# Patient Record
Sex: FEMALE | Race: WHITE | Employment: FULL TIME | URBAN - METROPOLITAN AREA
[De-identification: names, ages, dates, MRNs, and addresses within clinical notes are randomized per-mention and may not be internally consistent; named-entity substitution may affect disease eponyms.]

---

## 2017-01-14 ENCOUNTER — HOSPITAL ENCOUNTER (EMERGENCY)
Age: 52
Discharge: HOME | End: 2017-01-14
Attending: EMERGENCY MEDICINE | Admitting: EMERGENCY MEDICINE
Payer: COMMERCIAL

## 2017-01-14 ENCOUNTER — APPOINTMENT (OUTPATIENT)
Dept: GENERAL RADIOLOGY | Age: 52
End: 2017-01-14
Attending: EMERGENCY MEDICINE
Payer: COMMERCIAL

## 2017-01-14 ENCOUNTER — APPOINTMENT (OUTPATIENT)
Dept: CT IMAGING | Age: 52
End: 2017-01-14
Attending: EMERGENCY MEDICINE
Payer: COMMERCIAL

## 2017-01-14 VITALS
TEMPERATURE: 98.4 F | WEIGHT: 131 LBS | BODY MASS INDEX: 22.36 KG/M2 | RESPIRATION RATE: 16 BRPM | DIASTOLIC BLOOD PRESSURE: 82 MMHG | SYSTOLIC BLOOD PRESSURE: 117 MMHG | HEIGHT: 64 IN | HEART RATE: 102 BPM | OXYGEN SATURATION: 97 %

## 2017-01-14 DIAGNOSIS — E87.6 HYPOKALEMIA: ICD-10-CM

## 2017-01-14 DIAGNOSIS — E83.51 HYPOCALCEMIA: ICD-10-CM

## 2017-01-14 DIAGNOSIS — N30.00 ACUTE CYSTITIS WITHOUT HEMATURIA: ICD-10-CM

## 2017-01-14 DIAGNOSIS — D69.6 THROMBOCYTOPENIA (HCC): ICD-10-CM

## 2017-01-14 DIAGNOSIS — R56.9 SEIZURE (HCC): Primary | ICD-10-CM

## 2017-01-14 LAB
ALBUMIN SERPL BCP-MCNC: 3.4 G/DL (ref 3.5–5)
ALBUMIN/GLOB SERPL: 0.9 {RATIO} (ref 1.1–2.2)
ALP SERPL-CCNC: 118 U/L (ref 45–117)
ALT SERPL-CCNC: 32 U/L (ref 12–78)
ANION GAP BLD CALC-SCNC: 10 MMOL/L (ref 5–15)
ANION GAP BLD CALC-SCNC: 13 MMOL/L (ref 5–15)
APPEARANCE UR: CLEAR
AST SERPL W P-5'-P-CCNC: 164 U/L (ref 15–37)
BACTERIA URNS QL MICRO: ABNORMAL /HPF
BASOPHILS # BLD AUTO: 0 K/UL (ref 0–0.1)
BASOPHILS # BLD: 0 % (ref 0–1)
BILIRUB SERPL-MCNC: 0.7 MG/DL (ref 0.2–1)
BILIRUB UR QL: NEGATIVE
BUN SERPL-MCNC: 7 MG/DL (ref 6–20)
BUN SERPL-MCNC: 9 MG/DL (ref 6–20)
BUN/CREAT SERPL: 7 (ref 12–20)
BUN/CREAT SERPL: 7 (ref 12–20)
CALCIUM SERPL-MCNC: 7.4 MG/DL (ref 8.5–10.1)
CALCIUM SERPL-MCNC: 8.2 MG/DL (ref 8.5–10.1)
CHLORIDE SERPL-SCNC: 100 MMOL/L (ref 97–108)
CHLORIDE SERPL-SCNC: 91 MMOL/L (ref 97–108)
CO2 SERPL-SCNC: 25 MMOL/L (ref 21–32)
CO2 SERPL-SCNC: 26 MMOL/L (ref 21–32)
COLOR UR: ABNORMAL
CREAT SERPL-MCNC: 0.99 MG/DL (ref 0.55–1.02)
CREAT SERPL-MCNC: 1.28 MG/DL (ref 0.55–1.02)
EOSINOPHIL # BLD: 0.1 K/UL (ref 0–0.4)
EOSINOPHIL NFR BLD: 2 % (ref 0–7)
EPITH CASTS URNS QL MICRO: ABNORMAL /LPF
ERYTHROCYTE [DISTWIDTH] IN BLOOD BY AUTOMATED COUNT: 15.8 % (ref 11.5–14.5)
GLOBULIN SER CALC-MCNC: 3.9 G/DL (ref 2–4)
GLUCOSE BLD STRIP.AUTO-MCNC: 145 MG/DL (ref 65–100)
GLUCOSE SERPL-MCNC: 116 MG/DL (ref 65–100)
GLUCOSE SERPL-MCNC: 119 MG/DL (ref 65–100)
GLUCOSE UR STRIP.AUTO-MCNC: NEGATIVE MG/DL
HCT VFR BLD AUTO: 30.1 % (ref 35–47)
HGB BLD-MCNC: 10.6 G/DL (ref 11.5–16)
HGB UR QL STRIP: NEGATIVE
KETONES UR QL STRIP.AUTO: NEGATIVE MG/DL
LEUKOCYTE ESTERASE UR QL STRIP.AUTO: ABNORMAL
LYMPHOCYTES # BLD AUTO: 14 % (ref 12–49)
LYMPHOCYTES # BLD: 0.9 K/UL (ref 0.8–3.5)
MAGNESIUM SERPL-MCNC: 1.6 MG/DL (ref 1.6–2.4)
MCH RBC QN AUTO: 34.2 PG (ref 26–34)
MCHC RBC AUTO-ENTMCNC: 35.2 G/DL (ref 30–36.5)
MCV RBC AUTO: 97.1 FL (ref 80–99)
MONOCYTES # BLD: 0.7 K/UL (ref 0–1)
MONOCYTES NFR BLD AUTO: 10 % (ref 5–13)
NEUTS SEG # BLD: 4.8 K/UL (ref 1.8–8)
NEUTS SEG NFR BLD AUTO: 74 % (ref 32–75)
NITRITE UR QL STRIP.AUTO: NEGATIVE
PH UR STRIP: 6 [PH] (ref 5–8)
PLATELET # BLD AUTO: 80 K/UL (ref 150–400)
POTASSIUM SERPL-SCNC: 2.5 MMOL/L (ref 3.5–5.1)
POTASSIUM SERPL-SCNC: 3 MMOL/L (ref 3.5–5.1)
PROT SERPL-MCNC: 7.3 G/DL (ref 6.4–8.2)
PROT UR STRIP-MCNC: NEGATIVE MG/DL
RBC # BLD AUTO: 3.1 M/UL (ref 3.8–5.2)
RBC #/AREA URNS HPF: ABNORMAL /HPF (ref 0–5)
SERVICE CMNT-IMP: ABNORMAL
SODIUM SERPL-SCNC: 130 MMOL/L (ref 136–145)
SODIUM SERPL-SCNC: 135 MMOL/L (ref 136–145)
SP GR UR REFRACTOMETRY: 1 (ref 1–1.03)
UA: UC IF INDICATED,UAUC: ABNORMAL
UROBILINOGEN UR QL STRIP.AUTO: 0.2 EU/DL (ref 0.2–1)
WBC # BLD AUTO: 6.6 K/UL (ref 3.6–11)
WBC URNS QL MICRO: ABNORMAL /HPF (ref 0–4)

## 2017-01-14 PROCEDURE — 96366 THER/PROPH/DIAG IV INF ADDON: CPT

## 2017-01-14 PROCEDURE — 74011250637 HC RX REV CODE- 250/637: Performed by: EMERGENCY MEDICINE

## 2017-01-14 PROCEDURE — 87086 URINE CULTURE/COLONY COUNT: CPT | Performed by: EMERGENCY MEDICINE

## 2017-01-14 PROCEDURE — 87077 CULTURE AEROBIC IDENTIFY: CPT | Performed by: EMERGENCY MEDICINE

## 2017-01-14 PROCEDURE — 81001 URINALYSIS AUTO W/SCOPE: CPT | Performed by: EMERGENCY MEDICINE

## 2017-01-14 PROCEDURE — 71020 XR CHEST PA LAT: CPT

## 2017-01-14 PROCEDURE — 83735 ASSAY OF MAGNESIUM: CPT | Performed by: EMERGENCY MEDICINE

## 2017-01-14 PROCEDURE — 94640 AIRWAY INHALATION TREATMENT: CPT

## 2017-01-14 PROCEDURE — 70450 CT HEAD/BRAIN W/O DYE: CPT

## 2017-01-14 PROCEDURE — 96367 TX/PROPH/DG ADDL SEQ IV INF: CPT

## 2017-01-14 PROCEDURE — 74011250636 HC RX REV CODE- 250/636: Performed by: EMERGENCY MEDICINE

## 2017-01-14 PROCEDURE — 74011000258 HC RX REV CODE- 258: Performed by: EMERGENCY MEDICINE

## 2017-01-14 PROCEDURE — 82962 GLUCOSE BLOOD TEST: CPT

## 2017-01-14 PROCEDURE — 87186 SC STD MICRODIL/AGAR DIL: CPT | Performed by: EMERGENCY MEDICINE

## 2017-01-14 PROCEDURE — 80048 BASIC METABOLIC PNL TOTAL CA: CPT | Performed by: EMERGENCY MEDICINE

## 2017-01-14 PROCEDURE — 74011000250 HC RX REV CODE- 250: Performed by: EMERGENCY MEDICINE

## 2017-01-14 PROCEDURE — 96361 HYDRATE IV INFUSION ADD-ON: CPT

## 2017-01-14 PROCEDURE — 96365 THER/PROPH/DIAG IV INF INIT: CPT

## 2017-01-14 PROCEDURE — 77030029684 HC NEB SM VOL KT MONA -A

## 2017-01-14 PROCEDURE — 36415 COLL VENOUS BLD VENIPUNCTURE: CPT | Performed by: EMERGENCY MEDICINE

## 2017-01-14 PROCEDURE — 80053 COMPREHEN METABOLIC PANEL: CPT | Performed by: EMERGENCY MEDICINE

## 2017-01-14 PROCEDURE — 99285 EMERGENCY DEPT VISIT HI MDM: CPT

## 2017-01-14 PROCEDURE — 85025 COMPLETE CBC W/AUTO DIFF WBC: CPT | Performed by: EMERGENCY MEDICINE

## 2017-01-14 RX ORDER — CEPHALEXIN 500 MG/1
500 CAPSULE ORAL 3 TIMES DAILY
Qty: 21 CAP | Refills: 0 | Status: SHIPPED | OUTPATIENT
Start: 2017-01-14 | End: 2017-01-18 | Stop reason: SINTOL

## 2017-01-14 RX ORDER — CALCIUM GLUCONATE 94 MG/ML
1 INJECTION, SOLUTION INTRAVENOUS
Status: DISCONTINUED | OUTPATIENT
Start: 2017-01-14 | End: 2017-01-14

## 2017-01-14 RX ORDER — POTASSIUM CHLORIDE 750 MG/1
40 TABLET, FILM COATED, EXTENDED RELEASE ORAL
Status: COMPLETED | OUTPATIENT
Start: 2017-01-14 | End: 2017-01-14

## 2017-01-14 RX ORDER — IPRATROPIUM BROMIDE AND ALBUTEROL SULFATE 2.5; .5 MG/3ML; MG/3ML
3 SOLUTION RESPIRATORY (INHALATION)
Status: COMPLETED | OUTPATIENT
Start: 2017-01-14 | End: 2017-01-14

## 2017-01-14 RX ORDER — POTASSIUM CHLORIDE 20 MEQ/1
60 TABLET, EXTENDED RELEASE ORAL
Status: COMPLETED | OUTPATIENT
Start: 2017-01-14 | End: 2017-01-14

## 2017-01-14 RX ORDER — POTASSIUM CHLORIDE 7.45 MG/ML
10 INJECTION INTRAVENOUS
Status: COMPLETED | OUTPATIENT
Start: 2017-01-14 | End: 2017-01-14

## 2017-01-14 RX ADMIN — POTASSIUM CHLORIDE 60 MEQ: 20 TABLET, EXTENDED RELEASE ORAL at 09:07

## 2017-01-14 RX ADMIN — IPRATROPIUM BROMIDE AND ALBUTEROL SULFATE 3 ML: .5; 3 SOLUTION RESPIRATORY (INHALATION) at 07:54

## 2017-01-14 RX ADMIN — CALCIUM GLUCONATE 1 G: 94 INJECTION, SOLUTION INTRAVENOUS at 12:50

## 2017-01-14 RX ADMIN — POTASSIUM CHLORIDE 10 MEQ: 10 INJECTION, SOLUTION INTRAVENOUS at 09:08

## 2017-01-14 RX ADMIN — SODIUM CHLORIDE 1000 ML: 900 INJECTION, SOLUTION INTRAVENOUS at 07:53

## 2017-01-14 RX ADMIN — POTASSIUM CHLORIDE 40 MEQ: 750 TABLET, FILM COATED, EXTENDED RELEASE ORAL at 13:58

## 2017-01-14 RX ADMIN — POTASSIUM CHLORIDE 10 MEQ: 10 INJECTION, SOLUTION INTRAVENOUS at 10:10

## 2017-01-14 NOTE — ED PROVIDER NOTES
HPI Comments: Nilam Wilson is a 46 y.o. female with PMhx significant for seizures, breast CA, and HTN who presents via EMS to the ED with a sudden episode of AMS. Pt states that she got off work this morning at 0430 and went to a friend's house. She notes that she was consuming EtOH at the time of event, when her friend noticed that pt suddenly became \"out of it. \" Per EMS, friend noted that pt leaned back as if she was in pain and appeared \"jerky. \" Pt notes that she is unable to recall the event. Per EMS, pt appeared to be postictal on arrival to scene. Patient states that she has a Hx of seizures, last known seizure ~ 2 years and was admitted for further workup at that time. She denies currently being on antiepileptic medications and has not followed up with a neurologist since last seizure. Pt denies any N/V/D, fevers, chills, chest pain, head injury, dyspnea, abdominal pain, incontnence or falls. Social history significant for: + Tobacco, + EtOH, - Illicit drug use    PCP: Brianna Adams MD    There are no other complaints, changes or physical findings at this time. Written by Giancarlo Hinson ED Scribe, as dictated by Abram Anton MD      The history is provided by the patient and the EMS personnel. No  was used.         Past Medical History:   Diagnosis Date    Anemia NEC     Anxiety     Cancer ([facility] [address]      Breast Cancer-Ductal Carcinoma in situ grade 1    Depression     Headache(784.0)     Hypertension     Poor appetite     Psychiatric disorder      depression    PUD (peptic ulcer disease) 11/12    Stress     Tired     Trouble in sleeping     Weakness generalized        Past Surgical History:   Procedure Laterality Date    Hx gyn       fallopian tube    Hx other surgical       cyst removed rom right thigh    Hx orthopaedic       left ankle repair    Pr breast surgery procedure unlisted  5/30/13     RIGHT BREAST LUMPECTOMY WITH RIGHT NEDDLE LOCALIZATION     Hx breast lumpectomy  5/30/2013     BREAST LUMPECTOMY/PARTIAL performed by Aries Raphael MD at [facility] MAIN OR         Family History:   Problem Relation Age of Onset    Cancer Father      pancreatic cancer    Cancer Brother      thyroid cancer    Hypertension Mother        Social History     Social History    Marital status:      Spouse name: N/A    Number of children: N/A    Years of education: N/A     Occupational History    Not on file. Social History Main Topics    Smoking status: Current Every Day Smoker     Packs/day: 0.25     Years: 20.00     Types: Cigarettes    Smokeless tobacco: Never Used      Comment: 4 cigarettes a day    Alcohol use 3.5 oz/week     7 Standard drinks or equivalent per week      Comment: 1 beer daily/1 glass wine    Drug use: No    Sexual activity: Not on file     Other Topics Concern    Not on file     Social History Narrative         ALLERGIES: Aspirin    Review of Systems   Constitutional: Negative for chills, fatigue and fever. HENT: Negative for congestion, rhinorrhea and sore throat. Eyes: Negative for pain, discharge and visual disturbance. Respiratory: Negative for cough, chest tightness, shortness of breath and wheezing. Cardiovascular: Negative for chest pain, palpitations and leg swelling. Gastrointestinal: Negative for abdominal pain, constipation, diarrhea, nausea and vomiting. No incontinence    Genitourinary: Negative for dysuria, frequency and hematuria. No incontinence    Musculoskeletal: Negative for arthralgias, back pain and myalgias. Skin: Negative for rash. Neurological: Positive for seizures. Negative for dizziness, weakness, light-headedness and headaches. Psychiatric/Behavioral: Positive for confusion (Postictal ).        Patient Vitals for the past 12 hrs:   Temp Pulse Resp BP SpO2   01/14/17 1253 - 94 28 112/79 97 %   01/14/17 1130 - 96 18 119/84 96 %   01/14/17 1105 - 93 15 126/82 98 %   01/14/17 1030 - 95 17 110/79 94 %   01/14/17 1010 - 94 17 109/65 91 %   01/14/17 0830 - (!) 103 19 125/81 95 %   01/14/17 0800 - 85 18 121/88 100 %   01/14/17 0724 - - - - 97 %   01/14/17 0715 - - - 120/83 96 %   01/14/17 0701 98.4 °F (36.9 °C) 99 16 130/80 96 %            Physical Exam   Constitutional: She is oriented to person, place, and time. She appears well-developed and well-nourished. No distress. HENT:   Head: Normocephalic and atraumatic. Eyes: EOM are normal. Pupils are equal, round, and reactive to light. Right eye exhibits no discharge. Left eye exhibits no discharge. No scleral icterus. Neck: Normal range of motion. Neck supple. No tracheal deviation present. Cardiovascular: Normal rate, regular rhythm, normal heart sounds and intact distal pulses. Exam reveals no gallop and no friction rub. No murmur heard. Pulmonary/Chest: Effort normal. No respiratory distress. She has wheezes (Scattered). She has no rales. Abdominal: Soft. She exhibits no distension. There is no tenderness. Musculoskeletal: Normal range of motion. She exhibits no edema. Lymphadenopathy:     She has no cervical adenopathy. Neurological: She is alert and oriented to person, place, and time. Facial symmetry, normal speech, 5/5 strength BL upper and lower extremities, finger-nose-finger and heel to shin intact bilaterally. Negative pronator drift. Gait stable   Skin: Skin is warm and dry. No rash noted. Psychiatric: She has a normal mood and affect. Nursing note and vitals reviewed. MDM  Number of Diagnoses or Management Options  Acute cystitis without hematuria:   Hypocalcemia:   Hypokalemia:   Seizure ([facility] [address]): Thrombocytopenia [facility]):   Diagnosis management comments:     Patient presents to ED after seizure-like activity. DDx includes seizure disorder, electrolyte abnormality, dehydration, intracranial mass, withdrawal.  Will obtain CBC, CMP, Mg, UA, head CT. Will treat with IV fluids and reevaluate.   Advised patient she cannot drive until she has been seizure free for six months and cleared by a neurologist.         Amount and/or Complexity of Data Reviewed  Clinical lab tests: ordered and reviewed  Tests in the radiology section of CPT®: ordered and reviewed  Obtain history from someone other than the patient: yes (EMS)  Review and summarize past medical records: yes    Patient Progress  Patient progress: stable      ED Course       Procedures     PROGRESS NOTE:  7:57 AM  Pt has been re-evaluated. Patient's friend is at bedside. Friend states that pt leaned back, her eyes were squinted and she had diffuse shaking consistent with seizure (friend has witness seizures in the past). Friend notes that the episode lasted ~ 4 minutes. Friend states the pt was confused following event. PROGRESS NOTE:  1:00 PM  Patient is alert and oriented, no additional seizure like activity in ED. K and Ca repleted while in ED. Advised pt to f/u with PCP next week for repeat blood work (discussed electrolytes and low platelets) and neurology for further evaluation. Advised pt she can not drive for 6 months and until cleared by neurology. Discussed results, prescriptions and follow up plan with patient. Provided customary return to ED instructions. Patient expressed understanding.   Neymar Galdamez MD      LABORATORY TESTS:  Recent Results (from the past 12 hour(s))   GLUCOSE, POC    Collection Time: 01/14/17  7:02 AM   Result Value Ref Range    Glucose (POC) 145 (H) 65 - 100 mg/dL    Performed by Tami Washburn    CBC WITH AUTOMATED DIFF    Collection Time: 01/14/17  7:28 AM   Result Value Ref Range    WBC 6.6 3.6 - 11.0 K/uL    RBC 3.10 (L) 3.80 - 5.20 M/uL    HGB 10.6 (L) 11.5 - 16.0 g/dL    HCT 30.1 (L) 35.0 - 47.0 %    MCV 97.1 80.0 - 99.0 FL    MCH 34.2 (H) 26.0 - 34.0 PG    MCHC 35.2 30.0 - 36.5 g/dL    RDW 15.8 (H) 11.5 - 14.5 %    PLATELET 80 (L) 238 - 400 K/uL    NEUTROPHILS 74 32 - 75 %    LYMPHOCYTES 14 12 - 49 %    MONOCYTES 10 5 - 13 %    EOSINOPHILS 2 0 - 7 %    BASOPHILS 0 0 - 1 %    ABS. NEUTROPHILS 4.8 1.8 - 8.0 K/UL    ABS. LYMPHOCYTES 0.9 0.8 - 3.5 K/UL    ABS. MONOCYTES 0.7 0.0 - 1.0 K/UL    ABS. EOSINOPHILS 0.1 0.0 - 0.4 K/UL    ABS. BASOPHILS 0.0 0.0 - 0.1 K/UL   METABOLIC PANEL, COMPREHENSIVE    Collection Time: 01/14/17  7:28 AM   Result Value Ref Range    Sodium 130 (L) 136 - 145 mmol/L    Potassium 2.5 (LL) 3.5 - 5.1 mmol/L    Chloride 91 (L) 97 - 108 mmol/L    CO2 26 21 - 32 mmol/L    Anion gap 13 5 - 15 mmol/L    Glucose 119 (H) 65 - 100 mg/dL    BUN 9 6 - 20 MG/DL    Creatinine 1.28 (H) 0.55 - 1.02 MG/DL    BUN/Creatinine ratio 7 (L) 12 - 20      GFR est AA 53 (L) >60 ml/min/1.73m2    GFR est non-AA 44 (L) >60 ml/min/1.73m2    Calcium 8.2 (L) 8.5 - 10.1 MG/DL    Bilirubin, total 0.7 0.2 - 1.0 MG/DL    ALT 32 12 - 78 U/L     (H) 15 - 37 U/L    Alk.  phosphatase 118 (H) 45 - 117 U/L    Protein, total 7.3 6.4 - 8.2 g/dL    Albumin 3.4 (L) 3.5 - 5.0 g/dL    Globulin 3.9 2.0 - 4.0 g/dL    A-G Ratio 0.9 (L) 1.1 - 2.2     MAGNESIUM    Collection Time: 01/14/17  7:28 AM   Result Value Ref Range    Magnesium 1.6 1.6 - 2.4 mg/dL   URINALYSIS W/ REFLEX CULTURE    Collection Time: 01/14/17  9:17 AM   Result Value Ref Range    Color YELLOW/STRAW      Appearance CLEAR CLEAR      Specific gravity 1.005 1.003 - 1.030      pH (UA) 6.0 5.0 - 8.0      Protein NEGATIVE  NEG mg/dL    Glucose NEGATIVE  NEG mg/dL    Ketone NEGATIVE  NEG mg/dL    Bilirubin NEGATIVE  NEG      Blood NEGATIVE  NEG      Urobilinogen 0.2 0.2 - 1.0 EU/dL    Nitrites NEGATIVE  NEG      Leukocyte Esterase SMALL (A) NEG      WBC 5-10 0 - 4 /hpf    RBC 0-5 0 - 5 /hpf    Epithelial cells FEW FEW /lpf    Bacteria 2+ (A) NEG /hpf    UA:UC IF INDICATED URINE CULTURE ORDERED (A) CNI     METABOLIC PANEL, BASIC    Collection Time: 01/14/17 11:28 AM   Result Value Ref Range    Sodium 135 (L) 136 - 145 mmol/L    Potassium 3.0 (L) 3.5 - 5.1 mmol/L    Chloride 100 97 - 108 mmol/L    CO2 25 21 - 32 mmol/L    Anion gap 10 5 - 15 mmol/L    Glucose 116 (H) 65 - 100 mg/dL    BUN 7 6 - 20 MG/DL    Creatinine 0.99 0.55 - 1.02 MG/DL    BUN/Creatinine ratio 7 (L) 12 - 20      GFR est AA >60 >60 ml/min/1.73m2    GFR est non-AA 59 (L) >60 ml/min/1.73m2    Calcium 7.4 (L) 8.5 - 10.1 MG/DL       IMAGING RESULTS:  CT Results  (Last 48 hours)               01/14/17 0744  CT HEAD WO CONT Final result    Impression:  IMPRESSION:    1. No acute intracranial abnormality               Narrative:  EXAM:  CT HEAD WO CONT       INDICATION:   new onset seizure       COMPARISON: June 13, 2013. TECHNIQUE: Unenhanced CT of the head was performed using 5 mm images. Brain and   bone windows were generated. CT dose reduction was achieved through use of a   standardized protocol tailored for this examination and automatic exposure   control for dose modulation. FINDINGS:   The ventricles and sulci are normal in size, shape and configuration and   midline. There is no significant white matter disease. There is no intracranial   hemorrhage, extra-axial collection, mass, mass effect or midline shift. The   basilar cisterns are open. No acute infarct is identified. The bone windows   demonstrate no abnormalities. The visualized portions of the paranasal sinuses   and mastoid air cells are clear. CXR Results  (Last 48 hours)               01/14/17 0732  XR CHEST PA LAT Final result    Impression:  Impression:   1. No acute cardiopulmonary disease           Narrative:  INDICATION:  wheezing on exam with seizure-like activity       Exam: Chest 2 views. Comparison October 15, 2013. Findings: Cardiomediastinal silhouette is normal. Pulmonary vasculature is not   engorged. No focal parenchymal opacities, effusions, or pneumothorax. Bony   thorax is intact.                  MEDICATIONS GIVEN:  Medications   potassium chloride SR (KLOR-CON 10) tablet 40 mEq (not administered)   calcium gluconate 1 g in 0.9% sodium chloride 100 mL IVPB (1 g IntraVENous New Bag 1/14/17 1250)   sodium chloride 0.9 % bolus infusion 1,000 mL (0 mL IntraVENous IV Completed 1/14/17 0908)   albuterol-ipratropium (DUO-NEB) 2.5 MG-0.5 MG/3 ML (3 mL Nebulization Given 1/14/17 0754)   potassium chloride (K-DUR, KLOR-CON) SR tablet 60 mEq (60 mEq Oral Given 1/14/17 0907)   potassium chloride 10 mEq in 100 ml IVPB (0 mEq IntraVENous IV Completed 1/14/17 1127)       IMPRESSION:  1. Seizure ([facility] [address]    2. Hypokalemia    3. Hypocalcemia    4. Thrombocytopenia ([facility] [address]    5. Acute cystitis without hematuria        PLAN:  1. Discharge home  Current Discharge Medication List      START taking these medications    Details   cephALEXin (KEFLEX) 500 mg capsule Take 1 Cap by mouth three (3) times daily for 7 days. Qty: 21 Cap, Refills: 0         CONTINUE these medications which have NOT CHANGED    Details   atorvastatin (LIPITOR) 20 mg tablet Take 1 Tab by mouth daily. Qty: 30 Tab, Refills: 3    Associated Diagnoses: Elevated lipids      diphenoxylate-atropine (LOMOTIL) 2.5-0.025 mg per tablet TAKE 2 TABLETS BY MOUTH 4 TIMES A DAY AS NEEDED FOR DIARRHEA  Refills: 0      escitalopram oxalate (LEXAPRO) 10 mg tablet Take 1 Tab by mouth daily. Qty: 30 Tab, Refills: 6      SUMAtriptan (IMITREX) 50 mg tablet Take 1 Tab by mouth once as needed for Migraine for up to 1 dose. Qty: 6 Tab, Refills: 1      amitriptyline (ELAVIL) 25 mg tablet Take 1 Tab by mouth nightly. Qty: 30 Tab, Refills: 3      nystatin (MYCOSTATIN) 100,000 unit/mL suspension Take 5 mL by mouth four (4) times daily. swish and spit  Qty: 180 mL, Refills: 0      metoprolol succinate (TOPROL-XL) 25 mg XL tablet Take 1 Tab by mouth daily. Qty: 30 Tab, Refills: 3      predniSONE (STERAPRED) 5 mg dose pack See administration instruction per 5mg dose pack  Qty: 21 Tab, Refills: 0      multivitamin (ONE A DAY) tablet Take 1 Tab by mouth daily.              2.   Follow-up Information Follow up With Details Comments Contact Info    Lucille Isaacs MD In 3 days Please follow up with your primary care provider to have your blood work reevaluated [address] [facility] Wanda Kelley MD  Please follow up with neurology as soon as possible [address]  [address]  [phone]      [facility] EMERGENCY DEPT  As needed, If symptoms worsen [address]  [address] [facility]  [phone]      Return to ED if worse     DISCHARGE NOTE  1:01 PM  The patient has been re-evaluated and is ready for discharge. Reviewed available results with patient. Counseled patient on diagnosis and care plan. Patient has expressed understanding, and all questions have been answered. Patient agrees with plan and agrees to follow up as recommended, or return to the ED if their symptoms worsen. Discharge instructions have been provided and explained to the patient, along with reasons to return to the ED. This note is prepared by Tone Ryan, acting as Scribe for Ghulam Aragon MD.    Ghulam Aragon MD: The scribe's documentation has been prepared under my direction and personally reviewed by me in its entirety. I confirm that the note above accurately reflects all work, treatment, procedures, and medical decision making performed by me.

## 2017-01-14 NOTE — ED NOTES
Pt resting quietly with eyes closed. No distress noted. Friend remains at bedside. Call bell within reach. Side rails up x 2. Will continue to monitor.

## 2017-01-14 NOTE — ED NOTES
Pt ambulated to and from bathroom with steady gait with this nurse Valentin De Leon RN present at side.

## 2017-01-14 NOTE — ED NOTES
Discussed timeframe and plan of care with pt. Verbalized understanding. Side rails up x 2. Call bell within reach. Remains NSR on cardiac monitor. Will continue to monitor.

## 2017-01-14 NOTE — ED NOTES
Report received from off going RN Douglas Wong. Pt on cardiac monitor x 3. Side rails up x 2. Friend at bedside. Call bell within reach.

## 2017-01-14 NOTE — ED NOTES
Dr Pete Baltazar visited pt. Discharge orders and instructions noted. Discharge instructions, follow up care and prescriptions reviewed with pt and understanding verbalized. Opportunity for questions and clarifications provided. Pt left via ambulation with friend. In no acute distress.

## 2017-01-14 NOTE — DISCHARGE INSTRUCTIONS
- Please do not drive a vehicle or operate machinery until you have seen neurology and been seizure free for six months  - Please follow up with your primary care provider and neurology next week   - Please ensure you are eating a well balance diet and staying hydrated     Electrolyte Imbalance: Care Instructions  Your Care Instructions  Electrolytes are minerals in your blood. They include sodium, potassium, calcium, and magnesium. When they are not at the right levels, you can feel very ill. You may not know what is causing it, but you know something is wrong. You may feel weak or numb, have muscle spasms, or twitch. Your heart may beat fast. Symptoms are different with each mineral. Too much is as bad as too little. Minerals help keep your body working as it should. Vomiting, diarrhea, and fever can cause you to lose minerals. A problem with your kidneys can tip a mineral out of balance. So can taking certain medicines. Your doctor may do more tests. He or she may change your medicine and diet. If you are low in one or more minerals, they may be given through a tube into your vein (IV). Your doctor may have you take or drink special fluids or pills. If your kidneys are failing, your blood may be filtered. This is called dialysis. It can put the minerals back in balance. Follow-up care is a key part of your treatment and safety. Be sure to make and go to all appointments, and call your doctor if you are having problems. It's also a good idea to know your test results and keep a list of the medicines you take. How can you care for yourself at home? · Take your medicines exactly as prescribed. Call your doctor if you have any problems with your medicines. You will get more details on the specific medicines your doctor prescribes. · Do not take any medicine without talking to your doctor first. This includes prescription, over-the-counter, and herbal medicines.   · If you have kidney, heart, or liver disease and have to limit fluids, talk with your doctor before you increase the amount of fluids you drink. · Your doctor or dietitian may give you a diet plan to help balance your minerals. Follow the diet carefully. When should you call for help? Call 911 anytime you think you may need emergency care. For example, call if:  · You passed out (lost consciousness). · Your heart seems to be speeding up and then slowing down or skipping beats. Call your doctor now or seek immediate medical care if:  · You are very tired and have no energy. · You have trouble thinking or concentrating. Watch closely for changes in your health, and be sure to contact your doctor if:  · You want advice on what to do to keep your minerals in balance. · You do not get better as expected. Where can you learn more? Go to http://emilee-jaclyn.info/. Enter P108 in the search box to learn more about \"Electrolyte Imbalance: Care Instructions. \"  Current as of: July 26, 2016  Content Version: 11.1  © 6181-4165 [employer], Incorporated. Care instructions adapted under license by [employer] (which disclaims liability or warranty for this information). If you have questions about a medical condition or this instruction, always ask your healthcare professional. [place] [address] any warranty or liability for your use of this information. Seizure: Care Instructions  Your Care Instructions    Seizures are caused by abnormal patterns of electrical signals in the brain. They are different for each person. Seizures can affect movement, speech, vision, or awareness. Some people have only slight shaking of a hand and do not pass out. Other people may pass out and have violent shaking of the whole body. Some people appear to stare into space. They are awake, but they can't respond normally. Later, they may not remember what happened. You may need tests to identify the type and cause of the seizures.   A seizure may occur only once, or you may have them more than one time. Taking medicines as directed and following up with your doctor may help keep you from having more seizures. The doctor has checked you carefully, but problems can develop later. If you notice any problems or new symptoms, get medical treatment right away. Follow-up care is a key part of your treatment and safety. Be sure to make and go to all appointments, and call your doctor if you are having problems. It's also a good idea to know your test results and keep a list of the medicines you take. How can you care for yourself at home? · Be safe with medicines. Take your medicines exactly as prescribed. Call your doctor if you think you are having a problem with your medicine. · Do not do any activity that could be dangerous to you or others until your doctor says it is safe to do so. For example, do not drive a car, operate machinery, swim, or climb ladders. · Be sure that anyone treating you for any health problem knows that you have had a seizure and what medicines you are taking for it. · Identify and avoid things that may make you more likely to have a seizure. These may include lack of sleep, alcohol or drug use, stress, or not eating. · Make sure you go to your follow-up appointment. When should you call for help? Call 911 anytime you think you may need emergency care. For example, call if:  · You have another seizure. · You have more than one seizure in 24 hours. · You have new symptoms, such as trouble walking, speaking, or thinking clearly. Call your doctor now or seek immediate medical care if:  · You are not acting normally. Watch closely for changes in your health, and be sure to contact your doctor if you have any problems. Where can you learn more? Go to http://emilee-jaclyn.info/. Enter Q153 in the search box to learn more about \"Seizure: Care Instructions. \"  Current as of: February 19, 2016  Content Version: 11.1  © 2927-8012 [employer]. Care instructions adapted under license by [employer] (which disclaims liability or warranty for this information). If you have questions about a medical condition or this instruction, always ask your healthcare professional. [address] any warranty or liability for your use of this information.

## 2017-01-14 NOTE — LETTER
[address] 
[facility] EMERGENCY DEPT 
[address] [place] [address] [address] [id number] [phone] Work/School Note Date: 1/14/2017 To Whom It May concern: 
 
Oumou Ocasio was seen and treated today in the emergency room by the following provider(s): 
Attending Provider: Eriberto Arreola MD.   
 
Oumou Ocasio may return to work on 1/15/17.  
 
Sincerely, 
 
 
 
 
Eriberto Arreola MD

## 2017-01-15 NOTE — PROGRESS NOTES
Keflex, C&S pending. Patient would like a different abx called in as Keflex is causing diarrhea. Patient wishes to wait until tomorrow for the pending culture before starting another abx.    Alejo Allen

## 2017-01-16 LAB
BACTERIA SPEC CULT: NORMAL
CC UR VC: NORMAL
SERVICE CMNT-IMP: NORMAL

## 2017-01-18 RX ORDER — CIPROFLOXACIN 500 MG/1
500 TABLET ORAL 2 TIMES DAILY
Qty: 10 TAB | Refills: 0 | Status: SHIPPED | OUTPATIENT
Start: 2017-01-18 | End: 2017-01-23

## 2017-03-01 ENCOUNTER — APPOINTMENT (OUTPATIENT)
Dept: GENERAL RADIOLOGY | Age: 52
End: 2017-03-01
Attending: EMERGENCY MEDICINE
Payer: COMMERCIAL

## 2017-03-01 ENCOUNTER — HOSPITAL ENCOUNTER (EMERGENCY)
Age: 52
Discharge: HOME | End: 2017-03-01
Attending: EMERGENCY MEDICINE
Payer: COMMERCIAL

## 2017-03-01 VITALS
BODY MASS INDEX: 20.49 KG/M2 | SYSTOLIC BLOOD PRESSURE: 143 MMHG | WEIGHT: 120 LBS | HEART RATE: 120 BPM | OXYGEN SATURATION: 96 % | HEIGHT: 64 IN | TEMPERATURE: 98.5 F | RESPIRATION RATE: 18 BRPM | DIASTOLIC BLOOD PRESSURE: 95 MMHG

## 2017-03-01 DIAGNOSIS — R11.2 NAUSEA AND VOMITING, INTRACTABILITY OF VOMITING NOT SPECIFIED, UNSPECIFIED VOMITING TYPE: ICD-10-CM

## 2017-03-01 DIAGNOSIS — R07.89 ATYPICAL CHEST PAIN: Primary | ICD-10-CM

## 2017-03-01 DIAGNOSIS — N39.0 URINARY TRACT INFECTION, SITE UNSPECIFIED: ICD-10-CM

## 2017-03-01 DIAGNOSIS — R19.7 DIARRHEA, UNSPECIFIED TYPE: ICD-10-CM

## 2017-03-01 LAB
ALBUMIN SERPL BCP-MCNC: 3.3 G/DL (ref 3.5–5)
ALBUMIN/GLOB SERPL: 0.9 {RATIO} (ref 1.1–2.2)
ALP SERPL-CCNC: 141 U/L (ref 45–117)
ALT SERPL-CCNC: 48 U/L (ref 12–78)
ANION GAP BLD CALC-SCNC: 10 MMOL/L (ref 5–15)
APPEARANCE UR: ABNORMAL
AST SERPL W P-5'-P-CCNC: 179 U/L (ref 15–37)
BACTERIA URNS QL MICRO: ABNORMAL /HPF
BASOPHILS # BLD AUTO: 0 K/UL (ref 0–0.1)
BASOPHILS # BLD: 0 % (ref 0–1)
BILIRUB SERPL-MCNC: 1.4 MG/DL (ref 0.2–1)
BILIRUB UR QL: NEGATIVE
BUN SERPL-MCNC: 6 MG/DL (ref 6–20)
BUN/CREAT SERPL: 8 (ref 12–20)
CALCIUM SERPL-MCNC: 8.4 MG/DL (ref 8.5–10.1)
CHLORIDE SERPL-SCNC: 104 MMOL/L (ref 97–108)
CK SERPL-CCNC: 55 U/L (ref 26–192)
CO2 SERPL-SCNC: 27 MMOL/L (ref 21–32)
COLOR UR: ABNORMAL
CREAT SERPL-MCNC: 0.8 MG/DL (ref 0.55–1.02)
EOSINOPHIL # BLD: 0 K/UL (ref 0–0.4)
EOSINOPHIL NFR BLD: 0 % (ref 0–7)
EPITH CASTS URNS QL MICRO: ABNORMAL /LPF
ERYTHROCYTE [DISTWIDTH] IN BLOOD BY AUTOMATED COUNT: 17.3 % (ref 11.5–14.5)
GLOBULIN SER CALC-MCNC: 3.6 G/DL (ref 2–4)
GLUCOSE SERPL-MCNC: 119 MG/DL (ref 65–100)
GLUCOSE UR STRIP.AUTO-MCNC: NEGATIVE MG/DL
HCT VFR BLD AUTO: 31.1 % (ref 35–47)
HGB BLD-MCNC: 10.8 G/DL (ref 11.5–16)
HGB UR QL STRIP: ABNORMAL
KETONES UR QL STRIP.AUTO: ABNORMAL MG/DL
LEUKOCYTE ESTERASE UR QL STRIP.AUTO: ABNORMAL
LYMPHOCYTES # BLD AUTO: 11 % (ref 12–49)
LYMPHOCYTES # BLD: 1 K/UL (ref 0.8–3.5)
MCH RBC QN AUTO: 35.1 PG (ref 26–34)
MCHC RBC AUTO-ENTMCNC: 34.7 G/DL (ref 30–36.5)
MCV RBC AUTO: 101 FL (ref 80–99)
MONOCYTES # BLD: 0.5 K/UL (ref 0–1)
MONOCYTES NFR BLD AUTO: 6 % (ref 5–13)
NEUTS SEG # BLD: 7.5 K/UL (ref 1.8–8)
NEUTS SEG NFR BLD AUTO: 83 % (ref 32–75)
NITRITE UR QL STRIP.AUTO: POSITIVE
PH UR STRIP: 6.5 [PH] (ref 5–8)
PLATELET # BLD AUTO: 269 K/UL (ref 150–400)
POTASSIUM SERPL-SCNC: 3.3 MMOL/L (ref 3.5–5.1)
PROT SERPL-MCNC: 6.9 G/DL (ref 6.4–8.2)
PROT UR STRIP-MCNC: ABNORMAL MG/DL
RBC # BLD AUTO: 3.08 M/UL (ref 3.8–5.2)
RBC #/AREA URNS HPF: ABNORMAL /HPF (ref 0–5)
SODIUM SERPL-SCNC: 141 MMOL/L (ref 136–145)
SP GR UR REFRACTOMETRY: 1.01 (ref 1–1.03)
TROPONIN I SERPL-MCNC: <0.04 NG/ML
UA: UC IF INDICATED,UAUC: ABNORMAL
UROBILINOGEN UR QL STRIP.AUTO: 1 EU/DL (ref 0.2–1)
WBC # BLD AUTO: 9 K/UL (ref 3.6–11)
WBC URNS QL MICRO: ABNORMAL /HPF (ref 0–4)

## 2017-03-01 PROCEDURE — 36415 COLL VENOUS BLD VENIPUNCTURE: CPT | Performed by: EMERGENCY MEDICINE

## 2017-03-01 PROCEDURE — 93005 ELECTROCARDIOGRAM TRACING: CPT

## 2017-03-01 PROCEDURE — 81001 URINALYSIS AUTO W/SCOPE: CPT | Performed by: EMERGENCY MEDICINE

## 2017-03-01 PROCEDURE — 96361 HYDRATE IV INFUSION ADD-ON: CPT

## 2017-03-01 PROCEDURE — 82550 ASSAY OF CK (CPK): CPT | Performed by: EMERGENCY MEDICINE

## 2017-03-01 PROCEDURE — 80053 COMPREHEN METABOLIC PANEL: CPT | Performed by: EMERGENCY MEDICINE

## 2017-03-01 PROCEDURE — 71020 XR CHEST PA LAT: CPT

## 2017-03-01 PROCEDURE — 74011250636 HC RX REV CODE- 250/636: Performed by: EMERGENCY MEDICINE

## 2017-03-01 PROCEDURE — 85025 COMPLETE CBC W/AUTO DIFF WBC: CPT | Performed by: EMERGENCY MEDICINE

## 2017-03-01 PROCEDURE — 84484 ASSAY OF TROPONIN QUANT: CPT | Performed by: EMERGENCY MEDICINE

## 2017-03-01 PROCEDURE — 87077 CULTURE AEROBIC IDENTIFY: CPT | Performed by: EMERGENCY MEDICINE

## 2017-03-01 PROCEDURE — 96374 THER/PROPH/DIAG INJ IV PUSH: CPT

## 2017-03-01 PROCEDURE — 99285 EMERGENCY DEPT VISIT HI MDM: CPT

## 2017-03-01 PROCEDURE — 96375 TX/PRO/DX INJ NEW DRUG ADDON: CPT

## 2017-03-01 PROCEDURE — 87086 URINE CULTURE/COLONY COUNT: CPT | Performed by: EMERGENCY MEDICINE

## 2017-03-01 PROCEDURE — 87186 SC STD MICRODIL/AGAR DIL: CPT | Performed by: EMERGENCY MEDICINE

## 2017-03-01 RX ORDER — ONDANSETRON 2 MG/ML
4 INJECTION INTRAMUSCULAR; INTRAVENOUS
Status: COMPLETED | OUTPATIENT
Start: 2017-03-01 | End: 2017-03-01

## 2017-03-01 RX ORDER — MORPHINE SULFATE 2 MG/ML
4 INJECTION, SOLUTION INTRAMUSCULAR; INTRAVENOUS
Status: COMPLETED | OUTPATIENT
Start: 2017-03-01 | End: 2017-03-01

## 2017-03-01 RX ORDER — CEPHALEXIN 500 MG/1
500 CAPSULE ORAL 2 TIMES DAILY
Qty: 14 CAP | Refills: 0 | Status: SHIPPED | OUTPATIENT
Start: 2017-03-01 | End: 2017-03-08

## 2017-03-01 RX ORDER — ONDANSETRON 4 MG/1
4 TABLET, ORALLY DISINTEGRATING ORAL
Qty: 16 TAB | Refills: 0 | Status: SHIPPED | OUTPATIENT
Start: 2017-03-01 | End: 2017-03-22

## 2017-03-01 RX ADMIN — Medication 4 MG: at 20:33

## 2017-03-01 RX ADMIN — ONDANSETRON HYDROCHLORIDE 4 MG: 2 INJECTION, SOLUTION INTRAMUSCULAR; INTRAVENOUS at 20:33

## 2017-03-01 RX ADMIN — SODIUM CHLORIDE 1000 ML: 900 INJECTION, SOLUTION INTRAVENOUS at 20:33

## 2017-03-01 NOTE — LETTER
[address] 
[facility] EMERGENCY DEPT 
[address] [place] [address] [address] [id number] 
[phone] Work/School Note Date: 3/1/2017 To Whom It May concern: 
 
Anay Dillon was seen and treated today in the emergency room by the following provider(s): 
Attending Provider: Que Nj DO. Maite Florence may return to work on Thursday, March 2nd, 2017.  
 
Sincerely, 
 
 
 
 
Artem Barroso RN

## 2017-03-02 LAB
ATRIAL RATE: 97 BPM
CALCULATED P AXIS, ECG09: 61 DEGREES
CALCULATED R AXIS, ECG10: 23 DEGREES
CALCULATED T AXIS, ECG11: 53 DEGREES
DIAGNOSIS, 93000: NORMAL
P-R INTERVAL, ECG05: 150 MS
Q-T INTERVAL, ECG07: 374 MS
QRS DURATION, ECG06: 78 MS
QTC CALCULATION (BEZET), ECG08: 474 MS
VENTRICULAR RATE, ECG03: 97 BPM

## 2017-03-02 NOTE — ED NOTES
Physician has reviewed discharge instructions with the patient. The patient verbalized understanding. Pt denied wheelchair assistance out of ED. Pt in no acute distress.

## 2017-03-02 NOTE — DISCHARGE INSTRUCTIONS
Chest Pain: Care Instructions  Your Care Instructions  There are many things that can cause chest pain. Some are not serious and will get better on their own in a few days. But some kinds of chest pain need more testing and treatment. Your doctor may have recommended a follow-up visit in the next 8 to 12 hours. If you are not getting better, you may need more tests or treatment. Even though your doctor has released you, you still need to watch for any problems. The doctor carefully checked you, but sometimes problems can develop later. If you have new symptoms or if your symptoms do not get better, get medical care right away. If you have worse or different chest pain or pressure that lasts more than 5 minutes or you passed out (lost consciousness), call 911 or seek other emergency help right away. A medical visit is only one step in your treatment. Even if you feel better, you still need to do what your doctor recommends, such as going to all suggested follow-up appointments and taking medicines exactly as directed. This will help you recover and help prevent future problems. How can you care for yourself at home? · Rest until you feel better. · Take your medicine exactly as prescribed. Call your doctor if you think you are having a problem with your medicine. · Do not drive after taking a prescription pain medicine. When should you call for help? Call 911 if:  · You passed out (lost consciousness). · You have severe difficulty breathing. · You have symptoms of a heart attack. These may include:  ¨ Chest pain or pressure, or a strange feeling in your chest.  ¨ Sweating. ¨ Shortness of breath. ¨ Nausea or vomiting. ¨ Pain, pressure, or a strange feeling in your back, neck, jaw, or upper belly or in one or both shoulders or arms. ¨ Lightheadedness or sudden weakness. ¨ A fast or irregular heartbeat.   After you call 911, the  may tell you to chew 1 adult-strength or 2 to 4 low-dose aspirin. Wait for an ambulance. Do not try to drive yourself. Call your doctor today if:  · You have any trouble breathing. · Your chest pain gets worse. · You are dizzy or lightheaded, or you feel like you may faint. · You are not getting better as expected. · You are having new or different chest pain. Where can you learn more? Go to http://emilee-jaclyn.info/. Enter A120 in the search box to learn more about \"Chest Pain: Care Instructions. \"  Current as of: May 27, 2016  Content Version: 11.1  © 7807-3925 [email or web address]. Care instructions adapted under license by [employer] (which disclaims liability or warranty for this information). If you have questions about a medical condition or this instruction, always ask your healthcare professional. [address] any warranty or liability for your use of this information. Diarrhea: Care Instructions  Your Care Instructions    Diarrhea is loose, watery stools (bowel movements). The exact cause is often hard to find. Sometimes diarrhea is your body's way of getting rid of what caused an upset stomach. Viruses, food poisoning, and many medicines can cause diarrhea. Some people get diarrhea in response to emotional stress, anxiety, or certain foods. Almost everyone has diarrhea now and then. It usually isn't serious, and your stools will return to normal soon. The important thing to do is replace the fluids you have lost, so you can prevent dehydration. The doctor has checked you carefully, but problems can develop later. If you notice any problems or new symptoms, get medical treatment right away. Follow-up care is a key part of your treatment and safety. Be sure to make and go to all appointments, and call your doctor if you are having problems. It's also a good idea to know your test results and keep a list of the medicines you take. How can you care for yourself at home?   · Watch for signs of dehydration, which means your body has lost too much water. Dehydration is a serious condition and should be treated right away. Signs of dehydration are:  ¨ Increasing thirst and dry eyes and mouth. ¨ Feeling faint or lightheaded. ¨ Darker urine, and a smaller amount of urine than normal.  · To prevent dehydration, drink plenty of fluids, enough so that your urine is light yellow or clear like water. Choose water and other caffeine-free clear liquids until you feel better. If you have kidney, heart, or liver disease and have to limit fluids, talk with your doctor before you increase the amount of fluids you drink. · Begin eating small amounts of mild foods the next day, if you feel like it. ¨ Try yogurt that has live cultures of Lactobacillus. (Check the label.)  ¨ Avoid spicy foods, fruits, alcohol, and caffeine until 48 hours after all symptoms are gone. ¨ Avoid chewing gum that contains sorbitol. ¨ Avoid dairy products (except for yogurt with Lactobacillus) while you have diarrhea and for 3 days after symptoms are gone. · The doctor may recommend that you take over-the-counter medicine, such as loperamide (Imodium), if you still have diarrhea after 6 hours. Read and follow all instructions on the label. Do not use this medicine if you have bloody diarrhea, a high fever, or other signs of serious illness. Call your doctor if you think you are having a problem with your medicine. When should you call for help? Call 911 anytime you think you may need emergency care. For example, call if:  · You passed out (lost consciousness). · Your stools are maroon or very bloody. Call your doctor now or seek immediate medical care if:  · You are dizzy or lightheaded, or you feel like you may faint. · Your stools are black and look like tar, or they have streaks of blood. · You have new or worse belly pain. · You have symptoms of dehydration, such as:  ¨ Dry eyes and a dry mouth.   ¨ Passing only a little dark urine.  ¨ Feeling thirstier than usual.  · You have a new or higher fever. Watch closely for changes in your health, and be sure to contact your doctor if:  · Your diarrhea is getting worse. · You see pus in the diarrhea. · You are not getting better after 2 days (48 hours). Where can you learn more? Go to http://emilee-jaclyn.info/. Enter O454 in the search box to learn more about \"Diarrhea: Care Instructions. \"  Current as of: May 27, 2016  Content Version: 11.1  © 8100-6632 [employer]. Care instructions adapted under license by [employer] (which disclaims liability or warranty for this information). If you have questions about a medical condition or this instruction, always ask your healthcare professional. [place] [address] any warranty or liability for your use of this information. Nausea and Vomiting: Care Instructions  Your Care Instructions    When you are nauseated, you may feel weak and sweaty and notice a lot of saliva in your mouth. Nausea often leads to vomiting. Most of the time you do not need to worry about nausea and vomiting, but they can be signs of other illnesses. Two common causes of nausea and vomiting are stomach flu and food poisoning. Nausea and vomiting from viral stomach flu will usually start to improve within 24 hours. Nausea and vomiting from food poisoning may last from 12 to 48 hours. The doctor has checked you carefully, but problems can develop later. If you notice any problems or new symptoms, get medical treatment right away. Follow-up care is a key part of your treatment and safety. Be sure to make and go to all appointments, and call your doctor if you are having problems. It's also a good idea to know your test results and keep a list of the medicines you take. How can you care for yourself at home?   · To prevent dehydration, drink plenty of fluids, enough so that your urine is light yellow or clear like water. Choose water and other caffeine-free clear liquids until you feel better. If you have kidney, heart, or liver disease and have to limit fluids, talk with your doctor before you increase the amount of fluids you drink. · Rest in bed until you feel better. · When you are able to eat, try clear soups, mild foods, and liquids until all symptoms are gone for 12 to 48 hours. Other good choices include dry toast, crackers, cooked cereal, and gelatin dessert, such as Jell-O. When should you call for help? Call 911 anytime you think you may need emergency care. For example, call if:  · You passed out (lost consciousness). Call your doctor now or seek immediate medical care if:  · You have symptoms of dehydration, such as:  ¨ Dry eyes and a dry mouth. ¨ Passing only a little dark urine. ¨ Feeling thirstier than usual.  · You have new or worsening belly pain. · You have a new or higher fever. · You vomit blood or what looks like coffee grounds. Watch closely for changes in your health, and be sure to contact your doctor if:  · You have ongoing nausea and vomiting. · Your vomiting is getting worse. · Your vomiting lasts longer than 2 days. · You are not getting better as expected. Where can you learn more? Go to http://emilee-jaclyn.info/. Enter 25 [id number] in the search box to learn more about \"Nausea and Vomiting: Care Instructions. \"  Current as of: May 27, 2016  Content Version: 11.1  © 5631-2930 [employer]. Care instructions adapted under license by [employer] (which disclaims liability or warranty for this information). If you have questions about a medical condition or this instruction, always ask your healthcare professional. [place] [address] any warranty or liability for your use of this information.          Urinary Tract Infection in Women: Care Instructions  Your Care Instructions    A urinary tract infection, or UTI, is a general term for an infection anywhere between the kidneys and the urethra (where urine comes out). Most UTIs are bladder infections. They often cause pain or burning when you urinate. UTIs are caused by bacteria and can be cured with antibiotics. Be sure to complete your treatment so that the infection goes away. Follow-up care is a key part of your treatment and safety. Be sure to make and go to all appointments, and call your doctor if you are having problems. It's also a good idea to know your test results and keep a list of the medicines you take. How can you care for yourself at home? · Take your antibiotics as directed. Do not stop taking them just because you feel better. You need to take the full course of antibiotics. · Drink extra water and other fluids for the next day or two. This may help wash out the bacteria that are causing the infection. (If you have kidney, heart, or liver disease and have to limit fluids, talk with your doctor before you increase your fluid intake.)  · Avoid drinks that are carbonated or have caffeine. They can irritate the bladder. · Urinate often. Try to empty your bladder each time. · To relieve pain, take a hot bath or lay a heating pad set on low over your lower belly or genital area. Never go to sleep with a heating pad in place. To prevent UTIs  · Drink plenty of water each day. This helps you urinate often, which clears bacteria from your system. (If you have kidney, heart, or liver disease and have to limit fluids, talk with your doctor before you increase your fluid intake.)  · Consider adding cranberry juice to your diet. · Urinate when you need to. · Urinate right after you have sex. · Change sanitary pads often. · Avoid douches, bubble baths, feminine hygiene sprays, and other feminine hygiene products that have deodorants. · After going to the bathroom, wipe from front to back. When should you call for help?   Call your doctor now or seek immediate medical care if:  · Symptoms such as fever, chills, nausea, or vomiting get worse or appear for the first time. · You have new pain in your back just below your rib cage. This is called flank pain. · There is new blood or pus in your urine. · You have any problems with your antibiotic medicine. Watch closely for changes in your health, and be sure to contact your doctor if:  · You are not getting better after taking an antibiotic for 2 days. · Your symptoms go away but then come back. Where can you learn more? Go to http://emilee-jaclyn.info/. Enter Z856 in the search box to learn more about \"Urinary Tract Infection in Women: Care Instructions. \"  Current as of: August 12, 2016  Content Version: 11.1  © 5473-5093 [employer]. Care instructions adapted under license by [employer] (which disclaims liability or warranty for this information). If you have questions about a medical condition or this instruction, always ask your healthcare professional. [place] [address] any warranty or liability for your use of this information.

## 2017-03-02 NOTE — ED PROVIDER NOTES
HPI Comments: Nilam Wilson is a 46 y.o. Female tobacco user (.25ppd) who has a h/o hypertension, peptic ulcer disease and anxiety presents to ED [facility] ED via EMS with cc non radiating sharp left sided chest pain as well as shortness of breath x this morning. The patient also reports symptoms of nausea, vomiting, diarrhea, mild abd pain and dizziness, all of which began 2 days ago. The patient does not endorse any exacerbating or alleviating factors for her current symptom onset. The patient reports sick contacts for which she states that several of her coworkers have had cold symptoms. She has also not yet attempted any medications for symptom relief. She denies any known h/o heart disease, diabetes or hypertension. She denies all other symptoms at this time, including any leg swelling, fevers, coughing, headaches or any urinary symptoms. PCP: Brianna Adams MD    There are no other complaints changes or physical findings at this time  Written by Naila Cross ED Scribe as dictated by Rigoberto Perea DO. The history is provided by the patient.         Past Medical History:   Diagnosis Date    Anemia NEC     Anxiety     Cancer ([facility] [address]     Breast Cancer-Ductal Carcinoma in situ grade 1    Depression     Headache(784.0)     Hypertension     Poor appetite     Psychiatric disorder     depression    PUD (peptic ulcer disease) 11/12    Stress     Tired     Trouble in sleeping     Weakness generalized        Past Surgical History:   Procedure Laterality Date    BREAST SURGERY PROCEDURE UNLISTED  5/30/13    RIGHT BREAST LUMPECTOMY WITH RIGHT NEDDLE LOCALIZATION     HX BREAST LUMPECTOMY  5/30/2013    BREAST LUMPECTOMY/PARTIAL performed by Hero To MD at [facility] MAIN OR    HX GYN      fallopian tube    HX ORTHOPAEDIC      left ankle repair    HX OTHER SURGICAL      cyst removed rom right thigh         Family History:   Problem Relation Age of Onset    Cancer Father      pancreatic cancer    Cancer Brother      thyroid cancer    Hypertension Mother        Social History     Social History    Marital status:      Spouse name: N/A    Number of children: N/A    Years of education: N/A     Occupational History    Not on file. Social History Main Topics    Smoking status: Current Every Day Smoker     Packs/day: 0.25     Years: 20.00     Types: Cigarettes    Smokeless tobacco: Never Used      Comment: 4 cigarettes a day    Alcohol use 3.5 oz/week     7 Standard drinks or equivalent per week      Comment: 1 beer daily/1 glass wine    Drug use: No    Sexual activity: Not on file     Other Topics Concern    Not on file     Social History Narrative         ALLERGIES: Aspirin    Review of Systems   Constitutional: Negative for fatigue and fever. HENT: Negative. Eyes: Negative. Respiratory: Positive for shortness of breath. Negative for cough and wheezing. Cardiovascular: Positive for chest pain. Negative for leg swelling. Gastrointestinal: Positive for abdominal pain, diarrhea, nausea and vomiting. Negative for blood in stool and constipation. Endocrine: Negative. Genitourinary: Negative for decreased urine volume, difficulty urinating, dysuria, frequency, hematuria and urgency. Musculoskeletal: Negative. Skin: Negative for rash. Allergic/Immunologic: Negative. Neurological: Positive for dizziness. Negative for weakness, numbness and headaches. Hematological: Negative. Psychiatric/Behavioral: Negative. All other systems reviewed and are negative. Patient Vitals for the past 12 hrs:   Temp Pulse Resp BP SpO2   03/01/17 2245 - - - (!) 143/95 96 %   03/01/17 2230 - - - (!) 142/91 98 %   03/01/17 2130 - - - 122/72 98 %   03/01/17 2115 - - - 134/77 97 %   03/01/17 2037 - - - (!) 125/106 98 %   03/01/17 2023 98.5 °F (36.9 °C) (!) 120 18 (!) 118/91 96 %     Physical Exam   Constitutional: She is oriented to person, place, and time.  She appears well-developed and well-nourished. HENT:   Head: Normocephalic and atraumatic. Mouth/Throat: Mucous membranes are normal.   Eyes: EOM are normal. Pupils are equal, round, and reactive to light. Neck: Normal range of motion. No JVD present. No tracheal deviation present. Cardiovascular: Normal rate, regular rhythm, normal heart sounds and intact distal pulses. Exam reveals no gallop and no friction rub. No murmur heard. Pulmonary/Chest: Effort normal and breath sounds normal. No stridor. She has no wheezes. She has no rales. She exhibits no tenderness. Abdominal: Soft. Bowel sounds are normal. She exhibits no distension and no mass. There is no tenderness. There is no guarding. Musculoskeletal: Normal range of motion. She exhibits no edema or tenderness. Neurological: She is alert and oriented to person, place, and time. Skin: Skin is warm and dry. No rash noted. Psychiatric: She has a normal mood and affect. Her behavior is normal. Judgment and thought content normal.        MDM  Number of Diagnoses or Management Options  Atypical chest pain:   Diarrhea, unspecified type:   Nausea and vomiting, intractability of vomiting not specified, unspecified vomiting type:   Urinary tract infection, site unspecified:   Diagnosis management comments: Pt presenting with multiple complaints along with new onset L-sided chest pain that began this morning. Pt with no cardiac history. DDx includes atypical chest pain, acs, pneumonia, anxiety, gerd, uti. Will check labs, cardiac enzymes, CXR, ekg. Will treat with antiemetics, analgesia, then reassess.         Amount and/or Complexity of Data Reviewed  Clinical lab tests: ordered and reviewed  Tests in the radiology section of CPT®: ordered and reviewed  Tests in the medicine section of CPT®: ordered and reviewed  Review and summarize past medical records: yes  Independent visualization of images, tracings, or specimens: yes    Patient Progress  Patient progress: stable    ED Course       Procedures    EKG interpretation: (Preliminary) 1918  Rhythm: normal sinus rhythm; and regular . Rate (approx.): 97; Axis: normal; P wave: normal; QRS interval: normal ; ST/T wave: normal;     10:11 PM  The patient states she is feeling much better, tolerated PO, and is talking on the phone with a family member. LABORATORY TESTS:  Recent Results (from the past 12 hour(s))   EKG, 12 LEAD, INITIAL    Collection Time: 03/01/17  7:18 PM   Result Value Ref Range    Ventricular Rate 97 BPM    Atrial Rate 97 BPM    P-R Interval 150 ms    QRS Duration 78 ms    Q-T Interval 374 ms    QTC Calculation (Bezet) 474 ms    Calculated P Axis 61 degrees    Calculated R Axis 23 degrees    Calculated T Axis 53 degrees    Diagnosis       Normal sinus rhythm  Cannot rule out Anterior infarct , age undetermined  When compared with ECG of 23-MAY-2013 10:08,  QT has lengthened     CBC WITH AUTOMATED DIFF    Collection Time: 03/01/17  8:13 PM   Result Value Ref Range    WBC 9.0 3.6 - 11.0 K/uL    RBC 3.08 (L) 3.80 - 5.20 M/uL    HGB 10.8 (L) 11.5 - 16.0 g/dL    HCT 31.1 (L) 35.0 - 47.0 %    .0 (H) 80.0 - 99.0 FL    MCH 35.1 (H) 26.0 - 34.0 PG    MCHC 34.7 30.0 - 36.5 g/dL    RDW 17.3 (H) 11.5 - 14.5 %    PLATELET 898 671 - 241 K/uL    NEUTROPHILS 83 (H) 32 - 75 %    LYMPHOCYTES 11 (L) 12 - 49 %    MONOCYTES 6 5 - 13 %    EOSINOPHILS 0 0 - 7 %    BASOPHILS 0 0 - 1 %    ABS. NEUTROPHILS 7.5 1.8 - 8.0 K/UL    ABS. LYMPHOCYTES 1.0 0.8 - 3.5 K/UL    ABS. MONOCYTES 0.5 0.0 - 1.0 K/UL    ABS. EOSINOPHILS 0.0 0.0 - 0.4 K/UL    ABS.  BASOPHILS 0.0 0.0 - 0.1 K/UL   METABOLIC PANEL, COMPREHENSIVE    Collection Time: 03/01/17  8:13 PM   Result Value Ref Range    Sodium 141 136 - 145 mmol/L    Potassium 3.3 (L) 3.5 - 5.1 mmol/L    Chloride 104 97 - 108 mmol/L    CO2 27 21 - 32 mmol/L    Anion gap 10 5 - 15 mmol/L    Glucose 119 (H) 65 - 100 mg/dL    BUN 6 6 - 20 MG/DL    Creatinine 0.80 0.55 - 1.02 MG/DL    BUN/Creatinine ratio 8 (L) 12 - 20      GFR est AA >60 >60 ml/min/1.73m2    GFR est non-AA >60 >60 ml/min/1.73m2    Calcium 8.4 (L) 8.5 - 10.1 MG/DL    Bilirubin, total 1.4 (H) 0.2 - 1.0 MG/DL    ALT (SGPT) 48 12 - 78 U/L    AST (SGOT) 179 (H) 15 - 37 U/L    Alk. phosphatase 141 (H) 45 - 117 U/L    Protein, total 6.9 6.4 - 8.2 g/dL    Albumin 3.3 (L) 3.5 - 5.0 g/dL    Globulin 3.6 2.0 - 4.0 g/dL    A-G Ratio 0.9 (L) 1.1 - 2.2     CK W/ REFLX CKMB    Collection Time: 03/01/17  8:13 PM   Result Value Ref Range    CK 55 26 - 192 U/L   TROPONIN I    Collection Time: 03/01/17  8:13 PM   Result Value Ref Range    Troponin-I, Qt. <0.04 <0.05 ng/mL   URINALYSIS W/ REFLEX CULTURE    Collection Time: 03/01/17 10:30 PM   Result Value Ref Range    Color DARK YELLOW      Appearance CLOUDY (A) CLEAR      Specific gravity 1.010 1.003 - 1.030      pH (UA) 6.5 5.0 - 8.0      Protein TRACE (A) NEG mg/dL    Glucose NEGATIVE  NEG mg/dL    Ketone TRACE (A) NEG mg/dL    Bilirubin NEGATIVE  NEG      Blood TRACE (A) NEG      Urobilinogen 1.0 0.2 - 1.0 EU/dL    Nitrites POSITIVE (A) NEG      Leukocyte Esterase LARGE (A) NEG      WBC PENDING /hpf    RBC PENDING /hpf    Epithelial cells PENDING /lpf    Bacteria PENDING /hpf    UA:UC IF INDICATED PENDING        IMAGING RESULTS:  CXR Results  (Last 48 hours)               03/01/17 2052  XR CHEST PA LAT Final result    Impression:  IMPRESSION:       Normal chest views. No change. Narrative:  EXAM:  XR CHEST PA LAT       INDICATION:  Chest pain for one hour with associated left upper extremity   numbness and tingling. Malaise for the past few days. COMPARISON: Chest views on 1/14/2017       TECHNIQUE: PA and lateral chest views       FINDINGS: Metallic bra supports are visible. The cardiomediastinal and hilar   contours are within normal limits. The pulmonary vasculature is within normal   limits. The lungs and pleural spaces are clear.  The visualized bones and upper abdomen   are age-appropriate. MEDICATIONS GIVEN:  Medications   ondansetron [facility]) injection 4 mg (4 mg IntraVENous Given 3/1/17 2033)   sodium chloride 0.9 % bolus infusion 1,000 mL (0 mL IntraVENous IV Completed 3/1/17 2232)   morphine injection 4 mg (4 mg IntraVENous Given 3/1/17 2033)       IMPRESSION:  1. Atypical chest pain    2. Nausea and vomiting, intractability of vomiting not specified, unspecified vomiting type    3. Diarrhea, unspecified type    4. Urinary tract infection, site unspecified        PLAN:  1. Current Discharge Medication List      START taking these medications    Details   cephALEXin (KEFLEX) 500 mg capsule Take 1 Cap by mouth two (2) times a day for 7 days. Qty: 14 Cap, Refills: 0      ondansetron (ZOFRAN ODT) 4 mg disintegrating tablet Take 1 Tab by mouth every eight (8) hours as needed for Nausea. Qty: 16 Tab, Refills: 0         CONTINUE these medications which have NOT CHANGED    Details   atorvastatin (LIPITOR) 20 mg tablet Take 1 Tab by mouth daily. Qty: 30 Tab, Refills: 3    Associated Diagnoses: Elevated lipids      diphenoxylate-atropine (LOMOTIL) 2.5-0.025 mg per tablet TAKE 2 TABLETS BY MOUTH 4 TIMES A DAY AS NEEDED FOR DIARRHEA  Refills: 0      escitalopram oxalate (LEXAPRO) 10 mg tablet Take 1 Tab by mouth daily. Qty: 30 Tab, Refills: 6      SUMAtriptan (IMITREX) 50 mg tablet Take 1 Tab by mouth once as needed for Migraine for up to 1 dose. Qty: 6 Tab, Refills: 1      amitriptyline (ELAVIL) 25 mg tablet Take 1 Tab by mouth nightly. Qty: 30 Tab, Refills: 3      nystatin (MYCOSTATIN) 100,000 unit/mL suspension Take 5 mL by mouth four (4) times daily. swish and spit  Qty: 180 mL, Refills: 0      metoprolol succinate (TOPROL-XL) 25 mg XL tablet Take 1 Tab by mouth daily.   Qty: 30 Tab, Refills: 3      predniSONE (STERAPRED) 5 mg dose pack See administration instruction per 5mg dose pack  Qty: 21 Tab, Refills: 0      multivitamin (ONE A DAY) tablet Take 1 Tab by mouth daily.             2.   Follow-up Information     Follow up With Details Comments Contact Info    Margaretmary Saint, MD Schedule an appointment as soon as possible for a visit in 1 day  [address] [facility]  [address] [facility]  [phone]      [facility] EMERGENCY DEPT  As needed, If symptoms worsen [address]  [address] [facility]  [phone]        Return to ED if worse   Discharge Note:  11:03 PM  The patient is ready for discharge. The patient's signs, symptoms, diagnosis, and discharge instructions have been discussed and the patient has conveyed their understanding. The patient is to follow up as recommended or return to the ER should their symptoms worsen. Plan has been discussed and the patient is in agreement. This note is prepared by Tammie Delcid acting as Scribe for Marko Osuna DO. Marko Osuna DO: The Scribe's documentation has been prepared under my direction and personally reviewed by me in its entirety. I confirm that the note above accurately reflects all work, treatment, procedures, and medical decision making performed by me.

## 2017-03-04 LAB
BACTERIA SPEC CULT: ABNORMAL
CC UR VC: ABNORMAL
SERVICE CMNT-IMP: ABNORMAL

## 2017-03-18 ENCOUNTER — HOSPITAL ENCOUNTER (EMERGENCY)
Age: 52
Discharge: HOME | End: 2017-03-18
Attending: EMERGENCY MEDICINE
Payer: COMMERCIAL

## 2017-03-18 ENCOUNTER — APPOINTMENT (OUTPATIENT)
Dept: CT IMAGING | Age: 52
End: 2017-03-18
Attending: EMERGENCY MEDICINE
Payer: COMMERCIAL

## 2017-03-18 ENCOUNTER — APPOINTMENT (OUTPATIENT)
Dept: ULTRASOUND IMAGING | Age: 52
End: 2017-03-18
Attending: EMERGENCY MEDICINE
Payer: COMMERCIAL

## 2017-03-18 VITALS
SYSTOLIC BLOOD PRESSURE: 112 MMHG | DIASTOLIC BLOOD PRESSURE: 76 MMHG | HEART RATE: 82 BPM | WEIGHT: 143.3 LBS | TEMPERATURE: 97.9 F | OXYGEN SATURATION: 95 % | BODY MASS INDEX: 24.46 KG/M2 | RESPIRATION RATE: 16 BRPM | HEIGHT: 64 IN

## 2017-03-18 DIAGNOSIS — S22.41XA CLOSED FRACTURE OF MULTIPLE RIBS OF RIGHT SIDE, INITIAL ENCOUNTER: ICD-10-CM

## 2017-03-18 DIAGNOSIS — R07.81 RIB PAIN ON RIGHT SIDE: Primary | ICD-10-CM

## 2017-03-18 LAB
ALBUMIN SERPL BCP-MCNC: 3.4 G/DL (ref 3.5–5)
ALBUMIN/GLOB SERPL: 0.9 {RATIO} (ref 1.1–2.2)
ALP SERPL-CCNC: 146 U/L (ref 45–117)
ALT SERPL-CCNC: 48 U/L (ref 12–78)
ANION GAP BLD CALC-SCNC: 10 MMOL/L (ref 5–15)
AST SERPL W P-5'-P-CCNC: 213 U/L (ref 15–37)
BASOPHILS # BLD AUTO: 0.1 K/UL (ref 0–0.1)
BASOPHILS # BLD: 1 % (ref 0–1)
BILIRUB SERPL-MCNC: 0.4 MG/DL (ref 0.2–1)
BUN SERPL-MCNC: 2 MG/DL (ref 6–20)
BUN/CREAT SERPL: 3 (ref 12–20)
CALCIUM SERPL-MCNC: 8.2 MG/DL (ref 8.5–10.1)
CHLORIDE SERPL-SCNC: 106 MMOL/L (ref 97–108)
CO2 SERPL-SCNC: 27 MMOL/L (ref 21–32)
CREAT SERPL-MCNC: 0.62 MG/DL (ref 0.55–1.02)
EOSINOPHIL # BLD: 0.2 K/UL (ref 0–0.4)
EOSINOPHIL NFR BLD: 2 % (ref 0–7)
ERYTHROCYTE [DISTWIDTH] IN BLOOD BY AUTOMATED COUNT: 17.6 % (ref 11.5–14.5)
GLOBULIN SER CALC-MCNC: 3.8 G/DL (ref 2–4)
GLUCOSE SERPL-MCNC: 91 MG/DL (ref 65–100)
HCT VFR BLD AUTO: 33.2 % (ref 35–47)
HGB BLD-MCNC: 11 G/DL (ref 11.5–16)
LIPASE SERPL-CCNC: 144 U/L (ref 73–393)
LYMPHOCYTES # BLD AUTO: 22 % (ref 12–49)
LYMPHOCYTES # BLD: 2.8 K/UL (ref 0.8–3.5)
MCH RBC QN AUTO: 34.2 PG (ref 26–34)
MCHC RBC AUTO-ENTMCNC: 33.1 G/DL (ref 30–36.5)
MCV RBC AUTO: 103.1 FL (ref 80–99)
MONOCYTES # BLD: 1 K/UL (ref 0–1)
MONOCYTES NFR BLD AUTO: 8 % (ref 5–13)
NEUTS SEG # BLD: 8.8 K/UL (ref 1.8–8)
NEUTS SEG NFR BLD AUTO: 67 % (ref 32–75)
PLATELET # BLD AUTO: 218 K/UL (ref 150–400)
POTASSIUM SERPL-SCNC: 3.2 MMOL/L (ref 3.5–5.1)
PROT SERPL-MCNC: 7.2 G/DL (ref 6.4–8.2)
RBC # BLD AUTO: 3.22 M/UL (ref 3.8–5.2)
SODIUM SERPL-SCNC: 143 MMOL/L (ref 136–145)
WBC # BLD AUTO: 12.9 K/UL (ref 3.6–11)

## 2017-03-18 PROCEDURE — 96361 HYDRATE IV INFUSION ADD-ON: CPT

## 2017-03-18 PROCEDURE — 74011636320 HC RX REV CODE- 636/320: Performed by: EMERGENCY MEDICINE

## 2017-03-18 PROCEDURE — 36415 COLL VENOUS BLD VENIPUNCTURE: CPT | Performed by: EMERGENCY MEDICINE

## 2017-03-18 PROCEDURE — 99282 EMERGENCY DEPT VISIT SF MDM: CPT

## 2017-03-18 PROCEDURE — 96375 TX/PRO/DX INJ NEW DRUG ADDON: CPT

## 2017-03-18 PROCEDURE — 83690 ASSAY OF LIPASE: CPT | Performed by: EMERGENCY MEDICINE

## 2017-03-18 PROCEDURE — 96374 THER/PROPH/DIAG INJ IV PUSH: CPT

## 2017-03-18 PROCEDURE — 85025 COMPLETE CBC W/AUTO DIFF WBC: CPT | Performed by: EMERGENCY MEDICINE

## 2017-03-18 PROCEDURE — 80053 COMPREHEN METABOLIC PANEL: CPT | Performed by: EMERGENCY MEDICINE

## 2017-03-18 PROCEDURE — 71275 CT ANGIOGRAPHY CHEST: CPT

## 2017-03-18 PROCEDURE — 74011250636 HC RX REV CODE- 250/636: Performed by: EMERGENCY MEDICINE

## 2017-03-18 PROCEDURE — 76705 ECHO EXAM OF ABDOMEN: CPT

## 2017-03-18 RX ORDER — SODIUM CHLORIDE 9 MG/ML
50 INJECTION, SOLUTION INTRAVENOUS
Status: COMPLETED | OUTPATIENT
Start: 2017-03-18 | End: 2017-03-18

## 2017-03-18 RX ORDER — FENTANYL CITRATE 50 UG/ML
50 INJECTION, SOLUTION INTRAMUSCULAR; INTRAVENOUS
Status: DISCONTINUED | OUTPATIENT
Start: 2017-03-18 | End: 2017-03-18 | Stop reason: HOSPADM

## 2017-03-18 RX ORDER — ONDANSETRON 2 MG/ML
4 INJECTION INTRAMUSCULAR; INTRAVENOUS
Status: COMPLETED | OUTPATIENT
Start: 2017-03-18 | End: 2017-03-18

## 2017-03-18 RX ORDER — MORPHINE SULFATE 4 MG/ML
4 INJECTION, SOLUTION INTRAMUSCULAR; INTRAVENOUS ONCE
Status: COMPLETED | OUTPATIENT
Start: 2017-03-18 | End: 2017-03-18

## 2017-03-18 RX ORDER — SODIUM CHLORIDE 0.9 % (FLUSH) 0.9 %
10 SYRINGE (ML) INJECTION
Status: COMPLETED | OUTPATIENT
Start: 2017-03-18 | End: 2017-03-18

## 2017-03-18 RX ORDER — HYDROCODONE BITARTRATE AND ACETAMINOPHEN 5; 325 MG/1; MG/1
1 TABLET ORAL
Qty: 20 TAB | Refills: 0 | Status: SHIPPED | OUTPATIENT
Start: 2017-03-18 | End: 2017-03-22 | Stop reason: SDUPTHER

## 2017-03-18 RX ADMIN — SODIUM CHLORIDE 50 ML/HR: 900 INJECTION, SOLUTION INTRAVENOUS at 06:40

## 2017-03-18 RX ADMIN — MORPHINE SULFATE 4 MG: 4 INJECTION, SOLUTION INTRAMUSCULAR; INTRAVENOUS at 05:06

## 2017-03-18 RX ADMIN — Medication 10 ML: at 06:40

## 2017-03-18 RX ADMIN — ONDANSETRON HYDROCHLORIDE 4 MG: 2 INJECTION, SOLUTION INTRAMUSCULAR; INTRAVENOUS at 05:06

## 2017-03-18 RX ADMIN — SODIUM CHLORIDE 1000 ML: 900 INJECTION, SOLUTION INTRAVENOUS at 05:06

## 2017-03-18 RX ADMIN — IOPAMIDOL 100 ML: 755 INJECTION, SOLUTION INTRAVENOUS at 06:40

## 2017-03-18 NOTE — ED PROVIDER NOTES
HPI Comments: Nilam Wilson is a 46 y.o. female with PMHx of HTN, depression, and breast CA s/p lumpectomy (partial), presenting per EMS to ED c/o acute onset of intermittent sharp right sided abdominal pain for the past few hours. Pt denies modifying factors. She notes associated N/V. Pt reports she awoke from sleep with the pain. She endorses injury to right elbow (scratch) and states she thinks she hit it on the wall this morning while rolling out of bed. She reports she drinks 1-2 glasses of wine per day and \"more\" on her days off. Pt states she typically works at night, noting she worked last night. She denies prior similar symptoms. Pt also notes chronic cough secondary to smoking. She denies recent falls. Pt denies history of COPD, emphysema, asthma, and DM. She reports she is on medication for HTN, HLD, and migraines. Pt is without other complaints. PCP: Brianna Adams MD  Social Hx: current every day smoker (0.25 ppd); + EtOH (3.5 oz/week); - drug use. There are no other complaints, changes, or physical findings at this time. Written by Jeovany Holcomb ED Scribblossom, as dictated by Vania Abdullahi MD.          The history is provided by the patient and the EMS personnel.         Past Medical History:   Diagnosis Date    Anemia NEC     Anxiety     Cancer ([facility] [address]     Breast Cancer-Ductal Carcinoma in situ grade 1    Depression     Headache(784.0)     Hypertension     Poor appetite     Psychiatric disorder     depression    PUD (peptic ulcer disease) 11/12    Stress     Tired     Trouble in sleeping     Weakness generalized        Past Surgical History:   Procedure Laterality Date    BREAST SURGERY PROCEDURE UNLISTED  5/30/13    RIGHT BREAST LUMPECTOMY WITH RIGHT NEDDLE LOCALIZATION     HX BREAST LUMPECTOMY  5/30/2013    BREAST LUMPECTOMY/PARTIAL performed by [facility] at [facility] MAIN OR    HX GYN      fallopian tube    HX ORTHOPAEDIC      left ankle repair    HX OTHER SURGICAL      cyst removed rom right thigh         Family History:   Problem Relation Age of Onset    Cancer Father      pancreatic cancer    Cancer Brother      thyroid cancer    Hypertension Mother        Social History     Social History    Marital status:      Spouse name: N/A    Number of children: N/A    Years of education: N/A     Occupational History    Not on file. Social History Main Topics    Smoking status: Current Every Day Smoker     Packs/day: 0.25     Years: 20.00     Types: Cigarettes    Smokeless tobacco: Never Used      Comment: 4 cigarettes a day    Alcohol use 3.5 oz/week     7 Standard drinks or equivalent per week      Comment: 1 beer daily/1 glass wine    Drug use: No    Sexual activity: Not on file     Other Topics Concern    Not on file     Social History Narrative         ALLERGIES: Aspirin    Review of Systems   Constitutional: Negative for activity change, appetite change, chills, fever and unexpected weight change. HENT: Negative for congestion. Eyes: Negative for pain and visual disturbance. Respiratory: Positive for cough (chronic per pt). Negative for shortness of breath. Cardiovascular: Negative for chest pain. Gastrointestinal: Positive for abdominal pain (right), nausea and vomiting. Negative for diarrhea. Genitourinary: Negative for dysuria. Musculoskeletal: Negative for back pain. Skin: Negative for rash. Neurological: Negative for headaches. All other systems reviewed and are negative. Patient Vitals for the past 12 hrs:   Temp Pulse Resp BP SpO2   03/18/17 0346 97.9 °F (36.6 °C) 82 16 112/76 95 %       Physical Exam   Constitutional: She is oriented to person, place, and time. She appears well-developed and well-nourished. Middle-aged female who appears acutely intoxicated with intermittent pain that takes her breath away;   HENT:   Head: Normocephalic and atraumatic.    Mouth/Throat: Oropharynx is clear and moist.   Poor dentition;   Eyes: Conjunctivae and EOM are normal. Pupils are equal, round, and reactive to light. Right eye exhibits no discharge. Left eye exhibits no discharge. Neck: Normal range of motion. Neck supple. Cardiovascular: Normal rate and normal heart sounds. No murmur heard. Pulmonary/Chest: Effort normal and breath sounds normal. No respiratory distress. She has no wheezes. She has no rales. Abdominal: Soft. Bowel sounds are normal. She exhibits no distension. There is tenderness (RUQ). Musculoskeletal: Normal range of motion. Neurological: She is alert and oriented to person, place, and time. No cranial nerve deficit. She exhibits normal muscle tone. Skin: Skin is warm and dry. No rash noted. She is not diaphoretic. Nursing note and vitals reviewed. MDM  Number of Diagnoses or Management Options  Closed fracture of multiple ribs of right side, initial encounter:   Rib pain on right side:   Diagnosis management comments: RUQ pain with splinting. Possible PNA vs biliary colic. Pt acutely intoxicated but without evidence of trauma. Amount and/or Complexity of Data Reviewed  Clinical lab tests: ordered and reviewed  Tests in the radiology section of CPT®: ordered and reviewed  Obtain history from someone other than the patient: yes (EMS)  Review and summarize past medical records: yes  Independent visualization of images, tracings, or specimens: yes    Patient Progress  Patient progress: stable    Procedures    SIGN OUT:  6:12 AM  Patient's presentation, labs/imaging and plan of care was reviewed with Kaz Sage M.D. as part of sign out. They will wait for pending CTA as part of the plan discussed with the patient. Kaz Sage M.D.'s assistance in completion of this plan is greatly appreciated but it should be noted that I will be the provider of record for this patient.     Servando Gorman MD     This note is prepared by Roxana Jones, acting as Scribe for Ana Oreliseo MD Ginny.    Vilma Lassiter MD: The scribe's documentation has been prepared under my direction and personally reviewed by me in its entirety. I confirm that the note above accurately reflects all work, treatment, procedures, and medical decision making performed by me. LABORATORY TESTS:  Recent Results (from the past 12 hour(s))   METABOLIC PANEL, COMPREHENSIVE    Collection Time: 03/18/17  4:50 AM   Result Value Ref Range    Sodium 143 136 - 145 mmol/L    Potassium 3.2 (L) 3.5 - 5.1 mmol/L    Chloride 106 97 - 108 mmol/L    CO2 27 21 - 32 mmol/L    Anion gap 10 5 - 15 mmol/L    Glucose 91 65 - 100 mg/dL    BUN 2 (L) 6 - 20 MG/DL    Creatinine 0.62 0.55 - 1.02 MG/DL    BUN/Creatinine ratio 3 (L) 12 - 20      GFR est AA >60 >60 ml/min/1.73m2    GFR est non-AA >60 >60 ml/min/1.73m2    Calcium 8.2 (L) 8.5 - 10.1 MG/DL    Bilirubin, total 0.4 0.2 - 1.0 MG/DL    ALT (SGPT) 48 12 - 78 U/L    AST (SGOT) 213 (H) 15 - 37 U/L    Alk. phosphatase 146 (H) 45 - 117 U/L    Protein, total 7.2 6.4 - 8.2 g/dL    Albumin 3.4 (L) 3.5 - 5.0 g/dL    Globulin 3.8 2.0 - 4.0 g/dL    A-G Ratio 0.9 (L) 1.1 - 2.2     LIPASE    Collection Time: 03/18/17  4:50 AM   Result Value Ref Range    Lipase 144 73 - 393 U/L   CBC WITH AUTOMATED DIFF    Collection Time: 03/18/17  4:50 AM   Result Value Ref Range    WBC 12.9 (H) 3.6 - 11.0 K/uL    RBC 3.22 (L) 3.80 - 5.20 M/uL    HGB 11.0 (L) 11.5 - 16.0 g/dL    HCT 33.2 (L) 35.0 - 47.0 %    .1 (H) 80.0 - 99.0 FL    MCH 34.2 (H) 26.0 - 34.0 PG    MCHC 33.1 30.0 - 36.5 g/dL    RDW 17.6 (H) 11.5 - 14.5 %    PLATELET 096 251 - 690 K/uL    NEUTROPHILS 67 32 - 75 %    LYMPHOCYTES 22 12 - 49 %    MONOCYTES 8 5 - 13 %    EOSINOPHILS 2 0 - 7 %    BASOPHILS 1 0 - 1 %    ABS. NEUTROPHILS 8.8 (H) 1.8 - 8.0 K/UL    ABS. LYMPHOCYTES 2.8 0.8 - 3.5 K/UL    ABS. MONOCYTES 1.0 0.0 - 1.0 K/UL    ABS. EOSINOPHILS 0.2 0.0 - 0.4 K/UL    ABS.  BASOPHILS 0.1 0.0 - 0.1 K/UL       IMAGING RESULTS:  CTA CHEST W WO CONT   Final Result   CT ANGIOGRAPHY CHEST. 3/18/2017 6:40 AM      INDICATION: Chest pain, acute, pulmonary embolism suspected. Normal EKG. Right  breast carcinoma. Right-sided rib pain, increased with palpation and movement.     COMPARISON: 1/10/2014 CT abdomen.     TECHNIQUE: CT angiography of the chest was performed after the administration of  80 cc IV contrast (Isovue 370). Coronal and sagittal, and coronal MIP  reconstructions were performed. CT dose reduction was achieved through use of a  standardized protocol tailored for this examination and automatic exposure  control for dose modulation. [Billing note only, not part of diagnostic report:  [facility] - please bill for CT angiography of the chest with contrast only, as  described. ]     FINDINGS:  Acute fractures of the right seventh lateral rib and right ninth posterior rib  are mildly displaced. An old fracture of the right 10th posterior rib has healed  with minimal deformity. There is no pulmonary embolism. Aside from mild  dependent atelectasis, the lungs are clear. There is bronchial wall thickening  in the right lower lobe (505-34). No endobronchial mucus plugging or tree in bud  nodules to further suggest acute bronchitis. No pneumothorax or pleural  effusion. The heart size is normal. Left anterior descending coronary  calcifications are mild. No thoracic lymphadenopathy. The liver is fatty  infiltrated.     IMPRESSION  IMPRESSION:   1. Acute right seventh and ninth rib fractures. 2. Moderate hepatic steatosis. US ABD LTD   Final Result   ULTRASOUND OF THE RIGHT UPPER QUADRANT     INDICATION: Right upper quadrant abdominal pain and nausea. Evaluate for biliary  source.     COMPARISON: 12/24/2013, CT abdomen 1/10/2014.     TECHNIQUE:  Sonography of the right upper quadrant was performed.      FINDINGS:     GALLBLADDER: No gallstones, wall thickening, or pericholecystic fluid. COMMON DUCT: 0. 6 cm in diameter. The duct is normal caliber.    LIVER: Hepatic echogenicity is increased, consistent with moderate to severe  hepatic steatosis. All but the superficial portions of the liver are obscured as  a result. No intrahepatic biliary ductal dilation or focal mass is shown in the  superficial-most portions of the liver. PANCREAS: Obscured by overlying bowel gas. RIGHT KIDNEY: 9.5 cm in length. Largely obscured by overlying ribs, axial  position, and hepatic steatosis.       IMPRESSION  IMPRESSION:   Moderate to severe hepatic steatosis. MEDICATIONS GIVEN:  Medications   morphine injection 4 mg (4 mg IntraVENous Given 3/18/17 0506)   ondansetron (ZOFRAN) injection 4 mg (4 mg IntraVENous Given 3/18/17 0506)   sodium chloride 0.9 % bolus infusion 1,000 mL (1,000 mL IntraVENous New Bag 3/18/17 0506)   0.9% sodium chloride infusion (50 mL/hr IntraVENous New Bag 3/18/17 0640)   iopamidol (ISOVUE-370) 76 % injection 100 mL (100 mL IntraVENous Given 3/18/17 0640)   sodium chloride (NS) flush 10 mL (10 mL IntraVENous Given 3/18/17 0640)       IMPRESSION:  1. Rib pain on right side    2. Closed fracture of multiple ribs of right side, initial encounter        PLAN:  1. Current Discharge Medication List      START taking these medications    Details   HYDROcodone-acetaminophen (NORCO) 5-325 mg per tablet Take 1 Tab by mouth every four (4) hours as needed for Pain. Max Daily Amount: 6 Tabs. Qty: 20 Tab, Refills: 0           2.    Follow-up Information     Follow up With Details Comments Contact Info    Grady Huang MD Schedule an appointment as soon as possible for a visit in 2 days  [address]  [phone]      [facility] EMERGENCY DEPT  If symptoms worsen [address] [facility]  [address]   [address] [phone]    Grady Huang MD  As needed [address]  [phone]          Return to ED if worse     DISCHARGE NOTE  8:00 AM  The patient has been re-evaluated and is ready for discharge. Reviewed available results with patient. Counseled patient on diagnosis and care plan. Patient has expressed understanding, and all questions have been answered. Patient agrees with plan and agrees to follow up as recommended, or return to the ED if their symptoms worsen. Discharge instructions have been provided and explained to the patient, along with reasons to return to the ED. This note is prepared by Lin Box, acting as Scribe for Zaid Curtis M.D. Zaid Curtis M.D: The scribe's documentation has been prepared under my direction and personally reviewed by me in its entirety. I confirm that the note above accurately reflects all work, treatment, procedures, and medical decision making performed by me.

## 2017-03-18 NOTE — ED NOTES
Discharge instructions given to patient by Dr. Lucas Joiner . Patient verbalized understanding of discharge instructions. Pt discharged without difficulty. Pt. Discharged in stable condition via ambulation , accompanied by self.

## 2017-03-18 NOTE — LETTER
[address] 
[facility] EMERGENCY DEPT 
[address] [place] [address] [address] [id number] [phone] Work/School Note Date: 3/18/2017 To Whom It May concern: 
 
Kristine Fraire was seen and treated today in the emergency room by the following provider(s): 
Attending Provider: Birdie Evans. MD Ginny.   
 
Kristine Fraire may return to work on 3/22/17 night. Sincerely, Norman Clark MD

## 2017-03-18 NOTE — ED NOTES
47 yo female Pt with PMH of right side breast CA presents to ED for right side rib pain, pain increased with palpation, states pain with movement , oriented to room and call bell, assessment completed, provider at bedside for eval, plan of care reviewed, call bell within reach, side rails up x2, will continue to monitor. 6:05 AM  Transferred to radiology for ultrasound, safety maintained.

## 2017-03-20 ENCOUNTER — TELEPHONE (OUTPATIENT)
Dept: INTERNAL MEDICINE CLINIC | Age: 52
End: 2017-03-20

## 2017-03-20 NOTE — TELEPHONE ENCOUNTER
Patient states she needs a call back in refeernce to getting an appt for an ER 3/18/17 Follow up appt for Cracked Ribs. Please call.  Thank you

## 2017-03-20 NOTE — TELEPHONE ENCOUNTER
Called, spoke to pt. Two pt identifiers confirmed. Pt asking for ED f/u. Pt offered and accepted appt for 3/22/17 1215. Pt verbalized understanding of information discussed w/ no further questions at this time.

## 2017-03-22 ENCOUNTER — OFFICE VISIT (OUTPATIENT)
Dept: INTERNAL MEDICINE CLINIC | Age: 52
End: 2017-03-22

## 2017-03-22 VITALS
SYSTOLIC BLOOD PRESSURE: 137 MMHG | HEART RATE: 89 BPM | HEIGHT: 64 IN | BODY MASS INDEX: 22.4 KG/M2 | WEIGHT: 131.2 LBS | OXYGEN SATURATION: 94 % | DIASTOLIC BLOOD PRESSURE: 87 MMHG | TEMPERATURE: 99.2 F

## 2017-03-22 DIAGNOSIS — D72.829 LEUKOCYTOSIS, UNSPECIFIED TYPE: ICD-10-CM

## 2017-03-22 DIAGNOSIS — R79.89 ELEVATED LIVER FUNCTION TESTS: ICD-10-CM

## 2017-03-22 DIAGNOSIS — S22.41XD CLOSED FRACTURE OF MULTIPLE RIBS OF RIGHT SIDE WITH ROUTINE HEALING, SUBSEQUENT ENCOUNTER: ICD-10-CM

## 2017-03-22 DIAGNOSIS — E78.00 PURE HYPERCHOLESTEROLEMIA: ICD-10-CM

## 2017-03-22 DIAGNOSIS — I10 ESSENTIAL HYPERTENSION: Primary | ICD-10-CM

## 2017-03-22 DIAGNOSIS — F32.9 REACTIVE DEPRESSION: ICD-10-CM

## 2017-03-22 DIAGNOSIS — E03.9 ACQUIRED HYPOTHYROIDISM: ICD-10-CM

## 2017-03-22 RX ORDER — HYDROCODONE BITARTRATE AND ACETAMINOPHEN 5; 325 MG/1; MG/1
1 TABLET ORAL
Qty: 20 TAB | Refills: 0 | Status: SHIPPED | OUTPATIENT
Start: 2017-03-22 | End: 2017-05-01

## 2017-03-22 NOTE — LETTER
NOTIFICATION RETURN TO WORK / SCHOOL 
 
3/22/2017 12:42 PM 
 
Ms. Maite Rubio [address] [facility] [address] [facility] [address] To Whom It May Concern: 
 
Marisol Maurice is currently under the care of [facility]. She will return to work/school on: 4/1/17. Ms. Eb Rubio is to avoid lifting and carrying until 5/1/17. If there are questions or concerns please have the patient contact our office.  
 
 
 
Sincerely, 
 
 
Alfonos Davis MD

## 2017-03-22 NOTE — PROGRESS NOTES
HISTORY OF PRESENT ILLNESS  Maite Vasquez is a 46 y.o. female. HPI   Last here 9/19/16. Pt is here to f/u on ED visit     BP today is 137/87  Continues toprol 25mg daily     Pt was in ED 3/01/17 for cp and tingling in L arm   She had some sob with this as well   Reviewed EKG: normal  Reviewed chest XR: negative  Treated for UTI with keflex  Determined to be atypical chest pain   UTI sx resolved     Pt was in ED again 3/18/17 following a fall   Reviewed notes: pt had been drinking quite a few screwdrivers prior to falling at home, she fell down and had rib pain following this  She fell on Saturday in her bathroom in the afternoon   She slipped and fell over the rub with this  Denies hitting her head or LOC with this   Reviewed CT chest: fractures of 7 and 9th rib, fatty liver, old fracture, no PE   Reviewed US liver: moderate to severe fatty liver   Reviewed blood work: mild anemia, leukocytosis, LFTs quite elevated more than early in March, kidney nl, heart markers negative   She was given hydrocodone for pain   Pt has been using this at home for her pain since this and this has worked well for pain control   Pt was given note for ED to return to work this evening but she is in too much pain for this and would like note to stay out of work longer  She cannot lift her cat without significant pain   She cannot get in comfortable position and is having trouble sleeping with this  Pt has been wrapping with ACE bandage at night with improvement- addressed avoiding this   Advised avoiding heavy lifting for 5 weeks with this to improve healing and will provide work note  Pt has not had any drinks x 2 days- last had merlot a few days ago  She would like additional hydrocodone- discussed she cannot have alcohol with hydrocodone, will provide very limited amount.  She denies having any hydrocodone the same day as drinking   Discussed she cannot drive with taking hydrocodone  Can provide muscle relaxer for spasm as well Counseled on her alcohol abuse at length, discussed she can absolutely not use alcohol while taking hydrocodone for pain   She promises me she will not drink in the next week or so    Continues amitriptyline prn for sleep, works well   She is not using this each night     Reviewed last labs  LFTs very elevated, cholesterol very elevated  Repeat labs today    Since last visit, pt has started taking lipitor daily for cholesterol   She is currently taking 20mg daily   Pt is fasting today- repeat lipids    Her TSH was abnormal on last labs and had ordered repeat labs for this   She did not come back to complete these labs   Will have her repeat labs today to determine if treatment indicated     Continues lexapro 10mg daily for depression, works well, happy with dose  This works quite well overall and mood is stable. Pt continues to smoke  Counseled on cessation  Discussed her healing will be much impaired from her smoking     Advised f/u with Dr. Danii Rosales ([facility]) d/t severe liver abnormalities   Provided referral for her again today--discussed    PREVENTIVE:   Colonoscopy: 3/13, repeat 10 years  EGD: , possible Barrette's   Pap: overdue, reminded again, referred, will schedule 3/17   Mammogram: 5/04/15 negative, due, ordered, she will schedule, h/o R sided breast cancer--discussed 3/17  Tdap: 2013  Pneumovax: 2012  Flu shot: 2016  Eye exam:   EK16, 3/17 in ED  Lipids:  triglycerides too high to calculate LDL            Patient Active Problem List    Diagnosis Date Noted    Elevated WBC count 2014    Elevated liver function tests 2014    Breast cancer right DCIS 2013    Cancer ([facility] [address]     Depression 2013    Headache(784.0) 09/10/2012    HTN (hypertension) 09/10/2012     Current Outpatient Prescriptions   Medication Sig Dispense Refill    HYDROcodone-acetaminophen (NORCO) 5-325 mg per tablet Take 1 Tab by mouth every four (4) hours as needed for Pain. Max Daily Amount: 6 Tabs. 20 Tab 0    atorvastatin (LIPITOR) 20 mg tablet Take 1 Tab by mouth daily. 30 Tab 3    diphenoxylate-atropine (LOMOTIL) 2.5-0.025 mg per tablet TAKE 2 TABLETS BY MOUTH 4 TIMES A DAY AS NEEDED FOR DIARRHEA  0    escitalopram oxalate (LEXAPRO) 10 mg tablet Take 1 Tab by mouth daily. 30 Tab 6    SUMAtriptan (IMITREX) 50 mg tablet Take 1 Tab by mouth once as needed for Migraine for up to 1 dose. 6 Tab 1    amitriptyline (ELAVIL) 25 mg tablet Take 1 Tab by mouth nightly. 30 Tab 3    nystatin (MYCOSTATIN) 100,000 unit/mL suspension Take 5 mL by mouth four (4) times daily. swish and spit 180 mL 0    metoprolol succinate (TOPROL-XL) 25 mg XL tablet Take 1 Tab by mouth daily. 30 Tab 3    multivitamin (ONE A DAY) tablet Take 1 Tab by mouth daily.  ondansetron (ZOFRAN ODT) 4 mg disintegrating tablet Take 1 Tab by mouth every eight (8) hours as needed for Nausea.  16 Tab 0    predniSONE (STERAPRED) 5 mg dose pack See administration instruction per 5mg dose pack 21 Tab 0     Past Surgical History:   Procedure Laterality Date    BREAST SURGERY PROCEDURE UNLISTED  5/30/13    RIGHT BREAST LUMPECTOMY WITH RIGHT NEDDLE LOCALIZATION     HX BREAST LUMPECTOMY  5/30/2013    BREAST LUMPECTOMY/PARTIAL performed by Eduarda Chappell MD at [facility] MA[facility] OR    HX GYN      fallopian tube    HX ORTHOPAEDIC      left ankle repair    HX OTHER SURGICAL      cyst removed rom right thigh      Lab Results  Component Value Date/Time   WBC 12.9 03/18/2017 04:50 AM   Hemoglobin (POC) 15.3 10/31/2014 02:26 PM   HGB 11.0 03/18/2017 04:50 AM   Hematocrit (POC) 45 10/31/2014 02:26 PM   HCT 33.2 03/18/2017 04:50 AM   PLATELET 661 88/47/9772 04:50 AM   .1 03/18/2017 04:50 AM       Lab Results  Component Value Date/Time   Cholesterol, total 254 09/19/2016 02:08 PM   HDL Cholesterol 21 09/19/2016 02:08 PM   LDL, calculated Comment 09/19/2016 02:08 PM   Triglyceride 1900 09/19/2016 02:08 PM       Lab Results  Component Value Date/Time   GFR est AA >60 03/18/2017 04:50 AM   GFR est non-AA >60 03/18/2017 04:50 AM   Creatinine (POC) 0.9 10/31/2014 02:26 PM   Creatinine 0.62 03/18/2017 04:50 AM   BUN 2 03/18/2017 04:50 AM   BUN (POC) 8 10/31/2014 02:26 PM   Sodium (POC) 140 10/31/2014 02:26 PM   Sodium 143 03/18/2017 04:50 AM   Potassium 3.2 03/18/2017 04:50 AM   Potassium (POC) 3.7 10/31/2014 02:26 PM   Chloride (POC) 103 10/31/2014 02:26 PM   Chloride 106 03/18/2017 04:50 AM   CO2 27 03/18/2017 04:50 AM         Review of Systems   Respiratory: Negative for shortness of breath. Cardiovascular: Negative for chest pain. Musculoskeletal: Positive for falls. Physical Exam   Constitutional: She is oriented to person, place, and time. She appears well-developed and well-nourished. No distress. HENT:   Head: Normocephalic and atraumatic. Mouth/Throat: Oropharynx is clear and moist. No oropharyngeal exudate. Eyes: Conjunctivae and EOM are normal. Right eye exhibits no discharge. Left eye exhibits no discharge. Neck: Normal range of motion. Neck supple. Cardiovascular: Normal rate, regular rhythm and normal heart sounds. Exam reveals no gallop and no friction rub. No murmur heard. Pulmonary/Chest: Effort normal. No respiratory distress. She has wheezes (diffuse, mild). She has no rales. She exhibits no tenderness. Abdominal: Soft. She exhibits no distension. There is no tenderness. There is no rebound. Musculoskeletal: She exhibits edema (mild BLE). She exhibits no tenderness or deformity. Lymphadenopathy:     She has no cervical adenopathy. Neurological: She is alert and oriented to person, place, and time. Coordination abnormal.   Skin: Skin is warm and dry. No rash noted. She is not diaphoretic. No erythema. No pallor. Bruising R ribs   Psychiatric: She has a normal mood and affect. Her behavior is normal.       ASSESSMENT and PLAN    ICD-10-CM ICD-9-CM    1.  Essential hypertension    BP adequately controlled on current medications of toprol 25mg daily, continue no change to dose today   I10 401.9    2. Elevated liver function tests    This is chronic issue related to her alcohol use, recent US showed severe fatty liver, have discussed with her on multiple occasions to stop drinking alcohol but she continues to drink alcohol, I have referred her several times to f/u with liver specialist Dr. Ludy Merino, including last visit but she has not yet been to see him, discussed again today, discussed he can evaluate for fibrosis of the liver. R79.89 790.6    3. Closed fracture of multiple ribs of right side with routine healing, subsequent encounter    Related to falling after several alcoholic beverages, no evidence of pneumothorax in ED, will have her stay home from work until April first and will have her return to work and avoid heavy lifting for five weeks from that date, discussed she needs to not drink any alcoholic beverages, provided hydrocodone for pain. S22.41XD V54.19    4. Acquired hypothyroidism    TSH elevated on last labs, advised her to go back to lab for repeat testing to see if this needs to be treated, she never went back, will get blood work done today. E03.9 244.9    5. Pure hypercholesterolemia    Started her on lipitor since last visit, TG were exceedingly elevated, will repeat lipids today, pt is fasting. E78.00 272.0    6. Reactive depression    Improved overall on lexapro     F32.9 300.4    7. Leukocytosis  Was evaluated by Dr. Kaylee Sands for this and h/o breast cancer, last seen in 2015, addressed this. Elevated WBC is thought to be reactive to cigarette smoking. As for her h/o breast cancer, he had discussed she never completed her XRT and had advised hormonal therapy but she had not been interested, she is also several years overdue for her mammogram which I discussed with her, this has been ordered, advised her to schedule.       Written by Alberto Sewell Pratibha Joyner, as dictated by Lb Payton MD.    Current diagnosis and concerns discussed with pt at length. Understands risks and benefits or current treatment plan and medications and accepts the treatment and medication with any possible risks.   Pt asks appropriate questions which were answered.   Pt instructed to call with any concerns or problems. This note will not be viewable in [address]

## 2017-03-22 NOTE — MR AVS SNAPSHOT
Visit Information Date & Time Provider Department Dept. Phone Encounter #  
 3/22/2017 12:15 PM Meryle Pai, [address] [place] [address],[address] [phone] [id number] Follow-up Instructions Return in about 6 months (around 9/22/2017). Upcoming Health Maintenance Date Due  
 PAP AKA CERVICAL CYTOLOGY 6/6/1984 Pneumococcal 19-64 Highest Risk (2 of 3 - PCV13) 8/27/2013 FOBT Q 1 YEAR AGE 50-75 11/29/2013 BREAST CANCER SCRN MAMMOGRAM 5/4/2017 DTaP/Tdap/Td series (2 - Td) 6/13/2023 Allergies as of 3/22/2017  Review Complete On: 3/22/2017 By: Evens García Severity Noted Reaction Type Reactions Aspirin  06/21/2016    Itching Current Immunizations  Reviewed on 5/11/2015 Name Date Influenza Vaccine 9/16/2016 Influenza Vaccine PF 11/19/2013 Influenza Vaccine Split 9/18/2012 Pneumococcal Vaccine (Unspecified Type) 8/27/2012 TDAP Vaccine 9/10/2012 Tdap 6/13/2013 11:22 AM  
  
 Not reviewed this visit You Were Diagnosed With   
  
 Codes Comments Essential hypertension    -  Primary ICD-10-CM: I10 
ICD-9-CM: 401.9 Elevated liver function tests     ICD-10-CM: R79.89 ICD-9-CM: 790.6 Closed fracture of multiple ribs of right side with routine healing, subsequent encounter     ICD-10-CM: S22.41XD ICD-9-CM: V54.19 Acquired hypothyroidism     ICD-10-CM: E03.9 ICD-9-CM: 244.9 Pure hypercholesterolemia     ICD-10-CM: E78.00 ICD-9-CM: 272.0 Reactive depression     ICD-10-CM: F32.9 ICD-9-CM: 300.4 Leukocytosis, unspecified type     ICD-10-CM: D72.829 ICD-9-CM: 288.60 Vitals BP Pulse Temp Height(growth percentile) Weight(growth percentile) SpO2  
 137/87 (BP 1 Location: Right arm, BP Patient Position: Sitting) 89 99.2 °F (37.3 °C) (Oral) 5' 4\" (1.626 m) 131 lb 3.2 oz (59.5 kg) 94% BMI OB Status Smoking Status 22.52 kg/m2 Menopause Current Every Day Smoker BMI and BSA Data Body Mass Index Body Surface Area  
 22.52 kg/m 2 1.64 m 2 Preferred Pharmacy Pharmacy Name Phone [id number]- [place], [place] - [address] [phone] Your Updated Medication List  
  
   
This list is accurate as of: 3/22/17 12:38 PM.  Always use your most recent med list.  
  
  
  
  
 amitriptyline 25 mg tablet Commonly known as:  ELAVIL Take 1 Tab by mouth nightly. atorvastatin 20 mg tablet Commonly known as:  LIPITOR Take 1 Tab by mouth daily. diphenoxylate-atropine 2.5-0.025 mg per tablet Commonly known as:  LOMOTIL  
TAKE 2 TABLETS BY MOUTH 4 TIMES A DAY AS NEEDED FOR DIARRHEA  
  
 escitalopram oxalate 10 mg tablet Commonly known as:  Cloria Kohut Take 1 Tab by mouth daily. HYDROcodone-acetaminophen 5-325 mg per tablet Commonly known as:  Nahomi Parisian Take 1 Tab by mouth every four (4) hours as needed for Pain. Max Daily Amount: 6 Tabs. metoprolol succinate 25 mg XL tablet Commonly known as:  TOPROL-XL Take 1 Tab by mouth daily. multivitamin tablet Commonly known as:  ONE A DAY Take 1 Tab by mouth daily. nystatin 100,000 unit/mL suspension Commonly known as:  MYCOSTATIN Take 5 mL by mouth four (4) times daily. swish and spit SUMAtriptan 50 mg tablet Commonly known as:  IMITREX Take 1 Tab by mouth once as needed for Migraine for up to 1 dose. Prescriptions Printed Refills HYDROcodone-acetaminophen (NORCO) 5-325 mg per tablet 0 Sig: Take 1 Tab by mouth every four (4) hours as needed for Pain. Max Daily Amount: 6 Tabs. Class: Print Route: Oral  
  
We Performed the Following LIPID PANEL [85814 CPT(R)] T4, FREE [id number] CPT(R)] THYROID PEROXIDASE (TPO) AB [86135 CPT(R)] TSH 3RD GENERATION [42509 CPT(R)] Follow-up Instructions Return in about 6 months (around 9/22/2017). Introducing [facility]! Td Vilchis introduces FeedBurner patient portal. Now you can access parts of your medical record, email your doctor's office, and request medication refills online. 1. In your internet browser, go to [email or web address] 2. Click on the First Time User? Click Here link in the Sign In box. You will see the New Member Sign Up page. 3. Enter your FeedBurner Access Code exactly as it appears below. You will not need to use this code after youve completed the sign-up process. If you do not sign up before the expiration date, you must request a new code. · FeedBurner Access Code: [email or web address] Expires: 4/14/2017  8:19 AM 
 
4. Enter the last four digits of your Social Security Number (xxxx) and Date of Birth (mm/dd/yyyy) as indicated and click Submit. You will be taken to the next sign-up page. 5. Create a FeedBurner ID. This will be your FeedBurner login ID and cannot be changed, so think of one that is secure and easy to remember. 6. Create a FeedBurner password. You can change your password at any time. 7. Enter your Password Reset Question and Answer. This can be used at a later time if you forget your password. 8. Enter your e-mail address. You will receive e-mail notification when new information is available in [address] 9. Click Sign Up. You can now view and download portions of your medical record. 10. Click the Download Summary menu link to download a portable copy of your medical information. If you have questions, please visit the Frequently Asked Questions section of the FeedBurner website. Remember, FeedBurner is NOT to be used for urgent needs. For medical emergencies, dial 911. Now available from your iPhone and Android! Please provide this summary of care documentation to your next provider. Your primary care clinician is listed as Jody Ascencio. If you have any questions after today's visit, please call [phone].

## 2017-03-23 LAB
CHOLEST SERPL-MCNC: 153 MG/DL (ref 100–199)
HDLC SERPL-MCNC: 44 MG/DL
LDLC SERPL CALC-MCNC: 29 MG/DL (ref 0–99)
T4 FREE SERPL-MCNC: 0.75 NG/DL (ref 0.82–1.77)
THYROPEROXIDASE AB SERPL-ACNC: 14 IU/ML (ref 0–34)
TRIGL SERPL-MCNC: 400 MG/DL (ref 0–149)
TSH SERPL DL<=0.005 MIU/L-ACNC: 4.74 UIU/ML (ref 0.45–4.5)
VLDLC SERPL CALC-MCNC: 80 MG/DL (ref 5–40)

## 2017-03-23 NOTE — PROGRESS NOTES
the patient is hypothyroid, start synthoid 50mcg daily     Lipids improved continue lipitor    Repeat tsh in 6 weeks to adjust thyroid med as needed

## 2017-03-24 RX ORDER — LEVOTHYROXINE SODIUM 50 UG/1
50 TABLET ORAL
Qty: 30 TAB | Refills: 1 | Status: SHIPPED | OUTPATIENT
Start: 2017-03-24 | End: 2017-06-05 | Stop reason: SDUPTHER

## 2017-03-24 NOTE — PROGRESS NOTES
Called, spoke to pt. Two pt identifiers confirmed. Pt informed per Dr. Shae Feliz patient is hypothyroid, start synthoid 50mcg daily. Pt informed per Dr. Shae Feliz Lipids improved continue lipitor. Pt informed per Dr. Shae Feliz Repeat tsh in 6 weeks to adjust thyroid med as needed. Labs ordered and mailed to pt. Pt verbalized understanding of information discussed w/ no further questions at this time.

## 2017-04-13 ENCOUNTER — TELEPHONE (OUTPATIENT)
Dept: INTERNAL MEDICINE CLINIC | Age: 52
End: 2017-04-13

## 2017-04-13 NOTE — TELEPHONE ENCOUNTER
Patient states she needs a call back today as soon as possible to discuss Rib injury/Pain that patient has seen Dr. Zuleima Jung for & needs to discuss what she can take for pain as patient is allergic to Aspirin but patient reports she needs to work & is not sure what she can take. Patient states a detailed message can be left on voice mail of attached number. Please call to discuss & advise.  Thank you

## 2017-04-13 NOTE — TELEPHONE ENCOUNTER
Pt states she works at the [employer] and has to twist a lot and thinks this is what is bothering her ribs. Patient states she is in pain. Can you call in something for the pain as OTC is not working. Please call pt so she knows what to do.

## 2017-04-14 ENCOUNTER — DOCUMENTATION ONLY (OUTPATIENT)
Dept: INTERNAL MEDICINE CLINIC | Age: 52
End: 2017-04-14

## 2017-04-14 NOTE — TELEPHONE ENCOUNTER
MD Sav Esquivle, AMRIT        Caller: Unspecified (Yesterday,  8:38 AM)                     Tylenol over the counter is fine (as long as she is not drinking alcohol)

## 2017-04-14 NOTE — PROGRESS NOTES
Pt called at 5454am , c/o severe rib pain, hydrocodone not helping.  Advised to call in AM for appt or go to [facility]

## 2017-04-18 ENCOUNTER — TELEPHONE (OUTPATIENT)
Dept: INTERNAL MEDICINE CLINIC | Age: 52
End: 2017-04-18

## 2017-04-18 NOTE — TELEPHONE ENCOUNTER
Pt state the extra strength tylenol is not working. She is back to work and twist around and thinks this is adding to the pain. She is still having spasms as well. Is there something different that she can take? Please call pt.

## 2017-04-20 NOTE — TELEPHONE ENCOUNTER
Left vm for pt that it takes time for the healing and that if sx's worsen o/v needed. Will sign encounter for multiple attempts to reach pt unsuccessful.

## 2017-04-21 ENCOUNTER — TELEPHONE (OUTPATIENT)
Dept: INTERNAL MEDICINE CLINIC | Age: 52
End: 2017-04-21

## 2017-05-01 ENCOUNTER — OFFICE VISIT (OUTPATIENT)
Dept: INTERNAL MEDICINE CLINIC | Age: 52
End: 2017-05-01

## 2017-05-01 VITALS
RESPIRATION RATE: 16 BRPM | SYSTOLIC BLOOD PRESSURE: 114 MMHG | HEIGHT: 64 IN | OXYGEN SATURATION: 98 % | DIASTOLIC BLOOD PRESSURE: 83 MMHG | HEART RATE: 90 BPM | WEIGHT: 136.4 LBS | TEMPERATURE: 98 F | BODY MASS INDEX: 23.29 KG/M2

## 2017-05-01 DIAGNOSIS — I10 ESSENTIAL HYPERTENSION: ICD-10-CM

## 2017-05-01 DIAGNOSIS — G44.229 CHRONIC TENSION-TYPE HEADACHE, NOT INTRACTABLE: ICD-10-CM

## 2017-05-01 DIAGNOSIS — C50.911 MALIGNANT NEOPLASM OF RIGHT FEMALE BREAST, UNSPECIFIED SITE OF BREAST: ICD-10-CM

## 2017-05-01 DIAGNOSIS — F17.200 SMOKER: ICD-10-CM

## 2017-05-01 DIAGNOSIS — R07.81 PLEURITIC CHEST PAIN: Primary | ICD-10-CM

## 2017-05-01 DIAGNOSIS — R07.81 RIB PAIN ON RIGHT SIDE: ICD-10-CM

## 2017-05-01 DIAGNOSIS — E78.00 PURE HYPERCHOLESTEROLEMIA: ICD-10-CM

## 2017-05-01 DIAGNOSIS — D72.829 LEUKOCYTOSIS, UNSPECIFIED TYPE: ICD-10-CM

## 2017-05-01 DIAGNOSIS — E03.9 ACQUIRED HYPOTHYROIDISM: ICD-10-CM

## 2017-05-01 DIAGNOSIS — F32.9 REACTIVE DEPRESSION: Primary | ICD-10-CM

## 2017-05-01 RX ORDER — SUMATRIPTAN 50 MG/1
50 TABLET, FILM COATED ORAL
Qty: 6 TAB | Refills: 1 | Status: SHIPPED | OUTPATIENT
Start: 2017-05-01 | End: 2018-03-19 | Stop reason: SDUPTHER

## 2017-05-01 RX ORDER — GABAPENTIN 300 MG/1
300 CAPSULE ORAL
Qty: 30 CAP | Refills: 3 | Status: SHIPPED | OUTPATIENT
Start: 2017-05-01 | End: 2017-08-31 | Stop reason: SDUPTHER

## 2017-05-01 NOTE — MR AVS SNAPSHOT
Visit Information Date & Time Provider Department Dept. Phone Encounter #  
 5/1/2017 12:00 PM John Capellan, [address] [phone] [id number] Follow-up Instructions Return for as scheduled. Your Appointments 9/22/2017  8:30 AM  
ROUTINE CARE with John Capellan, [address] [facility] Appt Note: 6 month follow up  
 [address] [address]  
[address] [facility] [address] Upcoming Health Maintenance Date Due  
 PAP AKA CERVICAL CYTOLOGY 6/6/1984 Pneumococcal 19-64 Highest Risk (2 of 3 - PCV13) 8/27/2013 FOBT Q 1 YEAR AGE 50-75 11/29/2013 BREAST CANCER SCRN MAMMOGRAM 5/4/2017 INFLUENZA AGE 9 TO ADULT 8/1/2017 DTaP/Tdap/Td series (2 - Td) 6/13/2023 Allergies as of 5/1/2017  Review Complete On: 5/1/2017 By: John Capellan MD  
  
 Severity Noted Reaction Type Reactions Aspirin  06/21/2016    Itching Current Immunizations  Reviewed on 5/11/2015 Name Date Influenza Vaccine 9/16/2016 Influenza Vaccine PF 11/19/2013 Influenza Vaccine Split 9/18/2012 Pneumococcal Vaccine (Unspecified Type) 8/27/2012 TDAP Vaccine 9/10/2012 Tdap 6/13/2013 11:22 AM  
  
 Not reviewed this visit You Were Diagnosed With   
  
 Codes Comments Reactive depression    -  Primary ICD-10-CM: F32.9 ICD-9-CM: 300.4 Pure hypercholesterolemia     ICD-10-CM: E78.00 ICD-9-CM: 272.0 Chronic tension-type headache, not intractable     ICD-10-CM: C61.326 ICD-9-CM: 339.12 Rib pain on right side     ICD-10-CM: R07.81 ICD-9-CM: 786.50 Malignant neoplasm of right female breast, unspecified site of breast ([facility]     ICD-10-CM: C50.911 ICD-9-CM: 174.9 Acquired hypothyroidism     ICD-10-CM: E03.9 ICD-9-CM: 244.9 Leukocytosis, unspecified type     ICD-10-CM: D72.829 ICD-9-CM: 288.60   
 Essential hypertension     ICD-10-CM: I10 
ICD-9-CM: 401.9 Smoker     ICD-10-CM: D61.202 ICD-9-CM: 305.1 Vitals OB Status Smoking Status Menopause Current Every Day Smoker Preferred Pharmacy Pharmacy Name Phone [facility]AC[facility] #[id number] [place]  [address] [phone] Your Updated Medication List  
  
   
This list is accurate as of: 5/1/17 12:01 PM.  Always use your most recent med list.  
  
  
  
  
 amitriptyline 25 mg tablet Commonly known as:  ELAVIL Take 1 Tab by mouth nightly. atorvastatin 20 mg tablet Commonly known as:  LIPITOR Take 1 Tab by mouth daily. escitalopram oxalate 10 mg tablet Commonly known as:  Dior Lizett Take 1 Tab by mouth daily. gabapentin 300 mg capsule Commonly known as:  NEURONTIN Take 1 Cap by mouth nightly as needed. levothyroxine 50 mcg tablet Commonly known as:  SYNTHROID Take 1 Tab by mouth Daily (before breakfast). metoprolol succinate 25 mg XL tablet Commonly known as:  TOPROL-XL Take 1 Tab by mouth daily. multivitamin tablet Commonly known as:  ONE A DAY Take 1 Tab by mouth daily. SUMAtriptan 50 mg tablet Commonly known as:  IMITREX Take 1 Tab by mouth once as needed for Migraine for up to 1 dose. Prescriptions Sent to Pharmacy Refills SUMAtriptan (IMITREX) 50 mg tablet 1 Sig: Take 1 Tab by mouth once as needed for Migraine for up to 1 dose. Class: Normal  
 Pharmacy: [facility]/pharmacy [address] Ph #: [phone] Route: Oral  
 gabapentin (NEURONTIN) 300 mg capsule 3 Sig: Take 1 Cap by mouth nightly as needed. Class: Normal  
 Pharmacy: [facility]/pharmacy [address] [place] [address] [address] Ph #: [phone] Route: Oral  
  
We Performed the Following CBC W/O DIFF [40384 CPT(R)] METABOLIC PANEL, BASIC [99599 CPT(R)] TSH 3RD GENERATION [77880 CPT(R)] Follow-up Instructions Return for as scheduled. To-Do List   
 05/01/2017 Imaging:  [facility] MAMMO BI SCREENING INCL CAD   
  
 05/01/2017 Imaging:  XR RIBS RT W PA CXR MIN 3 V Referral Information Referral ID Referred By Referred To  
  
 [id number] Tamie Loredo Not Available Visits Status Start Date End Date 1 New Request 5/1/17 5/1/18 If your referral has a status of pending review or denied, additional information will be sent to support the outcome of this decision. Patient Instructions Take elavil EVERY night to prevent headaches 
 
Gabapentin for rib pain. Schedule mammogram and follow up with dr Ivana Coon today X ray to day Introducing [facility]! Tanis Epley introduces [employer] patient portal. Now you can access parts of your medical record, email your doctor's office, and request medication refills online. 1. In your internet browser, go to [email or web address] 2. Click on the First Time User? Click Here link in the Sign In box. You will see the New Member Sign Up page. 3. Enter your [employer] Access Code exactly as it appears below. You will not need to use this code after youve completed the sign-up process. If you do not sign up before the expiration date, you must request a new code. · [employer] Access Code: [email or web address] Expires: 7/30/2017 11:45 AM 
 
4. Enter the last four digits of your Social Security Number (xxxx) and Date of Birth (mm/dd/yyyy) as indicated and click Submit. You will be taken to the next sign-up page. 5. Create a [employer] ID. This will be your [employer] login ID and cannot be changed, so think of one that is secure and easy to remember. 6. Create a [employer] password. You can change your password at any time. 7. Enter your Password Reset Question and Answer. This can be used at a later time if you forget your password. 8. Enter your e-mail address. You will receive e-mail notification when new information is available in [address] 9. Click Sign Up. You can now view and download portions of your medical record. 10. Click the Download Summary menu link to download a portable copy of your medical information. If you have questions, please visit the Frequently Asked Questions section of the [email or web address] website. Remember, [email or web address] is NOT to be used for urgent needs. For medical emergencies, dial 911. Now available from your iPhone and Android! Please provide this summary of care documentation to your next provider. Your primary care clinician is listed as Milena Stone. If you have any questions after today's visit, please call [phone].

## 2017-05-01 NOTE — PATIENT INSTRUCTIONS
Take elavil EVERY night to prevent headaches    Gabapentin for rib pain.      Schedule mammogram and follow up with dr Boo An today     X ray to day

## 2017-05-01 NOTE — PROGRESS NOTES
HISTORY OF PRESENT ILLNESS  Maite Pino is a 46 y.o. female. HPI   Last here 3/22/17.  Pt is here for acute care    BP today is great- 114/83  Pt is not monitoring BP at home- no home cuff  Continues toprol XL 25mg daily  She takes her medications at night, reports compliance     Pt had fall 3/18/17 and went to ED for this  I saw her for f/u 3/22/17 and treated her with hydrocodone for pain   She had rib pain at that time and had rib fracture as well   She states she has continued spasm in her R rib cage   Pt has some shooting pains in her R fib  Discussed rib fractures take about 6 weeks to heal   Discussed while healing she cannot be doing lifting and other aggravating activities   However, she states moving mail at work exacerbated her pain   Since last visit her pain has improved but has not resolved completely   She has occasional shooting pains and primarily when trying to move around during sleep at night  Currently using extra strength tylenol for pain which occasionally improves her pain   Discussed cannot have any alcohol while taking tylenol    Stressed importance of this with her   Pt with burning pain and has this at bed time primarily  Will start gabapentin to take before bed for her rib pain   Cannot provide any further narcotics for pain, addressed this   Discussed can complete further imaging to ensure healing properly    Continues amitriptyline for HAs but only uses this prn   She also has imitrex prn for HAs which works well   Pt uses both of these medications about 4 times per week   Discussed amitriptyline needs to be taken on daily basis to be effective  Advised increasing to daily dosing and refilled imitrex for her to use prn only    Pt has been taking OTC medications for worsening allergies  She has been taking store brand zyrtec with improvement   This is mildly sedating for her but works well overall     Continues synthroid 50mcg daily   Her thyroid was too slow on labs   This medication was started since last visit over the phone  Tolerating well, not having any issues with this   Repeat labs today to ensure now at goal    Pt with h/o R breast DCIS  She completed partial XRT for this  She also was recommended to complete hormone therapy for this  Pt never completed this treatment course however  Pt saw Dr. Tereso Hendrix in the past for this-- last in 2015  Discussed she is also quite overdue for mammogram  Advised completing mammogram and scheduling f/u with him   She is going to walk over to his office today to schedule with him    Continues lipitor 20mg daily for cholesterol   Her LDL is now at goal on this medication    Reviewed last labs 3/17  Repeat labs today     Pt continues to smoke  She is having trouble tapering back  Counseled on cessation  Discussed this delays healing     Continues lexapro daily for anxiety and depression  She states this calms her and helps with stomach sx as well     Wt is up 5 lbs since last visit   Her weight is within normal ranges    Pt is cutting back on alcohol consumption  She has actually completely stopped drinking -- however,   Her mother was recently visiting from [place] and she drank wine with her   Pt reports drinking several glasses of wine with her mother   Pt denies drinking while taking tylenol as we discussed   Discussed cutting out the alcohol completely     Recall past evaluation with Dr. Tereso Hendrix in 2015, thrombocytosis thought d/t cigarette smoking       PREVENTIVE:   Colonoscopy: 3/13, repeat 10 years  EGD: 2013, possible Barrette's   Pap: overdue, reminded again, referred, will schedule, discussed again 5/17  Mammogram: 5/04/15 negative, due, ordered, she will schedule, h/o R sided breast DCIS, discussed  Tdap: 6/13/2013  Pneumovax: 8/27/2012  Klaikzt76: not yet needed  Zostavax: not yet needed  Flu shot: 9/16/2016  Eye exam: 8/16  EKG: 3/17 per ED   Lipids: 3/17 LDL 29      Patient Active Problem List    Diagnosis Date Noted    Elevated WBC count 04/16/2014    Elevated liver function tests 04/16/2014    Breast cancer right DCIS 06/11/2013    Cancer ([facility]     Depression 02/11/2013    Headache 09/10/2012    HTN (hypertension) 09/10/2012     Current Outpatient Prescriptions   Medication Sig Dispense Refill    levothyroxine (SYNTHROID) 50 mcg tablet Take 1 Tab by mouth Daily (before breakfast). 30 Tab 1    HYDROcodone-acetaminophen (NORCO) 5-325 mg per tablet Take 1 Tab by mouth every four (4) hours as needed for Pain. Max Daily Amount: 6 Tabs. 20 Tab 0    atorvastatin (LIPITOR) 20 mg tablet Take 1 Tab by mouth daily. 30 Tab 3    diphenoxylate-atropine (LOMOTIL) 2.5-0.025 mg per tablet TAKE 2 TABLETS BY MOUTH 4 TIMES A DAY AS NEEDED FOR DIARRHEA  0    escitalopram oxalate (LEXAPRO) 10 mg tablet Take 1 Tab by mouth daily. 30 Tab 6    SUMAtriptan (IMITREX) 50 mg tablet Take 1 Tab by mouth once as needed for Migraine for up to 1 dose. 6 Tab 1    amitriptyline (ELAVIL) 25 mg tablet Take 1 Tab by mouth nightly. 30 Tab 3    nystatin (MYCOSTATIN) 100,000 unit/mL suspension Take 5 mL by mouth four (4) times daily. swish and spit 180 mL 0    metoprolol succinate (TOPROL-XL) 25 mg XL tablet Take 1 Tab by mouth daily. 30 Tab 3    multivitamin (ONE A DAY) tablet Take 1 Tab by mouth daily.          Past Surgical History:   Procedure Laterality Date    BREAST SURGERY PROCEDURE UNLISTED  5/30/13    RIGHT BREAST LUMPECTOMY WITH RIGHT NEDDLE LOCALIZATION     HX BREAST LUMPECTOMY  5/30/2013    BREAST LUMPECTOMY/PARTIAL performed by Mona Zamudio [facility] at [facility] MAIN OR    HX GYN      fallopian tube    HX ORTHOPAEDIC      left ankle repair    HX OTHER SURGICAL      cyst removed rom right thigh      Lab Results  Component Value Date/Time   WBC 12.9 03/18/2017 04:50 AM   Hemoglobin (POC) 15.3 10/31/2014 02:26 PM   HGB 11.0 03/18/2017 04:50 AM   Hematocrit (POC) 45 10/31/2014 02:26 PM   HCT 33.2 03/18/2017 04:50 AM   PLATELET 780 46/21/7239 04:50 AM   .1 03/18/2017 04:50 AM       Lab Results  Component Value Date/Time   Cholesterol, total 153 03/22/2017 12:31 PM   HDL Cholesterol 44 03/22/2017 12:31 PM   LDL, calculated 29 03/22/2017 12:31 PM   Triglyceride 400 03/22/2017 12:31 PM       Lab Results  Component Value Date/Time   GFR est AA >60 03/18/2017 04:50 AM   GFR est non-AA >60 03/18/2017 04:50 AM   Creatinine (POC) 0.9 10/31/2014 02:26 PM   Creatinine 0.62 03/18/2017 04:50 AM   BUN 2 03/18/2017 04:50 AM   BUN (POC) 8 10/31/2014 02:26 PM   Sodium (POC) 140 10/31/2014 02:26 PM   Sodium 143 03/18/2017 04:50 AM   Potassium 3.2 03/18/2017 04:50 AM   Potassium (POC) 3.7 10/31/2014 02:26 PM   Chloride (POC) 103 10/31/2014 02:26 PM   Chloride 106 03/18/2017 04:50 AM   CO2 27 03/18/2017 04:50 AM         Review of Systems   Respiratory: Negative for shortness of breath. Cardiovascular: Negative for chest pain. Physical Exam   Constitutional: She is oriented to person, place, and time. She appears well-developed and well-nourished. No distress. HENT:   Head: Normocephalic and atraumatic. Mouth/Throat: Oropharynx is clear and moist. No oropharyngeal exudate. Eyes: Conjunctivae and EOM are normal. Right eye exhibits no discharge. Left eye exhibits no discharge. Neck: Normal range of motion. Neck supple. Cardiovascular: Normal rate, regular rhythm and normal heart sounds. Exam reveals no gallop and no friction rub. No murmur heard. Pulmonary/Chest: Effort normal and breath sounds normal. No respiratory distress. She has no wheezes. She has no rales. She exhibits no tenderness. Mild ttp R ribcage   Musculoskeletal: She exhibits tenderness. She exhibits no edema or deformity. Lymphadenopathy:     She has no cervical adenopathy. Neurological: She is alert and oriented to person, place, and time. Coordination normal.   Skin: Skin is warm and dry. No rash noted. She is not diaphoretic. No erythema. No pallor.    Psychiatric: She has a normal mood and affect. Her behavior is normal.       ASSESSMENT and PLAN    ICD-10-CM ICD-9-CM    1. Reactive depression    Controlled on lexapro, continue. F32.9 300.4 XR RIBS RT W PA CXR MIN 3 V      CBC W/O DIFF      METABOLIC PANEL, BASIC      TSH 3RD GENERATION      [facility] MAMMO BI SCREENING INCL CAD   2. Pure hypercholesterolemia    Now on lipitor, at goal last check, continue no change to dose. E78.00 272.0 XR RIBS RT W PA CXR MIN 3 V      CBC W/O DIFF      METABOLIC PANEL, BASIC      TSH 3RD GENERATION      [facility] MAMMO BI SCREENING INCL CAD   3. Chronic tension-type headache, not intractable    Chronic issue, needs refill of imitrex, also is only taking elavil prn, discussed taking on daily basis to prevent recurrent HAs G44.229 339.12 XR RIBS RT W PA CXR MIN 3 V      CBC W/O DIFF      METABOLIC PANEL, BASIC      TSH 3RD GENERATION      [facility] MAMMO BI SCREENING INCL CAD   4. Rib pain on right side    Repeat chest XR to ensure fractures have healed appropriately, gave gabapentin in the evening to take to improve sx R07.81 786.50 XR RIBS RT W PA CXR MIN 3 V      CBC W/O DIFF      METABOLIC PANEL, BASIC      TSH 3RD GENERATION      [facility] MAMMO BI SCREENING INCL CAD   5. Malignant neoplasm of right female breast, unspecified site of breast ([facility] [address] [facility]    Overdue for mammogram and f/u with Rony, ordered. C50.911 174.9 XR RIBS RT W PA CXR MIN 3 V      CBC W/O DIFF      METABOLIC PANEL, BASIC      TSH 3RD GENERATION      [facility] MAMMO BI SCREENING INCL CAD   6. Acquired hypothyroidism    Pt is now on synthroid 50mcg daily. Check TFTs and adjust dose as needed. E03.9 244.9 XR RIBS RT W PA CXR MIN 3 V      CBC W/O DIFF      METABOLIC PANEL, BASIC      TSH 3RD GENERATION      [facility] MAMMO BI SCREENING INCL CAD   7.  Leukocytosis, unspecified type    Evaluated by Dr. Isela Gibson over a year ago, thought related to smoking, repeat CBC, advised smoking cessation D72.829 288.60 XR RIBS RT W PA CXR MIN 3 V      CBC W/O DIFF      METABOLIC PANEL, BASIC      TSH 3RD GENERATION      [facility] MAMMO BI SCREENING INCL CAD   8. Essential hypertension    Controlled on toprol XL   I10 401.9    9. Smoker    Counseled on cessation F17.200 305.1             Written by Marino Olivares, as dictated by Georgina August MD.    Current diagnosis and concerns discussed with pt at length. Understands risks and benefits or current treatment plan and medications and accepts the treatment and medication with any possible risks.   Pt asks appropriate questions which were answered.   Pt instructed to call with any concerns or problems. This note will not be viewable in [address]

## 2017-05-02 LAB
BUN SERPL-MCNC: 9 MG/DL (ref 6–24)
BUN/CREAT SERPL: 15 (ref 9–23)
CALCIUM SERPL-MCNC: 10.1 MG/DL (ref 8.7–10.2)
CHLORIDE SERPL-SCNC: 92 MMOL/L (ref 96–106)
CO2 SERPL-SCNC: 23 MMOL/L (ref 18–29)
CREAT SERPL-MCNC: 0.61 MG/DL (ref 0.57–1)
ERYTHROCYTE [DISTWIDTH] IN BLOOD BY AUTOMATED COUNT: 15 % (ref 12.3–15.4)
GLUCOSE SERPL-MCNC: 83 MG/DL (ref 65–99)
HCT VFR BLD AUTO: 35.9 % (ref 34–46.6)
HGB BLD-MCNC: 11.8 G/DL (ref 11.1–15.9)
MCH RBC QN AUTO: 34.3 PG (ref 26.6–33)
MCHC RBC AUTO-ENTMCNC: 32.9 G/DL (ref 31.5–35.7)
MCV RBC AUTO: 104 FL (ref 79–97)
PLATELET # BLD AUTO: 416 X10E3/UL (ref 150–379)
POTASSIUM SERPL-SCNC: 5 MMOL/L (ref 3.5–5.2)
RBC # BLD AUTO: 3.44 X10E6/UL (ref 3.77–5.28)
SODIUM SERPL-SCNC: 135 MMOL/L (ref 134–144)
TSH SERPL DL<=0.005 MIU/L-ACNC: 3.78 UIU/ML (ref 0.45–4.5)
WBC # BLD AUTO: 24.8 X10E3/UL (ref 3.4–10.8)

## 2017-05-02 NOTE — PROGRESS NOTES
Johnathan: Have pt f/u with dr Ivana Moctezuma, WBC quite elevated again, will route copy of labs to him to review as well and see if he would like any additional labs ordered    tsh at goal on synthroid continue no change to dose

## 2017-05-02 NOTE — PROGRESS NOTES
Called, spoke to pt. Two pt identifiers confirmed. Pt informed per Dr. Tam Christian to f/u with dr Luis Hopper, WBC quite elevated again, will route copy of labs to him. Pt informed per Dr. Tam Christian tsh at goal on synthroid continue no change to dose. Pt verbalized understanding of information discussed w/ no further questions at this time.

## 2017-05-03 ENCOUNTER — TELEPHONE (OUTPATIENT)
Dept: INTERNAL MEDICINE CLINIC | Age: 52
End: 2017-05-03

## 2017-05-03 NOTE — TELEPHONE ENCOUNTER
Pt would like to know why there are certain restrictions for her job. Please call.   She is asking for Dr. Joann Slaughter to write this in a letter as well to give to her employer

## 2017-05-04 NOTE — TELEPHONE ENCOUNTER
Pt would like a return phone call from the nurse regarding getting a note for work explaining why she needs to be on light duty for another 2 weeks. Pt can be reached at [phone].        Message received & copied from [facility] after closing on 5/3/17

## 2017-05-04 NOTE — TELEPHONE ENCOUNTER
Left vm for pt that it is important for pt to not lift/bend/twist too much at work in order for the ribs to heal.  Callback prn. Encounter to be signed.

## 2017-05-23 ENCOUNTER — HOSPITAL ENCOUNTER (OUTPATIENT)
Dept: MAMMOGRAPHY | Age: 52
Discharge: HOME | End: 2017-05-23
Attending: INTERNAL MEDICINE
Payer: COMMERCIAL

## 2017-05-23 DIAGNOSIS — E03.9 ACQUIRED HYPOTHYROIDISM: ICD-10-CM

## 2017-05-23 DIAGNOSIS — R07.81 RIB PAIN ON RIGHT SIDE: ICD-10-CM

## 2017-05-23 DIAGNOSIS — G44.229 CHRONIC TENSION-TYPE HEADACHE, NOT INTRACTABLE: ICD-10-CM

## 2017-05-23 DIAGNOSIS — C50.911 MALIGNANT NEOPLASM OF RIGHT FEMALE BREAST, UNSPECIFIED SITE OF BREAST: ICD-10-CM

## 2017-05-23 DIAGNOSIS — E78.00 PURE HYPERCHOLESTEROLEMIA: ICD-10-CM

## 2017-05-23 DIAGNOSIS — D72.829 LEUKOCYTOSIS, UNSPECIFIED TYPE: ICD-10-CM

## 2017-05-23 DIAGNOSIS — F32.9 REACTIVE DEPRESSION: ICD-10-CM

## 2017-05-23 PROCEDURE — 77066 DX MAMMO INCL CAD BI: CPT

## 2017-05-31 ENCOUNTER — OFFICE VISIT (OUTPATIENT)
Dept: ONCOLOGY | Age: 52
End: 2017-05-31

## 2017-05-31 VITALS
DIASTOLIC BLOOD PRESSURE: 85 MMHG | RESPIRATION RATE: 18 BRPM | TEMPERATURE: 100.8 F | SYSTOLIC BLOOD PRESSURE: 125 MMHG | HEART RATE: 93 BPM | HEIGHT: 64 IN | OXYGEN SATURATION: 96 % | WEIGHT: 138 LBS | BODY MASS INDEX: 23.56 KG/M2

## 2017-05-31 DIAGNOSIS — C50.911 MALIGNANT NEOPLASM OF RIGHT FEMALE BREAST, UNSPECIFIED SITE OF BREAST: ICD-10-CM

## 2017-05-31 DIAGNOSIS — D75.839 THROMBOCYTOSIS: ICD-10-CM

## 2017-05-31 DIAGNOSIS — E78.5 ELEVATED LIPIDS: ICD-10-CM

## 2017-05-31 DIAGNOSIS — D72.9 NEUTROPHILIA: Primary | ICD-10-CM

## 2017-05-31 RX ORDER — ATORVASTATIN CALCIUM 20 MG/1
20 TABLET, FILM COATED ORAL DAILY
Qty: 30 TAB | Refills: 3 | Status: SHIPPED | OUTPATIENT
Start: 2017-05-31 | End: 2018-02-04 | Stop reason: SDUPTHER

## 2017-05-31 NOTE — PROGRESS NOTES
Tevin Canas is a 46 y.o. female   had concerns including Follow-up. Breast right DCIS, continues to smoke,tori anxious , states no pain and no sob. Ms. Jose Alfredo Lind has a reminder for a \"due or due soon\" health maintenance. I have asked that she contact her primary care provider for follow-up on this health maintenance.

## 2017-05-31 NOTE — MR AVS SNAPSHOT
Visit Information Date & Time Provider Department Dept. Phone Encounter #  
 5/31/2017  3:30 PM Jam Deleon [address] Oncology at [address] [id number] Your Appointments 9/22/2017  8:30 AM  
ROUTINE CARE with Charismaarianna Connellks, [address] [facility]) Appt Note: 6 month follow up  
 [facility] [address] [address]  
[address] [facility] [address] [place] [address] Upcoming Health Maintenance Date Due  
 PAP AKA CERVICAL CYTOLOGY 6/6/1984 Pneumococcal 19-64 Highest Risk (2 of 3 - PCV13) 8/27/2013 FOBT Q 1 YEAR AGE 50-75 11/29/2013 INFLUENZA AGE 9 TO ADULT 8/1/2017 BREAST CANCER SCRN MAMMOGRAM 5/23/2019 DTaP/Tdap/Td series (2 - Td) 6/13/2023 Allergies as of 5/31/2017  Review Complete On: 5/31/2017 By: Becky Zarate LPN Severity Noted Reaction Type Reactions Aspirin  06/21/2016    Itching Current Immunizations  Reviewed on 5/31/2017 Name Date Influenza Vaccine 9/16/2016 Influenza Vaccine PF 11/19/2013 Influenza Vaccine Split 9/18/2012 Pneumococcal Vaccine (Unspecified Type) 8/27/2012 TDAP Vaccine 9/10/2012 Tdap 6/13/2013 11:22 AM  
  
 Reviewed by Becky Zarate LPN on 8/39/8235 at  3:23 PM  
Vitals BP Pulse Temp Resp Height(growth percentile) Weight(growth percentile) 125/85 93 (!) 100.8 °F (38.2 °C) 18 5' 4\" (1.626 m) 138 lb (62.6 kg) SpO2 BMI OB Status Smoking Status 96% 23.69 kg/m2 Menopause Current Every Day Smoker Vitals History BMI and BSA Data Body Mass Index Body Surface Area  
 23.69 kg/m 2 1.68 m 2 Preferred Pharmacy Pharmacy Name Phone [id number], [place] - [address] [phone] Your Updated Medication List  
  
   
 This list is accurate as of: 5/31/17  3:49 PM.  Always use your most recent med list.  
  
  
  
  
 amitriptyline 25 mg tablet Commonly known as:  ELAVIL Take 1 Tab by mouth nightly. atorvastatin 20 mg tablet Commonly known as:  LIPITOR Take 1 Tab by mouth daily. escitalopram oxalate 10 mg tablet Commonly known as:  Aliene Apo Take 1 Tab by mouth daily. gabapentin 300 mg capsule Commonly known as:  NEURONTIN Take 1 Cap by mouth nightly as needed. levothyroxine 50 mcg tablet Commonly known as:  SYNTHROID Take 1 Tab by mouth Daily (before breakfast). metoprolol succinate 25 mg XL tablet Commonly known as:  TOPROL-XL Take 1 Tab by mouth daily. multivitamin tablet Commonly known as:  ONE A DAY Take 1 Tab by mouth daily. SUMAtriptan 50 mg tablet Commonly known as:  IMITREX Take 1 Tab by mouth once as needed for Migraine for up to 1 dose. Introducing [facility] & [facility]! [facility] introduces [employer] patient portal. Now you can access parts of your medical record, email your doctor's office, and request medication refills online. 1. In your internet browser, go to [email or web address] 2. Click on the First Time User? Click Here link in the Sign In box. You will see the New Member Sign Up page. 3. Enter your [employer] Access Code exactly as it appears below. You will not need to use this code after youve completed the sign-up process. If you do not sign up before the expiration date, you must request a new code. · [employer] Access Code: [email or web address] Expires: 7/30/2017 11:45 AM 
 
4. Enter the last four digits of your Social Security Number (xxxx) and Date of Birth (mm/dd/yyyy) as indicated and click Submit. You will be taken to the next sign-up page. 5. Create a [employer] ID. This will be your [employer] login ID and cannot be changed, so think of one that is secure and easy to remember. 6. Create a [employer] password. You can change your password at any time. 7. Enter your Password Reset Question and Answer. This can be used at a later time if you forget your password. 8. Enter your e-mail address. You will receive e-mail notification when new information is available in [address] 9. Click Sign Up. You can now view and download portions of your medical record. 10. Click the Download Summary menu link to download a portable copy of your medical information. If you have questions, please visit the Frequently Asked Questions section of the [employer] website. Remember, [employer] is NOT to be used for urgent needs. For medical emergencies, dial 911. Now available from your iPhone and Android! Please provide this summary of care documentation to your next provider. Your primary care clinician is listed as Milena Stone. If you have any questions after today's visit, please call [phone].

## 2017-05-31 NOTE — PROGRESS NOTES
Follow-up Note        Patient: Alison Salas MRN: [id number]  SSN: [id number]    YOB: 1965  Age: 46 y.o. Sex: female          Diagnosis:      1. Right breast DCIS, multifocal    Grade 1, ER +ve, NC +ve       Treatment:      1. S/P right lumpectomy on 05/30/2013   Patient interrupted radiation    Patient refused hormonal therapy      Subjective:      Alison Salas is a 46 y.o. pre-menopausal  female who has right sided multifocal DCIS. She underwent a lumpectomy on 05/30/2013. She discontinued radiation therapy before the end and she did not want to take hormonal therapy. Ms. Christoph Clay returns today at the request of Dr. Henry Castro for evaluation of thrombocytosis and leukocytosis. The patient has no complaints, but does continue to smoke and drinks alcohol daily.        Review of Systems:     Constitutional: anxiety   Eyes: negative   Ears, Nose, Mouth, Throat, and Face: negative   Respiratory: negative   Cardiovascular: negative   Gastrointestinal: negative   Genitourinary:negative   Integument/Breast: negative   Hematologic/Lymphatic: negative   Musculoskeletal:negative   Neurological: negative             Past Medical History:   Diagnosis Date    Anemia NEC     Anxiety     Breast cancer ([facility] 2013    right lumpectomy with radiation    Cancer ([facility]     Breast Cancer-Ductal Carcinoma in situ grade 1    Depression     Headache     Hypertension     Poor appetite     Psychiatric disorder     depression    PUD (peptic ulcer disease) 11/12    Stress     Tired     Trouble in sleeping     Weakness generalized      Past Surgical History:   Procedure Laterality Date    BREAST SURGERY PROCEDURE UNLISTED  5/30/13    RIGHT BREAST LUMPECTOMY WITH RIGHT NEDDLE LOCALIZATION     HX BREAST LUMPECTOMY  5/30/2013    BREAST LUMPECTOMY/PARTIAL performed by [facility] MAIN OR    HX GYN      fallopian tube    HX ORTHOPAEDIC      left ankle repair    HX OTHER SURGICAL cyst removed rom right thigh      Family History   Problem Relation Age of Onset    Cancer Father      pancreatic cancer    Cancer Brother      thyroid cancer    Hypertension Mother      Social History   Substance Use Topics    Smoking status: Current Every Day Smoker     Packs/day: 0.25     Years: 20.00     Types: Cigarettes    Smokeless tobacco: Never Used      Comment: 4 cigarettes a day    Alcohol use 3.5 oz/week     7 Standard drinks or equivalent per week      Comment: 1 beer daily/1 glass wine      Prior to Admission medications    Medication Sig Start Date End Date Taking? Authorizing Provider   SUMAtriptan (IMITREX) 50 mg tablet Take 1 Tab by mouth once as needed for Migraine for up to 1 dose. 5/1/17  Yes Merced Blue MD   gabapentin (NEURONTIN) 300 mg capsule Take 1 Cap by mouth nightly as needed. 5/1/17  Yes Merced Blue MD   levothyroxine (SYNTHROID) 50 mcg tablet Take 1 Tab by mouth Daily (before breakfast). 3/24/17  Yes Merced Blue MD   atorvastatin (LIPITOR) 20 mg tablet Take 1 Tab by mouth daily. 9/23/16  Yes Merced Blue MD   escitalopram oxalate (LEXAPRO) 10 mg tablet Take 1 Tab by mouth daily. 9/19/16  Yes Merced Blue MD   amitriptyline (ELAVIL) 25 mg tablet Take 1 Tab by mouth nightly. 9/19/16  Yes Merced Blue MD   metoprolol succinate (TOPROL-XL) 25 mg XL tablet Take 1 Tab by mouth daily. 9/19/16  Yes Merced Blue MD   multivitamin (ONE A DAY) tablet Take 1 Tab by mouth daily.      Yes Historical Provider          Allergies   Allergen Reactions    Aspirin Itching         Objective:     Vitals:    05/31/17 1521   BP: 125/85   Pulse: 93   Resp: 18   Temp: (!) 100.8 °F (38.2 °C)   SpO2: 96%   Weight: 138 lb (62.6 kg)   Height: 5' 4\" (1.626 m)            Physical Exam:    GENERAL: alert, cooperative, anxious   EYE: conjunctivae/corneas clear   LYMPHATIC: Cervical, supraclavicular, and axillary nodes normal.   THROAT & NECK: normal and no erythema or exudates noted. LUNG: clear to auscultation bilaterally   HEART: regular rate and rhythm   ABDOMEN: soft, non-tender   EXTREMITIES: extremities normal   SKIN: Normal.   NEUROLOGIC: negative           Lab Results   Component Value Date/Time    WBC 24.8 05/01/2017 12:19 PM    Hemoglobin (POC) 15.3 10/31/2014 02:26 PM    HGB 11.8 05/01/2017 12:19 PM    Hematocrit (POC) 45 10/31/2014 02:26 PM    HCT 35.9 05/01/2017 12:19 PM    PLATELET 463 39/85/9403 12:19 PM     05/01/2017 12:19 PM       Lab Results   Component Value Date/Time    Sodium 135 05/01/2017 12:19 PM    Potassium 5.0 05/01/2017 12:19 PM    Chloride 92 05/01/2017 12:19 PM    CO2 23 05/01/2017 12:19 PM    Anion gap 10 03/18/2017 04:50 AM    Glucose 83 05/01/2017 12:19 PM    BUN 9 05/01/2017 12:19 PM    Creatinine 0.61 05/01/2017 12:19 PM    BUN/Creatinine ratio 15 05/01/2017 12:19 PM    GFR est  05/01/2017 12:19 PM    GFR est non- 05/01/2017 12:19 PM    Calcium 10.1 05/01/2017 12:19 PM    Bilirubin, total 0.4 03/18/2017 04:50 AM    AST (SGOT) 213 03/18/2017 04:50 AM    Alk. phosphatase 146 03/18/2017 04:50 AM    Protein, total 7.2 03/18/2017 04:50 AM    Albumin 3.4 03/18/2017 04:50 AM    Globulin 3.8 03/18/2017 04:50 AM    A-G Ratio 0.9 03/18/2017 04:50 AM    ALT (SGPT) 48 03/18/2017 04:50 AM             Assessment:      1. Right breast DCIS:    Grade 1, ER +ve, MA +ve       > S/P right breast lumpectomy   > Did not finish breast radiation  > Not keen on hormonal therapy    Recent mammogram (05/2015): normal      2. Neutrophilia and thrombocytosis    Secondary to cigarette smoking and alcohol intake    Advised strict cessation of smoking and alcohol intake. She will contact PCP if she desires Chantix. Plan:        > Mammogram in 1 yr  > Follow up with PCP        Signed by: Cameron Franco MD                     May 31, 2017          CC.  Shane Rojas MD

## 2017-05-31 NOTE — TELEPHONE ENCOUNTER
Requested Prescriptions     Pending Prescriptions Disp Refills    atorvastatin (LIPITOR) 20 mg tablet 30 Tab 3     Sig: Take 1 Tab by mouth daily. Last OV-5. 1.17  Next OV-9.22.17

## 2017-06-05 DIAGNOSIS — E03.9 ACQUIRED HYPOTHYROIDISM: ICD-10-CM

## 2017-06-05 RX ORDER — LEVOTHYROXINE SODIUM 50 UG/1
50 TABLET ORAL
Qty: 30 TAB | Refills: 1 | Status: SHIPPED | OUTPATIENT
Start: 2017-06-05 | End: 2017-07-14 | Stop reason: SDUPTHER

## 2017-07-01 RX ORDER — METOPROLOL SUCCINATE 25 MG/1
TABLET, EXTENDED RELEASE ORAL
Qty: 30 TAB | Refills: 3 | Status: SHIPPED | OUTPATIENT
Start: 2017-07-01 | End: 2018-02-04 | Stop reason: SDUPTHER

## 2017-07-07 ENCOUNTER — TELEPHONE (OUTPATIENT)
Dept: INTERNAL MEDICINE CLINIC | Age: 52
End: 2017-07-07

## 2017-07-07 NOTE — TELEPHONE ENCOUNTER
Patient states she needs a call back to discuss her medications for her Thyroid & Migraines. Patient states she needs to know if she's to continue the Thyroid medication & also needs to see about getting more than 6 pills for the migraines as patient reports they are coming more frequent. Patient states a detailed message can be left on voice mail of attached phone number. Please call.  Thank you

## 2017-07-10 NOTE — TELEPHONE ENCOUNTER
MD Felecia Brownlee LPN        Caller: Unspecified (3 days ago, 10:59 AM)                     the patient can continue on synthroid 50mcg daiy --at goal     the patient is to take elavil to prevent HA EVERY day --will give 30d at a time plus RF     imitrex is just prn , not supposed to use more than 4-6 per month -- if having HA may need to increase elavil dose

## 2017-07-10 NOTE — TELEPHONE ENCOUNTER
Left detailed vm per pt request:    Pt informed per Dr. Diamond Brandt: take synthroid 50mcg daily, take elavil daily, imitrex only prn for about 4-6 per month, and may need to increase elavil dose if HA persist/worsen. Pt advised to call back for any refills and if having any questions/concerns. Encounter to be closed.

## 2017-08-03 ENCOUNTER — TELEPHONE (OUTPATIENT)
Dept: INTERNAL MEDICINE CLINIC | Age: 52
End: 2017-08-03

## 2017-08-03 NOTE — TELEPHONE ENCOUNTER
----- Message from Bernita Habermann sent at 8/3/2017 10:11 AM EDT -----  Regarding: Rubio/telephone  Pt is requesting to know if she is updated on her Tetanus shot. Her neighbors puppy nipped her in a few places. Pts number is [phone]. You can leave a message she does work at night and she maybe a sleep.       Copied/pasted from [facility]

## 2017-08-03 NOTE — TELEPHONE ENCOUNTER
MD Kris Oneil LPN        Caller: Unspecified (Today, 10:37 AM)                     tdap is utd she is fine, has been less than 5 years

## 2017-08-31 ENCOUNTER — OFFICE VISIT (OUTPATIENT)
Dept: INTERNAL MEDICINE CLINIC | Age: 52
End: 2017-08-31

## 2017-08-31 VITALS
RESPIRATION RATE: 16 BRPM | OXYGEN SATURATION: 98 % | BODY MASS INDEX: 24.84 KG/M2 | HEART RATE: 83 BPM | HEIGHT: 64 IN | TEMPERATURE: 98.4 F | WEIGHT: 145.5 LBS | DIASTOLIC BLOOD PRESSURE: 92 MMHG | SYSTOLIC BLOOD PRESSURE: 148 MMHG

## 2017-08-31 DIAGNOSIS — E78.00 PURE HYPERCHOLESTEROLEMIA: ICD-10-CM

## 2017-08-31 DIAGNOSIS — I10 ESSENTIAL HYPERTENSION: ICD-10-CM

## 2017-08-31 DIAGNOSIS — Z23 ENCOUNTER FOR IMMUNIZATION: ICD-10-CM

## 2017-08-31 DIAGNOSIS — E03.9 ACQUIRED HYPOTHYROIDISM: ICD-10-CM

## 2017-08-31 DIAGNOSIS — D72.829 LEUKOCYTOSIS, UNSPECIFIED TYPE: ICD-10-CM

## 2017-08-31 DIAGNOSIS — C50.911 MALIGNANT NEOPLASM OF RIGHT FEMALE BREAST, UNSPECIFIED SITE OF BREAST: ICD-10-CM

## 2017-08-31 DIAGNOSIS — R79.89 ELEVATED LIVER FUNCTION TESTS: ICD-10-CM

## 2017-08-31 DIAGNOSIS — R60.0 LOCALIZED EDEMA: Primary | ICD-10-CM

## 2017-08-31 RX ORDER — FUROSEMIDE 20 MG/1
20 TABLET ORAL DAILY
Qty: 30 TAB | Refills: 0 | Status: SHIPPED | OUTPATIENT
Start: 2017-08-31 | End: 2018-03-19 | Stop reason: SDUPTHER

## 2017-08-31 RX ORDER — GABAPENTIN 300 MG/1
CAPSULE ORAL
Qty: 30 CAP | Refills: 3 | Status: SHIPPED | OUTPATIENT
Start: 2017-08-31 | End: 2018-02-20

## 2017-08-31 RX ORDER — ESCITALOPRAM OXALATE 10 MG/1
TABLET ORAL
Qty: 30 TAB | Refills: 6 | Status: SHIPPED | OUTPATIENT
Start: 2017-08-31 | End: 2018-02-20 | Stop reason: DRUGHIGH

## 2017-08-31 NOTE — MR AVS SNAPSHOT
Visit Information Date & Time Provider Department Dept. Phone Encounter #  
 8/31/2017 11:30 AM Camila Addison, [address] [phone] [id number] Follow-up Instructions Return for as scheduled. Your Appointments 9/22/2017  8:30 AM  
ROUTINE CARE with Camila Addison, [address] [facility]) Appt Note: 6 month follow up  
 [address] [facility] [phone]  
[address] [place] [address] [address] [place] [address] Upcoming Health Maintenance Date Due  
 PAP AKA CERVICAL CYTOLOGY 6/6/1984 Pneumococcal 19-64 Highest Risk (2 of 3 - PCV13) 8/27/2013 INFLUENZA AGE 9 TO ADULT 8/1/2017 BREAST CANCER SCRN MAMMOGRAM 5/23/2019 COLONOSCOPY 3/11/2023 DTaP/Tdap/Td series (2 - Td) 6/13/2023 Allergies as of 8/31/2017  Review Complete On: 8/31/2017 By: Camila Addison MD  
  
 Severity Noted Reaction Type Reactions Aspirin  06/21/2016    Itching Current Immunizations  Reviewed on 8/31/2017 Name Date Influenza Vaccine 9/16/2016 Influenza Vaccine (Quad) PF 8/31/2017 11:50 AM  
 Influenza Vaccine PF 11/19/2013 Influenza Vaccine Split 9/18/2012 TDAP Vaccine 9/10/2012 Tdap 6/13/2013 11:22 AM  
 ZZZ-RETIRED (DO NOT USE) Pneumococcal Vaccine (Unspecified Type) 8/27/2012 Reviewed by Portia Shi LPN on 7/81/8298 at 09:63 AM  
You Were Diagnosed With   
  
 Codes Comments Localized edema    -  Primary ICD-10-CM: R60.0 ICD-9-CM: 782.3 Leukocytosis, unspecified type     ICD-10-CM: D72.829 ICD-9-CM: 288.60 Elevated liver function tests     ICD-10-CM: R94.5 ICD-9-CM: 790.6 Malignant neoplasm of right female breast, unspecified site of breast [facility]     ICD-10-CM: C50.911 ICD-9-CM: 174.9 Essential hypertension     ICD-10-CM: I10 
ICD-9-CM: 401.9 Pure hypercholesterolemia     ICD-10-CM: E78.00 ICD-9-CM: 272.0 Acquired hypothyroidism     ICD-10-CM: E03.9 ICD-9-CM: 244.9 Encounter for immunization     ICD-10-CM: R48 ICD-9-CM: V03.89 Vitals BP Pulse Temp Resp Height(growth percentile) Weight(growth percentile) 140/88 (BP 1 Location: Left arm, BP Patient Position: Sitting) 83 98.4 °F (36.9 °C) (Oral) 16 5' 4\" (1.626 m) 145 lb 8 oz (66 kg) SpO2 BMI OB Status Smoking Status 98% 24.98 kg/m2 Menopause Current Every Day Smoker Vitals History BMI and BSA Data Body Mass Index Body Surface Area 24.98 kg/m 2 1.73 m 2 Preferred Pharmacy Pharmacy Name Phone [id number]- [place], [place] - [address] [phone] Your Updated Medication List  
  
   
This list is accurate as of: 8/31/17 11:51 AM.  Always use your most recent med list.  
  
  
  
  
 amitriptyline 25 mg tablet Commonly known as:  ELAVIL Take 1 Tab by mouth nightly. atorvastatin 20 mg tablet Commonly known as:  LIPITOR Take 1 Tab by mouth daily. escitalopram oxalate 10 mg tablet Commonly known as:  Jasper Snow TAKE 1 TABLET BY MOUTH EVERY DAY  
  
 furosemide 20 mg tablet Commonly known as:  LASIX Take 1 Tab by mouth daily. gabapentin 300 mg capsule Commonly known as:  NEURONTIN  
TAKE ONE CAPSULE BY MOUTH EVERY NIGHT AS NEEDED  
  
 levothyroxine 50 mcg tablet Commonly known as:  SYNTHROID Take 1 Tab by mouth Daily (before breakfast). metoprolol succinate 25 mg XL tablet Commonly known as:  TOPROL-XL  
TAKE 1 TAB BY MOUTH DAILY. multivitamin tablet Commonly known as:  ONE A DAY Take 1 Tab by mouth daily. SUMAtriptan 50 mg tablet Commonly known as:  IMITREX Take 1 Tab by mouth once as needed for Migraine for up to 1 dose. Prescriptions Sent to Pharmacy Refills  
 furosemide (LASIX) 20 mg tablet 0 Sig: Take 1 Tab by mouth daily.   
 Class: Normal  
 Pharmacy: [facility] [address] Ph #: [phone] Route: Oral  
  
We Performed the Following CBC W/O DIFF [82852 CPT(R)] INFLUENZA VIRUS VAC QUAD,SPLIT,PRESV FREE SYRINGE 3/> YRS IM [id number] CPT(R)] METABOLIC PANEL, COMPREHENSIVE [13354 CPT(R)] TSH 3RD GENERATION [69767 CPT(R)] Follow-up Instructions Return for as scheduled. Patient Instructions Take lasix once daily for the next 1-2 weeks to improve swelling Labs today Vaccine Information Statement Influenza (Flu) Vaccine (Inactivated or Recombinant): What you need to know Many Vaccine Information Statements are available in Upper sorbian and other languages. See www.immunize.org/vis Hojas de Información Sobre Vacunas están disponibles en Español y en muchos otros idiomas. Visite www.immunize.org/vis 1. Why get vaccinated? Influenza (flu) is a contagious disease that spreads around the [facility] every year, usually between October and May. Flu is caused by influenza viruses, and is spread mainly by coughing, sneezing, and close contact. Anyone can get flu. Flu strikes suddenly and can last several days. Symptoms vary by age, but can include: 
 fever/chills  sore throat  muscle aches  fatigue  cough  headache  runny or stuffy nose Flu can also lead to pneumonia and blood infections, and cause diarrhea and seizures in children. If you have a medical condition, such as heart or lung disease, flu can make it worse. Flu is more dangerous for some people. Infants and young children, people 72years of age and older, pregnant women, and people with certain health conditions or a weakened immune system are at greatest risk. Each year thousands of people in the [facility] die from flu, and many more are hospitalized.   
 
Flu vaccine can: 
 keep you from getting flu, 
 make flu less severe if you do get it, and 
  keep you from spreading flu to your family and other people. 2. Inactivated and recombinant flu vaccines A dose of flu vaccine is recommended every flu season. Children 6 months through 6years of age may need two doses during the same flu season. Everyone else needs only one dose each flu season. Some inactivated flu vaccines contain a very small amount of a mercury-based preservative called thimerosal. Studies have not shown thimerosal in vaccines to be harmful, but flu vaccines that do not contain thimerosal are available. There is no live flu virus in flu shots. They cannot cause the flu. There are many flu viruses, and they are always changing. Each year a new flu vaccine is made to protect against three or four viruses that are likely to cause disease in the upcoming flu season. But even when the vaccine doesnt exactly match these viruses, it may still provide some protection Flu vaccine cannot prevent: 
 flu that is caused by a virus not covered by the vaccine, or 
 illnesses that look like flu but are not. It takes about 2 weeks for protection to develop after vaccination, and protection lasts through the flu season. 3. Some people should not get this vaccine Tell the person who is giving you the vaccine:  If you have any severe, life-threatening allergies. If you ever had a life-threatening allergic reaction after a dose of flu vaccine, or have a severe allergy to any part of this vaccine, you may be advised not to get vaccinated. Most, but not all, types of flu vaccine contain a small amount of egg protein.  If you ever had Guillain-Barré Syndrome (also called GBS). Some people with a history of GBS should not get this vaccine. This should be discussed with your doctor.  If you are not feeling well. It is usually okay to get flu vaccine when you have a mild illness, but you might be asked to come back when you feel better. 4. Risks of a vaccine reaction With any medicine, including vaccines, there is a chance of reactions. These are usually mild and go away on their own, but serious reactions are also possible. Most people who get a flu shot do not have any problems with it. Minor problems following a flu shot include:  
 soreness, redness, or swelling where the shot was given  hoarseness  sore, red or itchy eyes  cough  fever  aches  headache  itching  fatigue If these problems occur, they usually begin soon after the shot and last 1 or 2 days. More serious problems following a flu shot can include the following:  There may be a small increased risk of Guillain-Barré Syndrome (GBS) after inactivated flu vaccine. This risk has been estimated at 1 or 2 additional cases per million people vaccinated. This is much lower than the risk of severe complications from flu, which can be prevented by flu vaccine.  Young children who get the flu shot along with pneumococcal vaccine (PCV13) and/or DTaP vaccine at the same time might be slightly more likely to have a seizure caused by fever. Ask your doctor for more information. Tell your doctor if a child who is getting flu vaccine has ever had a seizure. Problems that could happen after any injected vaccine:  People sometimes faint after a medical procedure, including vaccination. Sitting or lying down for about 15 minutes can help prevent fainting, and injuries caused by a fall. Tell your doctor if you feel dizzy, or have vision changes or ringing in the ears.  Some people get severe pain in the shoulder and have difficulty moving the arm where a shot was given. This happens very rarely.  Any medication can cause a severe allergic reaction. Such reactions from a vaccine are very rare, estimated at about 1 in a million doses, and would happen within a few minutes to a few hours after the vaccination. As with any medicine, there is a very remote chance of a vaccine causing a serious injury or death. The safety of vaccines is always being monitored. For more information, visit: www.cdc.gov/vaccinesafety/ 
 
5. What if there is a serious reaction? What should I look for?  Look for anything that concerns you, such as signs of a severe allergic reaction, very high fever, or unusual behavior. Signs of a severe allergic reaction can include hives, swelling of the face and throat, difficulty breathing, a fast heartbeat, dizziness, and weakness  usually within a few minutes to a few hours after the vaccination. What should I do?  If you think it is a severe allergic reaction or other emergency that cant wait, call 9-1-1 and get the person to the nearest hospital. Otherwise, call your doctor.  Reactions should be reported to the Vaccine Adverse Event Reporting System (VAERS). Your doctor should file this report, or you can do it yourself through  the VAERS web site at www.vaers. [facility].gov, or by calling 8-541.810.9414. VAERS does not give medical advice. 6. The National Vaccine Injury Compensation Program 
 
The [facility] Vaccine Injury Compensation Program (VICP) is a federal program that was created to compensate people who may have been injured by certain vaccines. Persons who believe they may have been injured by a vaccine can learn about the program and about filing a claim by calling 1-627.119.3695 or visiting the [employer] website at www.[facility].gov/vaccinecompensation. There is a time limit to file a claim for compensation. 7. How can I learn more?  Ask your healthcare provider. He or she can give you the vaccine package insert or suggest other sources of information.  Call your local or state health department.  Contact the Centers for Disease Control and Prevention (CDC): 
- Call 6-869.249.7798 (1-800-CDC-INFO) or 
- Visit CDCs website at www.cdc.gov/flu Vaccine Information Statement Inactivated Influenza Vaccine 8/7/2015 
[address] [id number]-39 Department of Health and [facility] Centers for Disease Control and Prevention Office Use Only Introducing [facility]! [address] introduces [employer] patient portal. Now you can access parts of your medical record, email your doctor's office, and request medication refills online. 1. In your internet browser, go to [email or web address] 2. Click on the First Time User? Click Here link in the Sign In box. You will see the New Member Sign Up page. 3. Enter your [employer] Access Code exactly as it appears below. You will not need to use this code after youve completed the sign-up process. If you do not sign up before the expiration date, you must request a new code. · [employer] Access Code: [facility] Expires: 11/29/2017 11:46 AM 
 
4. Enter the last four digits of your Social Security Number (xxxx) and Date of Birth (mm/dd/yyyy) as indicated and click Submit. You will be taken to the next sign-up page. 5. Create a [employer] ID. This will be your [employer] login ID and cannot be changed, so think of one that is secure and easy to remember. 6. Create a [employer] password. You can change your password at any time. 7. Enter your Password Reset Question and Answer. This can be used at a later time if you forget your password. 8. Enter your e-mail address. You will receive e-mail notification when new information is available in [address] 9. Click Sign Up. You can now view and download portions of your medical record. 10. Click the Download Summary menu link to download a portable copy of your medical information. If you have questions, please visit the Frequently Asked Questions section of the [employer] website. Remember, [employer] is NOT to be used for urgent needs. For medical emergencies, dial 911. Now available from your iPhone and Android! Please provide this summary of care documentation to your next provider. Your primary care clinician is listed as Lary Marina. If you have any questions after today's visit, please call [phone].

## 2017-08-31 NOTE — PATIENT INSTRUCTIONS
Take lasix once daily for the next 1-2 weeks to improve swelling     Labs today   Vaccine Information Statement    Influenza (Flu) Vaccine (Inactivated or Recombinant): What you need to know    Many Vaccine Information Statements are available in Bengali and other languages. See www.immunize.org/vis  Hojas de Información Sobre Vacunas están disponibles en Español y en muchos otros idiomas. Visite www.immunize.org/vis    1. Why get vaccinated? Influenza (flu) is a contagious disease that spreads around the [place] every year, usually between October and May. Flu is caused by influenza viruses, and is spread mainly by coughing, sneezing, and close contact. Anyone can get flu. Flu strikes suddenly and can last several days. Symptoms vary by age, but can include:   fever/chills   sore throat   muscle aches   fatigue   cough   headache    runny or stuffy nose    Flu can also lead to pneumonia and blood infections, and cause diarrhea and seizures in children. If you have a medical condition, such as heart or lung disease, flu can make it worse. Flu is more dangerous for some people. Infants and young children, people 72years of age and older, pregnant women, and people with certain health conditions or a weakened immune system are at greatest risk. Each year thousands of people in the [facility] die from flu, and many more are hospitalized. Flu vaccine can:   keep you from getting flu,   make flu less severe if you do get it, and   keep you from spreading flu to your family and other people. 2. Inactivated and recombinant flu vaccines    A dose of flu vaccine is recommended every flu season. Children 6 months through 6years of age may need two doses during the same flu season. Everyone else needs only one dose each flu season.        Some inactivated flu vaccines contain a very small amount of a mercury-based preservative called thimerosal. Studies have not shown thimerosal in vaccines to be harmful, but flu vaccines that do not contain thimerosal are available. There is no live flu virus in flu shots. They cannot cause the flu. There are many flu viruses, and they are always changing. Each year a new flu vaccine is made to protect against three or four viruses that are likely to cause disease in the upcoming flu season. But even when the vaccine doesnt exactly match these viruses, it may still provide some protection    Flu vaccine cannot prevent:   flu that is caused by a virus not covered by the vaccine, or   illnesses that look like flu but are not. It takes about 2 weeks for protection to develop after vaccination, and protection lasts through the flu season. 3. Some people should not get this vaccine    Tell the person who is giving you the vaccine:     If you have any severe, life-threatening allergies. If you ever had a life-threatening allergic reaction after a dose of flu vaccine, or have a severe allergy to any part of this vaccine, you may be advised not to get vaccinated. Most, but not all, types of flu vaccine contain a small amount of egg protein.  If you ever had Guillain-Barré Syndrome (also called GBS). Some people with a history of GBS should not get this vaccine. This should be discussed with your doctor.  If you are not feeling well. It is usually okay to get flu vaccine when you have a mild illness, but you might be asked to come back when you feel better. 4. Risks of a vaccine reaction    With any medicine, including vaccines, there is a chance of reactions. These are usually mild and go away on their own, but serious reactions are also possible. Most people who get a flu shot do not have any problems with it.      Minor problems following a flu shot include:    soreness, redness, or swelling where the shot was given     hoarseness   sore, red or itchy eyes   cough   fever   aches   headache   itching   fatigue  If these problems occur, they usually begin soon after the shot and last 1 or 2 days. More serious problems following a flu shot can include the following:     There may be a small increased risk of Guillain-Barré Syndrome (GBS) after inactivated flu vaccine. This risk has been estimated at 1 or 2 additional cases per million people vaccinated. This is much lower than the risk of severe complications from flu, which can be prevented by flu vaccine.  Young children who get the flu shot along with pneumococcal vaccine (PCV13) and/or DTaP vaccine at the same time might be slightly more likely to have a seizure caused by fever. Ask your doctor for more information. Tell your doctor if a child who is getting flu vaccine has ever had a seizure. Problems that could happen after any injected vaccine:      People sometimes faint after a medical procedure, including vaccination. Sitting or lying down for about 15 minutes can help prevent fainting, and injuries caused by a fall. Tell your doctor if you feel dizzy, or have vision changes or ringing in the ears.  Some people get severe pain in the shoulder and have difficulty moving the arm where a shot was given. This happens very rarely.  Any medication can cause a severe allergic reaction. Such reactions from a vaccine are very rare, estimated at about 1 in a million doses, and would happen within a few minutes to a few hours after the vaccination. As with any medicine, there is a very remote chance of a vaccine causing a serious injury or death. The safety of vaccines is always being monitored. For more information, visit: www.cdc.gov/vaccinesafety/    5. What if there is a serious reaction? What should I look for?  Look for anything that concerns you, such as signs of a severe allergic reaction, very high fever, or unusual behavior.     Signs of a severe allergic reaction can include hives, swelling of the face and throat, difficulty breathing, a fast heartbeat, dizziness, and weakness  usually within a few minutes to a few hours after the vaccination. What should I do?  If you think it is a severe allergic reaction or other emergency that cant wait, call 9-1-1 and get the person to the nearest hospital. Otherwise, call your doctor.  Reactions should be reported to the Vaccine Adverse Event Reporting System (VAERS). Your doctor should file this report, or you can do it yourself through  the VAERS web site at www.vaers. [facility].gov, or by calling 9-492.494.1992. VAERS does not give medical advice. 6. The National Vaccine Injury Compensation Program    The [facility] Vaccine Injury Compensation Program (VICP) is a federal program that was created to compensate people who may have been injured by certain vaccines. Persons who believe they may have been injured by a vaccine can learn about the program and about filing a claim by calling 5-675.151.4507 or visiting the [facility] website at www.[facility].gov/vaccinecompensation. There is a time limit to file a claim for compensation. 7. How can I learn more?  Ask your healthcare provider. He or she can give you the vaccine package insert or suggest other sources of information.  Call your local or state health department.  Contact the Centers for Disease Control and Prevention (CDC):  - Call 2-949.541.6961 (1-800-CDC-INFO) or  - Visit CDCs website at www.cdc.gov/flu    Vaccine Information Statement   Inactivated Influenza Vaccine   8/7/2015  [address] ELIAS Beltran [id number]    Department of Health and Human Services  Centers for Disease Control and Prevention    Office Use Only

## 2017-08-31 NOTE — PROGRESS NOTES
HISTORY OF PRESENT ILLNESS  Maite Underwood is a 45 y.o. female. HPI   Last here 5/01/17. Pt is here to f/u on acute care. Pt brought in medications - reviewed.     BP today is 140/88, will repeat this  Pt is not monitoring BP at home as she has no BP cuff  Continues toprol XL 25mg daily  She takes her medications at night, reports compliance     Pt c/o edema to ankles BL x 1-2 days ago, recurrent issue for her  She states that this has been worsening since she worked last night  Pt stands while working for ~8 hours  She denies any triggers to precipitate this  She denies SOB  She reports numbness to the tops of her feet  Will start her on lasix today  Advised leg elevation    Pt saw NP from Dr. Mosley Mon office in 5/17  Reviewed note from Dr. Polo Juan (onc) 5/31/17:   1. Right breast DCIS: Grade 1, [place] +ve, [place] +ve  > S/P right breast lumpectomy   > Did not finish breast radiation  > Not keen on hormonal therapy  Recent mammogram (05/2015): normal  2. Neutrophilia and thrombocytosis:  Secondary to cigarette smoking and alcohol intake  Advised strict cessation of smoking and alcohol intake. She will contact PCP if she desires Chantix.    Recall past evaluation with Dr. Polo Juan in 2015, thrombocytosis thought d/t cigarette smoking     Continues amitriptyline for HAs but only uses this prn   She also has imitrex prn for HAs which works well      Continues synthroid 50mcg daily   Discussed thyroid dysfunction possibly contributing to edema     Continues lipitor 20mg daily for cholesterol      Reviewed last labs 5/17  30182 WBC    Reviewed mammogram 5/23/17: negative     Pt continues to smoke  She is having trouble tapering back  Counseled on cessation  Discussed this delays healing      Continues lexapro daily for anxiety and depression  She states this calms her and helps with stomach sx as well      Wt is up 7 lbs since last visit  Her weight is within normal ranges     Pt is cutting back on alcohol consumption  Pt states that she had 4-6 glasses of wine for 5 days while on vacation  Discussed alcohol consumption possibly causing electrolyte imbalance  Discussed cutting out the alcohol completely         PREVENTIVE:   Colonoscopy: 3/13, repeat 10 years  EGD: 2013, possible Barrette's   Pap: overdue, reminded again, referred, will schedule, discussed again 8/17  Mammogram: 5/23/17 negative, h/o R sided breast DCIS, discussed  Tdap: 6/13/2013  Pneumovax: 8/27/2012  [id number]: not yet needed  Zostavax: not yet needed  Flu shot: 8/31/17  Eye exam: 8/16  EKG: 3/17 per ED   Lipids: 3/17 LDL 29    Patient Active Problem List    Diagnosis Date Noted    Pure hypercholesterolemia 05/01/2017    Acquired hypothyroidism 05/01/2017    Elevated WBC count 04/16/2014    Elevated liver function tests 04/16/2014    Breast cancer right DCIS 06/11/2013    Cancer ([facility] [address]     Depression 02/11/2013    Headache 09/10/2012    HTN (hypertension) 09/10/2012     Current Outpatient Prescriptions   Medication Sig Dispense Refill    gabapentin (NEURONTIN) 300 mg capsule TAKE ONE CAPSULE BY MOUTH EVERY NIGHT AS NEEDED 30 Cap 3    escitalopram oxalate (LEXAPRO) 10 mg tablet TAKE 1 TABLET BY MOUTH EVERY DAY 30 Tab 6    levothyroxine (SYNTHROID) 50 mcg tablet Take 1 Tab by mouth Daily (before breakfast). 30 Tab 2    metoprolol succinate (TOPROL-XL) 25 mg XL tablet TAKE 1 TAB BY MOUTH DAILY. 30 Tab 3    atorvastatin (LIPITOR) 20 mg tablet Take 1 Tab by mouth daily. 30 Tab 3    SUMAtriptan (IMITREX) 50 mg tablet Take 1 Tab by mouth once as needed for Migraine for up to 1 dose. 6 Tab 1    amitriptyline (ELAVIL) 25 mg tablet Take 1 Tab by mouth nightly. 30 Tab 3    multivitamin (ONE A DAY) tablet Take 1 Tab by mouth daily.          Past Surgical History:   Procedure Laterality Date    BREAST SURGERY PROCEDURE UNLISTED  5/30/13    RIGHT BREAST LUMPECTOMY WITH RIGHT NEDDLE LOCALIZATION     HX BREAST LUMPECTOMY  5/30/2013    BREAST LUMPECTOMY[facility]PARTIAL [facility] Tre[facility]    HX GYN      fallopian tube    HX ORTHOPAEDIC      left ankle repair    HX OTHER SURGICAL      cyst removed rom right thigh      Lab Results  Component Value Date/Time   WBC 24.8 05/01/2017 12:19 PM   HGB 11.8 05/01/2017 12:19 PM   Hemoglobin (POC) 15.3 10/31/2014 02:26 PM   HCT 35.9 05/01/2017 12:19 PM   Hematocrit (POC) 45 10/31/2014 02:26 PM   PLATELET 738 36/47/8667 12:19 PM    05/01/2017 12:19 PM     Lab Results  Component Value Date/Time   Cholesterol, total 153 03/22/2017 12:31 PM   HDL Cholesterol 44 03/22/2017 12:31 PM   LDL, calculated 29 03/22/2017 12:31 PM   Triglyceride 400 03/22/2017 12:31 PM     Lab Results  Component Value Date/Time   GFR est non- 05/01/2017 12:19 PM   GFRNA, POC >60 10/31/2014 02:26 PM   GFR est  05/01/2017 12:19 PM   GFRAA, POC >60 10/31/2014 02:26 PM   Creatinine 0.61 05/01/2017 12:19 PM   Creatinine (POC) 0.9 10/31/2014 02:26 PM   BUN 9 05/01/2017 12:19 PM   BUN (POC) 8 10/31/2014 02:26 PM   Sodium 135 05/01/2017 12:19 PM   Sodium (POC) 140 10/31/2014 02:26 PM   Potassium 5.0 05/01/2017 12:19 PM   Potassium (POC) 3.7 10/31/2014 02:26 PM   Chloride 92 05/01/2017 12:19 PM   Chloride (POC) 103 10/31/2014 02:26 PM   CO2 23 05/01/2017 12:19 PM   Magnesium 1.6 01/14/2017 07:28 AM   Phosphorus 1.0 11/28/2012 04:45 AM          Review of Systems   Respiratory: Negative for shortness of breath. Cardiovascular: Positive for leg swelling. Negative for chest pain. Physical Exam   Constitutional: She is oriented to person, place, and time. She appears well-developed and well-nourished. No distress. HENT:   Head: Normocephalic and atraumatic. Mouth/Throat: Oropharynx is clear and moist. No oropharyngeal exudate. Eyes: Conjunctivae and EOM are normal.   Neck: Normal range of motion. Neck supple. Cardiovascular: Normal rate, regular rhythm, normal heart sounds and intact distal pulses. Exam reveals no gallop and no friction rub. No murmur heard. Pulmonary/Chest: Effort normal and breath sounds normal. No respiratory distress. She has no wheezes. She has no rales. She exhibits no tenderness. Musculoskeletal: Normal range of motion. She exhibits edema (1+ pitting BLE). She exhibits no tenderness or deformity. Lymphadenopathy:     She has no cervical adenopathy. Neurological: She is alert and oriented to person, place, and time. Coordination normal.   Skin: Skin is warm and dry. No rash noted. She is not diaphoretic. No erythema. No pallor. Psychiatric: She has a normal mood and affect. Her behavior is normal.       ASSESSMENT and PLAN    ICD-10-CM ICD-9-CM    1. Localized edema    Will check basic labs for further eval, pt was recently on a trip and drank profuse amounts of alochol and likely has electrolyte abnormalities that are contributing, she has been counselled on multiple occasions about alcohol consumption, will give lasix daily to improve sx, work on leg elevation, await lab results K18.7 796.0 METABOLIC PANEL, COMPREHENSIVE      CBC W/O DIFF      TSH 3RD GENERATION   2. Leukocytosis, unspecified type    Was evaluated by Dr. Laura Shaw office, elevated WBC is thought to be d/t reactive smoking and alcohol, repeat CBC today, counselled on cessation Z11.585 387.01 METABOLIC PANEL, COMPREHENSIVE      CBC W/O DIFF      TSH 3RD GENERATION   3. Elevated liver function tests    Related to alcohol use, repeat LFTs today C83.0 223.8 METABOLIC PANEL, COMPREHENSIVE      CBC W/O DIFF      TSH 3RD GENERATION   4. Malignant neoplasm of right female breast, unspecified site of breast ([facility] [address]    Mammogram UTD N57.303 183.3 METABOLIC PANEL, COMPREHENSIVE      CBC W/O DIFF      TSH 3RD GENERATION   5.  Essential hypertension    BP mildly up today, was well controlled last visit, she is not feeling well and retaining fluid and wt is up about 7lbs from last visit, which is likely contributing, for now just start lasix, continue toprol XL 25mg, if not improved at f/u next month, will adjust meds further R09 444.1 METABOLIC PANEL, COMPREHENSIVE      CBC W/O DIFF      TSH 3RD GENERATION   6. Pure hypercholesterolemia    On lipitor, at goal M30.73 368.7 METABOLIC PANEL, COMPREHENSIVE      CBC W/O DIFF      TSH 3RD GENERATION   7. Acquired hypothyroidism    On synthroid 50 mcg, wt up, check TFTs and adjust dose as needed F32.2 326.9 METABOLIC PANEL, COMPREHENSIVE      CBC W/O DIFF      TSH 3RD GENERATION   8. Encounter for immunization    Flu shot Z23 V03.89 INFLUENZA VIRUS VAC QUAD,SPLIT,PRESV FREE SYRINGE 3/> YRS IM        Scribed by [address], as dictated by Dr. Kandy Naranjo. Current diagnosis and concerns discussed with pt at length. Understands risks and benefits or current treatment plan and medications and accepts the treatment and medication with any possible risks. Pt asks appropriate questions which were answered. Pt instructed to call with any concerns or problems. This note will not be viewable in [address]

## 2017-09-01 LAB
ALBUMIN SERPL-MCNC: 4.1 G/DL (ref 3.5–5.5)
ALBUMIN/GLOB SERPL: 1.5 {RATIO} (ref 1.2–2.2)
ALP SERPL-CCNC: 92 IU/L (ref 39–117)
ALT SERPL-CCNC: 18 IU/L (ref 0–32)
AST SERPL-CCNC: 31 IU/L (ref 0–40)
BILIRUB SERPL-MCNC: 0.4 MG/DL (ref 0–1.2)
BUN SERPL-MCNC: 9 MG/DL (ref 6–24)
BUN/CREAT SERPL: 13 (ref 9–23)
CALCIUM SERPL-MCNC: 8.6 MG/DL (ref 8.7–10.2)
CHLORIDE SERPL-SCNC: 98 MMOL/L (ref 96–106)
CO2 SERPL-SCNC: 25 MMOL/L (ref 18–29)
CREAT SERPL-MCNC: 0.72 MG/DL (ref 0.57–1)
ERYTHROCYTE [DISTWIDTH] IN BLOOD BY AUTOMATED COUNT: 15.3 % (ref 12.3–15.4)
GLOBULIN SER CALC-MCNC: 2.7 G/DL (ref 1.5–4.5)
GLUCOSE SERPL-MCNC: 91 MG/DL (ref 65–99)
HCT VFR BLD AUTO: 33.2 % (ref 34–46.6)
HGB BLD-MCNC: 10.9 G/DL (ref 11.1–15.9)
MCH RBC QN AUTO: 32.5 PG (ref 26.6–33)
MCHC RBC AUTO-ENTMCNC: 32.8 G/DL (ref 31.5–35.7)
MCV RBC AUTO: 99 FL (ref 79–97)
PLATELET # BLD AUTO: 198 X10E3/UL (ref 150–379)
POTASSIUM SERPL-SCNC: 4.2 MMOL/L (ref 3.5–5.2)
PROT SERPL-MCNC: 6.8 G/DL (ref 6–8.5)
RBC # BLD AUTO: 3.35 X10E6/UL (ref 3.77–5.28)
SODIUM SERPL-SCNC: 141 MMOL/L (ref 134–144)
TSH SERPL DL<=0.005 MIU/L-ACNC: 1.34 UIU/ML (ref 0.45–4.5)
WBC # BLD AUTO: 7.1 X10E3/UL (ref 3.4–10.8)

## 2017-09-22 ENCOUNTER — OFFICE VISIT (OUTPATIENT)
Dept: INTERNAL MEDICINE CLINIC | Age: 52
End: 2017-09-22

## 2017-09-22 ENCOUNTER — TELEPHONE (OUTPATIENT)
Dept: INTERNAL MEDICINE CLINIC | Age: 52
End: 2017-09-22

## 2017-09-22 VITALS
TEMPERATURE: 99.3 F | RESPIRATION RATE: 16 BRPM | HEART RATE: 90 BPM | WEIGHT: 142 LBS | SYSTOLIC BLOOD PRESSURE: 135 MMHG | BODY MASS INDEX: 24.24 KG/M2 | DIASTOLIC BLOOD PRESSURE: 88 MMHG | OXYGEN SATURATION: 95 % | HEIGHT: 64 IN

## 2017-09-22 DIAGNOSIS — R60.0 LOCALIZED EDEMA: ICD-10-CM

## 2017-09-22 DIAGNOSIS — D72.829 LEUKOCYTOSIS, UNSPECIFIED TYPE: ICD-10-CM

## 2017-09-22 DIAGNOSIS — D50.0 IRON DEFICIENCY ANEMIA DUE TO CHRONIC BLOOD LOSS: ICD-10-CM

## 2017-09-22 DIAGNOSIS — I10 ESSENTIAL HYPERTENSION: ICD-10-CM

## 2017-09-22 DIAGNOSIS — C50.911 MALIGNANT NEOPLASM OF RIGHT FEMALE BREAST, UNSPECIFIED SITE OF BREAST: ICD-10-CM

## 2017-09-22 DIAGNOSIS — E03.9 ACQUIRED HYPOTHYROIDISM: ICD-10-CM

## 2017-09-22 DIAGNOSIS — Z80.8 FHX: THYROID CANCER: ICD-10-CM

## 2017-09-22 DIAGNOSIS — Z00.00 PHYSICAL EXAM, ANNUAL: Primary | ICD-10-CM

## 2017-09-22 DIAGNOSIS — E78.00 PURE HYPERCHOLESTEROLEMIA: ICD-10-CM

## 2017-09-22 NOTE — PROGRESS NOTES
HISTORY OF PRESENT ILLNESS  Maite Hodgson is a 45 y.o. female. HPI   Last here 8/31/17. Pt is here to f/u on chronic conditions.   Pt brought in medications - reviewed.      BP today is 135/88  Pt is not monitoring BP at home as she has no BP cuff  Continues toprol XL 25mg daily   This med was not in her pill bag, but she reports compliance with this med  She takes her medications at night, reports compliance     Last visit, pt c/o edema to BL ankles   Pt was rx'd with lasix 20mg with significant improvement to sx  Pt uses this med prn     Pt follows with Dr. Martha Mack (hem/onc) for anemia  Last visit was 5/17 with his NP  Recall COLO and EGD 2013 - bleeding ulcer, issues with anemia  Recall past evaluation with Dr. Martha Mack in 2015, thrombocytosis thought d/t cigarette smoking      Pt states that she is getting more and more migraines  Pt has missed days of work as a result  Pt states that the light exacerbates her sx  Pt denies a HA in the clinic today  Pt has not been taking amitriptyline 25mg qhs for HAs - pt thought that this med only helped with sleeping  Advised her to take amitriptyline 25mg qhs to prevent HAs   Discussed increasing amitriptyline to 50mg qhs if needed  Discussed this med preventing HAs  Pt also has imitrex prn for HAs, works well  Advised taking imitrex when she feels a HA coming on      Reviewed head CT 1/14/17: No acute intracranial abnormality    Pt wonders about FMLA   Pt brought in FMLA paperwork today   Discussed her having to be compliant with therapy  Discussed short-term FMLA and giving her 1 day off from work each month      Continues synthroid 50mcg daily, tolerating well  Pt wonders if she has to take this med for the rest of her life - addressed this today  Pt has [address] thyroid cancer (brother)  Pt is unsure how his cancer was discovered  On exam, there were no nodules  Will try to order US thyroids today      Continues lipitor 20mg daily for cholesterol, tolerating well    Continues gabapentin 300mg, tolerating well helping pain      Reviewed last labs 8/17  Thyroid nl, WBC much improved, kidney nl, low calcium, liver nl  Will have her start calcium OTC today      Pt continues to smoke <0.5 PPD  Counseled on cessation    Pt has 1 alcoholic beverage each night  Pt has 2-3 alcoholic beverages when she dines with her mother  Discussed cessation      Continues lexapro 10mg daily for anxiety and depression  She states this calms her and helps with stomach sx as well       Wt is down 3 lbs since last visit  Her weight is within normal ranges    On exam, pt had black hairy tongue  Discussed this being d/t bacteria and orange juice consumption  Discussed using a soft toothbrush to scrape off bacteria     PREVENTIVE:   Colonoscopy: 3/13, repeat 10 years  EGD: 2013, possible Barrette's   Pap: overdue, reminded again, referred, will schedule, discussed again 9/17  Mammogram: 5/23/17 negative, h/o R sided breast DCIS,   Tdap: 6/13/2013  Pneumovax: 8/27/2012  [id number]: not yet needed  Zostavax: not yet needed  Flu shot: 8/31/17  Eye exam: 8/16, will schedule 10/2017 or 11/2017  EKG: 3/17 per ED   Lipids: 3/17 LDL 29    Patient Active Problem List    Diagnosis Date Noted    Pure hypercholesterolemia 05/01/2017    Acquired hypothyroidism 05/01/2017    Elevated WBC count 04/16/2014    Elevated liver function tests 04/16/2014    Breast cancer right DCIS 06/11/2013    Cancer ([facility] [address]     Depression 02/11/2013    Headache 09/10/2012    HTN (hypertension) 09/10/2012     Current Outpatient Prescriptions   Medication Sig Dispense Refill    gabapentin (NEURONTIN) 300 mg capsule TAKE ONE CAPSULE BY MOUTH EVERY NIGHT AS NEEDED 30 Cap 3    escitalopram oxalate (LEXAPRO) 10 mg tablet TAKE 1 TABLET BY MOUTH EVERY DAY 30 Tab 6    furosemide (LASIX) 20 mg tablet Take 1 Tab by mouth daily. 30 Tab 0    levothyroxine (SYNTHROID) 50 mcg tablet Take 1 Tab by mouth Daily (before breakfast).  30 Tab 2  metoprolol succinate (TOPROL-XL) 25 mg XL tablet TAKE 1 TAB BY MOUTH DAILY. 30 Tab 3    atorvastatin (LIPITOR) 20 mg tablet Take 1 Tab by mouth daily. 30 Tab 3    SUMAtriptan (IMITREX) 50 mg tablet Take 1 Tab by mouth once as needed for Migraine for up to 1 dose. 6 Tab 1    amitriptyline (ELAVIL) 25 mg tablet Take 1 Tab by mouth nightly. 30 Tab 3    multivitamin (ONE A DAY) tablet Take 1 Tab by mouth daily.          Past Surgical History:   Procedure Laterality Date    BREAST SURGERY PROCEDURE UNLISTED  5/30/13    RIGHT BREAST LUMPECTOMY WITH RIGHT NEDDLE LOCALIZATION     HX BREAST LUMPECTOMY  5/30/2013    BREAST LUMPECTOMY/PARTIAL performed by Vikash Gonzales MD at [facility] MAIN OR    HX GYN      fallopian tube    HX ORTHOPAEDIC      left ankle repair    HX OTHER SURGICAL      cyst removed rom right thigh      Lab Results  Component Value Date/Time   WBC 7.1 08/31/2017 12:04 PM   HGB 10.9 08/31/2017 12:04 PM   Hemoglobin (POC) 15.3 10/31/2014 02:26 PM   HCT 33.2 08/31/2017 12:04 PM   Hematocrit (POC) 45 10/31/2014 02:26 PM   PLATELET 261 52/29/8427 12:04 PM   MCV 99 08/31/2017 12:04 PM     Lab Results  Component Value Date/Time   Cholesterol, total 153 03/22/2017 12:31 PM   HDL Cholesterol 44 03/22/2017 12:31 PM   LDL, calculated 29 03/22/2017 12:31 PM   Triglyceride 400 03/22/2017 12:31 PM     Lab Results  Component Value Date/Time   GFR est non-AA 95 08/31/2017 12:04 PM   GFRNA, POC >60 10/31/2014 02:26 PM   GFR est  08/31/2017 12:04 PM   GFRAA, POC >60 10/31/2014 02:26 PM   Creatinine 0.72 08/31/2017 12:04 PM   Creatinine (POC) 0.9 10/31/2014 02:26 PM   BUN 9 08/31/2017 12:04 PM   BUN (POC) 8 10/31/2014 02:26 PM   Sodium 141 08/31/2017 12:04 PM   Sodium (POC) 140 10/31/2014 02:26 PM   Potassium 4.2 08/31/2017 12:04 PM   Potassium (POC) 3.7 10/31/2014 02:26 PM   Chloride 98 08/31/2017 12:04 PM   Chloride (POC) 103 10/31/2014 02:26 PM   CO2 25 08/31/2017 12:04 PM   Magnesium 1.6 01/14/2017 07:28 AM Phosphorus 1.0 11/28/2012 04:45 AM          Review of Systems   Respiratory: Negative for shortness of breath. Cardiovascular: Negative for chest pain and leg swelling. Neurological: Positive for headaches. Physical Exam   Constitutional: She is oriented to person, place, and time. She appears well-developed and well-nourished. No distress. HENT:   Head: Normocephalic and atraumatic. Mouth/Throat: Oropharynx is clear and moist. No oropharyngeal exudate. Black, hairy tongue   Eyes: Conjunctivae and EOM are normal. Right eye exhibits no discharge. Left eye exhibits no discharge. Neck: Normal range of motion. Neck supple. No thyromegaly present. No carotid bruits     Cardiovascular: Normal rate, regular rhythm, normal heart sounds and intact distal pulses. Exam reveals no gallop and no friction rub. No murmur heard. Pulmonary/Chest: Effort normal and breath sounds normal. No respiratory distress. She has no wheezes. She has no rales. She exhibits no tenderness. Abdominal: Soft. She exhibits no distension and no mass. There is no tenderness. There is no rebound and no guarding. Musculoskeletal: Normal range of motion. She exhibits no edema, tenderness or deformity. Lymphadenopathy:     She has no cervical adenopathy. Neurological: She is alert and oriented to person, place, and time. She has normal reflexes. She exhibits normal muscle tone. Coordination normal.   Skin: Skin is warm and dry. No rash noted. She is not diaphoretic. No erythema. No pallor. Psychiatric: She has a normal mood and affect. Her behavior is normal.       ASSESSMENT and PLAN    ICD-10-CM ICD-9-CM    1. Physical exam, annual    Wt nl, labs just completed, reviewed with her, she is still due for pelvic exam, addressed this, COLO and mammogram are UTD, flu shot completed, eye exam is due, advised her to schedule   Z00.00 V70.0    2.  Essential hypertension    Controlled on toprol XL 25mg daily, continue   I10 401.9    3. Iron deficiency anemia due to chronic blood loss    Chronic issue, had EGD and COLO in 2013, follows with hem, will monitor for now, did have h/o bleeding ulcers in 2013, if anemia progresses will need to complete EGD, addressed this with her   D50.0 280.0    4. Acquired hypothyroidism    Controled on synthroid 50mcg daily, discussed this is a chronic med   E03.9 244.9    5. Leukocytosis, unspecified type    Much improved on recent labs, follows with Dr. Dolores Oliveira, was thought reactive in nature   D72.829 288.60    6. Malignant neoplasm of right female breast, unspecified site of breast ([facility] [address]    UTD on mammogram   C50.911 174.9    7. Pure hypercholesterolemia    Controlled on lipitor in March   E78.00 272.0    8. Localized edema    Much improved since last visit, she can use lasix prn, does not need to use on a daily basis   R60.0 782.3    9. FHx: thyroid cancer    Will check US for thyroid cancer Z80.8 V16.8 US THYROID/PARATHYROID/SOFT TISS        Scribed by Tavia Gillespie of [address] [place] [address] [address], as dictated by Dr. Anne Smith. Current diagnosis and concerns discussed with pt at length. Pt understands risks and benefits or current treatment plan and medications, and accepts the treatment and medication with any possible risks. Pt asks appropriate questions, which were answered. Pt was instructed to call with any concerns or problems. This note will not be viewable in [address]

## 2017-09-22 NOTE — LETTER
NOTIFICATION RETURN TO WORK / SCHOOL 
 
9/22/2017 9:03 AM 
 
Ms. Maite Perez [address] [facility] [address] To Whom It May Concern: 
 
Luis E Mendoza is currently under the care of [facility]. She will return to work/school on: 9/22/17 If there are questions or concerns please have the patient contact our office.  
 
 
 
Sincerely, 
 
 
Dedra Laguna MD

## 2017-09-22 NOTE — PATIENT INSTRUCTIONS
Schedule pelvic exam     You need to take amitriptyline EVERY SINGLE NIGHT  To prevent headaches, can increase to 2 per night if needed

## 2017-09-22 NOTE — TELEPHONE ENCOUNTER
----- Message from Masood Renee sent at 9/22/2017  3:39 PM EDT -----  Regarding: Dr. Jaquelin Stahl  The patient received a message from [facility] and is requesting a call back.  P)[phone]    Copy/Pasted from [facility]

## 2017-09-22 NOTE — MR AVS SNAPSHOT
Visit Information Date & Time Provider Department Dept. Phone Encounter #  
 9/22/2017  8:30 AM Nicole Burden, [address] [place] [address],[address] [phone] [id number] Follow-up Instructions Return in about 5 months (around 2/22/2018). Upcoming Health Maintenance Date Due  
 PAP AKA CERVICAL CYTOLOGY 6/6/1984 Pneumococcal 19-64 Highest Risk (2 of 3 - PCV13) 8/27/2013 BREAST CANCER SCRN MAMMOGRAM 5/23/2019 COLONOSCOPY 3/11/2023 DTaP/Tdap/Td series (2 - Td) 6/13/2023 Allergies as of 9/22/2017  Review Complete On: 9/22/2017 By: Nicole Burden MD  
  
 Severity Noted Reaction Type Reactions Aspirin  06/21/2016    Itching Current Immunizations  Reviewed on 8/31/2017 Name Date Influenza Vaccine 9/16/2016 Influenza Vaccine (Quad) PF 8/31/2017 11:50 AM  
 Influenza Vaccine PF 11/19/2013 Influenza Vaccine Split 9/18/2012 TDAP Vaccine 9/10/2012 Tdap 6/13/2013 11:22 AM  
 ZZZ-RETIRED (DO NOT USE) Pneumococcal Vaccine (Unspecified Type) 8/27/2012 Not reviewed this visit You Were Diagnosed With   
  
 Codes Comments Physical exam, annual    -  Primary ICD-10-CM: Z00.00 ICD-9-CM: V70.0 Essential hypertension     ICD-10-CM: I10 
ICD-9-CM: 401.9 Iron deficiency anemia due to chronic blood loss     ICD-10-CM: D50.0 ICD-9-CM: 280.0 Acquired hypothyroidism     ICD-10-CM: E03.9 ICD-9-CM: 244.9 Leukocytosis, unspecified type     ICD-10-CM: D72.829 ICD-9-CM: 288.60 Malignant neoplasm of right female breast, unspecified site of breast ([facility] [address]     ICD-10-CM: C50.911 ICD-9-CM: 174.9 Pure hypercholesterolemia     ICD-10-CM: E78.00 ICD-9-CM: 272.0 Localized edema     ICD-10-CM: R60.0 ICD-9-CM: 782.3 FHx: thyroid cancer     ICD-10-CM: Z80.8 ICD-9-CM: V16.8 Vitals BP Pulse Temp Resp Height(growth percentile) Weight(growth percentile)  135/88 (BP 1 Location: Left arm, BP Patient Position: Sitting) 90 99.3 °F (37.4 °C) (Oral) 16 5' 4\" (1.626 m) 142 lb (64.4 kg) SpO2 BMI OB Status Smoking Status 95% 24.37 kg/m2 Menopause Current Every Day Smoker Vitals History BMI and BSA Data Body Mass Index Body Surface Area  
 24.37 kg/m 2 1.71 m 2 Preferred Pharmacy Pharmacy Name Phone [id number] [address] [phone] Your Updated Medication List  
  
   
This list is accurate as of: 9/22/17  8:57 AM.  Always use your most recent med list.  
  
  
  
  
 amitriptyline 25 mg tablet Commonly known as:  ELAVIL Take 1 Tab by mouth nightly. atorvastatin 20 mg tablet Commonly known as:  LIPITOR Take 1 Tab by mouth daily. escitalopram oxalate 10 mg tablet Commonly known as:  Napolean Mantle TAKE 1 TABLET BY MOUTH EVERY DAY  
  
 furosemide 20 mg tablet Commonly known as:  LASIX Take 1 Tab by mouth daily. gabapentin 300 mg capsule Commonly known as:  NEURONTIN  
TAKE ONE CAPSULE BY MOUTH EVERY NIGHT AS NEEDED  
  
 levothyroxine 50 mcg tablet Commonly known as:  SYNTHROID Take 1 Tab by mouth Daily (before breakfast). metoprolol succinate 25 mg XL tablet Commonly known as:  TOPROL-XL  
TAKE 1 TAB BY MOUTH DAILY. multivitamin tablet Commonly known as:  ONE A DAY Take 1 Tab by mouth daily. SUMAtriptan 50 mg tablet Commonly known as:  IMITREX Take 1 Tab by mouth once as needed for Migraine for up to 1 dose. Follow-up Instructions Return in about 5 months (around 2/22/2018). To-Do List   
 09/22/2017 Imaging:  US THYROID/PARATHYROID/SOFT TISS Patient Instructions Schedule pelvic exam  
 
You need to take amitriptyline EVERY SINGLE NIGHT  To prevent headaches, can increase to 2 per night if needed Introducing [facility]!    
 Joseph Mata introduces [employer] patient portal. Now you can access parts of your medical record, email your doctor's office, and request medication refills online. 1. In your internet browser, go to [email or web address] 2. Click on the First Time User? Click Here link in the Sign In box. You will see the New Member Sign Up page. 3. Enter your TeamPatentt Access Code exactly as it appears below. You will not need to use this code after youve completed the sign-up process. If you do not sign up before the expiration date, you must request a new code. · [employer] Access Code: [facility] Expires: 11/29/2017 11:46 AM 
 
4. Enter the last four digits of your Social Security Number (xxxx) and Date of Birth (mm/dd/yyyy) as indicated and click Submit. You will be taken to the next sign-up page. 5. Create a RetiDiag ID. This will be your RetiDiag login ID and cannot be changed, so think of one that is secure and easy to remember. 6. Create a TeamPatentt password. You can change your password at any time. 7. Enter your Password Reset Question and Answer. This can be used at a later time if you forget your password. 8. Enter your e-mail address. You will receive e-mail notification when new information is available in [address] 9. Click Sign Up. You can now view and download portions of your medical record. 10. Click the Download Summary menu link to download a portable copy of your medical information. If you have questions, please visit the Frequently Asked Questions section of the RetiDiag website. Remember, RetiDiag is NOT to be used for urgent needs. For medical emergencies, dial 911. Now available from your iPhone and Android! Please provide this summary of care documentation to your next provider. Your primary care clinician is listed as Darcy Job. If you have any questions after today's visit, please call [phone].

## 2017-09-25 NOTE — TELEPHONE ENCOUNTER
Left vm for pt that FMLA forms ready. Pt informed they must be given to pt to turn in. Pt informed to call back office or come in to get forms at earliest convenience.

## 2017-09-26 RX ORDER — AMITRIPTYLINE HYDROCHLORIDE 25 MG/1
TABLET, FILM COATED ORAL
Qty: 30 TAB | Refills: 1 | Status: SHIPPED | OUTPATIENT
Start: 2017-09-26 | End: 2017-12-11 | Stop reason: DRUGHIGH

## 2017-10-30 ENCOUNTER — HOSPITAL ENCOUNTER (EMERGENCY)
Age: 52
Discharge: HOME | End: 2017-10-30
Attending: FAMILY MEDICINE

## 2017-10-30 VITALS
OXYGEN SATURATION: 97 % | BODY MASS INDEX: 25.61 KG/M2 | SYSTOLIC BLOOD PRESSURE: 117 MMHG | HEART RATE: 85 BPM | RESPIRATION RATE: 18 BRPM | DIASTOLIC BLOOD PRESSURE: 67 MMHG | WEIGHT: 150 LBS | TEMPERATURE: 97.1 F | HEIGHT: 64 IN

## 2017-10-30 DIAGNOSIS — J30.2 CHRONIC SEASONAL ALLERGIC RHINITIS, UNSPECIFIED TRIGGER: Primary | ICD-10-CM

## 2017-10-30 RX ORDER — MOMETASONE FUROATE 50 UG/1
2 SPRAY, METERED NASAL DAILY
Qty: 1 CONTAINER | Refills: 0 | Status: SHIPPED | OUTPATIENT
Start: 2017-10-30 | End: 2018-02-20

## 2017-10-30 NOTE — LETTER
[facility] 
[address] [address] 
[phone] Work/School Note Date: 10/30/2017 To Whom It May concern: 
 
Margaux Cole was seen and treated today in the emergency room by the following provider(s): 
Attending Provider: Tamera Ham MD 
Physician Assistant: Alejo Hernandes. Maite Nova may return to work on 10/31/17. Sincerely, Alejo Hernandes

## 2017-10-30 NOTE — DISCHARGE INSTRUCTIONS
Allergies: Care Instructions  Your Care Instructions    Allergies occur when your body's defense system (immune system) overreacts to certain substances. The immune system treats a harmless substance as if it were a harmful germ or virus. Many things can cause this overreaction, including pollens, medicine, food, dust, animal dander, and mold. Allergies can be mild or severe. Mild allergies can be managed with home treatment. But medicine may be needed to prevent problems. Managing your allergies is an important part of staying healthy. Your doctor may suggest that you have allergy testing to help find out what is causing your allergies. When you know what things trigger your symptoms, you can avoid them. This can prevent allergy symptoms and other health problems. For severe allergies that cause reactions that affect your whole body (anaphylactic reactions), your doctor may prescribe a shot of epinephrine to carry with you in case you have a severe reaction. Learn how to give yourself the shot and keep it with you at all times. Make sure it is not . Follow-up care is a key part of your treatment and safety. Be sure to make and go to all appointments, and call your doctor if you are having problems. It's also a good idea to know your test results and keep a list of the medicines you take. How can you care for yourself at home? · If you have been told by your doctor that dust or dust mites are causing your allergy, decrease the dust around your bed:  [facility] sheets, pillowcases, and other bedding in hot water every week. ¨ Use dust-proof covers for pillows, duvets, and mattresses. Avoid plastic covers because they tear easily and do not \"breathe. \" Wash as instructed on the label. ¨ Do not use any blankets and pillows that you do not need. ¨ Use blankets that you can wash in your washing machine. ¨ Consider removing drapes and carpets, which attract and hold dust, from your bedroom.   · If you are allergic to house dust and mites, do not use home humidifiers. Your doctor can suggest ways you can control dust and mites. · Look for signs of cockroaches. Cockroaches cause allergic reactions. Use cockroach baits to get rid of them. Then, clean your home well. Cockroaches like areas where grocery bags, newspapers, empty bottles, or cardboard boxes are stored. Do not keep these inside your home, and keep trash and food containers sealed. Seal off any spots where cockroaches might enter your home. · If you are allergic to mold, get rid of furniture, rugs, and drapes that smell musty. Check for mold in the bathroom. · If you are allergic to outdoor pollen or mold spores, use air-conditioning. Change or clean all filters every month. Keep windows closed. · If you are allergic to pollen, stay inside when pollen counts are high. Use a vacuum  with a HEPA filter or a double-thickness filter at least two times each week. · Stay inside when air pollution is bad. Avoid paint fumes, perfumes, and other strong odors. · Avoid conditions that make your allergies worse. Stay away from smoke. Do not smoke or let anyone else smoke in your house. Do not use fireplaces or wood-burning stoves. · If you are allergic to your pets, change the air filter in your furnace every month. Use high-efficiency filters. · If you are allergic to pet dander, keep pets outside or out of your bedroom. Old carpet and cloth furniture can hold a lot of animal dander. You may need to replace them. When should you call for help? Give an epinephrine shot if:  ? · You think you are having a severe allergic reaction. ? · You have symptoms in more than one body area, such as mild nausea and an itchy mouth. ? After giving an epinephrine shot call 911, even if you feel better. ?Call 911 if:  ? · You have symptoms of a severe allergic reaction. These may include:  ¨ Sudden raised, red areas (hives) all over your body.   ¨ Swelling of the throat, mouth, lips, or tongue. ¨ Trouble breathing. ¨ Passing out (losing consciousness). Or you may feel very lightheaded or suddenly feel weak, confused, or restless. ? · You have been given an epinephrine shot, even if you feel better. ?Call your doctor now or seek immediate medical care if:  ? · You have symptoms of an allergic reaction, such as:  ¨ A rash or hives (raised, red areas on the skin). ¨ Itching. ¨ Swelling. ¨ Belly pain, nausea, or vomiting. ? Watch closely for changes in your health, and be sure to contact your doctor if:  ? · You do not get better as expected. Where can you learn more? Go to http://emilee-jaclyn.info/. Enter W700 in the search box to learn more about \"Allergies: Care Instructions. \"  Current as of: September 29, 2016  Content Version: 11.4  © 1746-4525 [employer]. Care instructions adapted under license by [employer] (which disclaims liability or warranty for this information). If you have questions about a medical condition or this instruction, always ask your healthcare professional. [place] [address] any warranty or liability for your use of this information.

## 2017-10-30 NOTE — UC PROVIDER NOTE
Patient is a 45 y.o. female presenting with cold symptoms. The history is provided by the patient. Cold Symptoms    This is a new problem. The current episode started more than 2 days ago. The problem has not changed since onset. There has been no fever. Associated symptoms include congestion and rhinorrhea. Pertinent negatives include no chest pain, no ear pain and no headaches. She has tried other medications for the symptoms. The treatment provided no relief. Past Medical History:   Diagnosis Date    Anemia NEC     Anxiety     Breast cancer ([address] [address] 2013    right lumpectomy with radiation    Cancer ([address]     Breast Cancer-Ductal Carcinoma in situ grade 1    Depression     Headache(784.0)     Hypertension     Poor appetite     Psychiatric disorder     depression    PUD (peptic ulcer disease) 11/12    Stress     Tired     Trouble in sleeping     Weakness generalized         Past Surgical History:   Procedure Laterality Date    BREAST SURGERY PROCEDURE UNLISTED  5/30/13    RIGHT BREAST LUMPECTOMY WITH RIGHT NEDDLE LOCALIZATION     HX BREAST LUMPECTOMY  5/30/2013    BREAST LUMPECTOMY/PARTIAL performed by Tonya Scruggs MD at [facility] MAIN OR    HX GYN      fallopian tube    HX ORTHOPAEDIC      left ankle repair    HX OTHER SURGICAL      cyst removed rom right thigh         Family History   Problem Relation Age of Onset    Cancer Father      pancreatic cancer    Cancer Brother      thyroid cancer    Hypertension Mother         Social History     Social History    Marital status:      Spouse name: N/A    Number of children: N/A    Years of education: N/A     Occupational History    Not on file.      Social History Main Topics    Smoking status: Current Every Day Smoker     Packs/day: 0.25     Years: 20.00     Types: Cigarettes    Smokeless tobacco: Never Used      Comment: 4 cigarettes a day    Alcohol use 3.5 oz/week     7 Standard drinks or equivalent per week      Comment: 1 beer daily/1 glass wine    Drug use: No    Sexual activity: Not on file     Other Topics Concern    Not on file     Social History Narrative                ALLERGIES: Aspirin    Review of Systems   Constitutional: Negative for chills. HENT: Positive for congestion and rhinorrhea. Negative for ear pain. Cardiovascular: Negative for chest pain. Neurological: Negative for headaches. Vitals:    10/30/17 1427   BP: 117/67   Pulse: 85   Resp: 18   Temp: 97.1 °F (36.2 °C)   SpO2: 97%   Weight: 68 kg (150 lb)   Height: 5' 4\" (1.626 m)       Physical Exam   Constitutional: She is oriented to person, place, and time. She appears well-developed and well-nourished. HENT:   Right Ear: External ear normal.   Left Ear: External ear normal.   Nasal passages pale, violet in color   Eyes: Conjunctivae and EOM are normal.   Cardiovascular: Normal rate and regular rhythm. Pulmonary/Chest: Effort normal and breath sounds normal.   Neurological: She is alert and oriented to person, place, and time. Skin: Skin is warm and dry. Psychiatric: She has a normal mood and affect. Her behavior is normal. Judgment and thought content normal.   Nursing note and vitals reviewed. MDM     Differential Diagnosis; Clinical Impression; Plan:     CLINICAL IMPRESSION:  Chronic seasonal allergic rhinitis, unspecified trigger  (primary encounter diagnosis)    Plan:  1. nasonex   2. OTC normal saline spray  3. Risk of Significant Complications, Morbidity, and/or Mortality:   Presenting problems: Moderate  Diagnostic procedures: Moderate  Management options:   Moderate  Progress:   Patient progress:  Stable      Procedures

## 2017-11-28 ENCOUNTER — TELEPHONE (OUTPATIENT)
Dept: SLEEP MEDICINE | Age: 52
End: 2017-11-28

## 2017-12-10 ENCOUNTER — HOSPITAL ENCOUNTER (EMERGENCY)
Age: 52
Discharge: HOME | End: 2017-12-10
Attending: EMERGENCY MEDICINE
Payer: COMMERCIAL

## 2017-12-10 VITALS
WEIGHT: 153 LBS | OXYGEN SATURATION: 98 % | RESPIRATION RATE: 16 BRPM | TEMPERATURE: 99 F | HEART RATE: 101 BPM | DIASTOLIC BLOOD PRESSURE: 75 MMHG | HEIGHT: 64 IN | SYSTOLIC BLOOD PRESSURE: 101 MMHG | BODY MASS INDEX: 26.12 KG/M2

## 2017-12-10 DIAGNOSIS — S61.012A LACERATION OF LEFT THUMB WITHOUT FOREIGN BODY, NAIL DAMAGE STATUS UNSPECIFIED, INITIAL ENCOUNTER: Primary | ICD-10-CM

## 2017-12-10 PROCEDURE — 90715 TDAP VACCINE 7 YRS/> IM: CPT | Performed by: EMERGENCY MEDICINE

## 2017-12-10 PROCEDURE — 75810000293 HC SIMP/SUPERF WND  RPR

## 2017-12-10 PROCEDURE — 77030010507 HC ADH SKN DERMBND J&J -B

## 2017-12-10 PROCEDURE — 99282 EMERGENCY DEPT VISIT SF MDM: CPT

## 2017-12-10 PROCEDURE — 74011250636 HC RX REV CODE- 250/636: Performed by: EMERGENCY MEDICINE

## 2017-12-10 PROCEDURE — 77030018836 HC SOL IRR NACL ICUM -A

## 2017-12-10 PROCEDURE — 90471 IMMUNIZATION ADMIN: CPT

## 2017-12-10 RX ADMIN — TETANUS TOXOID, REDUCED DIPHTHERIA TOXOID AND ACELLULAR PERTUSSIS VACCINE, ADSORBED 0.5 ML: 5; 2.5; 8; 8; 2.5 SUSPENSION INTRAMUSCULAR at 03:44

## 2017-12-10 NOTE — ED PROVIDER NOTES
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 female presents ambulatory to the ED c/o sudden onset laceration to the dorsal aspect of her L hand after getting her hand stuck in a machine at work PTA this morning. Pt states she currently works for [employer] and was advised to follow up in the ED. She denies any recent medications or treatment for her current sxs. Pt notes cleaning and bandaging her laceration at work PTA. She denies the use of any daily anticoagulants. Pt is unsure when her Tetanus was last updated. She otherwise specifically denies any recent fevers, chills, nausea, vomiting, diarrhea, abd pain, CP, SOB, urinary sxs, changes in BM, or headache. Given pt is otherwise without complaint there is no applicable quality, duration, severity, associated sxs, additional context, or modifying factors. PCP: Brianna Adams MD    There are no other complaints, changes, or physical findings at this time. Current Outpatient Prescriptions   Medication Sig Dispense Refill    mometasone (NASONEX) 50 mcg/actuation nasal spray 2 Sprays by Both Nostrils route daily. 1 Container 0    amitriptyline (ELAVIL) 25 mg tablet TAKE 1 TABLET BY MOUTH IN THE EVENING 30 Tab 1    gabapentin (NEURONTIN) 300 mg capsule TAKE ONE CAPSULE BY MOUTH EVERY NIGHT AS NEEDED 30 Cap 3    escitalopram oxalate (LEXAPRO) 10 mg tablet TAKE 1 TABLET BY MOUTH EVERY DAY 30 Tab 6    furosemide (LASIX) 20 mg tablet Take 1 Tab by mouth daily. 30 Tab 0    levothyroxine (SYNTHROID) 50 mcg tablet Take 1 Tab by mouth Daily (before breakfast).  30 Tab 2    metoprolol succinate (TOPROL-XL) 25 mg XL tablet TAKE 1 TAB BY MOUTH DAILY. 30 Tab 3    atorvastatin (LIPITOR) 20 mg tablet Take 1 Tab by mouth daily. 30 Tab 3    SUMAtriptan (IMITREX) 50 mg tablet Take 1 Tab by mouth once as needed for Migraine for up to 1 dose. 6 Tab 1    multivitamin (ONE A DAY) tablet Take 1 Tab by mouth daily. Past History     Past Medical History:  Past Medical History:   Diagnosis Date    Anemia NEC     Anxiety     Breast cancer ([facility] 2013    right lumpectomy with radiation    Cancer ([facility]     Breast Cancer-Ductal Carcinoma in situ grade 1    Depression     Headache(784.0)     Hypertension     Poor appetite     Psychiatric disorder     depression    PUD (peptic ulcer disease) 11/12    Stress     Tired     Trouble in sleeping     Weakness generalized        Past Surgical History:  Past Surgical History:   Procedure Laterality Date    BREAST SURGERY PROCEDURE UNLISTED  5/30/13    RIGHT BREAST LUMPECTOMY WITH RIGHT NEDDLE LOCALIZATION     HX BREAST LUMPECTOMY  5/30/2013    BREAST LUMPECTOMY/PARTIAL performed by Hero To MD at [facility] MA[facility] OR    HX GYN      fallopian tube    HX ORTHOPAEDIC      left ankle repair    HX OTHER SURGICAL      cyst removed rom right thigh       Family History:  Family History   Problem Relation Age of Onset    Cancer Father      pancreatic cancer    Cancer Brother      thyroid cancer    Hypertension Mother        Social History:  Social History   Substance Use Topics    Smoking status: Current Every Day Smoker     Packs/day: 0.25     Years: 20.00     Types: Cigarettes    Smokeless tobacco: Never Used      Comment: 4 cigarettes a day    Alcohol use 3.5 oz/week     7 Standard drinks or equivalent per week      Comment: 1 beer daily/1 glass wine       Allergies: Allergies   Allergen Reactions    Aspirin Itching         Review of Systems   Review of Systems   Constitutional: Negative for chills and fever. HENT: Negative for congestion and sore throat.     Eyes: Negative for visual disturbance. Respiratory: Negative for cough and shortness of breath. Cardiovascular: Negative for chest pain and leg swelling. Gastrointestinal: Negative for abdominal pain, blood in stool, diarrhea and nausea. Endocrine: Negative for polyuria. Genitourinary: Negative for dysuria, flank pain, vaginal bleeding and vaginal discharge. Musculoskeletal: Negative for myalgias. Skin: Positive for wound (laceration). Negative for rash. Allergic/Immunologic: Negative for immunocompromised state. Neurological: Negative for weakness and headaches. Psychiatric/Behavioral: Negative for confusion. Physical Exam   Physical Exam   Constitutional: She is oriented to person, place, and time. She appears well-developed and well-nourished. HENT:   Head: Normocephalic and atraumatic. Moist mucous membranes   Eyes: Conjunctivae are normal. Pupils are equal, round, and reactive to light. Right eye exhibits no discharge. Left eye exhibits no discharge. Neck: Normal range of motion. Neck supple. No tracheal deviation present. Cardiovascular: Normal rate, regular rhythm and normal heart sounds. No murmur heard. Pulmonary/Chest: Effort normal and breath sounds normal. No respiratory distress. She has no wheezes. She has no rales. Abdominal: Soft. Bowel sounds are normal. There is no tenderness. There is no rebound and no guarding. Musculoskeletal: Normal range of motion. She exhibits no edema, tenderness or deformity. Neurological: She is alert and oriented to person, place, and time. Normal strength  Normal sensations distally   Skin: Skin is warm and dry. No rash noted. No erythema. 1.5 cm laceration on the interweb space between the 1st and 2nd digit on the L hand; dorsal aspect, hemostatic  Laceration is more of a skin avulsion and flap  Skin flap is devitalized   Psychiatric: Her behavior is normal.   Nursing note and vitals reviewed.         Medical Decision Making   I am the first provider for this patient. I reviewed the vital signs, available nursing notes, past medical history, past surgical history, family history and social history. Vital Signs-Reviewed the patient's vital signs. Patient Vitals for the past 12 hrs:   Temp Pulse Resp BP SpO2   12/10/17 0330 - - - 101/75 98 %   12/10/17 0309 99 °F (37.2 °C) (!) 101 16 (!) 149/96 100 %       Pulse Oximetry Analysis - 99% on RA    Cardiac Monitor:   Rate: 98 bpm  Rhythm: Normal Sinus Rhythm       Records Reviewed: Nursing Notes    Provider Notes (Medical Decision Making):     DDx: laceration, contusion, no foreign body, need for Tetanus update    MDM: no need for primary closure, devitalized skin flap is surface covering. Will cover with glue for protection. ED Course:   Initial assessment performed. The patients presenting problems have been discussed, and they are in agreement with the care plan formulated and outlined with them. I have encouraged them to ask questions as they arise throughout their visit. 3:32 AM  Discussed the risks of smoking and the benefits of smoking cessation as well as the long term sequelae of smoking with the pt who verbalized her understanding. Reviewed strategies for success, including gradually decreasing the number of cigarettes smoked a day. Written by MARY ANN Matthewibblossom, as dictated by Jackeline Franks DO    Procedure Note - Laceration Repair:  3:40 AM  Procedure by Jackeline Franks DO. Complexity: simple  1.5cm skin avulsed k laceration to L hand  was irrigated copiously with NS under jet lavage, prepped with Shur Clens and draped in a sterile fashion. The wound was explored with the following results: No foreign bodies found. The wound was repaired with Dermabond. The wound was closed with good hemostasis and approximation. Sterile dressing applied. Estimated blood loss: <1mL   The procedure took 1-15 minutes, and pt tolerated well.       Progress note:  3:50 AM  Pt noted to be feeling better, ready for discharge. Will follow up as instructed. All questions have been answered, pt voiced understanding and agreement with plan. Specific return precautions provided as well as instructions to return to the ED should sx worsen at any time. Vital signs stable for discharge. Written by MARY ANN Matthew, as dictated by Jackeline Franks DO    Disposition:    DISCHARGE NOTE:  3:50 AM  The patient's results have been reviewed with family and/or caregiver. They verbally convey their understanding and agreement of the patient's signs, symptoms, diagnosis, treatment, and prognosis. They additionally agree to follow up as recommended in the discharge instructions or to return to the Emergency Room should the patient's condition change prior to their follow-up appointment. The family and/or caregiver verbally agrees with the care-plan and all of their questions have been answered. The discharge instructions have also been provided to the them along with educational information regarding the patient's diagnosis and a list of reasons why the patient would want to return to the ER prior to their follow-up appointment should their condition change. Written by MARY ANN Matthew, as dictated by Stewart Corral MD.     PLAN:  1. Discharge Medication List as of 12/10/2017  3:50 AM        2. Follow-up Information     Follow up With Details Comments Contact Info    Deepti Rangel MD  As needed [address] [place] [address] [address]  [facility]  [phone]          Return to ED if worse     Diagnosis     Clinical Impression:   1. Laceration of left thumb without foreign body, nail damage status unspecified, initial encounter        Attestations: This note is prepared by Dayton Mims, acting as Scribe for DO Jackeline Machado DO : The scribe's documentation has been prepared under my direction and personally reviewed by me in its entirety.  I confirm that the note above accurately reflects all work, treatment, procedures, and medical decision making performed by me.

## 2017-12-10 NOTE — LETTER
[address] 
[facility] EMERGENCY DEPT 
[address] [place] [address] [address] [id number] [phone] Work/School Note Date: 12/10/2017 To Whom It May concern: 
 
Stephania Sierra was seen and treated today in the emergency room by the following provider(s): 
Attending Provider: Lisa Kilpatrick DO. Maite Tejeda January may return to work on 12/15/17. Sincerely, Lisa Kilpatrick DO

## 2017-12-10 NOTE — DISCHARGE INSTRUCTIONS
Cuts: Care Instructions  Your Care Instructions  A cut can happen anywhere on your body. Stitches, staples, skin adhesives, or pieces of tape called Steri-Strips are sometimes used to keep the edges of a cut together and help it heal. Steri-Strips can be used by themselves or with stitches or staples. Sometimes cuts are left open. If the cut went deep and through the skin, the doctor may have closed the cut in two layers. A deeper layer of stitches brings the deep part of the cut together. These stitches will dissolve and don't need to be removed. The upper layer closure, which could be stitches, staples, Steri-Strips, or adhesive, is what you see on the cut. A cut is often covered by a bandage. The doctor has checked you carefully, but problems can develop later. If you notice any problems or new symptoms, get medical treatment right away. Follow-up care is a key part of your treatment and safety. Be sure to make and go to all appointments, and call your doctor if you are having problems. It's also a good idea to know your test results and keep a list of the medicines you take. How can you care for yourself at home? If a cut is open or closed  · Prop up the sore area on a pillow anytime you sit or lie down during the next 3 days. Try to keep it above the level of your heart. This will help reduce swelling. · Keep the cut dry for the first 24 to 48 hours. After this, you can shower if your doctor okays it. Pat the cut dry. · Don't soak the cut, such as in a bathtub. Your doctor will tell you when it's safe to get the cut wet. · After the first 24 to 48 hours, clean the cut with soap and water 2 times a day unless your doctor gives you different instructions. ¨ Don't use hydrogen peroxide or alcohol, which can slow healing. ¨ You may cover the cut with a thin layer of petroleum jelly and a nonstick bandage.   ¨ If the doctor put a bandage over the cut, put on a new bandage after cleaning the cut or if the bandage gets wet or dirty. · Avoid any activity that could cause your cut to reopen. · Be safe with medicines. Read and follow all instructions on the label. ¨ If the doctor gave you a prescription medicine for pain, take it as prescribed. ¨ If you are not taking a prescription pain medicine, ask your doctor if you can take an over-the-counter medicine. If the cut is closed with stitches, staples, or Steri-Strips  · Follow the above instructions for open or closed cuts. · Do not remove the stitches or staples on your own. Your doctor will tell you when to come back to have the stitches or staples removed. · Leave Steri-Strips on until they fall off. If the cut is closed with a skin adhesive  · Follow the above instructions for open or closed cuts. · Leave the skin adhesive on your skin until it falls off on its own. This may take 5 to 10 days. · Do not scratch, rub, or pick at the adhesive. · Do not put the sticky part of a bandage directly on the adhesive. · Do not put any kind of ointment, cream, or lotion over the area. This can make the adhesive fall off too soon. Do not use hydrogen peroxide or alcohol, which can slow healing. When should you call for help? Call your doctor now or seek immediate medical care if:  ? · You have new pain, or your pain gets worse. ? · The skin near the cut is cold or pale or changes color. ? · You have tingling, weakness, or numbness near the cut.   ? · The cut starts to bleed, and blood soaks through the bandage. Oozing small amounts of blood is normal.   ? · You have trouble moving the area near the cut.   ? · You have symptoms of infection, such as:  ¨ Increased pain, swelling, warmth, or redness around the cut. ¨ Red streaks leading from the cut. ¨ Pus draining from the cut. ¨ A fever. ? Watch closely for changes in your health, and be sure to contact your doctor if:  ? · The cut reopens. ? · You do not get better as expected.    Where can you learn more? Go to http://emilee-jaclyn.info/. Enter M735 in the search box to learn more about \"Cuts: Care Instructions. \"  Current as of: March 20, 2017  Content Version: 11.4  © 3889-8854 [employer]. Care instructions adapted under license by [employer] (which disclaims liability or warranty for this information). If you have questions about a medical condition or this instruction, always ask your healthcare professional. [address] any warranty or liability for your use of this information. Cuts Closed With Adhesives: Care Instructions  Your Care Instructions  A cut can happen anywhere on your body. The doctor used an adhesive to close the cut. When the adhesive dries, it forms a film that holds the edges of the cut together. Skin adhesives are sometimes called liquid stitches. If the cut went deep and through the skin, the doctor may have put in a layer of stitches below the adhesive. The deeper layer of stitches brings the deep part of the cut together. These stitches will dissolve and don't need to be removed. You don't see the stitches, only the adhesive. You may have a bandage. The doctor has checked you carefully, but problems can develop later. If you notice any problems or new symptoms, get medical treatment right away. Follow-up care is a key part of your treatment and safety. Be sure to make and go to all appointments, and call your doctor if you are having problems. It's also a good idea to know your test results and keep a list of the medicines you take. How can you care for yourself at home? · Keep the cut dry for the first 24 to 48 hours. After this, you can shower if your doctor okays it. Pat the cut dry. · Don't soak the cut, such as in a bathtub. Your doctor will tell you when it's safe to get the cut wet. · If your doctor told you how to care for your cut, follow your doctor's instructions.  If you did not get instructions, follow this general advice:  ¨ Do not put any kind of ointment, cream, or lotion over the area. This can make the adhesive fall off too soon. ¨ After the first 24 to 48 hours, wash around the cut with clean water 2 times a day. Do not use hydrogen peroxide or alcohol, which can slow healing. ¨ If the doctor told you to use a bandage, put on a new bandage after cleaning the cut or if the bandage gets wet or dirty. · Prop up the sore area on a pillow anytime you sit or lie down during the next 3 days. Try to keep it above the level of your heart. This will help reduce swelling. · Leave the skin adhesive on your skin until it falls off on its own. This may take 5 to 10 days. · Do not scratch, rub, or pick at the adhesive. · Do not put the sticky part of a bandage directly on the adhesive. · Avoid any activity that could cause your cut to reopen. · Be safe with medicines. Read and follow all instructions on the label. ¨ If the doctor gave you a prescription medicine for pain, take it as prescribed. ¨ If you are not taking a prescription pain medicine, ask your doctor if you can take an over-the-counter medicine. When should you call for help? Call your doctor now or seek immediate medical care if:  ? · You have new pain, or your pain gets worse. ? · The skin near the cut is cold or pale or changes color. ? · You have tingling, weakness, or numbness near the cut.   ? · The cut starts to bleed. ? · You have trouble moving the area near the cut.   ? · You have symptoms of infection, such as:  ¨ Increased pain, swelling, warmth, or redness around the cut. ¨ Red streaks leading from the cut. ¨ Pus draining from the cut. ¨ A fever. ? Watch closely for changes in your health, and be sure to contact your doctor if:  ? · The cut reopens. ? · You do not get better as expected. Where can you learn more? Go to http://bruce.info/.   Enter P174 in the search box to learn more about \"Cuts Closed With Adhesives: Care Instructions. \"  Current as of: March 20, 2017  Content Version: 11.4  © 1222-0727 [employer]. Care instructions adapted under license by [employer] (which disclaims liability or warranty for this information). If you have questions about a medical condition or this instruction, always ask your healthcare professional. [place] [address] any warranty or liability for your use of this information.

## 2017-12-10 NOTE — ED NOTES
Dr. Heath Maciel went over dc instructions with pt at this time. No further questions or concerns. Pt to dc home, doesn't want a wheelchair.

## 2017-12-11 ENCOUNTER — OFFICE VISIT (OUTPATIENT)
Dept: INTERNAL MEDICINE CLINIC | Age: 52
End: 2017-12-11

## 2017-12-11 VITALS
SYSTOLIC BLOOD PRESSURE: 126 MMHG | RESPIRATION RATE: 16 BRPM | DIASTOLIC BLOOD PRESSURE: 77 MMHG | TEMPERATURE: 98 F | HEART RATE: 96 BPM | WEIGHT: 151 LBS | BODY MASS INDEX: 25.78 KG/M2 | OXYGEN SATURATION: 94 % | HEIGHT: 64 IN

## 2017-12-11 DIAGNOSIS — E78.00 PURE HYPERCHOLESTEROLEMIA: ICD-10-CM

## 2017-12-11 DIAGNOSIS — G44.229 CHRONIC TENSION-TYPE HEADACHE, NOT INTRACTABLE: ICD-10-CM

## 2017-12-11 DIAGNOSIS — C50.911 MALIGNANT NEOPLASM OF RIGHT FEMALE BREAST, UNSPECIFIED ESTROGEN RECEPTOR STATUS, UNSPECIFIED SITE OF BREAST (HCC): Primary | ICD-10-CM

## 2017-12-11 DIAGNOSIS — R07.81 RIB PAIN ON RIGHT SIDE: ICD-10-CM

## 2017-12-11 DIAGNOSIS — E03.9 ACQUIRED HYPOTHYROIDISM: ICD-10-CM

## 2017-12-11 DIAGNOSIS — E66.3 OVERWEIGHT: ICD-10-CM

## 2017-12-11 DIAGNOSIS — F32.9 REACTIVE DEPRESSION: ICD-10-CM

## 2017-12-11 DIAGNOSIS — I10 ESSENTIAL HYPERTENSION: ICD-10-CM

## 2017-12-11 DIAGNOSIS — B00.1 COLD SORE: ICD-10-CM

## 2017-12-11 RX ORDER — AMITRIPTYLINE HYDROCHLORIDE 10 MG/1
10 TABLET, FILM COATED ORAL
Qty: 30 TAB | Refills: 3 | Status: SHIPPED | OUTPATIENT
Start: 2017-12-11 | End: 2018-05-22 | Stop reason: SDUPTHER

## 2017-12-11 RX ORDER — HYDROCODONE BITARTRATE AND ACETAMINOPHEN 5; 325 MG/1; MG/1
1 TABLET ORAL
Qty: 15 TAB | Refills: 0 | Status: SHIPPED | OUTPATIENT
Start: 2017-12-11 | End: 2018-02-20

## 2017-12-11 RX ORDER — INDOMETHACIN 50 MG/1
CAPSULE ORAL
COMMUNITY
Start: 2017-12-02 | End: 2017-12-11 | Stop reason: ALTCHOICE

## 2017-12-11 RX ORDER — MELOXICAM 7.5 MG/1
7.5 TABLET ORAL DAILY
Qty: 30 TAB | Refills: 0 | Status: SHIPPED | OUTPATIENT
Start: 2017-12-11 | End: 2018-02-20

## 2017-12-11 RX ORDER — VALACYCLOVIR HYDROCHLORIDE 1 G/1
1000 TABLET, FILM COATED ORAL 2 TIMES DAILY
Qty: 2 TAB | Refills: 0 | Status: SHIPPED | OUTPATIENT
Start: 2017-12-11 | End: 2018-02-20

## 2017-12-11 NOTE — LETTER
NOTIFICATION RETURN TO WORK / SCHOOL 
 
12/11/2017 1:58 PM 
 
Ms. Maite Box [email or web address] [facility] [phone] To Whom It May Concern: 
 
Christen Parrish is currently under the care of [facility]. She was for an office visit today, 12/11/17. If there are questions or concerns please have the patient contact our office.  
 
 
 
Sincerely, 
 
 
Quinton Do MD

## 2017-12-11 NOTE — PROGRESS NOTES
HISTORY OF PRESENT ILLNESS  Maite Banegas is a 45 y.o. female. HPI   Last here 9/22/17. Pt is here for acute/routine care.     Pt wonders if she can stop some of her medications   Addressed this not being the case (for the most part)    BP today is 126/77  Pt is not monitoring BP at home, as she does not have a BP cuff  Continues on toprol XL 25mg daily   She also has lasix 20mg to use prn for edema    Wt is up 9 lbs since last visit  Pt drinks flavored water  Discussed decreasing caloric beverage and increasing water intake    Discussed not eating fast-food    Reviewed last labs 8/17  Will get labs today       Pt follows with Dr. Erum Landis (hem/onc) for anemia  Last visit was 5/17 with his NP  Recall COLO and EGD 2013 - bleeding ulcer, issues with anemia  Recall past evaluation with Dr. Erum Landis in 2015, thrombocytosis thought d/t cigarette smoking    Lov, pt c/o frequent migraines - unchanged   Pt is now taking amitriptyline 25mg qhs (for the most part) to prevent HAs   Amitriptyline also helps her with sleep  Pt is now taking imitrex for onset HAs  Discussed decreasing amitriptyline would contribute to worsening sx  Will have her continue with amitriptyline 25mg qhs    Pt c/o fever blisters on face  Ordered valtrex 1g x2    Pt c/o constant R side pain x 6 days  Pt states that her sx mildly improve with walking   Pt states that it feels as though something is stabbing her in this area  Pt has difficulty sleeping as a result  Pt had rib fractures 5/17, but stated that her sx have improved  Pt states that her current pain is new and different from her past pain  Pt denies injuring this area or having any falls  Pt applied some Bengay to this area with mild improvement to sx  Pt has not taken any pain medications for her sx  Pt states that gabapentin has not helped her with this pain  Ordered acute rib series   Ordered meloxicam  Ordered a limited amount of hydrocodone   Will check labs     Continues on synthroid 50mcg daily  Lov, I ordered an US thyroids, but this imaging was never completed  Reordered US thyroids   Recall pt has a FMHX thyroid cancer (brother)    Pt went to the ER on 12/10/17  Pt cut her L hand on a machine  Pt received a Tdap at that time      Continues on lipitor 20mg daily for cholesterol     Continues on gabapentin 300mg    Continues on lexapro 10mg daily for anxiety and depression - works well, happy with dose  She states this medication calms her and helps with her stomach sx      Pt continues to smoke <0.5 PPD  Counseled on smoking cessation     Pt has 1 alcoholic beverage each night  Pt has 2-3 alcoholic beverages when she dines with her mother  Discussed alcohol cessation      PREVENTIVE:   Colonoscopy: 3/13, repeat 10 years  EGD: 2013, possible Barrette's   Pap: overdue, reminded again, referred, will schedule, discussed again 9/17  Mammogram: 5/23/17 negative, h/o R sided breast DCIS,   Tdap: 12/10/17  Pneumovax: 8/27/2012  Alxzysj64: not yet needed  Zostavax: not yet needed  Flu shot: 8/31/17  Eye exam: 8/16, will schedule 10/2017 or 11/2017  EKG: 3/17 per ED   Lipids: 3/17 LDL 29    Patient Active Problem List    Diagnosis Date Noted    Pure hypercholesterolemia 05/01/2017    Acquired hypothyroidism 05/01/2017    Elevated WBC count 04/16/2014    Elevated liver function tests 04/16/2014    Breast cancer right DCIS 06/11/2013    Cancer ([address]     Depression 02/11/2013    Headache(784.0) 09/10/2012    HTN (hypertension) 09/10/2012     Current Outpatient Prescriptions   Medication Sig Dispense Refill    indomethacin (INDOCIN) 50 mg capsule       mometasone (NASONEX) 50 mcg/actuation nasal spray 2 Sprays by Both Nostrils route daily.  1 Container 0    amitriptyline (ELAVIL) 25 mg tablet TAKE 1 TABLET BY MOUTH IN THE EVENING 30 Tab 1    gabapentin (NEURONTIN) 300 mg capsule TAKE ONE CAPSULE BY MOUTH EVERY NIGHT AS NEEDED 30 Cap 3    escitalopram oxalate (LEXAPRO) 10 mg tablet TAKE 1 TABLET BY MOUTH EVERY DAY 30 Tab 6    levothyroxine (SYNTHROID) 50 mcg tablet Take 1 Tab by mouth Daily (before breakfast). 30 Tab 2    metoprolol succinate (TOPROL-XL) 25 mg XL tablet TAKE 1 TAB BY MOUTH DAILY. 30 Tab 3    atorvastatin (LIPITOR) 20 mg tablet Take 1 Tab by mouth daily. 30 Tab 3    SUMAtriptan (IMITREX) 50 mg tablet Take 1 Tab by mouth once as needed for Migraine for up to 1 dose. 6 Tab 1    multivitamin (ONE A DAY) tablet Take 1 Tab by mouth daily.  furosemide (LASIX) 20 mg tablet Take 1 Tab by mouth daily.  30 Tab 0     Past Surgical History:   Procedure Laterality Date    BREAST SURGERY PROCEDURE UNLISTED  5/30/13    RIGHT BREAST LUMPECTOMY WITH RIGHT NEDDLE LOCALIZATION     HX BREAST LUMPECTOMY  5/30/2013    BREAST LUMPECTOMY/PARTIAL performed by Kirsten Duffy MD at [facility]    HX GYN      fallopian tube    HX ORTHOPAEDIC      left ankle repair    HX OTHER SURGICAL      cyst removed rom right thigh      Lab Results  Component Value Date/Time   WBC 7.1 08/31/2017 12:04 PM   HGB 10.9 08/31/2017 12:04 PM   Hemoglobin (POC) 15.3 10/31/2014 02:26 PM   HCT 33.2 08/31/2017 12:04 PM   Hematocrit (POC) 45 10/31/2014 02:26 PM   PLATELET 821 54/81/0358 12:04 PM   MCV 99 08/31/2017 12:04 PM     Lab Results  Component Value Date/Time   Cholesterol, total 153 03/22/2017 12:31 PM   HDL Cholesterol 44 03/22/2017 12:31 PM   LDL, calculated 29 03/22/2017 12:31 PM   Triglyceride 400 03/22/2017 12:31 PM     Lab Results  Component Value Date/Time   GFR est non-AA 95 08/31/2017 12:04 PM   GFRNA, POC >60 10/31/2014 02:26 PM   GFR est  08/31/2017 12:04 PM   GFRAA, POC >60 10/31/2014 02:26 PM   Creatinine 0.72 08/31/2017 12:04 PM   Creatinine (POC) 0.9 10/31/2014 02:26 PM   BUN 9 08/31/2017 12:04 PM   BUN (POC) 8 10/31/2014 02:26 PM   Sodium 141 08/31/2017 12:04 PM   Sodium (POC) 140 10/31/2014 02:26 PM   Potassium 4.2 08/31/2017 12:04 PM   Potassium (POC) 3.7 10/31/2014 02:26 PM   Chloride 98 08/31/2017 12:04 PM   Chloride (POC) 103 10/31/2014 02:26 PM   CO2 25 08/31/2017 12:04 PM   Magnesium 1.6 01/14/2017 07:28 AM   Phosphorus 1.0 11/28/2012 04:45 AM          Review of Systems   Respiratory: Negative for shortness of breath. Cardiovascular: Negative for chest pain. Musculoskeletal: Positive for myalgias. Negative for falls. Neurological: Positive for headaches. Physical Exam   Constitutional: She is oriented to person, place, and time. She appears well-developed and well-nourished. No distress. HENT:   Head: Normocephalic and atraumatic. Mouth/Throat: No oropharyngeal exudate. Eyes: Conjunctivae and EOM are normal. Right eye exhibits no discharge. Left eye exhibits no discharge. Neck: Normal range of motion. Neck supple. Cardiovascular: Normal rate, regular rhythm and normal heart sounds. Exam reveals no gallop and no friction rub. No murmur heard. Pulmonary/Chest: Effort normal and breath sounds normal. No respiratory distress. She has no wheezes. She has no rales. She exhibits no tenderness. Musculoskeletal: Normal range of motion. She exhibits tenderness (R rib cage). She exhibits no edema or deformity. Lymphadenopathy:     She has no cervical adenopathy. Neurological: She is alert and oriented to person, place, and time. Coordination normal.   Skin: Skin is warm and dry. No rash noted. She is not diaphoretic. No erythema. No pallor. Laceration on L hand  Erythematous papules near mouth and under nostrils    Psychiatric: She has a normal mood and affect. Her behavior is normal.       ASSESSMENT and PLAN    ICD-10-CM ICD-9-CM    1. Malignant neoplasm of right female breast, unspecified estrogen receptor status, unspecified site of breast ([facility] [address]    Follows with Dr. Lucina London, she last saw him in 5/17, mammogram was done in 5/17, she never completed the tx that he initially recommended, she is due to f/u with him 5/18   C50.911 174.9 XR RIBS RT W PA CXR MIN 3 V   2. Acquired hypothyroidism    On synthroid 50mcg daily, discussed that this is a permanent med, continue   E03.9 244.9 TSH 3RD GENERATION   3. Pure hypercholesterolemia    Controled on lipitor in March, continue    E78.00 272.0    4. Chronic tension-type headache, not intractable    Pt is on elavil 25mg daily to prevent HAs, she still gets HAs but not all the time, she asks about decreasing dose but this is not a good idea since HAs still occur, uses imitrex prn   T55.128 210.08 METABOLIC PANEL, COMPREHENSIVE   5. Rib pain on right side    Will get plain film, evaluate for any recurrent fractures, she has h/o falls though she does not admit to a recent fall, ultimately she says the pain has been present for 6 days, will give meloxicam for pain and a limited one time supply of hydrocodone   R07.81 786.50    6. Reactive depression    Controled on lexapro    F86.1 918.1 METABOLIC PANEL, COMPREHENSIVE   7. Essential hypertension    Controled on metoprolol    I10 401.9    8. Overweight    Has gained a little bit of wt ove the last years, discussed this with her, she will work on better diet choices nad cut out caloric beverages   E66.3 278.02    9. Cold sore    Valtrex 1g BID x1, 2 tabs given   B00.1 054. 9        Scribed by Brianna Hayes of [address] [place] [address] [address], as dictated by Dr. Milan Simpson. Current diagnosis and concerns discussed with pt at length. Pt understands risks and benefits or current treatment plan and medications, and accepts the treatment and medication with any possible risks. Pt asks appropriate questions, which were answered. Pt was instructed to call with any concerns or problems. This note will not be viewable in [address]

## 2017-12-11 NOTE — MR AVS SNAPSHOT
Visit Information Date & Time Provider Department Dept. Phone Encounter #  
 12/11/2017  1:30 PM Christa Fraire, [address] [phone] [id number] Your Appointments 2/20/2018  8:30 AM  
ROUTINE CARE with Christa Fraire, [address] [facility]) Appt Note: 5 month follow up  
 [facility] [address] [address]  
[address] [place] [address] [address] [place] [address] Upcoming Health Maintenance Date Due  
 PAP AKA CERVICAL CYTOLOGY 6/6/1984 Pneumococcal 19-64 Highest Risk (2 of 3 - PCV13) 8/27/2013 BREAST CANCER SCRN MAMMOGRAM 5/23/2019 COLONOSCOPY 3/11/2023 DTaP/Tdap/Td series (3 - Td) 12/10/2027 Allergies as of 12/11/2017  Review Complete On: 12/11/2017 By: Christa Fraire MD  
  
 Severity Noted Reaction Type Reactions Aspirin  06/21/2016    Itching Current Immunizations  Reviewed on 8/31/2017 Name Date Influenza Vaccine 9/16/2016 Influenza Vaccine (Quad) PF 8/31/2017 11:50 AM  
 Influenza Vaccine PF 11/19/2013 Influenza Vaccine Split 9/18/2012 TDAP Vaccine 9/10/2012 Tdap 12/10/2017  3:44 AM, 6/13/2013 11:22 AM  
 ZZZ-RETIRED (DO NOT USE) Pneumococcal Vaccine (Unspecified Type) 8/27/2012 Not reviewed this visit You Were Diagnosed With   
  
 Codes Comments Malignant neoplasm of right female breast, unspecified estrogen receptor status, unspecified site of breast ([address]    -  Primary ICD-10-CM: C50.911 ICD-9-CM: 174.9 Acquired hypothyroidism     ICD-10-CM: E03.9 ICD-9-CM: 244.9 Pure hypercholesterolemia     ICD-10-CM: E78.00 ICD-9-CM: 272.0 Chronic tension-type headache, not intractable     ICD-10-CM: Z23.672 ICD-9-CM: 339.12 Rib pain on right side     ICD-10-CM: R07.81 ICD-9-CM: 786.50 Reactive depression     ICD-10-CM: F32.9 ICD-9-CM: 300.4 Essential hypertension     ICD-10-CM: I10 
ICD-9-CM: 401.9 Overweight     ICD-10-CM: S10.9 ICD-9-CM: 278.02 Cold sore     ICD-10-CM: B00.1 ICD-9-CM: 054.9 Vitals BP Pulse Temp Resp Height(growth percentile) Weight(growth percentile) 126/77 (BP 1 Location: Left arm, BP Patient Position: Sitting) 96 98 °F (36.7 °C) (Oral) 16 5' 4\" (1.626 m) 151 lb (68.5 kg) SpO2 BMI OB Status Smoking Status 94% 25.92 kg/m2 Menopause Current Every Day Smoker Vitals History BMI and BSA Data Body Mass Index Body Surface Area  
 25.92 kg/m 2 1.76 m 2 Preferred Pharmacy Pharmacy Name Phone [id number]- [place], [place] - [address] [phone] Your Updated Medication List  
  
   
This list is accurate as of: 12/11/17  1:48 PM.  Always use your most recent med list.  
  
  
  
  
 amitriptyline 10 mg tablet Commonly known as:  ELAVIL Take 1 Tab by mouth nightly. atorvastatin 20 mg tablet Commonly known as:  LIPITOR Take 1 Tab by mouth daily. escitalopram oxalate 10 mg tablet Commonly known as:  Janeann Ramp TAKE 1 TABLET BY MOUTH EVERY DAY  
  
 furosemide 20 mg tablet Commonly known as:  LASIX Take 1 Tab by mouth daily. gabapentin 300 mg capsule Commonly known as:  NEURONTIN  
TAKE ONE CAPSULE BY MOUTH EVERY NIGHT AS NEEDED HYDROcodone-acetaminophen 5-325 mg per tablet Commonly known as:  Barnet Cuff Take 1 Tab by mouth every eight (8) hours as needed for Pain. Max Daily Amount: 3 Tabs. levothyroxine 50 mcg tablet Commonly known as:  SYNTHROID Take 1 Tab by mouth Daily (before breakfast). meloxicam 7.5 mg tablet Commonly known as:  MOBIC Take 1 Tab by mouth daily. metoprolol succinate 25 mg XL tablet Commonly known as:  TOPROL-XL  
TAKE 1 TAB BY MOUTH DAILY. mometasone 50 mcg/actuation nasal spray Commonly known as:  NASONEX  
2 Sprays by Both Nostrils route daily. multivitamin tablet Commonly known as:  ONE A DAY Take 1 Tab by mouth daily. SUMAtriptan 50 mg tablet Commonly known as:  IMITREX Take 1 Tab by mouth once as needed for Migraine for up to 1 dose. valACYclovir 1 gram tablet Commonly known as:  VALTREX Take 1 Tab by mouth two (2) times a day. Prescriptions Printed Refills HYDROcodone-acetaminophen (NORCO) 5-325 mg per tablet 0 Sig: Take 1 Tab by mouth every eight (8) hours as needed for Pain. Max Daily Amount: 3 Tabs. Class: Print Route: Oral  
  
Prescriptions Sent to Pharmacy Refills  
 amitriptyline (ELAVIL) 10 mg tablet 3 Sig: Take 1 Tab by mouth nightly. Class: Normal  
 Pharm[facility] [address] [facility] [address] Ph #: [phone] Route: Oral  
 meloxicam (MOBIC) 7.5 mg tablet 0 Sig: Take 1 Tab by mouth daily. Class: Normal  
 Pharm[facility] [id number]  [facility] [address] Ph #: [phone] Route: Oral  
 valACYclovir (VALTREX) 1 gram tablet 0 Sig: Take 1 Tab by mouth two (2) times a day. Class: Normal  
 Pharmacy: [facility]pharmacy [address] [facility] [address] Ph #: [phone] Route: Oral  
  
We Performed the Following METABOLIC PANEL, COMPREHENSIVE [93107 CPT(R)] TSH 3RD GENERATION [88072 CPT(R)] To-Do List   
 12/11/2017 Imaging:  XR RIBS RT W PA CXR MIN 3 V Patient Instructions Schedule pelvic and ultrasound thyroid Introducing [facility]! [facility] introduces [employer] patient portal. Now you can access parts of your medical record, email your doctor's office, and request medication refills online. 1. In your internet browser, go to [email or web address] 2. Click on the First Time User? Click Here link in the Sign In box. You will see the New Member Sign Up page. 3. Enter your Dunamu Access Code exactly as it appears below. You will not need to use this code after youve completed the sign-up process. If you do not sign up before the expiration date, you must request a new code. · Dunamu Access Code: [email or web address] Expires: 3/10/2018  3:43 AM 
 
4. Enter the last four digits of your Social Security Number (xxxx) and Date of Birth (mm/dd/yyyy) as indicated and click Submit. You will be taken to the next sign-up page. 5. Create a Dunamu ID. This will be your Dunamu login ID and cannot be changed, so think of one that is secure and easy to remember. 6. Create a Dunamu password. You can change your password at any time. 7. Enter your Password Reset Question and Answer. This can be used at a later time if you forget your password. 8. Enter your e-mail address. You will receive e-mail notification when new information is available in [address] 9. Click Sign Up. You can now view and download portions of your medical record. 10. Click the Download Summary menu link to download a portable copy of your medical information. If you have questions, please visit the Frequently Asked Questions section of the Dunamu website. Remember, Dunamu is NOT to be used for urgent needs. For medical emergencies, dial 911. Now available from your iPhone and Android! Please provide this summary of care documentation to your next provider. Your primary care clinician is listed as Sheree Serrano. If you have any questions after today's visit, please call [phone].

## 2017-12-12 LAB
ALBUMIN SERPL-MCNC: 4.1 G/DL (ref 3.5–5.5)
ALBUMIN/GLOB SERPL: 1.2 {RATIO} (ref 1.2–2.2)
ALP SERPL-CCNC: 120 IU/L (ref 39–117)
ALT SERPL-CCNC: 22 IU/L (ref 0–32)
AST SERPL-CCNC: 27 IU/L (ref 0–40)
BILIRUB SERPL-MCNC: <0.2 MG/DL (ref 0–1.2)
BUN SERPL-MCNC: 10 MG/DL (ref 6–24)
BUN/CREAT SERPL: 13 (ref 9–23)
CALCIUM SERPL-MCNC: 9.8 MG/DL (ref 8.7–10.2)
CHLORIDE SERPL-SCNC: 95 MMOL/L (ref 96–106)
CO2 SERPL-SCNC: 25 MMOL/L (ref 18–29)
CREAT SERPL-MCNC: 0.79 MG/DL (ref 0.57–1)
GFR SERPLBLD CREATININE-BSD FMLA CKD-EPI: 85 ML/MIN/1.73
GFR SERPLBLD CREATININE-BSD FMLA CKD-EPI: 98 ML/MIN/1.73
GLOBULIN SER CALC-MCNC: 3.5 G/DL (ref 1.5–4.5)
GLUCOSE SERPL-MCNC: 87 MG/DL (ref 65–99)
POTASSIUM SERPL-SCNC: 4.5 MMOL/L (ref 3.5–5.2)
PROT SERPL-MCNC: 7.6 G/DL (ref 6–8.5)
SODIUM SERPL-SCNC: 137 MMOL/L (ref 134–144)
TSH SERPL DL<=0.005 MIU/L-ACNC: 0.99 UIU/ML (ref 0.45–4.5)

## 2018-01-02 DIAGNOSIS — E03.9 ACQUIRED HYPOTHYROIDISM: ICD-10-CM

## 2018-01-02 RX ORDER — LEVOTHYROXINE SODIUM 50 UG/1
TABLET ORAL
Qty: 30 TAB | Refills: 2 | Status: SHIPPED | OUTPATIENT
Start: 2018-01-02 | End: 2018-04-03 | Stop reason: SDUPTHER

## 2018-01-24 ENCOUNTER — TELEPHONE (OUTPATIENT)
Dept: INTERNAL MEDICINE CLINIC | Age: 53
End: 2018-01-24

## 2018-01-24 NOTE — TELEPHONE ENCOUNTER
----- Message from Emerita Cottrell sent at 1/23/2018  8:15 PM EST -----  Regarding: Dr Rubio/Telephone   URGENT  Pt requesting call back regarding sharp pain in right side, radiating to back cannot find comfortable position to be in for work; stands for 8 hour shifts. Cannot take pain meds. at work. Is there anything differently that can be taken? Cannot take Aspirin; causes itching.     Best contact [phone]      Message copied/pasted from Vicki Weeks

## 2018-01-25 NOTE — TELEPHONE ENCOUNTER
Left voice message for patient to use gabapentin or tylenol for pain and to contact  in Ortho for further evaluation

## 2018-01-26 ENCOUNTER — TELEPHONE (OUTPATIENT)
Dept: INTERNAL MEDICINE CLINIC | Age: 53
End: 2018-01-26

## 2018-01-29 NOTE — TELEPHONE ENCOUNTER
Spoke with patient. Two pt identifiers confirmed. Patient states that she did not get VM on Friday pertaining to her side pain. Advised patient per Dr. Chaparro Hardin, that she can try her gabapentin and tylenol for the pain in her side, and also gave patient the phone number for Ortho (Dr. Yadi Quiñones) [phone]. Pt verbalized understanding of information discussed w/ no further questions at this time.

## 2018-02-04 DIAGNOSIS — E78.5 ELEVATED LIPIDS: ICD-10-CM

## 2018-02-04 RX ORDER — METOPROLOL SUCCINATE 25 MG/1
TABLET, EXTENDED RELEASE ORAL
Qty: 30 TAB | Refills: 3 | Status: SHIPPED | OUTPATIENT
Start: 2018-02-04 | End: 2018-06-22 | Stop reason: SDUPTHER

## 2018-02-04 RX ORDER — ATORVASTATIN CALCIUM 20 MG/1
TABLET, FILM COATED ORAL
Qty: 30 TAB | Refills: 3 | Status: SHIPPED | OUTPATIENT
Start: 2018-02-04 | End: 2018-06-22 | Stop reason: SDUPTHER

## 2018-02-19 ENCOUNTER — TELEPHONE (OUTPATIENT)
Dept: INTERNAL MEDICINE CLINIC | Age: 53
End: 2018-02-19

## 2018-02-19 DIAGNOSIS — I10 ESSENTIAL HYPERTENSION: Primary | ICD-10-CM

## 2018-02-19 DIAGNOSIS — E78.00 PURE HYPERCHOLESTEROLEMIA: ICD-10-CM

## 2018-02-19 NOTE — TELEPHONE ENCOUNTER
MD Rafal Orellana, LPN        Caller: Unspecified (Today,  7:47 AM)                     Lipids, cbc, cmp

## 2018-02-19 NOTE — TELEPHONE ENCOUNTER
----- Message from Sosa Natarajan sent at 2/17/2018  1:46 PM EST -----  Regarding: Dr. Katja Avila if faasting labs will be done on upcoming appt scheduled Tuesday 02/20/18 at 8:30 AM. Best contact number [phone] if no answer please leave a voicemail message.        Message copied/pasted from [facility]

## 2018-02-19 NOTE — TELEPHONE ENCOUNTER
Called, spoke to pt. Two pt identifiers confirmed. Pt informed per Dr. Thad Jain labs are fasting. Labs ordered. Pt verbalized understanding of information discussed w/ no further questions at this time.

## 2018-02-20 ENCOUNTER — OFFICE VISIT (OUTPATIENT)
Dept: INTERNAL MEDICINE CLINIC | Age: 53
End: 2018-02-20

## 2018-02-20 VITALS
DIASTOLIC BLOOD PRESSURE: 80 MMHG | BODY MASS INDEX: 26.29 KG/M2 | WEIGHT: 154 LBS | RESPIRATION RATE: 16 BRPM | SYSTOLIC BLOOD PRESSURE: 125 MMHG | HEART RATE: 79 BPM | OXYGEN SATURATION: 100 % | TEMPERATURE: 98.1 F | HEIGHT: 64 IN

## 2018-02-20 DIAGNOSIS — G89.29 CHRONIC RIGHT-SIDED THORACIC BACK PAIN: ICD-10-CM

## 2018-02-20 DIAGNOSIS — F17.200 SMOKING: ICD-10-CM

## 2018-02-20 DIAGNOSIS — E66.3 OVERWEIGHT: ICD-10-CM

## 2018-02-20 DIAGNOSIS — F32.9 REACTIVE DEPRESSION: ICD-10-CM

## 2018-02-20 DIAGNOSIS — M54.6 PAIN IN THORACIC SPINE: Primary | ICD-10-CM

## 2018-02-20 DIAGNOSIS — E03.9 ACQUIRED HYPOTHYROIDISM: ICD-10-CM

## 2018-02-20 DIAGNOSIS — M54.6 CHRONIC RIGHT-SIDED THORACIC BACK PAIN: ICD-10-CM

## 2018-02-20 DIAGNOSIS — K21.9 GASTROESOPHAGEAL REFLUX DISEASE WITHOUT ESOPHAGITIS: Primary | ICD-10-CM

## 2018-02-20 DIAGNOSIS — E78.00 PURE HYPERCHOLESTEROLEMIA: ICD-10-CM

## 2018-02-20 DIAGNOSIS — G43.709 CHRONIC MIGRAINE WITHOUT AURA WITHOUT STATUS MIGRAINOSUS, NOT INTRACTABLE: ICD-10-CM

## 2018-02-20 RX ORDER — PANTOPRAZOLE SODIUM 40 MG/1
40 TABLET, DELAYED RELEASE ORAL DAILY
Qty: 30 TAB | Refills: 1 | Status: SHIPPED | OUTPATIENT
Start: 2018-02-20 | End: 2018-12-17

## 2018-02-20 RX ORDER — METRONIDAZOLE 500 MG/1
TABLET ORAL
COMMUNITY
Start: 2018-02-16 | End: 2018-12-17

## 2018-02-20 RX ORDER — DULOXETIN HYDROCHLORIDE 30 MG/1
30 CAPSULE, DELAYED RELEASE ORAL DAILY
Qty: 30 CAP | Refills: 1 | Status: SHIPPED | OUTPATIENT
Start: 2018-02-20 | End: 2018-05-06 | Stop reason: SDUPTHER

## 2018-02-20 NOTE — PATIENT INSTRUCTIONS
Take lexapro every other day for 2 weeks then stop     Start cymbalta tomorrow    protonix daily for 4-6 weeks for bloating    Labs today     Xray ribs    Complete thyroid ultrasound    Schedule eye exam and pelvic exam    Mammogram is due in may

## 2018-02-20 NOTE — PROGRESS NOTES
HISTORY OF PRESENT ILLNESS  Maite Duong is a 45 y.o. female. HPI   Last here 12/11/17. Pt is here for routine care.     BP today is 125/80  Pt is not monitoring BP at home, as she does not have a BP cuff  Continues on toprol XL 25mg daily   She also has lasix 20mg to use prn (not recently) for edema     Wt is up 3 lbs since last visit    Pt states that she snacks at work  Discussed reducing caloric intake by 500 calories/day  Discussed decreasing carbohydrate and increasing protein intake  Discussed decreasing caloric beverage and increasing water intake       Reviewed last labs 12/17  Will get labs today     Lov, pt c/o constant R sided pain  Lov, I provided her with meloxicam and a limited amount of hydrocodone   Pt states that she has run out of both medications  Pt stopped taking gabapentin, as it was not effective   Reviewed XR acute rib series 12/17: 1. Chronic right eighth and ninth rib fractures. Today, pt c/o similar sx  Pt states that her sx exacerbate throughout the day, suze.  after standing for some time at work  Discussed mobic, tylenol and NSAIDs (cannot drink alcohol with these meds) to improve her sx  Ordering cymbalta to improve pain, as well  Ordered XR T spine    Pt went to [facility] for diarrhea on 2/16/18  Pt was provided with flagyl TID for her sx  Diarrhea has resolved  Today, pt states that it feels as though she has \"a bubble\" in her stomach x 7 days  Pt states that her sx exacerbate after eating food  Pt takes Pepto Bismol prn (not daily)  Advised her to take protonix daily for 6 weeks  If her sx do progress, will have her schedule an EGD  Recall EGD and COLO in 2013: bleeding ulcer    Continues on synthroid 50mcg daily  Since lov, I ordered an US thyroids, but this imaging was never completed  Reordered US thyroids today   Recall pt has a FMHX thyroid cancer (brother)      Continues on lipitor 20mg daily for cholesterol     Continues on lexapro 10mg daily for anxiety and depression, which has mildly improved her sx  Pt states that she has been sleeping more often over the last few weeks  Discussed trying cymbalta to improve anxiety/depression and R sided pain  Will decrease lexapro to every other day for 2 weeks  Will then start cymbalta 30mg qhs    Pt has been taking amitriptyline 10mg qhs prn (not always nightly) to prevent HAs and help with sleep, which works well  Pt also takes imitrex 50mg prn (not daily)       Pt follows with Dr. Lorenza Falk (hem/onc) for anemia  Last visit was 5/17   Recall COLO and EGD 2013 - bleeding ulcer, issues with anemia  Recall past evaluation with Dr. Lorenza Falk in 2015, thrombocytosis thought d/t cigarette smoking     Pt continues to smoke <0.5 PPD  Counseled on smoking cessation    Pt is not drinking alcohol at the moment, as she is taking flagyl  Pt states that she drinks 3 alcoholic beverages after speaking to her mother  Pt drinks up to 4 alcoholic beverages qhs  Discussed her having a moderate-severe fatty liver  Counseled on alcohol cessation    ACP not on file. SDM is her mother (Leticia Hernandez).   Provided information today.       PREVENTIVE:   Colonoscopy: 3/13, repeat 10 years  EGD: 2013, possible Barrette's   Pap: overdue, reminded again, referred, will schedule, discussed 9/17, discussed 2/18  Mammogram: 5/23/17, negative, h/o R sided breast DCIS, due 5/18   Tdap: 12/10/17  Pneumovax: 8/27/2012  [id number]: not yet needed  Zostavax: not yet needed  Flu shot: 8/31/17  Eye exam: 8/16, due, will schedule  EKG: 3/17 per ED   Lipids: 3/17 LDL 29    Patient Active Problem List    Diagnosis Date Noted    Pure hypercholesterolemia 05/01/2017    Acquired hypothyroidism 05/01/2017    Elevated WBC count 04/16/2014    Elevated liver function tests 04/16/2014    Breast cancer right DCIS 06/11/2013    Cancer ([facility] [address]     Depression 02/11/2013    Headache(784.0) 09/10/2012    HTN (hypertension) 09/10/2012     Current Outpatient Prescriptions Medication Sig Dispense Refill    metroNIDAZOLE (FLAGYL) 500 mg tablet       metoprolol succinate (TOPROL-XL) 25 mg XL tablet TAKE 1 TAB BY MOUTH DAILY. 30 Tab 3    atorvastatin (LIPITOR) 20 mg tablet TAKE 1 TAB BY MOUTH DAILY. 30 Tab 3    levothyroxine (SYNTHROID) 50 mcg tablet TAKE 1 TAB BY MOUTH DAILY (BEFORE BREAKFAST). 30 Tab 2    amitriptyline (ELAVIL) 10 mg tablet Take 1 Tab by mouth nightly. 30 Tab 3    meloxicam (MOBIC) 7.5 mg tablet Take 1 Tab by mouth daily. 30 Tab 0    HYDROcodone-acetaminophen (NORCO) 5-325 mg per tablet Take 1 Tab by mouth every eight (8) hours as needed for Pain. Max Daily Amount: 3 Tabs. 15 Tab 0    valACYclovir (VALTREX) 1 gram tablet Take 1 Tab by mouth two (2) times a day. 2 Tab 0    mometasone (NASONEX) 50 mcg/actuation nasal spray 2 Sprays by Both Nostrils route daily. 1 Container 0    gabapentin (NEURONTIN) 300 mg capsule TAKE ONE CAPSULE BY MOUTH EVERY NIGHT AS NEEDED 30 Cap 3    escitalopram oxalate (LEXAPRO) 10 mg tablet TAKE 1 TABLET BY MOUTH EVERY DAY 30 Tab 6    furosemide (LASIX) 20 mg tablet Take 1 Tab by mouth daily. 30 Tab 0    SUMAtriptan (IMITREX) 50 mg tablet Take 1 Tab by mouth once as needed for Migraine for up to 1 dose. 6 Tab 1    multivitamin (ONE A DAY) tablet Take 1 Tab by mouth daily.          Past Surgical History:   Procedure Laterality Date    BREAST SURGERY PROCEDURE UNLISTED  5/30/13    RIGHT BREAST LUMPECTOMY WITH RIGHT NEDDLE LOCALIZATION     HX BREAST LUMPECTOMY  5/30/2013    BREAST LUMPECTOMY/PARTIAL performed [facility]ait[facility] MA[facility] OR    HX GYN      fallopian tube    HX ORTHOPAEDIC      left ankle repair    HX OTHER SURGICAL      cyst removed rom right thigh      Lab Results  Component Value Date/Time   WBC 7.1 08/31/2017 12:04 PM   HGB 10.9 (L) 08/31/2017 12:04 PM   Hemoglobin (POC) 15.3 10/31/2014 02:26 PM   HCT 33.2 (L) 08/31/2017 12:04 PM   Hematocrit (POC) 45 10/31/2014 02:26 PM   PLATELET 495 91/39/3911 12:04 PM   MCV 99 (H) 08/31/2017 12:04 PM     Lab Results  Component Value Date/Time   Cholesterol, total 153 03/22/2017 12:31 PM   HDL Cholesterol 44 03/22/2017 12:31 PM   LDL, calculated 29 03/22/2017 12:31 PM   Triglyceride 400 (H) 03/22/2017 12:31 PM     Lab Results  Component Value Date/Time   GFR est non-AA 85 12/11/2017 03:08 PM   GFRNA, POC >60 10/31/2014 02:26 PM   GFR est AA 98 12/11/2017 03:08 PM   GFRAA, POC >60 10/31/2014 02:26 PM   Creatinine 0.79 12/11/2017 03:08 PM   Creatinine (POC) 0.9 10/31/2014 02:26 PM   BUN 10 12/11/2017 03:08 PM   BUN (POC) 8 (L) 10/31/2014 02:26 PM   Sodium 137 12/11/2017 03:08 PM   Sodium (POC) 140 10/31/2014 02:26 PM   Potassium 4.5 12/11/2017 03:08 PM   Potassium (POC) 3.7 10/31/2014 02:26 PM   Chloride 95 (L) 12/11/2017 03:08 PM   Chloride (POC) 103 10/31/2014 02:26 PM   CO2 25 12/11/2017 03:08 PM   Magnesium 1.6 01/14/2017 07:28 AM   Phosphorus 1.0 (L) 11/28/2012 04:45 AM        Review of Systems   Respiratory: Negative for shortness of breath. Cardiovascular: Negative for chest pain. Gastrointestinal: Positive for diarrhea. Musculoskeletal: Positive for myalgias. Physical Exam   Constitutional: She is oriented to person, place, and time. She appears well-developed and well-nourished. No distress. HENT:   Head: Normocephalic and atraumatic. Mouth/Throat: Oropharynx is clear and moist. No oropharyngeal exudate. Eyes: Conjunctivae and EOM are normal. Right eye exhibits no discharge. Left eye exhibits no discharge. Neck: Normal range of motion. Neck supple. Cardiovascular: Normal rate, regular rhythm and normal heart sounds. Exam reveals no gallop and no friction rub. No murmur heard. Pulmonary/Chest: Effort normal and breath sounds normal. No respiratory distress. She has no wheezes. She has no rales. She exhibits no tenderness. Abdominal: Soft. She exhibits no distension and no mass. There is tenderness (Mild epigastric).  There is no rebound and no guarding. Musculoskeletal: She exhibits tenderness (Reproducible pain around R lower rib cage). She exhibits no edema or deformity. Lymphadenopathy:     She has no cervical adenopathy. Neurological: She is alert and oriented to person, place, and time. Coordination normal.   Skin: Skin is warm and dry. No rash noted. She is not diaphoretic. No erythema. No pallor. Psychiatric: She has a normal mood and affect. Her behavior is normal.        ASSESSMENT and PLAN    ICD-10-CM ICD-9-CM    1. Gastroesophageal reflux disease without esophagitis    Start protonix daily for 4-6 weeks to improve bloating and epigastric discomfort, if not improving may need a f/u EGD, recall has h/o bleeding ulcer   K21.9 530.81    2. Overweight    Discussed diet and weight loss, up about 10 lbs in the last year, needs to focus on portion control, currently not monitoring diet    E66.3 278.02    3. Reactive depression    Borderline control on lexapro, given she has some chronic pain sx as well will change to celexa 30mg and see if this improves her sx, will taper off lexapro to every other day for 2 weeks and then stop, will start cymbalta now   F32.9 300.4    4. Acquired hypothyroidism    Stable on synthroid, reprinted order for thyroid US, discussed this with her   E03.9 244.9    5. Pure hypercholesterolemia    Controled on lipitor, due for repeat lipids, will check today   E78.00 272.0    6.  Chronic right-sided thoracic back pain    Pt has pain that is completely reproducible, musculoskeletal in etiology, has a h/o rib fractures on that side, potentially could have a compression fracture on that side so will check T spine plain film, will give cymbalta to see if this improves her sx, discussed mobbic but given her h/o gastritis she will try to avoid this, discussed tylenol for her sx, discussed NSAIDs but she needs to avoid alcohol which I have discussed with her, of note gabapentin has not helped   M54.6 724.1 XR SPINE THORAC 3 V G89.29 338.29     7. Migraines - improved on elavil 10mg, has imitrex to use prn    8. Smoking - counseled on cessation    Scribed by Kameron Rivera of [address] [place] [address] [address], as dictated by Dr. Berna Whitaker. Current diagnosis and concerns discussed with pt at length. Pt understands risks and benefits or current treatment plan and medications, and accepts the treatment and medication with any possible risks. Pt asks appropriate questions, which were answered. Pt was instructed to call with any concerns or problems. This note will not be viewable in [address]

## 2018-02-20 NOTE — MR AVS SNAPSHOT
[address] [facility] 
[phone] Patient: Shahnaz Jacob MRN: [id number] :1965 Visit Information Date & Time Provider Department Dept. Phone Encounter #  
 2018  8:30 AM Makayla Reno, [address] [place] [address],[address] [phone] [id number] Follow-up Instructions Return in about 3 months (around 2018). Upcoming Health Maintenance Date Due  
 PAP AKA CERVICAL CYTOLOGY 1984 Pneumococcal 19-64 Highest Risk (2 of 3 - PCV13) 2013 BREAST CANCER SCRN MAMMOGRAM 2019 COLONOSCOPY 3/11/2023 DTaP/Tdap/Td series (3 - Td) 12/10/2027 Allergies as of 2018  Review Complete On: 2018 By: Makayla Reno MD  
  
 Severity Noted Reaction Type Reactions Aspirin  2016    Itching Current Immunizations  Reviewed on 2017 Name Date Influenza Vaccine 2016 Influenza Vaccine (Quad) PF 2017 11:50 AM  
 Influenza Vaccine PF 2013 Influenza Vaccine Split 2012 TDAP Vaccine 9/10/2012 Tdap 12/10/2017  3:44 AM, 2013 11:22 AM  
 ZZZ-RETIRED (DO NOT USE) Pneumococcal Vaccine (Unspecified Type) 2012 Not reviewed this visit You Were Diagnosed With   
  
 Codes Comments Gastroesophageal reflux disease without esophagitis    -  Primary ICD-10-CM: K21.9 ICD-9-CM: 530.81 Overweight     ICD-10-CM: E64.5 ICD-9-CM: 278.02 Reactive depression     ICD-10-CM: F32.9 ICD-9-CM: 300.4 Acquired hypothyroidism     ICD-10-CM: E03.9 ICD-9-CM: 244.9 Pure hypercholesterolemia     ICD-10-CM: E78.00 ICD-9-CM: 272.0 Chronic right-sided thoracic back pain     ICD-10-CM: M54.6, G89.29 ICD-9-CM: 724.1, 338.29 Vitals BP Pulse Temp Resp Height(growth percentile) Weight(growth percentile)  125/80 (BP 1 Location: Left arm, BP Patient Position: Sitting) 79 98.1 °F (36.7 °C) (Oral) 16 5' 4\" (1.626 m) 154 lb (69.9 kg) SpO2 BMI OB Status Smoking Status 100% 26.43 kg/m2 Menopause Current Every Day Smoker Vitals History BMI and BSA Data Body Mass Index Body Surface Area  
 26.43 kg/m 2 1.78 m 2 Preferred Pharmacy Pharmacy Name Phone [id number]- [place] - [address] [phone] Your Updated Medication List  
  
   
This list is accurate as of: 2/20/18  8:43 AM.  Always use your most recent med list.  
  
  
  
  
 amitriptyline 10 mg tablet Commonly known as:  ELAVIL Take 1 Tab by mouth nightly. atorvastatin 20 mg tablet Commonly known as:  LIPITOR  
TAKE 1 TAB BY MOUTH DAILY. DULoxetine 30 mg capsule Commonly known as:  CYMBALTA Take 1 Cap by mouth daily. furosemide 20 mg tablet Commonly known as:  LASIX Take 1 Tab by mouth daily. levothyroxine 50 mcg tablet Commonly known as:  SYNTHROID  
TAKE 1 TAB BY MOUTH DAILY (BEFORE BREAKFAST). metoprolol succinate 25 mg XL tablet Commonly known as:  TOPROL-XL  
TAKE 1 TAB BY MOUTH DAILY. metroNIDAZOLE 500 mg tablet Commonly known as:  FLAGYL  
  
 multivitamin tablet Commonly known as:  ONE A DAY Take 1 Tab by mouth daily. pantoprazole 40 mg tablet Commonly known as:  PROTONIX Take 1 Tab by mouth daily. SUMAtriptan 50 mg tablet Commonly known as:  IMITREX Take 1 Tab by mouth once as needed for Migraine for up to 1 dose. Prescriptions Sent to Pharmacy Refills  
 pantoprazole (PROTONIX) 40 mg tablet 1 Sig: Take 1 Tab by mouth daily. Class: Normal  
 Pharmacy: [facility]/pharmacy [address] Ph #: [phone] Route: Oral  
 DULoxetine (CYMBALTA) 30 mg capsule 1 Sig: Take 1 Cap by mouth daily.   
 Class: Normal  
 Pharmacy: [facility] [address] Ph #: [phone] Route: Oral  
  
Follow-up Instructions Return in about 3 months (around 5/20/2018). To-Do List   
 02/20/2018 Imaging:  XR SPINE THORAC 3 V Patient Instructions Take lexapro every other day for 2 weeks then stop Start cymbalta tomorrow 
 
protonix daily for 4-6 weeks for bloating Labs today Xray ribs Complete thyroid ultrasound Schedule eye exam and pelvic exam 
 
Mammogram is due in may Introducing [facility]! Wally Kathleen introduces HelpAround patient portal. Now you can access parts of your medical record, email your doctor's office, and request medication refills online. 1. In your internet browser, go to [email or web address] 2. Click on the First Time User? Click Here link in the Sign In box. You will see the New Member Sign Up page. 3. Enter your HelpAround Access Code exactly as it appears below. You will not need to use this code after youve completed the sign-up process. If you do not sign up before the expiration date, you must request a new code. · HelpAround Access Code: [email or web address] Expires: 3/10/2018  3:43 AM 
 
4. Enter the last four digits of your Social Security Number (xxxx) and Date of Birth (mm/dd/yyyy) as indicated and click Submit. You will be taken to the next sign-up page. 5. Create a HelpAround ID. This will be your HelpAround login ID and cannot be changed, so think of one that is secure and easy to remember. 6. Create a HelpAround password. You can change your password at any time. 7. Enter your Password Reset Question and Answer. This can be used at a later time if you forget your password. 8. Enter your e-mail address. You will receive e-mail notification when new information is available in [address] 9. Click Sign Up. You can now view and download portions of your medical record. 10. Click the Download Summary menu link to download a portable copy of your medical information. If you have questions, please visit the Frequently Asked Questions section of the [employer] website. Remember, [employer] is NOT to be used for urgent needs. For medical emergencies, dial 911. Now available from your iPhone and Android! Please provide this summary of care documentation to your next provider. Your primary care clinician is listed as Elisabeth Justin. If you have any questions after today's visit, please call [phone].

## 2018-02-21 LAB
ALBUMIN SERPL-MCNC: 4 G/DL (ref 3.5–5.5)
ALBUMIN/GLOB SERPL: 1.3 {RATIO} (ref 1.2–2.2)
ALP SERPL-CCNC: 103 IU/L (ref 39–117)
ALT SERPL-CCNC: 26 IU/L (ref 0–32)
AST SERPL-CCNC: 54 IU/L (ref 0–40)
BILIRUB SERPL-MCNC: 0.2 MG/DL (ref 0–1.2)
BUN SERPL-MCNC: 8 MG/DL (ref 6–24)
BUN/CREAT SERPL: 10 (ref 9–23)
CALCIUM SERPL-MCNC: 8.8 MG/DL (ref 8.7–10.2)
CHLORIDE SERPL-SCNC: 94 MMOL/L (ref 96–106)
CHOLEST SERPL-MCNC: 117 MG/DL (ref 100–199)
CO2 SERPL-SCNC: 23 MMOL/L (ref 18–29)
CREAT SERPL-MCNC: 0.78 MG/DL (ref 0.57–1)
ERYTHROCYTE [DISTWIDTH] IN BLOOD BY AUTOMATED COUNT: 15.4 % (ref 12.3–15.4)
GFR SERPLBLD CREATININE-BSD FMLA CKD-EPI: 100 ML/MIN/{1.73_M2}
GFR SERPLBLD CREATININE-BSD FMLA CKD-EPI: 86 ML/MIN/{1.73_M2}
GLOBULIN SER CALC-MCNC: 3 G/L (ref 1.5–4.5)
GLUCOSE SERPL-MCNC: 80 MG/DL (ref 65–99)
HCT VFR BLD AUTO: 35.7 % (ref 34–46.6)
HDLC SERPL-MCNC: 42 MG/DL
HGB BLD-MCNC: 12 G/DL (ref 11.1–15.9)
LDLC SERPL CALC-MCNC: 40 MG/DL (ref 0–99)
MCH RBC QN AUTO: 32.7 PG (ref 26.6–33)
MCHC RBC AUTO-ENTMCNC: 33.6 G/DL (ref 31.5–35.7)
MCV RBC AUTO: 97 FL (ref 79–97)
PLATELET # BLD AUTO: 189 X10E3/UL (ref 150–379)
POTASSIUM SERPL-SCNC: 3.6 MMOL/L (ref 3.5–5.2)
PROT SERPL-MCNC: 7 G/DL (ref 6–8.5)
RBC # BLD AUTO: 3.67 X10E6/UL (ref 3.77–5.28)
SODIUM SERPL-SCNC: 132 MMOL/L (ref 134–144)
TRIGL SERPL-MCNC: 175 MG/DL (ref 0–149)
VLDLC SERPL CALC-MCNC: 35 MG/DL (ref 5–40)
WBC # BLD AUTO: 7.8 X10E3/UL (ref 3.4–10.8)

## 2018-02-21 NOTE — TELEPHONE ENCOUNTER
Overall labs good    Na a bit low and one lft up , pt was recently ill with diarrhea which may be cause    Repeat cmp in 3-4 weeks to ensure back to normal

## 2018-02-22 NOTE — TELEPHONE ENCOUNTER
Called, spoke to pt. Two pt identifiers confirmed. Pt informed per Dr. Shabnam Myers overall labs are good. Pt informed per Dr. Shabnam Myers Na a bit low and one lft up , pt was recently ill with diarrhea which may be cause. Pt informed per Dr. Shabnam Myers repeat labs in 3-4 wks. Labs ordered and mailed to pt. Pt verbalized understanding of information discussed w/ no further questions at this time.

## 2018-03-12 RX ORDER — HYDROCODONE BITARTRATE AND ACETAMINOPHEN 5; 325 MG/1; MG/1
1 TABLET ORAL
Qty: 15 TAB | Refills: 0 | OUTPATIENT
Start: 2018-03-12

## 2018-03-12 NOTE — TELEPHONE ENCOUNTER
Patient states she needs refill. Please call if any questions or when Hard Copy is ready.  Thank you

## 2018-03-12 NOTE — TELEPHONE ENCOUNTER
PCP: Arjun Kolb MD    Last appt: 2/20/2018  Future Appointments  Date Time Provider Pb Victor   5/22/2018 1:15 PM Arjun Kolb MD [facility]       Requested Prescriptions     Pending Prescriptions Disp Refills    HYDROcodone-acetaminophen (NORCO) 5-325 mg per tablet 15 Tab 0     Sig: Take 1 Tab by mouth every eight (8) hours as needed for Pain. Max Daily Amount: 3 Tabs.

## 2018-03-13 NOTE — TELEPHONE ENCOUNTER
Refused Medications  HYDROcodone-acetaminophen (NORCO) 5-325 mg per tablet  Take 1 Tab by mouth every eight (8) hours as needed for Pain. Max Daily Amount: 3 Tabs.   Disp: 15 Tab Refills: 0    Class: Normal Start: 3/12/2018   Refused by: Apolinar Vasquez MD  Refusal reason: other  Left message for patient that Venida Prude will not be refilled per Dr. Tiana Bolanos

## 2018-03-20 RX ORDER — SUMATRIPTAN 50 MG/1
TABLET, FILM COATED ORAL
Qty: 6 TAB | Refills: 1 | Status: SHIPPED | OUTPATIENT
Start: 2018-03-20

## 2018-03-20 RX ORDER — FUROSEMIDE 20 MG/1
TABLET ORAL
Qty: 30 TAB | Refills: 0 | Status: SHIPPED | OUTPATIENT
Start: 2018-03-20 | End: 2018-05-19 | Stop reason: SDUPTHER

## 2018-04-03 DIAGNOSIS — E03.9 ACQUIRED HYPOTHYROIDISM: ICD-10-CM

## 2018-04-03 RX ORDER — LEVOTHYROXINE SODIUM 50 UG/1
TABLET ORAL
Qty: 30 TAB | Refills: 2 | Status: SHIPPED | OUTPATIENT
Start: 2018-04-03 | End: 2018-07-08 | Stop reason: SDUPTHER

## 2018-04-16 ENCOUNTER — APPOINTMENT (OUTPATIENT)
Dept: INTERNAL MEDICINE CLINIC | Age: 53
End: 2018-04-16

## 2018-04-16 DIAGNOSIS — I10 ESSENTIAL HYPERTENSION: ICD-10-CM

## 2018-04-17 LAB
ALBUMIN SERPL-MCNC: 3.7 G/DL (ref 3.5–5.5)
ALBUMIN/GLOB SERPL: 1.1 {RATIO} (ref 1.2–2.2)
ALP SERPL-CCNC: 119 IU/L (ref 39–117)
ALT SERPL-CCNC: 19 IU/L (ref 0–32)
AST SERPL-CCNC: 25 IU/L (ref 0–40)
BILIRUB SERPL-MCNC: 0.3 MG/DL (ref 0–1.2)
BUN SERPL-MCNC: 10 MG/DL (ref 6–24)
BUN/CREAT SERPL: 14 (ref 9–23)
CALCIUM SERPL-MCNC: 9.6 MG/DL (ref 8.7–10.2)
CHLORIDE SERPL-SCNC: 99 MMOL/L (ref 96–106)
CO2 SERPL-SCNC: 23 MMOL/L (ref 18–29)
CREAT SERPL-MCNC: 0.71 MG/DL (ref 0.57–1)
GFR SERPLBLD CREATININE-BSD FMLA CKD-EPI: 112 ML/MIN/1.73
GFR SERPLBLD CREATININE-BSD FMLA CKD-EPI: 97 ML/MIN/1.73
GLOBULIN SER CALC-MCNC: 3.3 G/DL (ref 1.5–4.5)
GLUCOSE SERPL-MCNC: 100 MG/DL (ref 65–99)
POTASSIUM SERPL-SCNC: 4.2 MMOL/L (ref 3.5–5.2)
PROT SERPL-MCNC: 7 G/DL (ref 6–8.5)
SODIUM SERPL-SCNC: 138 MMOL/L (ref 134–144)

## 2018-05-06 RX ORDER — DULOXETIN HYDROCHLORIDE 30 MG/1
CAPSULE, DELAYED RELEASE ORAL
Qty: 30 CAP | Refills: 1 | Status: SHIPPED | OUTPATIENT
Start: 2018-05-06 | End: 2018-06-16 | Stop reason: SDUPTHER

## 2018-05-20 RX ORDER — FUROSEMIDE 20 MG/1
TABLET ORAL
Qty: 30 TAB | Refills: 0 | Status: SHIPPED | OUTPATIENT
Start: 2018-05-20 | End: 2018-07-21 | Stop reason: SDUPTHER

## 2018-05-22 ENCOUNTER — OFFICE VISIT (OUTPATIENT)
Dept: INTERNAL MEDICINE CLINIC | Age: 53
End: 2018-05-22

## 2018-05-22 VITALS
SYSTOLIC BLOOD PRESSURE: 109 MMHG | OXYGEN SATURATION: 100 % | DIASTOLIC BLOOD PRESSURE: 70 MMHG | WEIGHT: 152 LBS | RESPIRATION RATE: 16 BRPM | HEART RATE: 84 BPM | BODY MASS INDEX: 25.95 KG/M2 | TEMPERATURE: 98.6 F | HEIGHT: 64 IN

## 2018-05-22 DIAGNOSIS — D72.829 LEUKOCYTOSIS, UNSPECIFIED TYPE: ICD-10-CM

## 2018-05-22 DIAGNOSIS — F32.9 REACTIVE DEPRESSION: ICD-10-CM

## 2018-05-22 DIAGNOSIS — M54.6 CHRONIC BILATERAL THORACIC BACK PAIN: Primary | ICD-10-CM

## 2018-05-22 DIAGNOSIS — E03.9 ACQUIRED HYPOTHYROIDISM: ICD-10-CM

## 2018-05-22 DIAGNOSIS — Z12.39 SCREENING BREAST EXAMINATION: ICD-10-CM

## 2018-05-22 DIAGNOSIS — Z87.891 PERSONAL HISTORY OF NICOTINE DEPENDENCE: ICD-10-CM

## 2018-05-22 DIAGNOSIS — G43.709 CHRONIC MIGRAINE WITHOUT AURA WITHOUT STATUS MIGRAINOSUS, NOT INTRACTABLE: ICD-10-CM

## 2018-05-22 DIAGNOSIS — E66.3 OVERWEIGHT: ICD-10-CM

## 2018-05-22 DIAGNOSIS — G89.29 CHRONIC BILATERAL THORACIC BACK PAIN: Primary | ICD-10-CM

## 2018-05-22 DIAGNOSIS — E78.00 PURE HYPERCHOLESTEROLEMIA: ICD-10-CM

## 2018-05-22 RX ORDER — AMITRIPTYLINE HYDROCHLORIDE 10 MG/1
20 TABLET, FILM COATED ORAL
Qty: 60 TAB | Refills: 3 | Status: SHIPPED | OUTPATIENT
Start: 2018-05-22 | End: 2018-12-17

## 2018-05-22 RX ORDER — GABAPENTIN 300 MG/1
300 CAPSULE ORAL
Qty: 60 CAP | Refills: 4 | Status: SHIPPED | OUTPATIENT
Start: 2018-05-22 | End: 2018-11-12 | Stop reason: SDUPTHER

## 2018-05-22 NOTE — PATIENT INSTRUCTIONS
Increase elavil to 2 tabs at night to prevent migraines    Add gabapentin 1-2 times per day for side pain

## 2018-05-22 NOTE — MR AVS SNAPSHOT
[facility] [address] 
[phone] Patient: Antonieta Ceron MRN: [id number] :1965 Visit Information Date & Time Provider Department Dept. Phone Encounter #  
 2018  1:15 PM Yocasta Chaudhry, [address] [place] [address],[address] [phone] [id number] Follow-up Instructions Return in about 6 months (around 2018). Upcoming Health Maintenance Date Due  
 PAP AKA CERVICAL CYTOLOGY 1984 Pneumococcal 19-64 Highest Risk (2 of 3 - PCV13) 2013 Influenza Age 5 to Adult 2018 BREAST CANCER SCRN MAMMOGRAM 2019 COLONOSCOPY 3/11/2023 DTaP/Tdap/Td series (3 - Td) 12/10/2027 Allergies as of 2018  Review Complete On: 2018 By: Petey Guan LPN Severity Noted Reaction Type Reactions Aspirin  2016    Itching Current Immunizations  Reviewed on 2017 Name Date Influenza Vaccine 2016 Influenza Vaccine (Quad) PF 2017 11:50 AM  
 Influenza Vaccine PF 2013 Influenza Vaccine Split 2012 TDAP Vaccine 9/10/2012 Tdap 12/10/2017  3:44 AM, 2013 11:22 AM  
 ZZZ-RETIRED (DO NOT USE) Pneumococcal Vaccine (Unspecified Type) 2012 Not reviewed this visit You Were Diagnosed With   
  
 Codes Comments Chronic bilateral thoracic back pain    -  Primary ICD-10-CM: M54.6, G89.29 ICD-9-CM: 724.1, 338.29 Reactive depression     ICD-10-CM: F32.9 ICD-9-CM: 300.4 Pure hypercholesterolemia     ICD-10-CM: E78.00 ICD-9-CM: 272.0 Acquired hypothyroidism     ICD-10-CM: E03.9 ICD-9-CM: 244.9 Leukocytosis, unspecified type     ICD-10-CM: D72.829 ICD-9-CM: 288.60 Chronic migraine without aura without status migrainosus, not intractable     ICD-10-CM: V83.675 ICD-9-CM: 346.70 Personal history of nicotine dependence     ICD-10-CM: F42.189 ICD-9-CM: V15.82   
 Screening breast examination     ICD-10-CM: Z12.31 
ICD-9-CM: V76.10 Overweight     ICD-10-CM: Z17.6 ICD-9-CM: 278.02 Vitals BP Pulse Temp Resp Height(growth percentile) Weight(growth percentile) 109/70 (BP 1 Location: Left arm, BP Patient Position: Sitting) 84 98.6 °F (37 °C) (Oral) 16 5' 4\" (1.626 m) 152 lb (68.9 kg) SpO2 BMI OB Status Smoking Status 100% 26.09 kg/m2 Menopause Current Every Day Smoker Vitals History BMI and BSA Data Body Mass Index Body Surface Area 26.09 kg/m 2 1.76 m 2 Preferred Pharmacy Pharmacy Name Phone [facility]ACY #[id number]- [place], [place] - [address] [phone] Your Updated Medication List  
  
   
This list is accurate as of 5/22/18  2:15 PM.  Always use your most recent med list.  
  
  
  
  
 amitriptyline 10 mg tablet Commonly known as:  ELAVIL Take 2 Tabs by mouth nightly. atorvastatin 20 mg tablet Commonly known as:  LIPITOR  
TAKE 1 TAB BY MOUTH DAILY. DULoxetine 30 mg capsule Commonly known as:  CYMBALTA TAKE 1 CAP BY MOUTH DAILY. furosemide 20 mg tablet Commonly known as:  LASIX TAKE 1 TAB BY MOUTH DAILY. gabapentin 300 mg capsule Commonly known as:  NEURONTIN Take 1 Cap by mouth two (2) times daily as needed. levothyroxine 50 mcg tablet Commonly known as:  SYNTHROID  
TAKE 1 TAB BY MOUTH DAILY (BEFORE BREAKFAST). metoprolol succinate 25 mg XL tablet Commonly known as:  TOPROL-XL  
TAKE 1 TAB BY MOUTH DAILY. metroNIDAZOLE 500 mg tablet Commonly known as:  FLAGYL  
  
 multivitamin tablet Commonly known as:  ONE A DAY Take 1 Tab by mouth daily. pantoprazole 40 mg tablet Commonly known as:  PROTONIX Take 1 Tab by mouth daily. SUMAtriptan 50 mg tablet Commonly known as:  IMITREX  
TAKE 1 TABLET BY MOUTH ONCE AS NEEDED FOR MIGRAINE FOR UP TO 1 DOSE  
  
  
  
  
 Prescriptions Sent to Pharmacy Refills  
 amitriptyline (ELAVIL) 10 mg tablet 3 Sig: Take 2 Tabs by mouth nightly. Class: Normal  
 [facility] [address] [facility] [address] Ph #: [phone] Route: Oral  
 gabapentin (NEURONTIN) 300 mg capsule 4 Sig: Take 1 Cap by mouth two (2) times daily as needed. Class: Normal  
 [facility] [address] [facility] [address] Ph #: [phone] Route: Oral  
  
We Performed the Following REFERRAL TO ORTHOPEDICS [MPX135 Custom] Follow-up Instructions Return in about 6 months (around 11/22/2018). To-Do List   
 05/22/2018 Imaging:  CT LOW DOSE LUNG CANCER SCREENING   
  
 05/22/2018 Imaging:  CONNIE MAMMO BI SCREENING INCL CAD Referral Information Referral ID Referred By Referred To  
  
 [id number] ANTHONY, [facility] [address] [place] [address] Phone: [phone] Visits Status Start Date End Date 1 New Request 5/22/18 5/22/19 If your referral has a status of pending review or denied, additional information will be sent to support the outcome of this decision. Patient Instructions Increase elavil to 2 tabs at night to prevent migraines Add gabapentin 1-2 times per day for side pain Introducing [facility]! Tahira Ballesteros introduces BagThat patient portal. Now you can access parts of your medical record, email your doctor's office, and request medication refills online. 1. In your internet browser, go to https://"[email or web address] 2. Click on the First Time User? Click Here link in the Sign In box. You will see the New Member Sign Up page. 3. Enter your BagThat Access Code exactly as it appears below. You will not need to use this code after youve completed the sign-up process.  If you do not sign up before the expiration date, you must request a new code. · ComActivity Access Code: [email or web address] Expires: 8/20/2018  2:15 PM 
 
4. Enter the last four digits of your Social Security Number (xxxx) and Date of Birth (mm/dd/yyyy) as indicated and click Submit. You will be taken to the next sign-up page. 5. Create a ComActivity ID. This will be your ComActivity login ID and cannot be changed, so think of one that is secure and easy to remember. 6. Create a ComActivity password. You can change your password at any time. 7. Enter your Password Reset Question and Answer. This can be used at a later time if you forget your password. 8. Enter your e-mail address. You will receive e-mail notification when new information is available in [address] 9. Click Sign Up. You can now view and download portions of your medical record. 10. Click the Download Summary menu link to download a portable copy of your medical information. If you have questions, please visit the Frequently Asked Questions section of the ComActivity website. Remember, ComActivity is NOT to be used for urgent needs. For medical emergencies, dial 911. Now available from your iPhone and Android! Please provide this summary of care documentation to your next provider. Your primary care clinician is listed as Sherrie Gregg. If you have any questions after today's visit, please call [phone].

## 2018-05-22 NOTE — PROGRESS NOTES
HISTORY OF PRESENT ILLNESS  Maite Myers is a 45 y.o. female. HPI   Last here 2/20/18. Pt is here for routine care.      BP today is 109/70  Pt is not monitoring BP at home, as she does not have a BP cuff  Continues on toprol XL 25mg daily       She also has lasix 20mg to use prn (not recently) for edema      Wt is down 2 lbs x lov  Congratulated pt on this feat    Discussed continued diet and w/l       Reviewed last labs 2/18 and 4/18    Lov, pt c/o constant R-sided pain  Lov, I provided her with gabapentin   However, she started and stopped taking this med  Lov, I started her on cymbalta with improvement to sx  Reviewed XR T spine 2/18: No evidence of thoracic spine fracture. Levoconvex curvature of the lower thoracic spine, with associated multilevel spondylosis.   Today, pt c/o similar sx  Advised her to restart gabapentin qhs-BID   Provided her with exercises to complete at home  Provided referral for an ortho  She would like [place] to be [place] for this issue - reviewed   She would like the start date to be 05/22/18   She would like to have 3 days off/month for pain as needed    Pt c/o hot flashes  Discussed gabapentin and cymbalta improving her sx  Discussed hormonal therapy being contraindicated, as she is a smoker     Lov, I stopped her lexapro and started her on cymbalta 30mg qhs for anxiety and depression - reports compliance, works well, happy with dose     Continues on synthroid 50mcg daily  Lov, I ordered an US thyroids, but this imaging was never completed  Recall pt has a FMHX thyroid cancer (brother)      Continues on lipitor 20mg daily for cholesterol       Pt has been taking amitriptyline 10mg qhs prn to prevent HAs and help with sleep  Pt also takes imitrex 50mg prn (not daily)   However, she is still having some HAs  Will increase amitriptyline to 20mg qhs      Pt follows with Dr. Federico Lawrence (hem/onc) for anemia  Last visit was 5/17   Recall COLO and EGD 2013 - bleeding ulcer, issues with anemia  Recall past evaluation with Dr. Benjie Solares in 2015, thrombocytosis thought d/t cigarette smoking      Pt has a 30 pack year hx  Pt continues to smoke 0.5 or < PPD  Counseled on smoking cessation  Ordered CT lung cancer screen     Pt drinks 3 glasses of wine when she speaks to her mother  Counseled on alcohol cessation     ACP not on file. SDM is her brother Jayshree Kirby III. Provided information today. Recall EGD and COLO in 2013: bleeding ulcer      PREVENTIVE:   Colonoscopy: 3/13, repeat 10 years  EGD: 2013, possible Barrette's   Pap: overdue, reminded again, referred, will schedule, discussed today  Mammogram: 5/23/17, negative, h/o R sided breast DCIS, due 5/18, ordered   Tdap: 12/10/17  Pneumovax: 8/27/2012  Hibynbd55: not yet needed  Shingrix: not yet needed--give closer to age 59  Flu shot: 8/31/17  Eye exam: 3/18? EKG: 3/17 per ED   Lipids: 2/18 LDL 40  CT lung cancer screen: ordered 05/22/18     Patient Active Problem List    Diagnosis Date Noted    Pure hypercholesterolemia 05/01/2017    Acquired hypothyroidism 05/01/2017    Elevated WBC count 04/16/2014    Elevated liver function tests 04/16/2014    Breast cancer right DCIS 06/11/2013    Cancer ([facility] [address]     Depression 02/11/2013    Headache(784.0) 09/10/2012    HTN (hypertension) 09/10/2012     Current Outpatient Prescriptions   Medication Sig Dispense Refill    furosemide (LASIX) 20 mg tablet TAKE 1 TAB BY MOUTH DAILY. 30 Tab 0    DULoxetine (CYMBALTA) 30 mg capsule TAKE 1 CAP BY MOUTH DAILY. 30 Cap 1    levothyroxine (SYNTHROID) 50 mcg tablet TAKE 1 TAB BY MOUTH DAILY (BEFORE BREAKFAST). 30 Tab 2    pantoprazole (PROTONIX) 40 mg tablet Take 1 Tab by mouth daily. 30 Tab 1    metoprolol succinate (TOPROL-XL) 25 mg XL tablet TAKE 1 TAB BY MOUTH DAILY. 30 Tab 3    atorvastatin (LIPITOR) 20 mg tablet TAKE 1 TAB BY MOUTH DAILY. 30 Tab 3    amitriptyline (ELAVIL) 10 mg tablet Take 1 Tab by mouth nightly.  30 Tab 3    multivitamin (ONE A DAY) tablet Take 1 Tab by mouth daily.  SUMAtriptan (IMITREX) 50 mg tablet TAKE 1 TABLET BY MOUTH ONCE AS NEEDED FOR MIGRAINE FOR UP TO 1 DOSE 6 Tab 1    metroNIDAZOLE (FLAGYL) 500 mg tablet        Past Surgical History:   Procedure Laterality Date    BREAST SURGERY PROCEDURE UNLISTED  5/30/13    RIGHT BREAST LUMPECTOMY WITH RIGHT NEDDLE LOCALIZATION     HX BREAST LUMPECTOMY  5/30/2013    BREAST LUMPECTOMY/PARTIAL performed by Sharri Sung MD at [facility] MAIN OR    HX GYN      fallopian tube    HX ORTHOPAEDIC      left ankle repair    HX OTHER SURGICAL      cyst removed rom right thigh      Lab Results  Component Value Date/Time   WBC 7.8 02/20/2018 08:52 AM   HGB 12.0 02/20/2018 08:52 AM   Hemoglobin (POC) 15.3 10/31/2014 02:26 PM   HCT 35.7 02/20/2018 08:52 AM   Hematocrit (POC) 45 10/31/2014 02:26 PM   PLATELET 976 02/13/6892 08:52 AM   MCV 97 02/20/2018 08:52 AM     Lab Results  Component Value Date/Time   Cholesterol, total 117 02/20/2018 08:52 AM   HDL Cholesterol 42 02/20/2018 08:52 AM   LDL, calculated 40 02/20/2018 08:52 AM   Triglyceride 175 (H) 02/20/2018 08:52 AM     Lab Results  Component Value Date/Time   GFR est non-AA 97 04/16/2018 02:10 PM   GFRNA, POC >60 10/31/2014 02:26 PM   GFR est  04/16/2018 02:10 PM   GFRAA, POC >60 10/31/2014 02:26 PM   Creatinine 0.71 04/16/2018 02:10 PM   Creatinine (POC) 0.9 10/31/2014 02:26 PM   BUN 10 04/16/2018 02:10 PM   BUN (POC) 8 (L) 10/31/2014 02:26 PM   Sodium 138 04/16/2018 02:10 PM   Sodium (POC) 140 10/31/2014 02:26 PM   Potassium 4.2 04/16/2018 02:10 PM   Potassium (POC) 3.7 10/31/2014 02:26 PM   Chloride 99 04/16/2018 02:10 PM   Chloride (POC) 103 10/31/2014 02:26 PM   CO2 23 04/16/2018 02:10 PM   Magnesium 1.6 01/14/2017 07:28 AM   Phosphorus 1.0 (L) 11/28/2012 04:45 AM        Review of Systems   Constitutional: Positive for diaphoresis. Respiratory: Negative for shortness of breath.     Cardiovascular: Negative for chest pain. Genitourinary: Positive for flank pain. Neurological: Positive for headaches. Physical Exam   Constitutional: She is oriented to person, place, and time. She appears well-developed and well-nourished. No distress. HENT:   Head: Normocephalic and atraumatic. Mouth/Throat: Oropharynx is clear and moist. No oropharyngeal exudate. Eyes: Conjunctivae and EOM are normal. Right eye exhibits no discharge. Left eye exhibits no discharge. Neck: Normal range of motion. Neck supple. Cardiovascular: Normal rate, regular rhythm and normal heart sounds. Exam reveals no gallop and no friction rub. No murmur heard. Pulmonary/Chest: Effort normal and breath sounds normal. No respiratory distress. She has no wheezes. She has no rales. She exhibits no tenderness. Musculoskeletal: Normal range of motion. She exhibits tenderness (R ribs). She exhibits no edema or deformity. Lymphadenopathy:     She has no cervical adenopathy. Neurological: She is alert and oriented to person, place, and time. Coordination normal.   Skin: Skin is warm and dry. No rash noted. She is not diaphoretic. No erythema. No pallor. Psychiatric: She has a normal mood and affect. Her behavior is normal.       ASSESSMENT and PLAN    ICD-10-CM ICD-9-CM    1. Chronic bilateral thoracic back pain    Pt has had chronic T pain x rib fracture 1 year ago, prev gave gabapentin which she did not think helped her sx but she did not give it much of a try, now on cymbalta to help with depression and general fibromyalgia-types of pain,     will add back gabapentin 300mg BID to see if this is more effective, she will give this a try, [place] for 1-3 days off/month for back pain exacerbation    M54.6 724.1 REFERRAL TO ORTHOPEDICS    G89.29 338.29    2. Reactive depression    Improved with cymbalta 30mg daily, continue, no longer on lexapro   F32.9 300.4    3.  Pure hypercholesterolemia    Controled on lipitor in 2/18, continue    E78.00 272.0    4. Acquired hypothyroidism    Controled on synthroid last check, continue    E03.9 244.9    5. Leukocytosis, unspecified type    Resolved on last set of labs, was evaluated by Dr. Dayne Agrawal in the past, was thought to be related to smoking    D72.829 288.60    6. Chronic migraine without aura without status migrainosus, not intractable    Increase elavil to 20mg daily, the 10mg dose helps but not as well as it should, will see if higher dose is more effective for her   G43.709 346.70    7. Personal history of nicotine dependence    Counseled on smoking cessation, also ordered CT lung cancer screen   Z87.891 V15.82 CT LOW DOSE LUNG CANCER SCREENING   8. Screening breast examination    Screen    Z12.31 V76.10 CONNIE MAMMO BI SCREENING INCL CAD   9. Overweight    Wt has climbed over the last few years, counseled on portion control and w/l   E66.3 278.02         Scribed by Elly Disla of [facility], as dictated by Dr. Eduardo Quintero. Current diagnosis and concerns discussed with pt at length. Pt understands risks and benefits or current treatment plan and medications, and accepts the treatment and medication with any possible risks. Pt asks appropriate questions, which were answered. Pt was instructed to call with any concerns or problems. This note will not be viewable in [address]

## 2018-06-16 RX ORDER — DULOXETIN HYDROCHLORIDE 30 MG/1
CAPSULE, DELAYED RELEASE ORAL
Qty: 30 CAP | Refills: 1 | Status: SHIPPED | OUTPATIENT
Start: 2018-06-16 | End: 2018-09-20 | Stop reason: SDUPTHER

## 2018-06-22 DIAGNOSIS — E78.5 ELEVATED LIPIDS: ICD-10-CM

## 2018-06-22 RX ORDER — METOPROLOL SUCCINATE 25 MG/1
TABLET, EXTENDED RELEASE ORAL
Qty: 30 TAB | Refills: 3 | Status: SHIPPED | OUTPATIENT
Start: 2018-06-22 | End: 2018-11-21 | Stop reason: SDUPTHER

## 2018-06-22 RX ORDER — ATORVASTATIN CALCIUM 20 MG/1
TABLET, FILM COATED ORAL
Qty: 30 TAB | Refills: 3 | Status: SHIPPED | OUTPATIENT
Start: 2018-06-22 | End: 2018-11-21 | Stop reason: SDUPTHER

## 2018-07-08 DIAGNOSIS — E03.9 ACQUIRED HYPOTHYROIDISM: ICD-10-CM

## 2018-07-08 RX ORDER — LEVOTHYROXINE SODIUM 50 UG/1
TABLET ORAL
Qty: 30 TAB | Refills: 2 | Status: SHIPPED | OUTPATIENT
Start: 2018-07-08 | End: 2018-10-07 | Stop reason: SDUPTHER

## 2018-07-22 RX ORDER — FUROSEMIDE 20 MG/1
TABLET ORAL
Qty: 30 TAB | Refills: 0 | Status: SHIPPED | OUTPATIENT
Start: 2018-07-22 | End: 2018-08-28 | Stop reason: SDUPTHER

## 2018-08-28 RX ORDER — FUROSEMIDE 20 MG/1
TABLET ORAL
Qty: 30 TAB | Refills: 0 | Status: SHIPPED | OUTPATIENT
Start: 2018-08-28 | End: 2018-09-24 | Stop reason: SDUPTHER

## 2018-09-20 RX ORDER — DULOXETIN HYDROCHLORIDE 30 MG/1
CAPSULE, DELAYED RELEASE ORAL
Qty: 30 CAP | Refills: 1 | Status: SHIPPED | OUTPATIENT
Start: 2018-09-20 | End: 2018-11-21 | Stop reason: SDUPTHER

## 2018-09-24 RX ORDER — FUROSEMIDE 20 MG/1
TABLET ORAL
Qty: 30 TAB | Refills: 0 | Status: SHIPPED | OUTPATIENT
Start: 2018-09-24 | End: 2018-10-25 | Stop reason: SDUPTHER

## 2018-10-07 DIAGNOSIS — E03.9 ACQUIRED HYPOTHYROIDISM: ICD-10-CM

## 2018-10-07 RX ORDER — LEVOTHYROXINE SODIUM 50 UG/1
TABLET ORAL
Qty: 30 TAB | Refills: 2 | Status: SHIPPED | OUTPATIENT
Start: 2018-10-07 | End: 2019-01-10 | Stop reason: SDUPTHER

## 2018-10-25 RX ORDER — FUROSEMIDE 20 MG/1
TABLET ORAL
Qty: 30 TAB | Refills: 0 | Status: SHIPPED | OUTPATIENT
Start: 2018-10-25 | End: 2019-04-01 | Stop reason: SDUPTHER

## 2018-11-01 ENCOUNTER — HOSPITAL ENCOUNTER (OUTPATIENT)
Dept: CT IMAGING | Age: 53
Discharge: HOME | End: 2018-11-01
Attending: INTERNAL MEDICINE
Payer: SELF-PAY

## 2018-11-01 DIAGNOSIS — Z87.891 PERSONAL HISTORY OF NICOTINE DEPENDENCE: ICD-10-CM

## 2018-11-01 PROCEDURE — G0297 LDCT FOR LUNG CA SCREEN: HCPCS

## 2018-11-12 ENCOUNTER — OFFICE VISIT (OUTPATIENT)
Dept: INTERNAL MEDICINE CLINIC | Age: 53
End: 2018-11-12

## 2018-11-12 VITALS
DIASTOLIC BLOOD PRESSURE: 81 MMHG | OXYGEN SATURATION: 98 % | BODY MASS INDEX: 26.8 KG/M2 | SYSTOLIC BLOOD PRESSURE: 132 MMHG | HEART RATE: 89 BPM | WEIGHT: 157 LBS | RESPIRATION RATE: 18 BRPM | TEMPERATURE: 98.5 F | HEIGHT: 64 IN

## 2018-11-12 DIAGNOSIS — D72.829 LEUKOCYTOSIS, UNSPECIFIED TYPE: ICD-10-CM

## 2018-11-12 DIAGNOSIS — E55.9 VITAMIN D DEFICIENCY: ICD-10-CM

## 2018-11-12 DIAGNOSIS — E78.00 PURE HYPERCHOLESTEROLEMIA: ICD-10-CM

## 2018-11-12 DIAGNOSIS — Z00.00 PHYSICAL EXAM, ANNUAL: Primary | ICD-10-CM

## 2018-11-12 DIAGNOSIS — E03.9 ACQUIRED HYPOTHYROIDISM: ICD-10-CM

## 2018-11-12 DIAGNOSIS — G44.229 CHRONIC TENSION-TYPE HEADACHE, NOT INTRACTABLE: ICD-10-CM

## 2018-11-12 DIAGNOSIS — I10 ESSENTIAL HYPERTENSION: ICD-10-CM

## 2018-11-12 DIAGNOSIS — C50.911 MALIGNANT NEOPLASM OF RIGHT FEMALE BREAST, UNSPECIFIED ESTROGEN RECEPTOR STATUS, UNSPECIFIED SITE OF BREAST (HCC): ICD-10-CM

## 2018-11-12 DIAGNOSIS — R19.7 DIARRHEA, UNSPECIFIED TYPE: ICD-10-CM

## 2018-11-12 DIAGNOSIS — F32.9 REACTIVE DEPRESSION: ICD-10-CM

## 2018-11-12 DIAGNOSIS — R25.2 CRAMPS, EXTREMITY: ICD-10-CM

## 2018-11-12 DIAGNOSIS — R07.81 RIB PAIN ON RIGHT SIDE: ICD-10-CM

## 2018-11-12 RX ORDER — GABAPENTIN 300 MG/1
300 CAPSULE ORAL
Qty: 60 CAP | Refills: 4 | Status: SHIPPED | OUTPATIENT
Start: 2018-11-12 | End: 2019-11-11 | Stop reason: SDUPTHER

## 2018-11-12 NOTE — LETTER
11/13/2018 6:44 AM 
 
Ms. Maite Clarke [address] [facility] [address] Dear Crissy Coulter: Please find your most recent results below. Resulted Orders METABOLIC PANEL, COMPREHENSIVE Result Value Ref Range Glucose 75 65 - 99 mg/dL BUN 11 6 - 24 mg/dL Creatinine 0.87 0.57 - 1.00 mg/dL GFR est non-AA 75 >59 mL/min/1.73 GFR est AA 87 >59 mL/min/1.73  
 BUN/Creatinine ratio 13 9 - 23 Sodium 139 134 - 144 mmol/L Potassium 4.0 3.5 - 5.2 mmol/L Chloride 95 (L) 96 - 106 mmol/L  
 CO2 24 20 - 29 mmol/L Calcium 9.6 8.7 - 10.2 mg/dL Protein, total 7.5 6.0 - 8.5 g/dL Albumin 4.5 3.5 - 5.5 g/dL GLOBULIN, TOTAL 3.0 1.5 - 4.5 g/dL A-G Ratio 1.5 1.2 - 2.2 Bilirubin, total 0.6 0.0 - 1.2 mg/dL Alk. phosphatase 129 (H) 39 - 117 IU/L  
 AST (SGOT) 29 0 - 40 IU/L  
 ALT (SGPT) 17 0 - 32 IU/L Narrative Performed at:  [address]  [id number] : Aguilar Rockwell MD, Phone:  [phone] CBC WITH AUTOMATED DIFF Result Value Ref Range WBC 10.0 3.4 - 10.8 x10E3/uL  
 RBC 4.17 3.77 - 5.28 x10E6/uL HGB 13.3 11.1 - 15.9 g/dL HCT 40.2 34.0 - 46.6 % MCV 96 79 - 97 fL  
 MCH 31.9 26.6 - 33.0 pg  
 MCHC 33.1 31.5 - 35.7 g/dL  
 RDW 14.4 12.3 - 15.4 % PLATELET 263 890 - 347 x10E3/uL NEUTROPHILS 68 Not Estab. % Lymphocytes 22 Not Estab. % MONOCYTES 8 Not Estab. % EOSINOPHILS 2 Not Estab. % BASOPHILS 0 Not Estab. %  
 ABS. NEUTROPHILS 6.8 1.4 - 7.0 x10E3/uL Abs Lymphocytes 2.2 0.7 - 3.1 x10E3/uL  
 ABS. MONOCYTES 0.8 0.1 - 0.9 x10E3/uL  
 ABS. EOSINOPHILS 0.2 0.0 - 0.4 x10E3/uL  
 ABS. BASOPHILS 0.0 0.0 - 0.2 x10E3/uL IMMATURE GRANULOCYTES 0 Not Estab. %  
 ABS. IMM. GRANS. 0.0 0.0 - 0.1 x10E3/uL Narrative Performed at:  [address]  [id number] : Aguilar Rockwell MD, Phone:  [phone] [address]  
 Result Value Ref Range TSH 3.910 0.450 - 4.500 uIU/mL Narrative Performed at:  [address]  [id number] : Rahul Holly MD, Phone:  [phone] CK Result Value Ref Range Creatine Kinase,Total 81 24 - 173 U/L Narrative Performed at:  [address]  [id number] : Rahul Holly MD, Phone:  [phone] VITAMIN D, 25 HYDROXY Result Value Ref Range VITAMIN D, 25-HYDROXY 43.4 30.0 - 100.0 ng/mL Comment:  
   Vitamin D deficiency has been defined by the [address] [place] [address] practice guideline as a 
level of serum 25-OH vitamin D less than 20 ng/mL (1,2). The Endocrine Society went on to further define vitamin D 
insufficiency as a level between 21 and 29 ng/mL (2). 1. IOM ([place] of Medicine). 2010. Dietary reference 
   intakes for calcium and D. [address] [place] [address]: The 
   [employer]. 2. Ervin MF, Ron SANDOVAL, Sina RUCKER, et al. 
   Evaluation, treatment, and prevention of vitamin D 
   deficiency: an Endocrine Society clinical practice 
   guideline. JCEM. 2011 Jul; 96(7):1911-30. Narrative Performed at:  [address]  [id number] : Rahul Holly MD, Phone:  [phone] RECOMMENDATIONS: 
None. Keep up the good work! Please call me if you have any questions: [phone] Sincerely, 
 
 
Adam Saxena MD

## 2018-11-12 NOTE — LETTER
Nay Matta is currently under the care of [facility] with Dr. Shane Barbosa. The patient was here today, 11/12/18.

## 2018-11-12 NOTE — PROGRESS NOTES
HISTORY OF PRESENT ILLNESS Kiko Sanders is a 52 y.o. female. HPI Last here 5/22/18. Pt is here for routine care. 
   
BP today is controlled Pt is not monitoring BP at home, as she does not have a BP cuff Continues on toprol XL 25mg daily She also has lasix 20mg to use prn (not recently) for edema 
   
Wt today is 157 lbs --up 5 lbs x lov Discussed [facility] Discussed continued diet and w/l  
   
Reviewed labs 2/18 and 4/18 
  
Pt c/o back pain and a spasm/cramp while trying to sleep She is taking cymbalta 30mg qhs She is out of gabapentin She gets chronic pain from rib fx Needsaccomodations at work to decrease lifting, has paperwork with her today Discussed  
  
Continues on cymbalta 30mg qhs for anxiety/depression and myalgias - works well, happy with dose Improves her mood Continues on synthroid 50mcg daily Lov, I ordered an US thyroids, but this imaging was never completed Recall pt has a FMHX thyroid cancer (brother) 
   
Continues on lipitor 20mg daily for cholesterol 
    
Lov, increased amitriptyline to 20mg qhs (from 10) to prevent HAs and help with sleep However this was not among her pill bottles today she stopped it Unclear if it helped She is not having many HAs at this time, so will not restart amitriptyline for now Pt takes imitrex 50mg prn (not daily)  
   
Pt follows with Dr. Shiv Mathews (hem/onc) for anemia Last visit was 5/17 Recall COLO and EGD 2013 - bleeding ulcer, issues with anemia Recall past evaluation with Dr. Shiv Mathews in 2015, thrombocytosis thought d/t cigarette smoking Pt c/o her hands cramping up this AM, where her thumb was stuck She has also been having cramps in her feet when she sleeps Discussed trying tonic water Pt c/o occasionally having uncontrollable loose stools x 2-3 months This does not happen every day Advised taking a probiotic OTC Will check stool studies 
   
Pt has a 30 pack year hx Pt continues to smoke 0.5 or < PPD 
 Counseled on smoking cessation Reviewed CT lung cancer screen 11/1/18: 1. Scattered, benign 2 to 5 mm nodules redemonstrated in both lungs. No new or enlarging nodules. See recommendations below. 2. Mild segmental coronary artery calcifications. 
  
ACP not on file. SDM is her brother Milton Henning. Bernicew III. Provided information in the past.  
  
Recall EGD and COLO in 2013: bleeding ulcer 
   
PREVENTIVE:  
Colonoscopy: 3/13, repeat 10 years EGD: 2013, possible Barrette's Pap: overdue, reminded again, referred, will schedule, discussed Mammogram: 5/23/17, negative, h/o R sided breast DCIS, due, scheduled for 11/13/18 Tdap: 12/10/17 Pneumovax: 8/27/2012 [id number]: not yet needed Shingrix: not yet needed--give closer to age 59 Flu shot: Fall 2018 Eye exam: 3/18? EKG: 3/17 per ED Lipids: 2/18 LDL 40 
CT lung cancer screen:11/1/18 Patient Active Problem List  
 Diagnosis Date Noted  Pure hypercholesterolemia 05/01/2017  Acquired hypothyroidism 05/01/2017  Elevated WBC count 04/16/2014  Elevated liver function tests 04/16/2014  Breast cancer right DCIS 06/11/2013  Cancer ([facility] [address]  Depression 02/11/2013  DXVIEKJT(966.1) 09/10/2012  
 HTN (hypertension) 09/10/2012 Current Outpatient Medications Medication Sig Dispense Refill  furosemide (LASIX) 20 mg tablet TAKE 1 TAB BY MOUTH DAILY. 30 Tab 0  
 levothyroxine (SYNTHROID) 50 mcg tablet TAKE 1 TAB BY MOUTH DAILY (BEFORE BREAKFAST). 30 Tab 2  
 DULoxetine (CYMBALTA) 30 mg capsule TAKE 1 CAP BY MOUTH DAILY. 30 Cap 1  
 metoprolol succinate (TOPROL-XL) 25 mg XL tablet TAKE 1 TAB BY MOUTH DAILY. 30 Tab 3  
 atorvastatin (LIPITOR) 20 mg tablet TAKE 1 TAB BY MOUTH DAILY. 30 Tab 3  
 amitriptyline (ELAVIL) 10 mg tablet Take 2 Tabs by mouth nightly. 60 Tab 3  
 gabapentin (NEURONTIN) 300 mg capsule Take 1 Cap by mouth two (2) times daily as needed.  60 Cap 4  
  SUMAtriptan (IMITREX) 50 mg tablet TAKE 1 TABLET BY MOUTH ONCE AS NEEDED FOR MIGRAINE FOR UP TO 1 DOSE 6 Tab 1  
 metroNIDAZOLE (FLAGYL) 500 mg tablet  pantoprazole (PROTONIX) 40 mg tablet Take 1 Tab by mouth daily. 30 Tab 1  
 multivitamin (ONE A DAY) tablet Take 1 Tab by mouth daily. Past Surgical History:  
Procedure Laterality Date  BREAST SURGERY PROCEDURE UNLISTED  5/30/13 RIGHT BREAST LUMPECTOMY WITH RIGHT NEDDLE LOCALIZATION   
 HX GYN    
 fallopian tube  HX ORTHOPAEDIC    
 left ankle repair  HX OTHER SURGICAL    
 cyst removed rom right thigh Lab Results Component Value Date/Time WBC 7.8 02/20/2018 08:52 AM  
 HGB 12.0 02/20/2018 08:52 AM  
 Hemoglobin (POC) 15.3 10/31/2014 02:26 PM  
 HCT 35.7 02/20/2018 08:52 AM  
 Hematocrit (POC) 45 10/31/2014 02:26 PM  
 PLATELET 333 02/27/3530 08:52 AM  
 MCV 97 02/20/2018 08:52 AM  
 
Lab Results Component Value Date/Time Cholesterol, total 117 02/20/2018 08:52 AM  
 HDL Cholesterol 42 02/20/2018 08:52 AM  
 LDL, calculated 40 02/20/2018 08:52 AM  
 Triglyceride 175 (H) 02/20/2018 08:52 AM  
 
Lab Results Component Value Date/Time GFR est non-AA 97 04/16/2018 02:10 PM  
 GFRNA, POC >60 10/31/2014 02:26 PM  
 GFR est  04/16/2018 02:10 PM  
 GFRAA, POC >60 10/31/2014 02:26 PM  
 Creatinine 0.71 04/16/2018 02:10 PM  
 Creatinine (POC) 0.9 10/31/2014 02:26 PM  
 BUN 10 04/16/2018 02:10 PM  
 BUN (POC) 8 (L) 10/31/2014 02:26 PM  
 Sodium 138 04/16/2018 02:10 PM  
 Sodium (POC) 140 10/31/2014 02:26 PM  
 Potassium 4.2 04/16/2018 02:10 PM  
 Potassium (POC) 3.7 10/31/2014 02:26 PM  
 Chloride 99 04/16/2018 02:10 PM  
 Chloride (POC) 103 10/31/2014 02:26 PM  
 CO2 23 04/16/2018 02:10 PM  
 Magnesium 1.6 01/14/2017 07:28 AM  
 Phosphorus 1.0 (L) 11/28/2012 04:45 AM  
  
Review of Systems Respiratory: Negative for shortness of breath. Cardiovascular: Negative for chest pain. Gastrointestinal: Positive for diarrhea. Musculoskeletal: Positive for back pain and myalgias. Physical Exam  
Constitutional: She is oriented to person, place, and time. She appears well-developed and well-nourished. No distress. HENT:  
Head: Normocephalic and atraumatic. Mouth/Throat: Oropharynx is clear and moist. No oropharyngeal exudate. Eyes: Conjunctivae and EOM are normal. Pupils are equal, round, and reactive to light. Right eye exhibits no discharge. Left eye exhibits no discharge. No scleral icterus. Neck: Normal range of motion. Neck supple. Cardiovascular: Normal rate, regular rhythm, normal heart sounds and intact distal pulses. Exam reveals no gallop and no friction rub. No murmur heard. Pulmonary/Chest: Effort normal and breath sounds normal. No respiratory distress. She has no wheezes. She has no rales. She exhibits no tenderness. Abdominal: Soft. She exhibits no distension and no mass. There is no tenderness. There is no rebound and no guarding. Musculoskeletal: Normal range of motion. She exhibits no edema, tenderness or deformity. Lymphadenopathy:  
  She has no cervical adenopathy. Neurological: She is alert and oriented to person, place, and time. Coordination normal.  
Skin: Skin is warm and dry. No rash noted. She is not diaphoretic. No erythema. No pallor. Psychiatric: She has a normal mood and affect. Her behavior is normal.  
 
 
ASSESSMENT and PLAN 
  ICD-10-CM ICD-9-CM 1. Physical exam, annual 
 
Discussed need for diet and w/l, has from she brought in that needed to be completed, COLO UTD, pelvic due, she will schedule, eye exam completed, mammo scheduled for tomorrow, CT lung cancer screen complete, flu shot UTD 
 O15.46 W78.4 METABOLIC PANEL, COMPREHENSIVE  
   CBC WITH AUTOMATED DIFF  
   TSH 3RD GENERATION 2. Essential hypertension Controlled on toprol XL 25mg daily I10 401.9 3. Malignant neoplasm of right female breast, unspecified estrogen receptor status, unspecified site of breast ([facility] [address] Mammogram is set up for tomorrow, was previously followed by Dr Dionne Gallardo but did not follow through with complete tx plan, has not had recent office visit with him 
 C50.911 174.9 4. Reactive depression Improved with cymbalta, happy with dose, continue F32.9 300.4 5. Leukocytosis, unspecified type Improved on recent blood work, has been attributed to smoking D72.829 288.60   
6. Acquired hypothyroidism On synthroid 50mcgs daily, check TSH 
 E03.9 244.9 7. Pure hypercholesterolemia At goal on lipitor, continue E78.00 272.0   
8. Chronic tension-type headache, not intractable She uses imitrex prn but not on daily basis, was on amitriptyline but has stopped it and has not had any progressive HAs, as sx are not daily will not adjust meds further, discussed if progressive sx she would need to see neurology G44.229 339.12   
9. Cramps, extremity Will check vit D and CK levels, discussed likely idiopathic, discussed tonic water R25.2 729.82 CK 10. Vitamin D deficiency Check level and treat prn 
 E55.9 268.9 VITAMIN D, 25 HYDROXY 11 loose stools--start probiotic, check stool cx, may need colo if not improving 12 rib pain--rf gabapentin which helps. Needs paperwork d/t pain with lifting at work d/t old fx Scribed by Randi Oneill of [address], as dictated by Dr. Dedra Trejo. Current diagnosis and concerns discussed with pt at length. Pt understands risks and benefits or current treatment plan and medications, and accepts the treatment and medication with any possible risks. Pt asks appropriate questions, which were answered. Pt was instructed to call with any concerns or problems. I have reviewed the note documented by the scribe. The services provided are my own. The documentation is accurate This note will not be viewable in [employer].

## 2018-11-12 NOTE — PROGRESS NOTES
Last here 5/22/18. Pt is here for cpe and paper work  
   
BP today is controlled 
 
Bear Jaylin today is 157 lbs --up 5 lbs x lov Discussed [employer] Discussed continued diet and w/l  
   
Reviewed labs 2/18 and 4/18 
  
 
Pt has a 30 pack year hx Pt continues to smoke 0.5 or < PPD Counseled on smoking cessation Reviewed CT lung cancer screen 11/1/18: 1. Scattered, benign 2 to 5 mm nodules redemonstrated in both lungs. No new or enlarging nodules. See recommendations below. 2. Mild segmental coronary artery calcifications. 
  
ACP not on file. SDM is her brother Philip Bernal. Thranow III. Provided information in the past.  
  
Recall EGD and COLO in 2013: bleeding ulcer 
   
PREVENTIVE:  
Colonoscopy: 3/13, repeat 10 years EGD: 2013, possible Barrette's Pap: overdue, reminded again, referred, will schedule, discussed Mammogram: 5/23/17, negative, h/o R sided breast DCIS, due, scheduled for 11/13/18 Tdap: 12/10/17 Pneumovax: 8/27/2012 [id number]: not yet needed Shingrix: not yet needed--give closer to age 59 Flu shot: Fall 2018 Eye exam: 3/18? EKG: 3/17 per ED Lipids: 2/18 LDL 40 
CT lung cancer screen:11/1/18 Patient Active Problem List  
 Diagnosis Date Noted  Pure hypercholesterolemia 05/01/2017  Acquired hypothyroidism 05/01/2017  Elevated WBC count 04/16/2014  Elevated liver function tests 04/16/2014  Breast cancer right DCIS 06/11/2013  Cancer ([facility] [address]  Depression 02/11/2013  YHHETVZL(309.0) 09/10/2012  
 HTN (hypertension) 09/10/2012 Current Outpatient Medications Medication Sig Dispense Refill  furosemide (LASIX) 20 mg tablet TAKE 1 TAB BY MOUTH DAILY. 30 Tab 0  
 levothyroxine (SYNTHROID) 50 mcg tablet TAKE 1 TAB BY MOUTH DAILY (BEFORE BREAKFAST). 30 Tab 2  
 DULoxetine (CYMBALTA) 30 mg capsule TAKE 1 CAP BY MOUTH DAILY. 30 Cap 1  
 metoprolol succinate (TOPROL-XL) 25 mg XL tablet TAKE 1 TAB BY MOUTH DAILY.  30 Tab 3  
  atorvastatin (LIPITOR) 20 mg tablet TAKE 1 TAB BY MOUTH DAILY. 30 Tab 3  
 amitriptyline (ELAVIL) 10 mg tablet Take 2 Tabs by mouth nightly. 60 Tab 3  
 gabapentin (NEURONTIN) 300 mg capsule Take 1 Cap by mouth two (2) times daily as needed. 60 Cap 4  
 SUMAtriptan (IMITREX) 50 mg tablet TAKE 1 TABLET BY MOUTH ONCE AS NEEDED FOR MIGRAINE FOR UP TO 1 DOSE 6 Tab 1  
 metroNIDAZOLE (FLAGYL) 500 mg tablet  pantoprazole (PROTONIX) 40 mg tablet Take 1 Tab by mouth daily. 30 Tab 1  
 multivitamin (ONE A DAY) tablet Take 1 Tab by mouth daily. Past Surgical History:  
Procedure Laterality Date  BREAST SURGERY PROCEDURE UNLISTED  5/30/13 RIGHT BREAST LUMPECTOMY WITH RIGHT NEDDLE LOCALIZATION   
 HX GYN    
 fallopian tube  HX ORTHOPAEDIC    
 left ankle repair  HX OTHER SURGICAL    
 cyst removed rom right thigh Lab Results Component Value Date/Time WBC 7.8 02/20/2018 08:52 AM  
 HGB 12.0 02/20/2018 08:52 AM  
 Hemoglobin (POC) 15.3 10/31/2014 02:26 PM  
 HCT 35.7 02/20/2018 08:52 AM  
 Hematocrit (POC) 45 10/31/2014 02:26 PM  
 PLATELET 301 53/47/6177 08:52 AM  
 MCV 97 02/20/2018 08:52 AM  
 
Lab Results Component Value Date/Time Cholesterol, total 117 02/20/2018 08:52 AM  
 HDL Cholesterol 42 02/20/2018 08:52 AM  
 LDL, calculated 40 02/20/2018 08:52 AM  
 Triglyceride 175 (H) 02/20/2018 08:52 AM  
 
Lab Results Component Value Date/Time  GFR est non-AA 97 04/16/2018 02:10 PM  
 GFRNA, POC >60 10/31/2014 02:26 PM  
 GFR est  04/16/2018 02:10 PM  
 GFRAA, POC >60 10/31/2014 02:26 PM  
 Creatinine 0.71 04/16/2018 02:10 PM  
 Creatinine (POC) 0.9 10/31/2014 02:26 PM  
 BUN 10 04/16/2018 02:10 PM  
 BUN (POC) 8 (L) 10/31/2014 02:26 PM  
 Sodium 138 04/16/2018 02:10 PM  
 Sodium (POC) 140 10/31/2014 02:26 PM  
 Potassium 4.2 04/16/2018 02:10 PM  
 Potassium (POC) 3.7 10/31/2014 02:26 PM  
 Chloride 99 04/16/2018 02:10 PM  
 Chloride (POC) 103 10/31/2014 02:26 PM  
 CO2 23 04/16/2018 02:10 PM  
 Magnesium 1.6 01/14/2017 07:28 AM  
 Phosphorus 1.0 (L) 11/28/2012 04:45 AM  
  
Review of Systems Respiratory: Negative for shortness of breath. Cardiovascular: Negative for chest pain. Gastrointestinal: Positive for diarrhea. Musculoskeletal: Positive for back pain and myalgias. Physical Exam  
Constitutional: She is oriented to person, place, and time. She appears well-developed and well-nourished. No distress. HENT:  
Head: Normocephalic and atraumatic. Mouth/Throat: Oropharynx is clear and moist. No oropharyngeal exudate. Eyes: Conjunctivae and EOM are normal. Pupils are equal, round, and reactive to light. Right eye exhibits no discharge. Left eye exhibits no discharge. No scleral icterus. Neck: Normal range of motion. Neck supple. Cardiovascular: Normal rate, regular rhythm, normal heart sounds and intact distal pulses. Exam reveals no gallop and no friction rub. No murmur heard. Pulmonary/Chest: Effort normal and breath sounds normal. No respiratory distress. She has no wheezes. She has no rales. She exhibits no tenderness. Abdominal: Soft. She exhibits no distension and no mass. There is no tenderness. There is no rebound and no guarding. Musculoskeletal: Normal range of motion. She exhibits no edema, tenderness or deformity. Lymphadenopathy:  
  She has no cervical adenopathy. Neurological: She is alert and oriented to person, place, and time. Coordination normal.  
Skin: Skin is warm and dry. No rash noted. She is not diaphoretic. No erythema. No pallor. Psychiatric: She has a normal mood and affect. Her behavior is normal.  
 
 
ASSESSMENT and PLAN 
  ICD-10-CM ICD-9-CM 1. Physical exam, annual 
 
Discussed need for diet and w/l, has from she brought in that needed to be completed, COLO UTD, pelvic due, she will schedule, eye exam completed, mammo scheduled for tomorrow, CT lung cancer screen complete, flu shot UTD O80.44 R15.3 METABOLIC PANEL, COMPREHENSIVE This note will not be viewable in [address] Scribed by Marty Gomez of [address] [place] [address] [address], as dictated by Dr. Basil Carlos. Current diagnosis and concerns discussed with pt at length. Pt understands risks and benefits or current treatment plan and medications, and accepts the treatment and medication with any possible risks. Pt asks appropriate questions, which were answered. Pt was instructed to call with any concerns or problems. I have reviewed the note documented by the scribe. The services provided are my own. The documentation is accurate

## 2018-11-12 NOTE — LETTER
Alphonse Loredo is currently under the care of [facility] with Dr. Courtney Foster. The paperwork discussed at clinic today may not be competed in 7 days.

## 2018-11-13 ENCOUNTER — HOSPITAL ENCOUNTER (OUTPATIENT)
Dept: MAMMOGRAPHY | Age: 53
Discharge: HOME | End: 2018-11-13
Attending: INTERNAL MEDICINE
Payer: COMMERCIAL

## 2018-11-13 DIAGNOSIS — Z12.39 SCREENING BREAST EXAMINATION: ICD-10-CM

## 2018-11-13 LAB
25(OH)D3+25(OH)D2 SERPL-MCNC: 43.4 NG/ML (ref 30–100)
ALBUMIN SERPL-MCNC: 4.5 G/DL (ref 3.5–5.5)
ALBUMIN/GLOB SERPL: 1.5 {RATIO} (ref 1.2–2.2)
ALP SERPL-CCNC: 129 IU/L (ref 39–117)
ALT SERPL-CCNC: 17 IU/L (ref 0–32)
AST SERPL-CCNC: 29 IU/L (ref 0–40)
BASOPHILS # BLD AUTO: 0 X10E3/UL (ref 0–0.2)
BASOPHILS NFR BLD AUTO: 0 %
BILIRUB SERPL-MCNC: 0.6 MG/DL (ref 0–1.2)
BUN SERPL-MCNC: 11 MG/DL (ref 6–24)
BUN/CREAT SERPL: 13 (ref 9–23)
CALCIUM SERPL-MCNC: 9.6 MG/DL (ref 8.7–10.2)
CHLORIDE SERPL-SCNC: 95 MMOL/L (ref 96–106)
CK SERPL-CCNC: 81 U/L (ref 24–173)
CO2 SERPL-SCNC: 24 MMOL/L (ref 20–29)
CREAT SERPL-MCNC: 0.87 MG/DL (ref 0.57–1)
EOSINOPHIL # BLD AUTO: 0.2 X10E3/UL (ref 0–0.4)
EOSINOPHIL NFR BLD AUTO: 2 %
ERYTHROCYTE [DISTWIDTH] IN BLOOD BY AUTOMATED COUNT: 14.4 % (ref 12.3–15.4)
GLOBULIN SER CALC-MCNC: 3 G/DL (ref 1.5–4.5)
GLUCOSE SERPL-MCNC: 75 MG/DL (ref 65–99)
HCT VFR BLD AUTO: 40.2 % (ref 34–46.6)
HGB BLD-MCNC: 13.3 G/DL (ref 11.1–15.9)
IMM GRANULOCYTES # BLD: 0 X10E3/UL (ref 0–0.1)
IMM GRANULOCYTES NFR BLD: 0 %
LYMPHOCYTES # BLD AUTO: 2.2 X10E3/UL (ref 0.7–3.1)
LYMPHOCYTES NFR BLD AUTO: 22 %
MCH RBC QN AUTO: 31.9 PG (ref 26.6–33)
MCHC RBC AUTO-ENTMCNC: 33.1 G/DL (ref 31.5–35.7)
MCV RBC AUTO: 96 FL (ref 79–97)
MONOCYTES # BLD AUTO: 0.8 X10E3/UL (ref 0.1–0.9)
MONOCYTES NFR BLD AUTO: 8 %
NEUTROPHILS # BLD AUTO: 6.8 X10E3/UL (ref 1.4–7)
NEUTROPHILS NFR BLD AUTO: 68 %
PLATELET # BLD AUTO: 289 X10E3/UL (ref 150–379)
POTASSIUM SERPL-SCNC: 4 MMOL/L (ref 3.5–5.2)
PROT SERPL-MCNC: 7.5 G/DL (ref 6–8.5)
RBC # BLD AUTO: 4.17 X10E6/UL (ref 3.77–5.28)
SODIUM SERPL-SCNC: 139 MMOL/L (ref 134–144)
TSH SERPL DL<=0.005 MIU/L-ACNC: 3.91 UIU/ML (ref 0.45–4.5)
WBC # BLD AUTO: 10 X10E3/UL (ref 3.4–10.8)

## 2018-11-13 PROCEDURE — 77067 SCR MAMMO BI INCL CAD: CPT

## 2018-11-13 PROCEDURE — 77066 DX MAMMO INCL CAD BI: CPT

## 2018-11-15 ENCOUNTER — APPOINTMENT (OUTPATIENT)
Dept: INTERNAL MEDICINE CLINIC | Age: 53
End: 2018-11-15

## 2018-11-15 ENCOUNTER — TELEPHONE (OUTPATIENT)
Dept: INTERNAL MEDICINE CLINIC | Age: 53
End: 2018-11-15

## 2018-11-15 NOTE — TELEPHONE ENCOUNTER
Left message with patient that forms were completed and ready for . Placed in clear folder for pt . Copy of forms placed in scanning.

## 2018-11-15 NOTE — LETTER
11/17/2018 12:50 PM 
 
Ms. Maite Monroe [address] [facility] [id number] Dear June Aguilar: Please find your most recent results below. Resulted Orders C DIFFICILE TOXIN A & B BY EIA Result Value Ref Range C. difficile Toxins A+B Negative Negative Narrative Performed at:  [address]  [id number] : Pradip Gill MD, Phone:  [phone] RECOMMENDATIONS: 
None. Keep up the good work! Please call me if you have any questions: [phone] Sincerely, 
 
 
Noy Mendoza MD

## 2018-11-17 LAB — C DIFF TOX A+B STL QL IA: NEGATIVE

## 2018-11-21 ENCOUNTER — TELEPHONE (OUTPATIENT)
Dept: INTERNAL MEDICINE CLINIC | Age: 53
End: 2018-11-21

## 2018-11-21 DIAGNOSIS — E78.5 ELEVATED LIPIDS: ICD-10-CM

## 2018-11-21 RX ORDER — ATORVASTATIN CALCIUM 20 MG/1
TABLET, FILM COATED ORAL
Qty: 30 TAB | Refills: 3 | Status: SHIPPED | OUTPATIENT
Start: 2018-11-21 | End: 2019-03-16 | Stop reason: SDUPTHER

## 2018-11-21 RX ORDER — METOPROLOL SUCCINATE 25 MG/1
TABLET, EXTENDED RELEASE ORAL
Qty: 30 TAB | Refills: 3 | Status: SHIPPED | OUTPATIENT
Start: 2018-11-21 | End: 2019-03-16 | Stop reason: SDUPTHER

## 2018-11-21 RX ORDER — DULOXETIN HYDROCHLORIDE 30 MG/1
CAPSULE, DELAYED RELEASE ORAL
Qty: 30 CAP | Refills: 1 | Status: SHIPPED | OUTPATIENT
Start: 2018-11-21 | End: 2019-01-19 | Stop reason: SDUPTHER

## 2018-11-21 NOTE — TELEPHONE ENCOUNTER
Antoine Mercado from [facility] states that pt was due for the diagnostic mammo for the 5yr cancer screening. Antoine Mercado states that pt is not returning calls and needs her to come get the rest of the imaging. Antoine Mercado states that the callback number is [phone] and that pt would not need an appt nor a new order for Antoine Mercado would take care of that. Antoine Mercado informed LPN RR will reach out to pt.

## 2018-11-21 NOTE — TELEPHONE ENCOUNTER
Alvaro Go with [employer] called to speak with Dr. Ryanne Thomson nurse. Did not disclose reason behind call. Copy/paste Sven Parker from [employer] [employer] calling to inform practice that pts ordered annual screening mammogram was done, it was in her 5 yr cancer screening which needed to be diagnostics. Pt has not returned any phone calls or voicemail's to come back in.      Alvaro Go contact [phone]       Copy/paste Joycelyn

## 2018-11-23 LAB
CAMPYLOBACTER STL CULT: NORMAL
E COLI SXT STL QL IA: NEGATIVE
G LAMBLIA AG STL QL IA: NEGATIVE
O+P STL CONC: NORMAL
SALM + SHIG STL CULT: NORMAL

## 2018-11-26 ENCOUNTER — TELEPHONE (OUTPATIENT)
Dept: INTERNAL MEDICINE CLINIC | Age: 53
End: 2018-11-26

## 2018-11-26 NOTE — TELEPHONE ENCOUNTER
Pt would like to know if the paper work for her employer that she left during her appt on November 12, 2018 has been filled out and if they are ready for .  Pts contact [phone]       Message received & copied from [facility]

## 2018-11-26 NOTE — TELEPHONE ENCOUNTER
Called, spoke to pt. Two pt identifiers confirmed. Pt informed that form is ready for . Pt also informed that per Mammography, the pt needs to go back in for additional imaging r/t the order and needing to be done for the breast cancer screening. Given contact info. Pt verbalized understanding of information discussed w/ no further questions at this time.

## 2018-12-17 ENCOUNTER — OFFICE VISIT (OUTPATIENT)
Dept: INTERNAL MEDICINE CLINIC | Age: 53
End: 2018-12-17

## 2018-12-17 VITALS
SYSTOLIC BLOOD PRESSURE: 133 MMHG | RESPIRATION RATE: 16 BRPM | OXYGEN SATURATION: 97 % | WEIGHT: 162 LBS | DIASTOLIC BLOOD PRESSURE: 82 MMHG | TEMPERATURE: 97.9 F | HEART RATE: 85 BPM | BODY MASS INDEX: 27.66 KG/M2 | HEIGHT: 64 IN

## 2018-12-17 DIAGNOSIS — C50.911 MALIGNANT NEOPLASM OF RIGHT FEMALE BREAST, UNSPECIFIED ESTROGEN RECEPTOR STATUS, UNSPECIFIED SITE OF BREAST (HCC): ICD-10-CM

## 2018-12-17 DIAGNOSIS — I10 ESSENTIAL HYPERTENSION: Primary | ICD-10-CM

## 2018-12-17 DIAGNOSIS — F32.9 REACTIVE DEPRESSION: ICD-10-CM

## 2018-12-17 DIAGNOSIS — D72.829 LEUKOCYTOSIS, UNSPECIFIED TYPE: ICD-10-CM

## 2018-12-17 DIAGNOSIS — F17.200 SMOKING: ICD-10-CM

## 2018-12-17 DIAGNOSIS — E03.9 ACQUIRED HYPOTHYROIDISM: ICD-10-CM

## 2018-12-17 DIAGNOSIS — E78.00 PURE HYPERCHOLESTEROLEMIA: ICD-10-CM

## 2018-12-17 DIAGNOSIS — M54.14 THORACIC RADICULOPATHY: ICD-10-CM

## 2018-12-17 RX ORDER — BUPROPION HYDROCHLORIDE 150 MG/1
150 TABLET ORAL
Qty: 30 TAB | Refills: 3 | Status: SHIPPED | OUTPATIENT
Start: 2018-12-17 | End: 2019-04-13 | Stop reason: SDUPTHER

## 2018-12-17 NOTE — PROGRESS NOTES
HISTORY OF PRESENT ILLNESS  Maite LAWRENCE Garcia is a 52 y.o. female. HPI  Last here 11/12/18.  Pt is here for routine care.      BP today is controlled  Pt is not monitoring BP at home, as she does not have a BP cuff  Continues on toprol XL 25mg daily   She also has lasix 20mg to use prn (infrequently) for edema - stable      Wt today is 162 lbs --up 5 lbs x lov, 10 lbs x 5/18   Pt drinks gatorade and powerade - advised cutting back on caloric drinks, and drinking water instead  Discussed cutting back on chips, snacks, high carb foods  Discussed continued diet and w/l   Pt is considering starting yoga, especially to improve her balance and joints      Reviewed labs 11/18     Lov, pt c/o back pain  Today she states that this is still hurting her but improved  She is taking cymbalta 30mg qhs and gabapentin 1-2x per day, which help  Discussed that she can continue taking 2 if needed, and that she can take this together if she wants  She gets chronic pain from rib fx  Discussed seeing ortho - provided referral     Continues on cymbalta 30mg qhs for anxiety/depression and myalgias - works well, happy with dose  Improves her mood occasionally feels down   She states that she is doing okay, though she has some more down days around this time of year  Will be starting wellbutrin for smoking cessation, which will assist     Continues on synthroid 50mcg daily  In the past, I ordered an US thyroids, but this imaging was never completed  Recall pt has a FMHX thyroid cancer (brother)      Continues on lipitor 20mg daily for cholesterol    Pt has not been taking amitriptyline 20mg  Unclear if it helped  She is not having many HAs at this time, so will not restart amitriptyline for now  Pt takes imitrex 50mg prn (not daily) --stable      Pt follows with Dr. Ti Ochoa (hem/onc) for anemia  Last visit was 5/17   Recall COLO and EGD 2013 - bleeding ulcer, issues with anemia  Not taking protonix  No gerd sx  Recall past evaluation with  Rony in 2015, thrombocytosis thought d/t cigarette smoking     Lov, pt c/o loose stools  Reviewed stool studies 11/18: nl  Her sx have resolved  Pt states that she is occasionally bloated  Overall this sx is mild  Currently out of protonix, but states she may get some more  However she has not been getting any reflux sx - discussed that she may not need protonix      Pt has a 30 pack year hx  Pt continues to smoke 0.5 or < PPD  Counseled on smoking cessation  Pt is trying to work on cessation, and is interested in wellbutrin  Will start wellbutrin 150mg  Reviewed CT lung cancer screen 11/1/18: 1. Scattered, benign 2 to 5 mm nodules redemonstrated in both lungs. No new or enlarging nodules. See recommendations below. 2. Mild segmental coronary artery calcifications.     ACP not on file. SDM is her brother Tyree AARON Lupillo Mart III.   Provided information in the past.      Recall EGD and COLO in 2013: bleeding ulcer    Reviewed mammo and labs      PREVENTIVE:   Colonoscopy: 3/13, repeat 10 years  EGD: 2013, possible Barrette's   Pap: overdue, reminded again, referred, will schedule, discussed   Mammogram: 11/13/18, negative, h/o R sided breast DCIS   Tdap: 12/10/17  Pneumovax: 8/27/2012  [id number]: not yet needed  Shingrix: not yet needed--give closer to age 59  Flu shot: Fall 2018  Eye exam: 3/18? EKG: 3/17 per ED   Lipids: 2/18 LDL 40--due  CT lung cancer screen: 11/1/18    Patient Active Problem List    Diagnosis Date Noted    Pure hypercholesterolemia 05/01/2017    Acquired hypothyroidism 05/01/2017    Elevated WBC count 04/16/2014    Elevated liver function tests 04/16/2014    Breast cancer right DCIS 06/11/2013    Cancer ([facility] [address]     Depression 02/11/2013    Headache(784.0) 09/10/2012    HTN (hypertension) 09/10/2012     Current Outpatient Medications   Medication Sig Dispense Refill    DULoxetine (CYMBALTA) 30 mg capsule TAKE 1 CAP BY MOUTH DAILY.  30 Cap 1    metoprolol succinate (TOPROL-XL) 25 mg XL tablet TAKE 1 TAB BY MOUTH DAILY. 30 Tab 3    atorvastatin (LIPITOR) 20 mg tablet TAKE 1 TAB BY MOUTH DAILY. 30 Tab 3    gabapentin (NEURONTIN) 300 mg capsule Take 1 Cap by mouth two (2) times daily as needed. 60 Cap 4    furosemide (LASIX) 20 mg tablet TAKE 1 TAB BY MOUTH DAILY. 30 Tab 0    levothyroxine (SYNTHROID) 50 mcg tablet TAKE 1 TAB BY MOUTH DAILY (BEFORE BREAKFAST). 30 Tab 2    amitriptyline (ELAVIL) 10 mg tablet Take 2 Tabs by mouth nightly. 60 Tab 3    SUMAtriptan (IMITREX) 50 mg tablet TAKE 1 TABLET BY MOUTH ONCE AS NEEDED FOR MIGRAINE FOR UP TO 1 DOSE 6 Tab 1    metroNIDAZOLE (FLAGYL) 500 mg tablet       pantoprazole (PROTONIX) 40 mg tablet Take 1 Tab by mouth daily. 30 Tab 1    multivitamin (ONE A DAY) tablet Take 1 Tab by mouth daily.          Past Surgical History:   Procedure Laterality Date    BREAST SURGERY PROCEDURE UNLISTED  5/30/13    RIGHT BREAST LUMPECTOMY WITH RIGHT NEDDLE LOCALIZATION     HX BREAST LUMPECTOMY  5/30/2013    BREAST LUMPECTOMY/PARTIAL performed [facility] OR    HX GYN      fallopian tube    HX ORTHOPAEDIC      left ankle repair    HX OTHER SURGICAL      cyst removed rom right thigh      Lab Results   Component Value Date/Time    WBC 10.0 11/12/2018 01:55 PM    HGB 13.3 11/12/2018 01:55 PM    Hemoglobin (POC) 15.3 10/31/2014 02:26 PM    HCT 40.2 11/12/2018 01:55 PM    Hematocrit (POC) 45 10/31/2014 02:26 PM    PLATELET 652 68/60/6854 01:55 PM    MCV 96 11/12/2018 01:55 PM     Lab Results   Component Value Date/Time    Cholesterol, total 117 02/20/2018 08:52 AM    HDL Cholesterol 42 02/20/2018 08:52 AM    LDL, calculated 40 02/20/2018 08:52 AM    Triglyceride 175 (H) 02/20/2018 08:52 AM     Lab Results   Component Value Date/Time    GFR est non-AA 75 11/12/2018 01:55 PM    GFRNA, POC >60 10/31/2014 02:26 PM    GFR est AA 87 11/12/2018 01:55 PM    GFRAA, POC >60 10/31/2014 02:26 PM    Creatinine 0.87 11/12/2018 01:55 PM    Creatinine (POC) 0.9 10/31/2014 02:26 PM    BUN 11 11/12/2018 01:55 PM    BUN (POC) 8 (L) 10/31/2014 02:26 PM    Sodium 139 11/12/2018 01:55 PM    Sodium (POC) 140 10/31/2014 02:26 PM    Potassium 4.0 11/12/2018 01:55 PM    Potassium (POC) 3.7 10/31/2014 02:26 PM    Chloride 95 (L) 11/12/2018 01:55 PM    Chloride (POC) 103 10/31/2014 02:26 PM    CO2 24 11/12/2018 01:55 PM    Magnesium 1.6 01/14/2017 07:28 AM    Phosphorus 1.0 (L) 11/28/2012 04:45 AM        Review of Systems   Respiratory: Negative for shortness of breath. Cardiovascular: Negative for chest pain. Gastrointestinal: Negative for diarrhea. Physical Exam   Constitutional: She is oriented to person, place, and time. She appears well-developed and well-nourished. No distress. HENT:   Head: Normocephalic and atraumatic. Mouth/Throat: Oropharynx is clear and moist. No oropharyngeal exudate. Eyes: Conjunctivae and EOM are normal. Right eye exhibits no discharge. Left eye exhibits no discharge. Neck: Normal range of motion. Neck supple. Cardiovascular: Normal rate, regular rhythm and normal heart sounds. Exam reveals no gallop and no friction rub. No murmur heard. Pulmonary/Chest: Effort normal and breath sounds normal. No respiratory distress. She has no wheezes. She has no rales. She exhibits no tenderness. Musculoskeletal: She exhibits edema (Trace BLE) and tenderness (TTP over R ribcage). She exhibits no deformity. Lymphadenopathy:     She has no cervical adenopathy. Neurological: She is alert and oriented to person, place, and time. Coordination normal.   Skin: Skin is warm and dry. No rash noted. She is not diaphoretic. No erythema. No pallor. Psychiatric: She has a normal mood and affect. Her behavior is normal.       ASSESSMENT and PLAN    ICD-10-CM ICD-9-CM    1. Essential hypertension    Controlled on toprol XL 25mg daily   I10 401.9    2.  Malignant neoplasm of right female breast, unspecified estrogen receptor status, unspecified site of breast ([facility] [address]    UTD on mammo   C50.911 174.9    3. Acquired hypothyroidism    At goal on synthroid 50mcgs daily   E03.9 244.9    4. Pure hypercholesterolemia    Controlled on lipitor 20mg in 2/18  Repeat ordered   E78.00 272.0    5. Reactive depression    Overall stable on cymbalta, however she would also like to work on smoking cessation and is having increased stress with holidays, will add wellbutrin 150mg daily   F32.9 300.4    6. Thoracic radiculopathy    Currently taking gabapentin 300mg qhs, discussed instead of taking 2nd tab during day which is sedating could take 2 at the same time, will have her see ortho if pain continues to not improve, recall pain is primarily around area of old rib fractures   M54.14 724.4 REFERRAL TO ORTHOPEDICS   7. Smoking    Start wellbutrin to assist with cessation   F17.200 305.1    8. Leukocytosis, unspecified type    Resolved on last labs   D72.829 288.60         Scribed by Vipin Liriano of [facility], as dictated by Dr. Lamont Joe. Current diagnosis and concerns discussed with pt at length. Pt understands risks and benefits or current treatment plan and medications, and accepts the treatment and medication with any possible risks. Pt asks appropriate questions, which were answered. Pt was instructed to call with any concerns or problems. I have reviewed the note documented by the scribe. The services provided are my own.   The documentation is accurate

## 2018-12-18 LAB
CHOLEST SERPL-MCNC: 146 MG/DL (ref 100–199)
HDLC SERPL-MCNC: 39 MG/DL
LDLC SERPL CALC-MCNC: 49 MG/DL (ref 0–99)
TRIGL SERPL-MCNC: 292 MG/DL (ref 0–149)
VLDLC SERPL CALC-MCNC: 58 MG/DL (ref 5–40)

## 2019-01-08 ENCOUNTER — TELEPHONE (OUTPATIENT)
Dept: INTERNAL MEDICINE CLINIC | Age: 54
End: 2019-01-08

## 2019-01-10 DIAGNOSIS — E03.9 ACQUIRED HYPOTHYROIDISM: ICD-10-CM

## 2019-01-10 RX ORDER — LEVOTHYROXINE SODIUM 50 UG/1
TABLET ORAL
Qty: 30 TAB | Refills: 2 | Status: SHIPPED | OUTPATIENT
Start: 2019-01-10 | End: 2019-04-13 | Stop reason: SDUPTHER

## 2019-01-10 NOTE — TELEPHONE ENCOUNTER
Spoke to pt. Two pt identifiers confirmed. Pt informed that FMLA forms are ready. PT states she will come pick them up at the front office tomorrow 1/11/19. Pt verbalized understanding of information discussed w/ no further questions at this time.

## 2019-01-20 RX ORDER — DULOXETIN HYDROCHLORIDE 30 MG/1
CAPSULE, DELAYED RELEASE ORAL
Qty: 30 CAP | Refills: 1 | Status: SHIPPED | OUTPATIENT
Start: 2019-01-20 | End: 2019-03-16 | Stop reason: SDUPTHER

## 2019-03-16 DIAGNOSIS — E78.5 ELEVATED LIPIDS: ICD-10-CM

## 2019-03-16 RX ORDER — DULOXETIN HYDROCHLORIDE 30 MG/1
CAPSULE, DELAYED RELEASE ORAL
Qty: 30 CAP | Refills: 1 | Status: SHIPPED | OUTPATIENT
Start: 2019-03-16 | End: 2019-05-10 | Stop reason: SDUPTHER

## 2019-03-16 RX ORDER — ATORVASTATIN CALCIUM 20 MG/1
TABLET, FILM COATED ORAL
Qty: 30 TAB | Refills: 3 | Status: SHIPPED | OUTPATIENT
Start: 2019-03-16 | End: 2019-07-21 | Stop reason: SDUPTHER

## 2019-03-16 RX ORDER — METOPROLOL SUCCINATE 25 MG/1
TABLET, EXTENDED RELEASE ORAL
Qty: 30 TAB | Refills: 3 | Status: SHIPPED | OUTPATIENT
Start: 2019-03-16 | End: 2019-07-21 | Stop reason: SDUPTHER

## 2019-04-01 RX ORDER — FUROSEMIDE 20 MG/1
TABLET ORAL
Qty: 30 TAB | Refills: 0 | Status: SHIPPED | OUTPATIENT
Start: 2019-04-01 | End: 2019-04-28 | Stop reason: SDUPTHER

## 2019-04-13 DIAGNOSIS — E03.9 ACQUIRED HYPOTHYROIDISM: ICD-10-CM

## 2019-04-13 RX ORDER — LEVOTHYROXINE SODIUM 50 UG/1
TABLET ORAL
Qty: 30 TAB | Refills: 2 | Status: SHIPPED | OUTPATIENT
Start: 2019-04-13 | End: 2019-07-08

## 2019-04-13 RX ORDER — BUPROPION HYDROCHLORIDE 150 MG/1
TABLET ORAL
Qty: 30 TAB | Refills: 3 | Status: SHIPPED | OUTPATIENT
Start: 2019-04-13 | End: 2020-02-24

## 2019-04-13 RX ORDER — AMITRIPTYLINE HYDROCHLORIDE 10 MG/1
20 TABLET, FILM COATED ORAL
Qty: 60 TAB | Refills: 3 | Status: SHIPPED | OUTPATIENT
Start: 2019-04-13 | End: 2019-07-22 | Stop reason: SDUPTHER

## 2019-04-28 RX ORDER — FUROSEMIDE 20 MG/1
TABLET ORAL
Qty: 30 TAB | Refills: 0 | Status: SHIPPED | OUTPATIENT
Start: 2019-04-28 | End: 2019-05-26 | Stop reason: SDUPTHER

## 2019-04-29 ENCOUNTER — TELEPHONE (OUTPATIENT)
Dept: INTERNAL MEDICINE CLINIC | Age: 54
End: 2019-04-29

## 2019-04-29 NOTE — TELEPHONE ENCOUNTER
Pt is going on a cruise and wants to know can she take Prilosec with the medications that Dr. Sabrina Sommers has already prescribed to her. Best contact number is  [phone].        Message received & copied from [address] [place] [address]

## 2019-05-13 RX ORDER — DULOXETIN HYDROCHLORIDE 30 MG/1
CAPSULE, DELAYED RELEASE ORAL
Qty: 30 CAP | Refills: 1 | Status: SHIPPED | OUTPATIENT
Start: 2019-05-13 | End: 2019-07-22 | Stop reason: SDUPTHER

## 2019-05-26 RX ORDER — FUROSEMIDE 20 MG/1
TABLET ORAL
Qty: 30 TAB | Refills: 0 | Status: SHIPPED | OUTPATIENT
Start: 2019-05-26 | End: 2019-06-26 | Stop reason: SDUPTHER

## 2019-06-05 ENCOUNTER — TELEPHONE (OUTPATIENT)
Dept: INTERNAL MEDICINE CLINIC | Age: 54
End: 2019-06-05

## 2019-06-05 NOTE — TELEPHONE ENCOUNTER
Pt [phone] says that she feel lethargic and doesn't ant to get out of bed. Pt is on several meds and wants to know if she should be concerned. Pt is taking meds for depression/anxiety. Pt call to discuss.        Message received & copied from [facility] after closing on 6/4/19

## 2019-06-06 NOTE — TELEPHONE ENCOUNTER
MD John Bruno LPN   Caller: Unspecified Art Herrera,  8:51 AM)             When I see her on Monday I will write work letter if needed

## 2019-06-06 NOTE — TELEPHONE ENCOUNTER
Patient returning a call from Roxana Glynn. Stated just missed the call.  Contact is [phone]       Message received & copied from [facility] after closing on 6/5/19

## 2019-06-06 NOTE — TELEPHONE ENCOUNTER
Called, spoke to pt. Two pt identifiers confirmed. Pt c/o fatigue/lethargy x4 days. Pt still working overnight. Pt states no new stressors-work. Pt states having trouble sleeping. Pt states the summer is worse d/t the temperature. Pt feels more anxiety and maybe depression lately. Pt states not drinking a lot of caffeine. Pt still smokes but has cut back. Pt still taking cymbalta and wellbutrin. Pt states having an appt 6/10/19. Pt asking if at all possible, since she does not feel well, that she could get a letter excusing her tonight. Pt informed Dr. Royer Friedman will be notified. Pt verbalized understanding of information discussed w/ no further questions at this time.

## 2019-06-06 NOTE — TELEPHONE ENCOUNTER
Called, left vm for pt that per Dr. Eunice Rodgers letter would not be able to write the letter until office visit. Callback prn.

## 2019-06-10 ENCOUNTER — OFFICE VISIT (OUTPATIENT)
Dept: INTERNAL MEDICINE CLINIC | Age: 54
End: 2019-06-10

## 2019-06-10 VITALS
RESPIRATION RATE: 18 BRPM | SYSTOLIC BLOOD PRESSURE: 121 MMHG | DIASTOLIC BLOOD PRESSURE: 88 MMHG | BODY MASS INDEX: 26.8 KG/M2 | OXYGEN SATURATION: 98 % | TEMPERATURE: 97.4 F | HEIGHT: 64 IN | WEIGHT: 157 LBS | HEART RATE: 115 BPM

## 2019-06-10 DIAGNOSIS — I10 ESSENTIAL HYPERTENSION: Primary | ICD-10-CM

## 2019-06-10 DIAGNOSIS — E78.00 PURE HYPERCHOLESTEROLEMIA: ICD-10-CM

## 2019-06-10 DIAGNOSIS — E03.9 ACQUIRED HYPOTHYROIDISM: ICD-10-CM

## 2019-06-10 DIAGNOSIS — F32.9 REACTIVE DEPRESSION: ICD-10-CM

## 2019-06-10 DIAGNOSIS — R53.82 CHRONIC FATIGUE: ICD-10-CM

## 2019-06-10 DIAGNOSIS — R79.89 ELEVATED LIVER FUNCTION TESTS: ICD-10-CM

## 2019-06-10 DIAGNOSIS — C50.911 MALIGNANT NEOPLASM OF RIGHT FEMALE BREAST, UNSPECIFIED ESTROGEN RECEPTOR STATUS, UNSPECIFIED SITE OF BREAST (HCC): ICD-10-CM

## 2019-06-10 NOTE — PROGRESS NOTES
HISTORY OF PRESENT ILLNESS  Maite Banegas is a 62 y.o. female. HPI  Last here 12/17/18.  Pt is here for routine care.      BP today is 121/88  Pt is not monitoring BP at home, as she does not have a BP cuff  Continues on toprol XL 25mg daily   She also has lasix 20mg to use prn (infrequently) for edema - stable      Wt today is 157 lbs --down 5 lbs x lov  Discussed continued diet and w/l      Reviewed labs 12/18      Pt follows with Dr. Erum Landis (hem/onc) for anemia  Last visit was 5/17   Recall COLO and EGD 2013 - bleeding ulcer, issues with anemia  Not taking protonix  No gerd sx  Recall past evaluation with Dr. Erum Landis in 2015, thrombocytosis thought d/t cigarette smoking    Pt is taking cymbalta 30mg qhs and gabapentin 1-2x per day, which help  She gets chronic pain from rib fx  Will be having her decrease cymbalta as it may not be assisting with mood    Continues on synthroid 50mcg daily  In the past, I ordered an US thyroids, but this imaging was never completed  Recall pt has a FMHX thyroid cancer (brother)    Previously took protonix      Continues on lipitor 20mg daily for cholesterol     Pt restarted amitriptyline 20mg  However she has been feeling more tired and sedated in the AM  Advised her to stop taking this med and see if this helps  Pt takes imitrex 50mg prn (not daily) --stable    Lov, started wellbutrin 150mg  Also continues on cymbalta 30mg qhs for anxiety/depression and myalgias  Pt states she has been more depressed recently  She is unsure whether one med is helping more than the other  Will have her decrease cymbalta to every other day to see whether or not it is helping  Discussed if it is not helping, could stop cymbalta entirely and call to increase wellbutrin    Pt c/o feeling especially tired x 3-4 days  Will check labs  It has been difficult for her to get out of bed  Pt has not been to work in the last couple days because of this  Pt is unsure if she snores  She thinks that she has been more depressed recently  Pt got back from vacation with her mother at the end of May, where it was very hot  Pt has restarted taking amitriptyline 20mg at night when she got back from her cruise  Discussed that this might be causing her to feel unable to get out of bed in the AM  Provided info for sleep evaluation      Pt wonders why her feet itch  Discussed could be variety of causes such as from athlete's foot, wet feet, dry skin, etc    Pt drinks 3 drinks per night    Pt has a 30 pack year hx  Pt continues to smoke 0.5 or < PPD  Counseled on smoking cessation  Pt is taking wellbutrin  Completed CT lung cancer screen 11/1/18     ACP not on file. SDM is her brother Shakira Ocampo Ivan GOYAL.   Provided information in the past.      Recall EGD and COLO in 2013: bleeding ulcer     Reviewed mammo and labs      PREVENTIVE:   Colonoscopy: 3/13, repeat 10 years  EGD: 2013, possible Barrette's   Pap: ~3/19, [address]: 11/13/18, negative, h/o R sided breast DCIS   DEXA: [facility], ~3/19, will get report for review  Tdap: 12/10/17  Pneumovax: 8/27/2012  [id number]: not yet needed  Shingrix: discussed could get closer to age 59  Flu shot: Fall 2018  Eye exam: 3/18? EKG: 3/17 per ED   Lipids: 12/18 LDL 49  CT lung cancer screen: 11/1/18    Patient Active Problem List    Diagnosis Date Noted    Pure hypercholesterolemia 05/01/2017    Acquired hypothyroidism 05/01/2017    Elevated WBC count 04/16/2014    Elevated liver function tests 04/16/2014    Breast cancer right DCIS 06/11/2013    Cancer ([facility] [address]     Depression 02/11/2013    Headache(784.0) 09/10/2012    HTN (hypertension) 09/10/2012     Current Outpatient Medications   Medication Sig Dispense Refill    furosemide (LASIX) 20 mg tablet TAKE 1 TAB BY MOUTH DAILY. 30 Tab 0    DULoxetine (CYMBALTA) 30 mg capsule TAKE 1 CAP BY MOUTH DAILY.  30 Cap 1    buPROPion XL (WELLBUTRIN XL) 150 mg tablet TAKE 1 TABLET BY MOUTH EVERY DAY IN THE MORNING 30 Tab 3    amitriptyline (ELAVIL) 10 mg tablet TAKE 2 TABS BY MOUTH NIGHTLY. 60 Tab 3    levothyroxine (SYNTHROID) 50 mcg tablet TAKE 1 TAB BY MOUTH DAILY (BEFORE BREAKFAST). 30 Tab 2    atorvastatin (LIPITOR) 20 mg tablet TAKE 1 TAB BY MOUTH DAILY. 30 Tab 3    metoprolol succinate (TOPROL-XL) 25 mg XL tablet TAKE 1 TAB BY MOUTH DAILY. 30 Tab 3    gabapentin (NEURONTIN) 300 mg capsule Take 1 Cap by mouth two (2) times daily as needed. 60 Cap 4    SUMAtriptan (IMITREX) 50 mg tablet TAKE 1 TABLET BY MOUTH ONCE AS NEEDED FOR MIGRAINE FOR UP TO 1 DOSE 6 Tab 1    multivitamin (ONE A DAY) tablet Take 1 Tab by mouth daily.          Past Surgical History:   Procedure Laterality Date    BREAST SURGERY PROCEDURE UNLISTED  5/30/13    RIGHT BREAST LUMPECTOMY WITH RIGHT NEDDLE LOCALIZATION     HX BREAST LUMPECTOMY  5/30/2013    BREAST LUMPECTOMY/PARTIAL performed by Kortney Boone MD at [facility] MAIN OR    HX GYN      fallopian tube    HX ORTHOPAEDIC      left ankle repair    HX OTHER SURGICAL      cyst removed rom right thigh      Lab Results   Component Value Date/Time    WBC 10.0 11/12/2018 01:55 PM    HGB 13.3 11/12/2018 01:55 PM    Hemoglobin (POC) 15.3 10/31/2014 02:26 PM    HCT 40.2 11/12/2018 01:55 PM    Hematocrit (POC) 45 10/31/2014 02:26 PM    PLATELET 319 79/08/2085 01:55 PM    MCV 96 11/12/2018 01:55 PM     Lab Results   Component Value Date/Time    Cholesterol, total 146 12/17/2018 11:26 AM    HDL Cholesterol 39 (L) 12/17/2018 11:26 AM    LDL, calculated 49 12/17/2018 11:26 AM    Triglyceride 292 (H) 12/17/2018 11:26 AM     Lab Results   Component Value Date/Time    GFR est non-AA 75 11/12/2018 01:55 PM    GFRNA, POC >60 10/31/2014 02:26 PM    GFR est AA 87 11/12/2018 01:55 PM    GFRAA, POC >60 10/31/2014 02:26 PM    Creatinine 0.87 11/12/2018 01:55 PM    Creatinine (POC) 0.9 10/31/2014 02:26 PM    BUN 11 11/12/2018 01:55 PM    BUN (POC) 8 (L) 10/31/2014 02:26 PM    Sodium 139 11/12/2018 01:55 PM    Sodium (POC) 140 10/31/2014 02:26 PM    Potassium 4.0 11/12/2018 01:55 PM    Potassium (POC) 3.7 10/31/2014 02:26 PM    Chloride 95 (L) 11/12/2018 01:55 PM    Chloride (POC) 103 10/31/2014 02:26 PM    CO2 24 11/12/2018 01:55 PM    Magnesium 1.6 01/14/2017 07:28 AM    Phosphorus 1.0 (L) 11/28/2012 04:45 AM        Review of Systems   Constitutional: Positive for malaise/fatigue. Respiratory: Negative for shortness of breath. Cardiovascular: Negative for chest pain. Skin: Positive for itching. Psychiatric/Behavioral: Positive for depression. Physical Exam   Constitutional: She is oriented to person, place, and time. She appears well-developed and well-nourished. No distress. HENT:   Head: Normocephalic and atraumatic. Mouth/Throat: Oropharynx is clear and moist. No oropharyngeal exudate. Eyes: Conjunctivae and EOM are normal. Right eye exhibits no discharge. Left eye exhibits no discharge. Neck: Normal range of motion. Neck supple. Cardiovascular: Normal rate, regular rhythm and normal heart sounds. Exam reveals no gallop and no friction rub. No murmur heard. Pulmonary/Chest: Effort normal and breath sounds normal. No respiratory distress. She has no wheezes. She has no rales. She exhibits no tenderness. Musculoskeletal: Normal range of motion. She exhibits no edema, tenderness or deformity. Lymphadenopathy:     She has no cervical adenopathy. Neurological: She is alert and oriented to person, place, and time. Coordination normal.   Skin: Skin is warm and dry. No rash noted. She is not diaphoretic. No erythema. No pallor. Psychiatric: She has a normal mood and affect. Her behavior is normal.       ASSESSMENT and PLAN    ICD-10-CM ICD-9-CM    1.  Essential hypertension    Initially elevated, repeat at goal, continue toprol XL 25mg daily    HR up but anxious and feeling tired will monitor and eval further if elevation persists   I10 401.9 LIPID PANEL      METABOLIC PANEL, COMPREHENSIVE CBC W/O DIFF      TSH 3RD GENERATION      HEMOGLOBIN A1C WITH EAG      SLEEP MEDICINE REFERRAL   2. Acquired hypothyroidism    On synthroid 50mcgs daily, check TSH as she reports progressive fatigue, adjust medication prn   E03.9 244.9 LIPID PANEL      METABOLIC PANEL, COMPREHENSIVE      CBC W/O DIFF      TSH 3RD GENERATION      HEMOGLOBIN A1C WITH EAG      SLEEP MEDICINE REFERRAL   3. Elevated liver function tests    Related to significant alcohol consumption, I have discussed with her at each office visit the need for cessation, she continues to drink large amounts of alcohol, repeat LFTs, evaluate for progressive liver disease   R94.5 790.6 LIPID PANEL      METABOLIC PANEL, COMPREHENSIVE      CBC W/O DIFF      TSH 3RD GENERATION      HEMOGLOBIN A1C WITH EAG      SLEEP MEDICINE REFERRAL   4. Malignant neoplasm of right female breast, unspecified estrogen receptor status, unspecified site of breast ([facility] [address]    Due for mammo in 11/18, UTD   C50.911 174.9 LIPID PANEL      METABOLIC PANEL, COMPREHENSIVE      CBC W/O DIFF      TSH 3RD GENERATION      HEMOGLOBIN A1C WITH EAG      SLEEP MEDICINE REFERRAL   5. Chronic fatigue    May be due to depression, need to start with lab eval, also with poor sleep, stop amitriptyline as she just started this and has noticed her difficulty with wakening since starting this medication and sent her for sleep study   R53.82 780.79 LIPID PANEL      METABOLIC PANEL, COMPREHENSIVE      CBC W/O DIFF      TSH 3RD GENERATION      HEMOGLOBIN A1C WITH EAG      SLEEP MEDICINE REFERRAL   6.  Reactive depression    Currently on wellbutrin, not clear if cymbalta is helping much, the wellbutrin is to help with smoking cessation as well, will try tapering down cymbalta and if mood seems the same will increase wellbutrin, start with cymbalta taper, discussed contacting clinic in a month to increase wellbutrin   F32.9 300.4 LIPID PANEL      METABOLIC PANEL, COMPREHENSIVE      CBC W/O DIFF      TSH 3RD GENERATION      HEMOGLOBIN A1C WITH EAG      SLEEP MEDICINE REFERRAL   7. Pure hypercholesterolemia    Controlled in 12/18 on lipitor, continue no change to dose   E78.00 272.0 LIPID PANEL      METABOLIC PANEL, COMPREHENSIVE      CBC W/O DIFF      TSH 3RD GENERATION      HEMOGLOBIN A1C WITH EAG      SLEEP MEDICINE REFERRAL        Scribed by Laurel Peng of [address] [place] [address] [address], as dictated by Dr. Robyn Ruggiero. Current diagnosis and concerns discussed with pt at length. Pt understands risks and benefits or current treatment plan and medications, and accepts the treatment and medication with any possible risks. Pt asks appropriate questions, which were answered. Pt was instructed to call with any concerns or problems. I have reviewed the note documented by the scribe. The services provided are my own.   The documentation is accurate

## 2019-06-10 NOTE — LETTER
NOTIFICATION RETURN TO WORK / SCHOOL 
 
6/10/2019 11:21 AM 
 
Ms. Maite Miller [place] [address] [employer] [address] [address] To Whom It May Concern: 
 
Lillian Mejia is currently under the care of [facility]. She will return to work/school on: 06/13/19 evening. If there are questions or concerns please have the patient contact our office.  
 
 
 
Sincerely, 
 
 
Chris Castro MD

## 2019-06-10 NOTE — PROGRESS NOTES
Identified pt with two pt identifiers(name and ). Reviewed record in preparation for visit and have obtained necessary documentation. All patient medications has been reviewed. Chief Complaint   Patient presents with    Follow Up Chronic Condition       Health Maintenance Due   Topic    Pneumococcal 0-64 years (1 of 1 - PPSV23)    Shingrix Vaccine Age 50> (1 of 2)       Vitals:    06/10/19 1102 06/10/19 1120   BP: (!) 156/104 150/85   Pulse: (!) 115    Resp: 18    Temp: 97.4 °F (36.3 °C)    TempSrc: Oral    SpO2: 98%    Weight: 157 lb (71.2 kg)    Height: 5' 4\" (1.626 m)        Coordination of Care Questionnaire:   1) Have you been to an emergency room, urgent care, or hospitalized since your last visit?   no       2. Have seen or consulted any other health care provider since your last visit? NO    3) Do you have an Advanced Directive/ Living Will in place? NO  If yes, do we have a copy on file NO  If no, would you like information NO    Patient is accompanied by self I have received verbal consent from [facility] to discuss any/all medical information while they are present in the room.

## 2019-06-10 NOTE — PATIENT INSTRUCTIONS
Stop elavil (amitriptyline) After 2 weeks, try taking the cymbalta (duoxetine) every other day rather  Than daily for about 4 weeks If feeling fine mood wise at that point stop cymbalta and call clinic and we can increase wellbutrin

## 2019-06-11 LAB
ALBUMIN SERPL-MCNC: 4 G/DL (ref 3.5–5.5)
ALBUMIN/GLOB SERPL: 1.2 {RATIO} (ref 1.2–2.2)
ALP SERPL-CCNC: 134 IU/L (ref 39–117)
ALT SERPL-CCNC: 23 IU/L (ref 0–32)
AST SERPL-CCNC: 40 IU/L (ref 0–40)
BILIRUB SERPL-MCNC: 0.4 MG/DL (ref 0–1.2)
BUN SERPL-MCNC: 8 MG/DL (ref 6–24)
BUN/CREAT SERPL: 9 (ref 9–23)
CALCIUM SERPL-MCNC: 9.2 MG/DL (ref 8.7–10.2)
CHLORIDE SERPL-SCNC: 100 MMOL/L (ref 96–106)
CHOLEST SERPL-MCNC: 121 MG/DL (ref 100–199)
CO2 SERPL-SCNC: 23 MMOL/L (ref 20–29)
CREAT SERPL-MCNC: 0.92 MG/DL (ref 0.57–1)
ERYTHROCYTE [DISTWIDTH] IN BLOOD BY AUTOMATED COUNT: 14.3 % (ref 12.3–15.4)
EST. AVERAGE GLUCOSE BLD GHB EST-MCNC: 117 MG/DL
GLOBULIN SER CALC-MCNC: 3.3 G/DL (ref 1.5–4.5)
GLUCOSE SERPL-MCNC: 94 MG/DL (ref 65–99)
HBA1C MFR BLD: 5.7 % (ref 4.8–5.6)
HCT VFR BLD AUTO: 39.8 % (ref 34–46.6)
HDLC SERPL-MCNC: 39 MG/DL
HGB BLD-MCNC: 13.2 G/DL (ref 11.1–15.9)
LDLC SERPL CALC-MCNC: 2 MG/DL (ref 0–99)
MCH RBC QN AUTO: 30.8 PG (ref 26.6–33)
MCHC RBC AUTO-ENTMCNC: 33.2 G/DL (ref 31.5–35.7)
MCV RBC AUTO: 93 FL (ref 79–97)
PLATELET # BLD AUTO: 258 X10E3/UL (ref 150–450)
POTASSIUM SERPL-SCNC: 4.1 MMOL/L (ref 3.5–5.2)
PROT SERPL-MCNC: 7.3 G/DL (ref 6–8.5)
RBC # BLD AUTO: 4.28 X10E6/UL (ref 3.77–5.28)
SODIUM SERPL-SCNC: 142 MMOL/L (ref 134–144)
TRIGL SERPL-MCNC: 399 MG/DL (ref 0–149)
TSH SERPL DL<=0.005 MIU/L-ACNC: 5.12 UIU/ML (ref 0.45–4.5)
VLDLC SERPL CALC-MCNC: 80 MG/DL (ref 5–40)
WBC # BLD AUTO: 7.1 X10E3/UL (ref 3.4–10.8)

## 2019-06-11 NOTE — PROGRESS NOTES
Increase synthroid from 50mcg daily to 75mcg daily as thyroid too slow    Repeat tsh in 6 weeks    the patient has mild prediabetes on bloodwork, have them work on diet/weight loss to improve this, work on avoiding simple carbohydrates (\"whites\"--white bread, white rice, white pasta, etc.. ) to improve further, no medications needed for now will monitor bloodwork    Continue lipitor for lipids

## 2019-06-14 DIAGNOSIS — E03.9 ACQUIRED HYPOTHYROIDISM: Primary | ICD-10-CM

## 2019-06-14 RX ORDER — LEVOTHYROXINE SODIUM 75 UG/1
75 TABLET ORAL
Qty: 30 TAB | Refills: 1 | Status: SHIPPED | OUTPATIENT
Start: 2019-06-14 | End: 2019-07-07 | Stop reason: SDUPTHER

## 2019-06-14 NOTE — PROGRESS NOTES
Called, spoke to pt. Two pt identifiers confirmed. Pt informed per Dr. Tosin Oakes Increase synthroid from 50mcg daily to 75mcg daily as thyroid too slow - pended. Pt informed per Dr. Tosin Oakes Repeat tsh in 6 weeks - ordered. Pt informed per Dr. Tosin Oakes of mild prediabetes on bloodwork, work on diet/weight loss to improve this, work on avoiding simple carbohydrates (\"whites\"--white bread, white rice, white pasta, etc.. ) to improve further, no medications needed for now will monitor bloodwork. Pt informed per Dr. Dana Paul for lipids. Pt verbalized understanding of information discussed w/ no further questions at this time.

## 2019-06-26 RX ORDER — FUROSEMIDE 20 MG/1
TABLET ORAL
Qty: 30 TAB | Refills: 0 | Status: SHIPPED | OUTPATIENT
Start: 2019-06-26 | End: 2019-07-21 | Stop reason: SDUPTHER

## 2019-06-28 ENCOUNTER — HOSPITAL ENCOUNTER (EMERGENCY)
Age: 54
Discharge: HOME | End: 2019-06-28
Attending: EMERGENCY MEDICINE
Payer: COMMERCIAL

## 2019-06-28 VITALS
RESPIRATION RATE: 16 BRPM | HEIGHT: 64 IN | BODY MASS INDEX: 26.76 KG/M2 | WEIGHT: 156.75 LBS | TEMPERATURE: 98.7 F | DIASTOLIC BLOOD PRESSURE: 99 MMHG | HEART RATE: 124 BPM | SYSTOLIC BLOOD PRESSURE: 150 MMHG | OXYGEN SATURATION: 97 %

## 2019-06-28 DIAGNOSIS — L24.9 IRRITANT CONTACT DERMATITIS, UNSPECIFIED TRIGGER: Primary | ICD-10-CM

## 2019-06-28 LAB
ALBUMIN SERPL-MCNC: 3.9 G/DL (ref 3.5–5)
ALBUMIN/GLOB SERPL: 1 {RATIO} (ref 1.1–2.2)
ALP SERPL-CCNC: 152 U/L (ref 45–117)
ALT SERPL-CCNC: 23 U/L (ref 12–78)
ANION GAP SERPL CALC-SCNC: 7 MMOL/L (ref 5–15)
AST SERPL-CCNC: 19 U/L (ref 15–37)
BASOPHILS # BLD: 0.1 K/UL (ref 0–0.1)
BASOPHILS NFR BLD: 1 % (ref 0–1)
BILIRUB SERPL-MCNC: 0.7 MG/DL (ref 0.2–1)
BUN SERPL-MCNC: 11 MG/DL (ref 6–20)
BUN/CREAT SERPL: 11 (ref 12–20)
CALCIUM SERPL-MCNC: 9.3 MG/DL (ref 8.5–10.1)
CHLORIDE SERPL-SCNC: 102 MMOL/L (ref 97–108)
CO2 SERPL-SCNC: 25 MMOL/L (ref 21–32)
CREAT SERPL-MCNC: 0.97 MG/DL (ref 0.55–1.02)
DIFFERENTIAL METHOD BLD: ABNORMAL
EOSINOPHIL # BLD: 0.7 K/UL (ref 0–0.4)
EOSINOPHIL NFR BLD: 6 % (ref 0–7)
ERYTHROCYTE [DISTWIDTH] IN BLOOD BY AUTOMATED COUNT: 14.4 % (ref 11.5–14.5)
GLOBULIN SER CALC-MCNC: 4.1 G/DL (ref 2–4)
GLUCOSE SERPL-MCNC: 97 MG/DL (ref 65–100)
HCT VFR BLD AUTO: 38.8 % (ref 35–47)
HGB BLD-MCNC: 13.3 G/DL (ref 11.5–16)
IMM GRANULOCYTES # BLD AUTO: 0.1 K/UL (ref 0–0.04)
IMM GRANULOCYTES NFR BLD AUTO: 1 % (ref 0–0.5)
LYMPHOCYTES # BLD: 1.7 K/UL (ref 0.8–3.5)
LYMPHOCYTES NFR BLD: 14 % (ref 12–49)
MCH RBC QN AUTO: 32 PG (ref 26–34)
MCHC RBC AUTO-ENTMCNC: 34.3 G/DL (ref 30–36.5)
MCV RBC AUTO: 93.5 FL (ref 80–99)
MONOCYTES # BLD: 1.1 K/UL (ref 0–1)
MONOCYTES NFR BLD: 9 % (ref 5–13)
NEUTS SEG # BLD: 8.2 K/UL (ref 1.8–8)
NEUTS SEG NFR BLD: 69 % (ref 32–75)
NRBC # BLD: 0 K/UL (ref 0–0.01)
NRBC BLD-RTO: 0 PER 100 WBC
PLATELET # BLD AUTO: 298 K/UL (ref 150–400)
PMV BLD AUTO: 9.5 FL (ref 8.9–12.9)
POTASSIUM SERPL-SCNC: 3.7 MMOL/L (ref 3.5–5.1)
PROT SERPL-MCNC: 8 G/DL (ref 6.4–8.2)
RBC # BLD AUTO: 4.15 M/UL (ref 3.8–5.2)
SODIUM SERPL-SCNC: 134 MMOL/L (ref 136–145)
WBC # BLD AUTO: 11.8 K/UL (ref 3.6–11)

## 2019-06-28 PROCEDURE — 80053 COMPREHEN METABOLIC PANEL: CPT

## 2019-06-28 PROCEDURE — 36415 COLL VENOUS BLD VENIPUNCTURE: CPT

## 2019-06-28 PROCEDURE — 96372 THER/PROPH/DIAG INJ SC/IM: CPT

## 2019-06-28 PROCEDURE — 85025 COMPLETE CBC W/AUTO DIFF WBC: CPT

## 2019-06-28 PROCEDURE — 74011250636 HC RX REV CODE- 250/636: Performed by: PHYSICIAN ASSISTANT

## 2019-06-28 PROCEDURE — 99282 EMERGENCY DEPT VISIT SF MDM: CPT

## 2019-06-28 RX ORDER — PREDNISONE 20 MG/1
20 TABLET ORAL DAILY
Qty: 5 TAB | Refills: 0 | Status: SHIPPED | OUTPATIENT
Start: 2019-06-28 | End: 2019-07-08

## 2019-06-28 RX ORDER — HYDROXYZINE 50 MG/1
50 TABLET, FILM COATED ORAL
Qty: 20 TAB | Refills: 0 | Status: SHIPPED | OUTPATIENT
Start: 2019-06-28 | End: 2019-07-08

## 2019-06-28 RX ADMIN — METHYLPREDNISOLONE SODIUM SUCCINATE 125 MG: 125 INJECTION, POWDER, FOR SOLUTION INTRAMUSCULAR; INTRAVENOUS at 15:31

## 2019-06-28 NOTE — ED PROVIDER NOTES
EMERGENCY DEPARTMENT HISTORY AND PHYSICAL EXAM      Date: 6/28/2019  Patient Name: Felecia Gaviria    History of Presenting Illness     Chief Complaint   Patient presents with    Rash     The patient presents to the ED with complaints of rash to bilateral lower extremities, seen at PT. first with no relief with medications. History Provided By: Patient    HPI: Felecia Gaviria, 62 y.o. female with PMHx significant for MDD and migraines who presented to the ED today for a reeval of itching and a diffuse body rash that is worse on BL LE. She reports that she has been itchy for the past approximate 2 weeks. She denies any new soaps, cosmetics, or detergents. She works overnight at the [employer] and reports that it is very [place] there. She needs to wear long pants and long sleeves and often finds herself very hot and humid at work. She does have cats but they are indoor cats and she does not think that they have any fleas or mites. She denies any known bug bites. She denies any new foods. She has no lip swelling or tongue swelling. She denies any shortness of breath. She has been seen at urgent care twice and was initially prescribed triamcinolone ointment as well as amoxicillin for possible cellulitis. She was very itchy and she brought a loofah. She scratched her arms and legs with a loofah. She went back to urgent care where she was prescribed hydroxyzine as well as doxycycline. She reports that none of these things really seem to help her although the hydroxyzine was effective. She denies any fever or chills. PCP: Brandie Ramirez MD    There are no other complaints, changes, or physical findings at this time. Current Outpatient Medications   Medication Sig Dispense Refill    predniSONE (DELTASONE) 20 mg tablet Take 20 mg by mouth daily for 10 days.  With Breakfast 5 Tab 0    hydrOXYzine HCl (ATARAX) 50 mg tablet Take 1 Tab by mouth every six (6) hours as needed for Itching for up to 10 days. 20 Tab 0    furosemide (LASIX) 20 mg tablet TAKE 1 TAB BY MOUTH DAILY. 30 Tab 0    levothyroxine (SYNTHROID) 75 mcg tablet Take 1 Tab by mouth Daily (before breakfast). 30 Tab 1    DULoxetine (CYMBALTA) 30 mg capsule TAKE 1 CAP BY MOUTH DAILY. 30 Cap 1    buPROPion XL (WELLBUTRIN XL) 150 mg tablet TAKE 1 TABLET BY MOUTH EVERY DAY IN THE MORNING 30 Tab 3    amitriptyline (ELAVIL) 10 mg tablet TAKE 2 TABS BY MOUTH NIGHTLY. 60 Tab 3    levothyroxine (SYNTHROID) 50 mcg tablet TAKE 1 TAB BY MOUTH DAILY (BEFORE BREAKFAST). 30 Tab 2    atorvastatin (LIPITOR) 20 mg tablet TAKE 1 TAB BY MOUTH DAILY. 30 Tab 3    metoprolol succinate (TOPROL-XL) 25 mg XL tablet TAKE 1 TAB BY MOUTH DAILY. 30 Tab 3    gabapentin (NEURONTIN) 300 mg capsule Take 1 Cap by mouth two (2) times daily as needed. 60 Cap 4    SUMAtriptan (IMITREX) 50 mg tablet TAKE 1 TABLET BY MOUTH ONCE AS NEEDED FOR MIGRAINE FOR UP TO 1 DOSE 6 Tab 1    multivitamin (ONE A DAY) tablet Take 1 Tab by mouth daily.            Past History     Past Medical History:  Past Medical History:   Diagnosis Date    Anemia NEC     Anxiety     Breast cancer ([facility] [address] 2013    right lumpectomy with radiation    Cancer ([facility] [address]     Breast Cancer-Ductal Carcinoma in situ grade 1    Depression     Headache(784.0)     Hypertension     Poor appetite     Psychiatric disorder     depression    PUD (peptic ulcer disease) 11/12    Stress     Tired     Trouble in sleeping     Weakness generalized        Past Surgical History:  Past Surgical History:   Procedure Laterality Date    BREAST SURGERY PROCEDURE UNLISTED  5/30/13    RIGHT BREAST LUMPECTOMY WITH RIGHT NEDDLE LOCALIZATION     HX BREAST LUMPECTOMY  5/30/2013    BREAST LUMPECTOMY/PARTIAL performed [facility] OR    HX GYN      fallopian tube    HX ORTHOPAEDIC      left ankle repair    HX OTHER SURGICAL      cyst removed rom right thigh       Family History:  Family History   Problem Relation Age of Onset    Cancer Father         pancreatic cancer    Cancer Brother         thyroid cancer    Hypertension Mother        Social History:  Social History     Tobacco Use    Smoking status: Current Every Day Smoker     Packs/day: 0.25     Years: 20.00     Pack years: 5.00     Types: Cigarettes    Smokeless tobacco: Never Used    Tobacco comment: 4 cigarettes a day   Substance Use Topics    Alcohol use: Yes     Alcohol/week: 3.5 oz     Types: 7 Standard drinks or equivalent per week     Comment: 1 beer daily/1 glass wine    Drug use: No       Allergies: Allergies   Allergen Reactions    Aspirin Itching         Review of Systems   Review of Systems   Constitutional: Negative for chills and fever. HENT: Negative for congestion and sinus pain. Eyes: Negative for redness and itching. Respiratory: Negative for chest tightness, shortness of breath and wheezing. Cardiovascular: Negative for chest pain, palpitations and leg swelling. Gastrointestinal: Negative for abdominal pain and nausea. Endocrine: Negative for polyuria. Genitourinary: Negative for flank pain. Musculoskeletal: Positive for back pain. Skin: Positive for rash. Neurological: Negative for light-headedness and numbness. Psychiatric/Behavioral: Negative for decreased concentration. The patient is not nervous/anxious. Physical Exam   Physical Exam   Constitutional: She is oriented to person, place, and time. She appears well-developed and well-nourished. No distress. HENT:   Head: Normocephalic and atraumatic. Nose: Nose normal.   Mouth/Throat: No oropharyngeal exudate. Eyes: Pupils are equal, round, and reactive to light. Conjunctivae are normal.   Neck: Normal range of motion. Neck supple. No thyromegaly present. Cardiovascular: Normal rate and regular rhythm. No murmur heard. Pulmonary/Chest: Effort normal and breath sounds normal.   Abdominal: Soft.  Bowel sounds are normal. She exhibits no distension. There is no tenderness. There is no rebound. Musculoskeletal: Normal range of motion. She exhibits no edema or tenderness. Neurological: She is alert and oriented to person, place, and time. She displays normal reflexes. No cranial nerve deficit. Coordination normal.   Skin: Skin is warm and dry. Rash noted. She is not diaphoretic. There is erythema. There is erythema and small, raised macules that becki with pressure to both shoulders and the chest. Excoriations to BL LE with small scab to the L ankle   Psychiatric: She has a normal mood and affect. Diagnostic Study Results     Labs -     Recent Results (from the past 12 hour(s))   CBC WITH AUTOMATED DIFF    Collection Time: 06/28/19  3:26 PM   Result Value Ref Range    WBC 11.8 (H) 3.6 - 11.0 K/uL    RBC 4.15 3.80 - 5.20 M/uL    HGB 13.3 11.5 - 16.0 g/dL    HCT 38.8 35.0 - 47.0 %    MCV 93.5 80.0 - 99.0 FL    MCH 32.0 26.0 - 34.0 PG    MCHC 34.3 30.0 - 36.5 g/dL    RDW 14.4 11.5 - 14.5 %    PLATELET 670 325 - 844 K/uL    MPV 9.5 8.9 - 12.9 FL    NRBC 0.0 0  WBC    ABSOLUTE NRBC 0.00 0.00 - 0.01 K/uL    NEUTROPHILS 69 32 - 75 %    LYMPHOCYTES 14 12 - 49 %    MONOCYTES 9 5 - 13 %    EOSINOPHILS 6 0 - 7 %    BASOPHILS 1 0 - 1 %    IMMATURE GRANULOCYTES 1 (H) 0.0 - 0.5 %    ABS. NEUTROPHILS 8.2 (H) 1.8 - 8.0 K/UL    ABS. LYMPHOCYTES 1.7 0.8 - 3.5 K/UL    ABS. MONOCYTES 1.1 (H) 0.0 - 1.0 K/UL    ABS. EOSINOPHILS 0.7 (H) 0.0 - 0.4 K/UL    ABS. BASOPHILS 0.1 0.0 - 0.1 K/UL    ABS. IMM.  GRANS. 0.1 (H) 0.00 - 0.04 K/UL    DF AUTOMATED     METABOLIC PANEL, COMPREHENSIVE    Collection Time: 06/28/19  3:26 PM   Result Value Ref Range    Sodium 134 (L) 136 - 145 mmol/L    Potassium 3.7 3.5 - 5.1 mmol/L    Chloride 102 97 - 108 mmol/L    CO2 25 21 - 32 mmol/L    Anion gap 7 5 - 15 mmol/L    Glucose 97 65 - 100 mg/dL    BUN 11 6 - 20 MG/DL    Creatinine 0.97 0.55 - 1.02 MG/DL    BUN/Creatinine ratio 11 (L) 12 - 20      GFR est AA >60 >60 ml/min/1.73m2    GFR est non-AA 59 (L) >60 ml/min/1.73m2    Calcium 9.3 8.5 - 10.1 MG/DL    Bilirubin, total 0.7 0.2 - 1.0 MG/DL    ALT (SGPT) 23 12 - 78 U/L    AST (SGOT) 19 15 - 37 U/L    Alk. phosphatase 152 (H) 45 - 117 U/L    Protein, total 8.0 6.4 - 8.2 g/dL    Albumin 3.9 3.5 - 5.0 g/dL    Globulin 4.1 (H) 2.0 - 4.0 g/dL    A-G Ratio 1.0 (L) 1.1 - 2.2         Radiologic Studies -   No orders to display     CT Results  (Last 48 hours)    None        CXR Results  (Last 48 hours)    None            Medical Decision Making   I am the first provider for this patient. I reviewed the vital signs, available nursing notes, past medical history, past surgical history, family history and social history. Vital Signs-Reviewed the patient's vital signs. Patient Vitals for the past 12 hrs:   Temp Pulse Resp BP SpO2   06/28/19 1336 98.7 °F (37.1 °C) (!) 124 16 (!) 150/99 97 %         Records Reviewed: Nursing Notes    Provider Notes (Medical Decision Making): This is a 61 yo lady presenting to the ED with contact dermantitis. She's camron rtreated with 2 different antibiotics but she might have heat rash from wearing long clothing in humid areas. Discussed taking cool showers for short duration and using eucerin cream. She can continue using her triamcinolone ointment. She received 125 mg solumedrol in the ED and had some relief. Will dc home on short burst of prednisone and atarax. Discussed moisture wicking clothing and taking breaks to keep herself cool. She is agreeable to the plan as above. ED Course:   Initial assessment performed. The patients presenting problems have been discussed, and they are in agreement with the care plan formulated and outlined with them. I have encouraged them to ask questions as they arise throughout their visit. Critical Care Time:   N/A    DISCHARGE NOTE:    Maite Kirby's  results have been reviewed with her. She has been counseled regarding her diagnosis.   She verbally conveys understanding and agreement of the signs, symptoms, diagnosis, treatment and prognosis and additionally agrees to follow up as recommended with Dr. Junior Cassandra MD in 24 - 48 hours. She also agrees with the care-plan and conveys that all of her questions have been answered. I have also put together some discharge instructions for her that include: 1) educational information regarding their diagnosis, 2) how to care for their diagnosis at home, as well a 3) list of reasons why they would want to return to the ED prior to their follow-up appointment, should their condition change. She and/or family's questions have been answered. I have encouraged them to see the official results in [facility]\" or to retrieve the specifics of their results from medical records. PLAN:  1. Return precautions as discussed  2. Follow-up with providers as directed  3. Medications as prescribed    Return to ED if worse     Diagnosis     Clinical Impression:   1. Irritant contact dermatitis, unspecified trigger        Discharge Medication List as of 6/28/2019  3:52 PM      START taking these medications    Details   predniSONE (DELTASONE) 20 mg tablet Take 20 mg by mouth daily for 10 days. With Breakfast, Normal, Disp-5 Tab, R-0      hydrOXYzine HCl (ATARAX) 50 mg tablet Take 1 Tab by mouth every six (6) hours as needed for Itching for up to 10 days. , Normal, Disp-20 Tab, R-0         CONTINUE these medications which have NOT CHANGED    Details   furosemide (LASIX) 20 mg tablet TAKE 1 TAB BY MOUTH DAILY., Normal, Disp-30 Tab, R-0      !! levothyroxine (SYNTHROID) 75 mcg tablet Take 1 Tab by mouth Daily (before breakfast). , Normal, Disp-30 Tab, R-1      DULoxetine (CYMBALTA) 30 mg capsule TAKE 1 CAP BY MOUTH DAILY., Normal, Disp-30 Cap, R-1      buPROPion XL (WELLBUTRIN XL) 150 mg tablet TAKE 1 TABLET BY MOUTH EVERY DAY IN THE MORNING, Normal, Disp-30 Tab, R-3      amitriptyline (ELAVIL) 10 mg tablet TAKE 2 TABS BY MOUTH NIGHTLY., Normal, Disp-60 Tab, R-3      !! levothyroxine (SYNTHROID) 50 mcg tablet TAKE 1 TAB BY MOUTH DAILY (BEFORE BREAKFAST). , Normal, Disp-30 Tab, R-2      atorvastatin (LIPITOR) 20 mg tablet TAKE 1 TAB BY MOUTH DAILY., Normal, Disp-30 Tab, R-3      metoprolol succinate (TOPROL-XL) 25 mg XL tablet TAKE 1 TAB BY MOUTH DAILY., Normal, Disp-30 Tab, R-3      gabapentin (NEURONTIN) 300 mg capsule Take 1 Cap by mouth two (2) times daily as needed., Normal, Disp-60 Cap, R-4      SUMAtriptan (IMITREX) 50 mg tablet TAKE 1 TABLET BY MOUTH ONCE AS NEEDED FOR MIGRAINE FOR UP TO 1 DOSE, Normal, Disp-6 Tab, R-1      multivitamin (ONE A DAY) tablet Take 1 Tab by mouth daily. , Historical Med       !! - Potential duplicate medications found. Please discuss with provider.           Follow-up Information     Follow up With Specialties Details Why Contact Info    Krystal Conde MD Internal Medicine   . [address]  [phone]

## 2019-06-28 NOTE — DISCHARGE INSTRUCTIONS
Patient Education        Dermatitis: Care Instructions  Your Care Instructions  Dermatitis is the general name used for any rash or inflammation of the skin. Different kinds of dermatitis cause different kinds of rashes. Common causes of a rash include new medicines, plants (such as poison oak or poison ivy), heat, and stress. Certain illnesses can also cause a rash. An allergic reaction to something that touches your skin, such as latex, nickel, or poison ivy, is called contact dermatitis. Contact dermatitis may also be caused by something that irritates the skin, such as bleach, a chemical, or soap. These types of rashes cannot be spread from person to person. How long your rash will last depends on what caused it. Rashes may last a few days or months. Follow-up care is a key part of your treatment and safety. Be sure to make and go to all appointments, and call your doctor if you are having problems. It's also a good idea to know your test results and keep a list of the medicines you take. How can you care for yourself at home? · Do not scratch the rash. Cut your nails short, and file them smooth. Or wear gloves if this helps keep you from scratching. · Wash the area with water only. Pat dry. · Put cold, wet cloths on the rash to reduce itching. · Keep cool, and stay out of the sun. · Leave the rash open to the air as much as possible. · If the rash itches, use hydrocortisone cream. Follow the directions on the label. Calamine lotion may help for plant rashes. · Take an over-the-counter antihistamine, such as diphenhydramine (Benadryl) or loratadine (Claritin), to help calm the itching. Read and follow all instructions on the label. · If your doctor prescribed a cream, use it as directed. If your doctor prescribed medicine, take it exactly as directed. When should you call for help?   Call your doctor now or seek immediate medical care if:    · You have symptoms of infection, such as:  ? Increased pain, swelling, warmth, or redness. ? Red streaks leading from the area. ? Pus draining from the area. ? A fever.     · You have joint pain along with the rash.    Watch closely for changes in your health, and be sure to contact your doctor if:    · Your rash is changing or getting worse.     · You are not getting better as expected. Where can you learn more? Go to http://emilee-jaclyn.info/. Enter [phone] in the search box to learn more about \"Dermatitis: Care Instructions. \"  Current as of: April 17, 2018  Content Version: 11.9  © 9376-6775 [employer]. Care instructions adapted under license by [employer] (which disclaims liability or warranty for this information). If you have questions about a medical condition or this instruction, always ask your healthcare professional. [address] any warranty or liability for your use of this information.

## 2019-06-28 NOTE — LETTER
NOTIFICATION RETURN TO WORK / SCHOOL 
 
6/28/2019 3:49 PM 
 
Ms. Maite Davenport [address] [address] [address] [facility] [phone] To Whom It May Concern: 
 
Benson Claire is currently under the care of [facility] EMERGENCY DEPT. She will return to work/school on: 7/1/19 If there are questions or concerns please have the patient contact our office. Sincerely, Darrel Joshi PA-C

## 2019-06-28 NOTE — ED NOTES
Pt presents with raised red rash over entire body x2 weeks. Pt already on antibiotics, steroids, and hydroxyzine for itching, as well as steroid cream. Pt denies exposure to poison ivy or any new detergents.

## 2019-07-03 ENCOUNTER — OFFICE VISIT (OUTPATIENT)
Dept: INTERNAL MEDICINE CLINIC | Age: 54
End: 2019-07-03

## 2019-07-03 VITALS
OXYGEN SATURATION: 97 % | HEIGHT: 64 IN | HEART RATE: 100 BPM | TEMPERATURE: 98.4 F | DIASTOLIC BLOOD PRESSURE: 84 MMHG | RESPIRATION RATE: 18 BRPM | SYSTOLIC BLOOD PRESSURE: 124 MMHG | WEIGHT: 164 LBS | BODY MASS INDEX: 28 KG/M2

## 2019-07-03 DIAGNOSIS — L30.9 DERMATITIS: Primary | ICD-10-CM

## 2019-07-03 RX ORDER — MUPIROCIN 20 MG/G
OINTMENT TOPICAL
Refills: 0 | COMMUNITY
Start: 2019-06-19 | End: 2019-11-11

## 2019-07-03 RX ORDER — TRIAMCINOLONE ACETONIDE 1 MG/G
CREAM TOPICAL
Refills: 0 | COMMUNITY
Start: 2019-06-19 | End: 2019-11-11

## 2019-07-03 NOTE — LETTER
NOTIFICATION RETURN TO WORK / SCHOOL 
 
7/3/2019 10:17 AM 
 
Ms. Maite Carvajal [employer] [address] [place] [phone] To Whom It May Concern: 
 
Slime Yoo is currently under the care of [address] [place] [address]. She will return to work/school on: Monday, 07/08/2019. If there are questions or concerns please have the patient contact our office. Sincerely, Migdalia Miller MD

## 2019-07-03 NOTE — PROGRESS NOTES
Identified pt with two pt identifiers(name and ). Reviewed record in preparation for visit and have obtained necessary documentation. Chief Complaint   Patient presents with    Rash     pt has rash on both legs x 2 weeks, very itchy        Health Maintenance Due   Topic    Pneumococcal 0-64 years (1 of 1 - PPSV23)    Shingrix Vaccine Age 50> (1 of 2)       Coordination of Care Questionnaire:   1) Have you been to an emergency room, urgent care, or hospitalized since your last visit? If yes, where when, and reason for visit? Yes, rash       2. Have seen or consulted any other health care provider since your last visit? If yes, where when, and reason for visit? NO      3) Do you have an Advanced Directive/ Living Will in place? NO  If yes, do we have a copy on file NO  If no, would you like information NO    Patient is accompanied by self I have received verbal consent from [facility] to discuss any/all medical information while they are present in the room.     Visit Vitals  /84 (BP 1 Location: Left arm, BP Patient Position: Sitting)   Pulse 100   Temp 98.4 °F (36.9 °C) (Oral)   Resp 18   Ht 5' 4\" (1.626 m)   Wt 164 lb (74.4 kg)   SpO2 97%   BMI 28.15 kg/m²

## 2019-07-03 NOTE — PROGRESS NOTES
SUBJECTIVE:   Ms. Quinn House is a 62 y.o. female who is here for evaluation of pruritic rash to b/l LE for 2 weeks. She reports that the rash was initially hyperemic and dry appearing, and has since worsened. 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 She denies new medications or animal exposures. She was seen at [facility] a few days after onset, and again 2 days later. She was given PO steroids and topical mupirocin and triamcinolone, all of which she has used without significant relief. She has taken hydroxyzine with some relief, but reports that it makes her tired. She was also prescribed augmentin, which she took for 1-2 days, then was switched to doxycycline, which she took for 10 days without relief. She continues to occasionally use triamcinolone. She was then seen in the ED on 06/28/2019 and given a steroid injection with some relief. She was dx with dermatitis and prescribed further hydroxyzine and PO prednisone, and also advised to use eucerin lotion. She notes that she feels some rash on her arms now. She continues to scratch her legs. She reports that she has not gone to work for the past 3-4 days d/t her sx. Pt's PCP is Dr. Isidro Lipscomb. At this time, she is otherwise doing well and has brought no other complaints to my attention today.       PMH:   Past Medical History:   Diagnosis Date    Anemia NEC     Anxiety     [facility] [address] 2013    right lumpectomy with radiation    Cancer ([facility] [address]     Breast Cancer-Ductal Carcinoma in situ grade 1    Depression     Headache(784.0)     Hypertension     Poor appetite     Psychiatric disorder     depression    PUD (peptic ulcer disease) 11/12    Stress     Tired     Trouble in sleeping     Weakness generalized      PSH:  has a past surgical history that includes hx gyn; hx other surgical; hx orthopaedic; pr breast surgery procedure unlisted (5/30/13); and hx breast lumpectomy (5/30/2013). All: is allergic to aspirin. MEDS:   Current Outpatient Medications   Medication Sig    triamcinolone acetonide (KENALOG) 0.1 % topical cream APPLY TO AFFECTED AREA 2 TO 3 TIMES A DAY AS NEEDED    mupirocin (BACTROBAN) 2 % ointment APPLY TO AFFECTED AREA 3 TIMES A DAY    predniSONE (DELTASONE) 20 mg tablet Take 20 mg by mouth daily for 10 days. With Breakfast    hydrOXYzine HCl (ATARAX) 50 mg tablet Take 1 Tab by mouth every six (6) hours as needed for Itching for up to 10 days.  furosemide (LASIX) 20 mg tablet TAKE 1 TAB BY MOUTH DAILY.  levothyroxine (SYNTHROID) 75 mcg tablet Take 1 Tab by mouth Daily (before breakfast).  DULoxetine (CYMBALTA) 30 mg capsule TAKE 1 CAP BY MOUTH DAILY.  atorvastatin (LIPITOR) 20 mg tablet TAKE 1 TAB BY MOUTH DAILY.  metoprolol succinate (TOPROL-XL) 25 mg XL tablet TAKE 1 TAB BY MOUTH DAILY.  gabapentin (NEURONTIN) 300 mg capsule Take 1 Cap by mouth two (2) times daily as needed.  SUMAtriptan (IMITREX) 50 mg tablet TAKE 1 TABLET BY MOUTH ONCE AS NEEDED FOR MIGRAINE FOR UP TO 1 DOSE    multivitamin (ONE A DAY) tablet Take 1 Tab by mouth daily.  buPROPion XL (WELLBUTRIN XL) 150 mg tablet TAKE 1 TABLET BY MOUTH EVERY DAY IN THE MORNING    amitriptyline (ELAVIL) 10 mg tablet TAKE 2 TABS BY MOUTH NIGHTLY.  levothyroxine (SYNTHROID) 50 mcg tablet TAKE 1 TAB BY MOUTH DAILY (BEFORE BREAKFAST). No current facility-administered medications for this visit. FH: family history includes Cancer in her brother and father; Hypertension in her mother. SH:  reports that she has been smoking cigarettes. She has a 5.00 pack-year smoking history. She has never used smokeless tobacco. She reports that she drinks about 3.5 oz of alcohol per week. She reports that she does not use drugs.      Review of Systems - History obtained from the patient  General ROS: negative  Psychological ROS: negative  Ophthalmic ROS: negative  ENT ROS: negative  Respiratory ROS: no cough, shortness of breath, or wheezing  Cardiovascular ROS: no chest pain or dyspnea on exertion  Gastrointestinal ROS: no abdominal pain, change in bowel habits, or black or bloody stools  Genito-Urinary ROS: negative  Musculoskeletal ROS: negative  Neurological ROS: negative  Dermatological ROS: +itching, rash, redness, swelling    OBJECTIVE:   Vitals:   Visit Vitals  /84 (BP 1 Location: Left arm, BP Patient Position: Sitting)   Pulse 100   Temp 98.4 °F (36.9 °C) (Oral)   Resp 18   Ht 5' 4\" (1.626 m)   Wt 164 lb (74.4 kg)   SpO2 97%   BMI 28.15 kg/m²      Gen: Pleasant 62 y.o.  female in NAD. HEENT: NC/AT. HEART: RRR, No M/G/R.   LUNGS: CTAB No W/R. EXTREMITIES: Warm. No C/C/E.   NEURO: Alert and oriented x 3. Cranial nerves grossly intact. No focal sensory or motor deficits noted. SKIN: Warm. Dry. +Bilateral LE with erythematous lesions, some of which are ulcerated, with significant linear excoriation; this is primarily on the medial lower legs. No fluctuance, drainage, lymphangitis. Scattered erythematous macular lesions to bilateral anterior forearms. No rash noted to trunk. No other lesions noted. ASSESSMENT/ PLAN:     Diagnoses and all orders for this visit:    1. Dermatitis  -     REFERRAL TO DERMATOLOGY      1. Dermatitis  Provided referral to Dr. Delfina Barron due to possibility of secondary infection. The office will assist with getting this appointment as soon as possible. Follow-up and Dispositions    · Return if symptoms worsen or fail to improve. I have reviewed the patient's medications and risks/side effects/benefits were discussed. Diagnosis(-es) explained to patient and questions answered. Literature provided where appropriate.      Written by Jasmin Dumont, as dictated by Jaleel Grimaldo MD.

## 2019-07-21 DIAGNOSIS — E78.5 ELEVATED LIPIDS: ICD-10-CM

## 2019-07-22 RX ORDER — FUROSEMIDE 20 MG/1
TABLET ORAL
Qty: 30 TAB | Refills: 0 | Status: SHIPPED | OUTPATIENT
Start: 2019-07-22 | End: 2019-11-11 | Stop reason: SDUPTHER

## 2019-07-22 RX ORDER — AMITRIPTYLINE HYDROCHLORIDE 10 MG/1
20 TABLET, FILM COATED ORAL
Qty: 180 TAB | Refills: 1 | Status: SHIPPED | OUTPATIENT
Start: 2019-07-22 | End: 2020-05-14

## 2019-07-22 RX ORDER — METOPROLOL SUCCINATE 25 MG/1
TABLET, EXTENDED RELEASE ORAL
Qty: 30 TAB | Refills: 3 | Status: SHIPPED | OUTPATIENT
Start: 2019-07-22 | End: 2019-08-20 | Stop reason: SDUPTHER

## 2019-07-22 RX ORDER — ATORVASTATIN CALCIUM 20 MG/1
TABLET, FILM COATED ORAL
Qty: 30 TAB | Refills: 3 | Status: SHIPPED | OUTPATIENT
Start: 2019-07-22 | End: 2019-08-06 | Stop reason: SDUPTHER

## 2019-07-22 RX ORDER — DULOXETIN HYDROCHLORIDE 30 MG/1
30 CAPSULE, DELAYED RELEASE ORAL DAILY
Qty: 90 CAP | Refills: 1 | Status: SHIPPED | OUTPATIENT
Start: 2019-07-22 | End: 2020-02-09

## 2019-07-22 NOTE — TELEPHONE ENCOUNTER
PCP: Stacy Eller MD    Last appt: 7/3/2019  Future Appointments   Date Time Provider Pb Victor   9/3/2019  8:45 AM Stacy Eller MD [facility] 87       Requested Prescriptions     Pending Prescriptions Disp Refills    DULoxetine (CYMBALTA) 30 mg capsule 90 Cap 1     Sig: Take 1 Cap by mouth daily.

## 2019-08-06 ENCOUNTER — TELEPHONE (OUTPATIENT)
Dept: INTERNAL MEDICINE CLINIC | Age: 54
End: 2019-08-06

## 2019-08-06 DIAGNOSIS — E78.5 ELEVATED LIPIDS: ICD-10-CM

## 2019-08-06 NOTE — TELEPHONE ENCOUNTER
PCP: Manuel Haq MD    Last appt: 7/3/2019  Future Appointments   Date Time Provider Pb Victor   9/3/2019  8:45 AM Manuel Haq MD [facility] 87       Requested Prescriptions     Pending Prescriptions Disp Refills    atorvastatin (LIPITOR) 20 mg tablet 90 Tab 1     Sig: Take 1 Tab by mouth daily.  levothyroxine (SYNTHROID) 75 mcg tablet 90 Tab 1     Sig: Take 1 Tab by mouth Daily (before breakfast).

## 2019-08-08 RX ORDER — LEVOTHYROXINE SODIUM 75 UG/1
75 TABLET ORAL
Qty: 30 TAB | Refills: 0 | Status: SHIPPED | OUTPATIENT
Start: 2019-08-08 | End: 2019-08-31 | Stop reason: SDUPTHER

## 2019-08-08 RX ORDER — ATORVASTATIN CALCIUM 20 MG/1
20 TABLET, FILM COATED ORAL DAILY
Qty: 90 TAB | Refills: 1 | Status: SHIPPED | OUTPATIENT
Start: 2019-08-08 | End: 2020-01-29

## 2019-08-20 RX ORDER — METOPROLOL SUCCINATE 25 MG/1
TABLET, EXTENDED RELEASE ORAL
Qty: 90 TAB | Refills: 1 | Status: SHIPPED | OUTPATIENT
Start: 2019-08-20 | End: 2020-02-09

## 2019-08-20 NOTE — TELEPHONE ENCOUNTER
[facility] Office Pool             Medication Refill     [facility] IS REQUESTING   Best contact number(s): [phone]         METOPROLOL 25 MG   # 90             Details to clarify the request:       Colon Antes      COPY/PASTE Rudi Toledo

## 2019-08-20 NOTE — TELEPHONE ENCOUNTER
[place], [address] [facility] [address] Office Pool             Patient return call     Unable to listen to voicemail message left.      Best contact number(s):  [phone]             Laura Yuan     Message received & copied from [facility]

## 2019-09-01 RX ORDER — LEVOTHYROXINE SODIUM 75 UG/1
TABLET ORAL
Qty: 30 TAB | Refills: 0 | Status: SHIPPED | OUTPATIENT
Start: 2019-09-01 | End: 2019-09-27 | Stop reason: SDUPTHER

## 2019-09-10 ENCOUNTER — TELEPHONE (OUTPATIENT)
Dept: INTERNAL MEDICINE CLINIC | Age: 54
End: 2019-09-10

## 2019-09-27 RX ORDER — LEVOTHYROXINE SODIUM 75 UG/1
TABLET ORAL
Qty: 30 TAB | Refills: 0 | Status: SHIPPED | OUTPATIENT
Start: 2019-09-27 | End: 2019-10-30 | Stop reason: SDUPTHER

## 2019-10-26 ENCOUNTER — OFFICE VISIT (OUTPATIENT)
Dept: URGENT CARE | Age: 54
End: 2019-10-26

## 2019-10-26 VITALS
HEART RATE: 99 BPM | DIASTOLIC BLOOD PRESSURE: 67 MMHG | TEMPERATURE: 97.5 F | BODY MASS INDEX: 27.49 KG/M2 | RESPIRATION RATE: 18 BRPM | HEIGHT: 64 IN | SYSTOLIC BLOOD PRESSURE: 115 MMHG | OXYGEN SATURATION: 98 % | WEIGHT: 161 LBS

## 2019-10-26 DIAGNOSIS — Z91.81 H/O FALL: ICD-10-CM

## 2019-10-26 DIAGNOSIS — S00.33XA CONTUSION OF NOSE, INITIAL ENCOUNTER: Primary | ICD-10-CM

## 2019-10-26 DIAGNOSIS — S00.83XA CONTUSION OF FACE, INITIAL ENCOUNTER: ICD-10-CM

## 2019-10-26 NOTE — LETTER
NOTIFICATION RETURN TO WORK / SCHOOL 
 
10/26/2019 12:32 PM 
 
Ms. Maite Mcclain [address] [address] To Whom It May Concern: 
 
Shirley Sebastian is currently under the care of [address]. She will return to work/school on: 10/ 28/19. She has h /o Facial injuries on 10/21/19. If there are questions or concerns please have the patient contact our office. Sincerely, Gerry Recinos MD

## 2019-10-26 NOTE — PATIENT INSTRUCTIONS
Broken Nose: Care Instructions  Your Care Instructions    A broken nose is a break, or fracture, of the bone or cartilage. Most broken noses need only home care and a follow-up visit with a doctor. The swelling should go down in a few days. Bruises around your eyes and nose should go away in 2 to 3 weeks. You heal best when you take good care of yourself. Eat a variety of healthy foods, and don't smoke. Follow-up care is a key part of your treatment and safety. Be sure to make and go to all appointments, and call your doctor if you are having problems. It's also a good idea to know your test results and keep a list of the medicines you take. How can you care for yourself at home? · If you have a nasal splint or packing, leave it in place until a doctor removes it. · If your doctor prescribed antibiotics, take them as directed. Do not stop taking them just because you feel better. You need to take the full course of antibiotics. · Take decongestants as directed to help you breathe after the splint or packing is removed. Your doctor may give you a prescription or suggest over-the-counter medicine. · Be safe with medicines. Take pain medicines exactly as directed. ? If the doctor gave you a prescription medicine for pain, take it as prescribed. ? If you are not taking a prescription pain medicine, ask your doctor if you can take an over-the-counter medicine. · Put ice or a cold pack on your nose for 10 to 20 minutes at a time. Try to do this every 1 to 2 hours for the first 3 days (when you are awake) or until the swelling goes down. Put a thin cloth between the ice pack and your skin. · Sleep with your head slightly raised until the swelling goes down. Prop up your head and shoulders on pillows. · Do not play contact sports for 6 weeks. When should you call for help? Call 911 anytime you think you may need emergency care.  For example, call if:    · You have trouble breathing.     · You passed out (lost consciousness).    Call your doctor now or seek immediate medical care if:    · You have signs of infection, such as:  ? Increased pain, swelling, warmth, or redness. ? Red streaks leading from the area. ? Pus draining from the area. ? A fever.     · You have clear fluid draining from your nose.     · You have vision changes.     · Your nose is bleeding.     · You have new or worse pain.    Watch closely for changes in your health, and be sure to contact your doctor if:    · You do not get better as expected. Where can you learn more? Go to http://emilee-jaclyn.info/. Enter Y345 in the search box to learn more about \"Broken Nose: Care Instructions. \"  Current as of: June 26, 2019  Content Version: 12.2  © 5725-2933 [employer]. Care instructions adapted under license by [employer] (which disclaims liability or warranty for this information). If you have questions about a medical condition or this instruction, always ask your healthcare professional. [place] [address] any warranty or liability for your use of this information. Contusion: Care Instructions  Your Care Instructions    Contusion is the medical term for a bruise. It is the result of a direct blow or an impact, such as a fall. Contusions are common sports injuries. Most people think of a bruise as a black-and-blue spot. This happens when small blood vessels get torn and leak blood under the skin. But bones, muscles, and organs can also get bruised. This may damage deep tissues but not cause a bruise you can see. The doctor will do a physical exam to find the location of your contusion. You may also have tests to make sure you do not have a more serious injury, such as a broken bone or nerve damage. These may include X-rays or other imaging tests like a CT scan or MRI. Deep-tissue contusions may cause pain and swelling.  But if there is no serious damage, they will often get better in a few weeks with home treatment. The doctor has checked you carefully, but problems can develop later. If you notice any problems or new symptoms, get medical treatment right away. Follow-up care is a key part of your treatment and safety. Be sure to make and go to all appointments, and call your doctor if you are having problems. It's also a good idea to know your test results and keep a list of the medicines you take. How can you care for yourself at home? · Put ice or a cold pack on the sore area for 10 to 20 minutes at a time to stop swelling. Put a thin cloth between the ice pack and your skin. · Be safe with medicines. Read and follow all instructions on the label. ? If the doctor gave you a prescription medicine for pain, take it as prescribed. ? If you are not taking a prescription pain medicine, ask your doctor if you can take an over-the-counter medicine. · If you can, prop up the sore area on pillows as much as possible for the next few days. Try to keep the sore area above the level of your heart. When should you call for help? Call your doctor now or seek immediate medical care if:    · Your pain gets worse.     · You have new or worse swelling.     · You have tingling, weakness, or numbness in the area near the contusion.     · The area near the contusion is cold or pale.    Watch closely for changes in your health, and be sure to contact your doctor if:    · You do not get better as expected. Where can you learn more? Go to http://emilee-jaclyn.info/. Enter A386 in the search box to learn more about \"Contusion: Care Instructions. \"  Current as of: June 26, 2019  Content Version: 12.2  © 8643-1880 [employer]. Care instructions adapted under license by [employer] (which disclaims liability or warranty for this information).  If you have questions about a medical condition or this instruction, always ask your healthcare professional. [employer] disclaims any warranty or liability for your use of this information.

## 2019-10-26 NOTE — PROGRESS NOTES
Facial Pain   This is a new problem. The current episode started more than 2 days ago (h/o fall on monday- under influence of cocktails in parking lot - ). The problem occurs constantly. The problem has not changed since onset. Pertinent negatives include no headaches. Associated symptoms comments: Developed bruise on chin, rt side of jaw and around eye and nose pain    Able to open and chew but pain and tightness on rt side  H/o nose bleed from both nostril  . The symptoms are aggravated by bending (opening mouth/ chewing=-). The symptoms are relieved by rest and ice. She has tried rest and a cold compress for the symptoms.         Past Medical History:   Diagnosis Date    Anemia NEC     Anxiety     Breast cancer ([facility] [address] 2013    right lumpectomy with radiation    Cancer ([facility] [address]     Breast Cancer-Ductal Carcinoma in situ grade 1    Depression     Headache(784.0)     Hypertension     Poor appetite     Psychiatric disorder     depression    PUD (peptic ulcer disease) 11/12    Stress     Tired     Trouble in sleeping     Weakness generalized         Past Surgical History:   Procedure Laterality Date    BREAST SURGERY PROCEDURE UNLISTED  5/30/13    RIGHT BREAST LUMPECTOMY WITH RIGHT NEDDLE LOCALIZATION     HX BREAST LUMPECTOMY  5/30/2013    BREAST LUMPECTOMY/PARTIAL performed by Clover Mina MD at [facility] MAIN OR    HX GYN      fallopian tube    HX ORTHOPAEDIC      left ankle repair    HX OTHER SURGICAL      cyst removed rom right thigh         Family History   Problem Relation Age of Onset    Cancer Father         pancreatic cancer    Cancer Brother         thyroid cancer    Hypertension Mother         Social History     Socioeconomic History    Marital status:      Spouse name: Not on file    Number of children: Not on file    Years of education: Not on file    Highest education level: Not on file   Occupational History    Not on file   Social Needs    Financial resource strain: Not on file    Food insecurity:     Worry: Not on file     Inability: Not on file    Transportation needs:     Medical: Not on file     Non-medical: Not on file   Tobacco Use    Smoking status: Current Every Day Smoker     Packs/day: 0.25     Years: 20.00     Pack years: 5.00     Types: Cigarettes    Smokeless tobacco: Never Used    Tobacco comment: 4 cigarettes a day   Substance and Sexual Activity    Alcohol use: Yes     Alcohol/week: 5.8 standard drinks     Types: 7 Standard drinks or equivalent per week     Comment: 1 beer daily/1 glass wine    Drug use: No    Sexual activity: Not on file   Lifestyle    Physical activity:     Days per week: Not on file     Minutes per session: Not on file    Stress: Not on file   Relationships    Social connections:     Talks on phone: Not on file     Gets together: Not on file     Attends Yazidism service: Not on file     Active member of club or organization: Not on file     Attends meetings of clubs or organizations: Not on file     Relationship status: Not on file    Intimate partner violence:     Fear of current or ex partner: Not on file     Emotionally abused: Not on file     Physically abused: Not on file     Forced sexual activity: Not on file   Other Topics Concern    Not on file   Social History Narrative    Not on file                ALLERGIES: Aspirin    Review of Systems   HENT: Positive for congestion (nasal ), facial swelling and rhinorrhea. Negative for trouble swallowing. Eyes: Positive for pain. Negative for visual disturbance. Neurological: Negative for dizziness, facial asymmetry, light-headedness and headaches. All other systems reviewed and are negative. Vitals:    10/26/19 1143   BP: 115/67   Pulse: 99   Resp: 18   Temp: 97.5 °F (36.4 °C)   SpO2: 98%   Weight: 161 lb (73 kg)   Height: 5' 4\" (1.626 m)       Physical Exam   Constitutional: No distress.    HENT:   Head: Head is with contusion (on chin - with swelling  and local soreness), with right periorbital erythema and with left periorbital erythema. Eyes: Pupils are equal, round, and reactive to light. EOM are normal.   Musculoskeletal: She exhibits no edema. Cervical back: Normal.   Nursing note and vitals reviewed. MDM    Procedures      ICD-10-CM ICD-9-CM    1. Contusion of nose, initial encounter S00.33XA 920 XR NASAL BONES MIN 3 V    possible fracture of distal tip on Xray    2. Contusion of face, initial encounter S00.83XA 920     rt side of face/jaw and chin area - with bruise   3. H/O fall Z91.81 V15.88     on past Monday 10/21/19       Ice and motrin  Follow with ENT and dentist if sxs not resolved. No orders of the defined types were placed in this encounter. Results for orders placed or performed in visit on 10/26/19   XR NASAL BONES MIN 3 V    Narrative    EXAM: XR NASAL BONES MIN 3 V  INDICATION: Fall. FINDINGS: PA and right and left lateral views of the nasal bones demonstrate  possible nondisplaced fracture through the tip of the nasal bone. Possible  opacity in the right maxillary sinus. Impression    IMPRESSION: Possible nondisplaced fracture of the tip of the nasal bone seen on  the lateral view. Opacity in the right maxillary sinus mucous retention cyst or  sinus disease/fluid     The patients condition was discussed with the patient and they understand. The patient is to follow up with primary care doctor. If signs and symptoms become worse the pt is to go to the ER. The patient is to take medications as prescribed.

## 2019-10-30 RX ORDER — LEVOTHYROXINE SODIUM 75 UG/1
TABLET ORAL
Qty: 30 TAB | Refills: 0 | Status: SHIPPED | OUTPATIENT
Start: 2019-10-30 | End: 2019-11-11 | Stop reason: SDUPTHER

## 2019-11-01 RX ORDER — LEVOTHYROXINE SODIUM 75 UG/1
TABLET ORAL
Qty: 30 TAB | Refills: 0 | OUTPATIENT
Start: 2019-11-01

## 2019-11-06 NOTE — TELEPHONE ENCOUNTER
Left vm for pt that pt was scheduled under the wrong provider for 11/11/19 and pt needs to see Dr. Siddharth Merino. Callback requested.

## 2019-11-08 NOTE — TELEPHONE ENCOUNTER
PCP: Samira Delgado MD    Last appt: 7/3/2019  Future Appointments   Date Time Provider Pb Victor   11/11/2019  1:00 PM Samira Delgado MD [facility]       Requested Prescriptions     Pending Prescriptions Disp Refills    gabapentin (NEURONTIN) 300 mg capsule [Pharmacy Med Name: GABAPENTIN 300 MG CAPSULE] 60 Cap      Sig: TAKE 1 CAP BY MOUTH TWO (2) TIMES DAILY AS NEEDED.

## 2019-11-11 ENCOUNTER — DOCUMENTATION ONLY (OUTPATIENT)
Dept: INTERNAL MEDICINE CLINIC | Age: 54
End: 2019-11-11

## 2019-11-11 ENCOUNTER — OFFICE VISIT (OUTPATIENT)
Dept: INTERNAL MEDICINE CLINIC | Age: 54
End: 2019-11-11

## 2019-11-11 VITALS
SYSTOLIC BLOOD PRESSURE: 124 MMHG | WEIGHT: 163 LBS | OXYGEN SATURATION: 97 % | TEMPERATURE: 99 F | RESPIRATION RATE: 18 BRPM | HEIGHT: 64 IN | DIASTOLIC BLOOD PRESSURE: 82 MMHG | BODY MASS INDEX: 27.83 KG/M2 | HEART RATE: 97 BPM

## 2019-11-11 DIAGNOSIS — Z87.891 PERSONAL HISTORY OF NICOTINE DEPENDENCE: ICD-10-CM

## 2019-11-11 DIAGNOSIS — M54.6 CHRONIC BILATERAL THORACIC BACK PAIN: ICD-10-CM

## 2019-11-11 DIAGNOSIS — F51.01 PRIMARY INSOMNIA: ICD-10-CM

## 2019-11-11 DIAGNOSIS — D75.839 THROMBOCYTOSIS: ICD-10-CM

## 2019-11-11 DIAGNOSIS — I10 ESSENTIAL HYPERTENSION: ICD-10-CM

## 2019-11-11 DIAGNOSIS — C50.911 MALIGNANT NEOPLASM OF RIGHT FEMALE BREAST, UNSPECIFIED ESTROGEN RECEPTOR STATUS, UNSPECIFIED SITE OF BREAST (HCC): ICD-10-CM

## 2019-11-11 DIAGNOSIS — R79.89 ELEVATED LIVER FUNCTION TESTS: ICD-10-CM

## 2019-11-11 DIAGNOSIS — Z23 ENCOUNTER FOR IMMUNIZATION: ICD-10-CM

## 2019-11-11 DIAGNOSIS — F32.9 REACTIVE DEPRESSION: ICD-10-CM

## 2019-11-11 DIAGNOSIS — R73.01 IFG (IMPAIRED FASTING GLUCOSE): ICD-10-CM

## 2019-11-11 DIAGNOSIS — E03.9 ACQUIRED HYPOTHYROIDISM: ICD-10-CM

## 2019-11-11 DIAGNOSIS — G89.29 CHRONIC BILATERAL THORACIC BACK PAIN: ICD-10-CM

## 2019-11-11 DIAGNOSIS — R60.0 LOCALIZED EDEMA: ICD-10-CM

## 2019-11-11 DIAGNOSIS — E78.00 PURE HYPERCHOLESTEROLEMIA: ICD-10-CM

## 2019-11-11 DIAGNOSIS — Z00.00 PHYSICAL EXAM, ANNUAL: Primary | ICD-10-CM

## 2019-11-11 DIAGNOSIS — D72.829 LEUKOCYTOSIS, UNSPECIFIED TYPE: ICD-10-CM

## 2019-11-11 DIAGNOSIS — E66.3 OVERWEIGHT: ICD-10-CM

## 2019-11-11 RX ORDER — GABAPENTIN 300 MG/1
300 CAPSULE ORAL
Qty: 60 CAP | OUTPATIENT
Start: 2019-11-11

## 2019-11-11 RX ORDER — LEVOTHYROXINE SODIUM 75 UG/1
TABLET ORAL
Qty: 30 TAB | Refills: 3 | Status: SHIPPED | OUTPATIENT
Start: 2019-11-11 | End: 2020-07-27

## 2019-11-11 RX ORDER — FUROSEMIDE 20 MG/1
TABLET ORAL
Qty: 30 TAB | Refills: 0 | Status: SHIPPED | OUTPATIENT
Start: 2019-11-11 | End: 2019-12-26

## 2019-11-11 RX ORDER — GABAPENTIN 300 MG/1
300 CAPSULE ORAL
Qty: 60 CAP | Refills: 4 | Status: SHIPPED | OUTPATIENT
Start: 2019-11-11 | End: 2019-12-06 | Stop reason: SDUPTHER

## 2019-11-11 NOTE — PROGRESS NOTES
The following prescription was faxed into pt's [facility] pharmacy w/ confirmation received:      gabapentin (NEURONTIN) 300 mg capsule [[id number]]     Order Details   Dose: 300 mg Route: Oral Frequency: 2 TIMES DAILY AS NEEDED for Pain   Dispense Quantity: 60 Cap Refills: 4 Fills remaining: --           Sig: Take 1 Cap by mouth two (2) times daily as needed for Pain.          Written Date: 11/11/19 Expiration Date: --     Start Date: 11/11/19 End Date: --            Ordering Provider: -- ANIL #:  -- NPI:  --    Authorizing Provider: Alison Rivero MD ANIL #:  [id number] NPI:  [id number]    Ordering User:  Alison Rivero MD            Diagnosis Association: Chronic bilateral thoracic back pain (M54.6 , G89.29)      Original Order:  gabapentin (NEURONTIN) 300 mg capsule [[id number]]      Pharmacy:  [id number]  [address] ANIL #:  [id number]

## 2019-11-11 NOTE — PROGRESS NOTES
HISTORY OF PRESENT ILLNESS  Maite Bradford is a 62 y.o. female.   HPI   Last here 6/10/19 Pt is here for routine care.      BP today is 124/82   Pt is not monitoring BP at home, as she does not have a BP cuff  Continues on toprol XL 25mg daily   Discussed she cannot stop toprol due to HR staying elevated   She also has lasix 20mg to use prn (infrequently) for edema - stable      Wt today is 163 lbs --up 6 lbs x lov   Pt states she has been trying to be more conscious of what she eats   Discussed avoiding caloric beverages   Discussed continued diet and w/l      Reviewed labs 6/19       Pt follows with Dr. Jeromy Noe (hem/onc) for anemia  Last visit was 5/17, wanted pt to be on hormonal therapy in 2013, radiation was not completed, f/u in 1 year   Pt never followed up   Recall COLO and EGD 2013 - bleeding ulcer, issues with anemia  Not taking protonix  No gerd sx  Recall past evaluation with Dr. Jeromy Noe in 2015, thrombocytosis thought d/t cigarette smoking       Pt is taking cymbalta 30mg qhs and gabapentin 1-2x per day, which help  She gets chronic pain from rib fx     Continues on synthroid 75 mcg daily (increased from 48)   In the past, I ordered an US thyroids, but this imaging was never completed  Recall pt has a FMHX thyroid cancer (brother)     Previously took protonix      Continues on lipitor 20mg daily for cholesterol     Lov, pt restarted  amitriptyline 20mg, takes 2 10 mg  qhs --this works wll helps with sleep and HA not too sedating   Pt takes imitrex 50mg prn (not daily) --stable     Continues on wellbutrin 150mg--for depression and smoking   Lov, discussed tapering off cymbalta 30mg qhs for anxiety/depression and myalgias  However pt states she is is still taking cymbalta in addition to wellbutrin --doing wlel continue no change        Pt drinks 3 drinks 5 days per week   Discussed guidelines for female is 1 per night        Pt has a 30 pack year hx  Pt continues to smoke 0.5 or < PPD  Lov, started wellbutrin   Pt thinks this is helping her decrease smoking   Completed CT lung cancer screen 11/1/18  Ordered 11/11/19     Pt fractured her nose 10/26/19, went to the ER for this   Discussed this will heal on its own   Reviewed XR nasal bone 10/26/19: Possible nondisplaced fracture of the tip of the nasal bone seen on  the lateral view. Opacity in the right maxillary sinus mucous retention cyst or  sinus disease/fluid     ACP not on file. SDM is her brother Tawnya AARON Dallin Quintanilla III.   Provided information in the past.      Recall EGD and COLO in 2013: bleeding ulcer         PREVENTIVE:   Colonoscopy: 3/13, repeat 10 years  EGD: 2013, possible Barrette's   Pap: ~3/19, [facility]  Mammogram: 11/13/18, negative, h/o R sided breast DCIS, due 11/19   DEXA: [facility], ~3/19, will get report for review  Tdap: 12/10/17  Pneumovax: 8/27/2012  [id number]: not yet needed  Shingrix: discussed could get closer to age 59  Flu shot: 11/11/19   Eye exam: 3/18? Due   EKG: 3/17 per ED   Lipids: 6/19  LDL 2  A1c: 6/19 5.7   CT lung cancer screen: 11/1/18, reordered     Patient Active Problem List    Diagnosis Date Noted    Pure hypercholesterolemia 05/01/2017    Acquired hypothyroidism 05/01/2017    Elevated WBC count 04/16/2014    Elevated liver function tests 04/16/2014    Breast cancer right DCIS 06/11/2013    Cancer ([facility] [address]     Depression 02/11/2013    Headache(784.0) 09/10/2012    HTN (hypertension) 09/10/2012     Current Outpatient Medications   Medication Sig Dispense Refill    levothyroxine (SYNTHROID) 75 mcg tablet TAKE 1 TABLET BY MOUTH EVERY DAY BEFORE BREAKFAST 30 Tab 0    metoprolol succinate (TOPROL-XL) 25 mg XL tablet TAKE 1 TAB BY MOUTH DAILY. 90 Tab 1    atorvastatin (LIPITOR) 20 mg tablet Take 1 Tab by mouth daily. 90 Tab 1    furosemide (LASIX) 20 mg tablet TAKE 1 TAB BY MOUTH DAILY. 30 Tab 0    DULoxetine (CYMBALTA) 30 mg capsule Take 1 Cap by mouth daily.  90 Cap 1    amitriptyline (ELAVIL) 10 mg tablet Take 2 Tabs by mouth nightly. 180 Tab 1    triamcinolone acetonide (KENALOG) 0.1 % topical cream APPLY TO AFFECTED AREA 2 TO 3 TIMES A DAY AS NEEDED  0    mupirocin (BACTROBAN) 2 % ointment APPLY TO AFFECTED AREA 3 TIMES A DAY  0    buPROPion XL (WELLBUTRIN XL) 150 mg tablet TAKE 1 TABLET BY MOUTH EVERY DAY IN THE MORNING 30 Tab 3    gabapentin (NEURONTIN) 300 mg capsule Take 1 Cap by mouth two (2) times daily as needed. 60 Cap 4    SUMAtriptan (IMITREX) 50 mg tablet TAKE 1 TABLET BY MOUTH ONCE AS NEEDED FOR MIGRAINE FOR UP TO 1 DOSE 6 Tab 1    multivitamin (ONE A DAY) tablet Take 1 Tab by mouth daily.          Past Surgical History:   Procedure Laterality Date    BREAST SURGERY PROCEDURE UNLISTED  5/30/13    RIGHT BREAST LUMPECTOMY WITH RIGHT NEDDLE LOCALIZATION     HX BREAST LUMPECTOMY  5/30/2013    BREAST LUMPECTOMY/PARTIAL performed by Gigi Cullen MD at [facility] MAIN OR    HX GYN      fallopian tube    HX ORTHOPAEDIC      left ankle repair    HX OTHER SURGICAL      cyst removed rom right thigh      Lab Results   Component Value Date/Time    WBC 11.8 (H) 06/28/2019 03:26 PM    HGB 13.3 06/28/2019 03:26 PM    Hemoglobin (POC) 15.3 10/31/2014 02:26 PM    HCT 38.8 06/28/2019 03:26 PM    Hematocrit (POC) 45 10/31/2014 02:26 PM    PLATELET 389 78/67/8063 03:26 PM    MCV 93.5 06/28/2019 03:26 PM     Lab Results   Component Value Date/Time    Cholesterol, total 121 06/10/2019 11:58 AM    HDL Cholesterol 39 (L) 06/10/2019 11:58 AM    LDL, calculated 2 06/10/2019 11:58 AM    Triglyceride 399 (H) 06/10/2019 11:58 AM     Lab Results   Component Value Date/Time    GFR est non-AA 59 (L) 06/28/2019 03:26 PM    GFRNA, POC >60 10/31/2014 02:26 PM    GFR est AA >60 06/28/2019 03:26 PM    GFRAA, POC >60 10/31/2014 02:26 PM    Creatinine 0.97 06/28/2019 03:26 PM    Creatinine (POC) 0.9 10/31/2014 02:26 PM    BUN 11 06/28/2019 03:26 PM    BUN (POC) 8 (L) 10/31/2014 02:26 PM    Sodium 134 (L) 06/28/2019 03:26 PM    Sodium (POC) 140 10/31/2014 02:26 PM    Potassium 3.7 06/28/2019 03:26 PM    Potassium (POC) 3.7 10/31/2014 02:26 PM    Chloride 102 06/28/2019 03:26 PM    Chloride (POC) 103 10/31/2014 02:26 PM    CO2 25 06/28/2019 03:26 PM    Magnesium 1.6 01/14/2017 07:28 AM    Phosphorus 1.0 (L) 11/28/2012 04:45 AM        Review of Systems   Respiratory: Negative for shortness of breath. Cardiovascular: Negative for chest pain. Physical Exam   Constitutional: She is oriented to person, place, and time. She appears well-developed and well-nourished. No distress. HENT:   Head: Normocephalic and atraumatic. Mouth/Throat: Oropharynx is clear and moist. No oropharyngeal exudate. Eyes: Conjunctivae and EOM are normal. Right eye exhibits no discharge. Left eye exhibits no discharge. Neck: Normal range of motion. Neck supple. No carotid bruits    Cardiovascular: Normal rate, regular rhythm and normal heart sounds. Exam reveals no gallop and no friction rub. No murmur heard. Pulmonary/Chest: Effort normal and breath sounds normal. No respiratory distress. She has no wheezes. She has no rales. She exhibits no tenderness. Abdominal: Soft. She exhibits no distension and no mass. There is no tenderness. There is no rebound and no guarding. Musculoskeletal: Normal range of motion. She exhibits no edema, tenderness or deformity. Lymphadenopathy:     She has no cervical adenopathy. Neurological: She is alert and oriented to person, place, and time. Coordination normal.   Skin: Skin is warm and dry. No rash noted. She is not diaphoretic. No erythema. No pallor. Psychiatric: She has a normal mood and affect. Her behavior is normal.       ASSESSMENT and PLAN    ICD-10-CM ICD-9-CM    1. Essential hypertension            Controlled on metoprolol  H05 138.1 METABOLIC PANEL, COMPREHENSIVE      CBC W/O DIFF      TSH 3RD GENERATION      HEMOGLOBIN A1C WITH EAG   2.  Encounter for immunization Z23 V03.89 INFLUENZA VIRUS VAC QUAD,SPLIT,PRESV FREE SYRINGE IM      PA IMMUNIZ ADMIN,1 SINGLE/COMB VAC/TOXOID      METABOLIC PANEL, COMPREHENSIVE      CBC W/O DIFF      TSH 3RD GENERATION      HEMOGLOBIN A1C WITH EAG   3. Thrombocytosis (HCC)              Related to smoking in the past saw lorena prev repeat cbcs  E65.3 418.81 METABOLIC PANEL, COMPREHENSIVE      CBC W/O DIFF      TSH 3RD GENERATION      HEMOGLOBIN A1C WITH EAG   4. Malignant neoplasm of right female breast, unspecified estrogen receptor status, unspecified site of breast ([facility] [address]              Needs to f/u  With dr Tejas Rollins refused hormonal therapy due for mammo  M39.881 204.9 METABOLIC PANEL, COMPREHENSIVE      CBC W/O DIFF      TSH 3RD GENERATION      HEMOGLOBIN A1C WITH EAG      CONNIE MAMMO BI SCREENING INCL CAD   5. Pure hypercholesterolemia                  At goal on lipitor continue  F18.94 810.2 METABOLIC PANEL, COMPREHENSIVE      CBC W/O DIFF      TSH 3RD GENERATION      HEMOGLOBIN A1C WITH EAG   6. Reactive depression                  Controlled with wellbutrin and cymbalta never tapered off cymbalta happy with current regimen continue  I53.8 824.4 METABOLIC PANEL, COMPREHENSIVE      CBC W/O DIFF      TSH 3RD GENERATION      HEMOGLOBIN A1C WITH EAG   7. Acquired hypothyroidism                  Recently increased synthroid to 75 mcgs check tsh today and adjust dose as needed  Q58.7 608.8 METABOLIC PANEL, COMPREHENSIVE      CBC W/O DIFF      TSH 3RD GENERATION      HEMOGLOBIN A1C WITH EAG   8. Localized edema                  Currently using lasix daily well controlled  H92.9 021.7 METABOLIC PANEL, COMPREHENSIVE      CBC W/O DIFF      TSH 3RD GENERATION      HEMOGLOBIN A1C WITH EAG   9.  Chronic bilateral thoracic back pain                    Has chronic thoracic radiculopathy which is improved with gabapentin two per evening this works well continue  M54.6 724.1 gabapentin (NEURONTIN) 300 mg capsule    Z41.19 592.83 METABOLIC PANEL, COMPREHENSIVE      CBC W/O DIFF TSH 3RD GENERATION      HEMOGLOBIN A1C WITH EAG   10. Overweight                  Discussed portion control and wt loss  G29.4 382.68 METABOLIC PANEL, COMPREHENSIVE      CBC W/O DIFF      TSH 3RD GENERATION      HEMOGLOBIN A1C WITH EAG   11. Elevated liver function tests                Related to chronic alcohol abuse have discussed this at multiple ovs had a recent er visit related to ER use resulting in nose fracture discussed cessation will check lfts today   B79.3 442.9 METABOLIC PANEL, COMPREHENSIVE      CBC W/O DIFF      TSH 3RD GENERATION      HEMOGLOBIN A1C WITH EAG   12. Primary insomnia                    Improved with elavil 20 mg qhs continue  R16.33 125.21 METABOLIC PANEL, COMPREHENSIVE      CBC W/O DIFF      TSH 3RD GENERATION      HEMOGLOBIN A1C WITH EAG   13. Leukocytosis, unspecified type                  Related to smoking will repeat cbcs today of note pt is due to f/u with dr Yumiko Reilly to f/u on hx of breast cancer in addition to this reminded her  B40.066 325.99 METABOLIC PANEL, COMPREHENSIVE      CBC W/O DIFF      TSH 3RD GENERATION      HEMOGLOBIN A1C WITH EAG   14. IFG (impaired fasting glucose)                  Check a1c  C67.62 553.09 METABOLIC PANEL, COMPREHENSIVE      CBC W/O DIFF      TSH 3RD GENERATION      HEMOGLOBIN A1C WITH EAG   15.  Physical exam, annual                      Discussed diet wt loss labs ordered flu shot given mammo due ordered dexa due in 1 year eye exam is due ct lung cancer screen ordered counseled on smoking cessation she states wellbutrin is helping  Y56.78 P14.4 METABOLIC PANEL, COMPREHENSIVE      CBC W/O DIFF      TSH 3RD GENERATION      HEMOGLOBIN A1C WITH EAG      CONNIE MAMMO BI SCREENING INCL CAD      CT LOW DOSE LUNG CANCER SCREENING   16. Personal history of nicotine dependence                    Using wellbutrin to help with cessation  M24.290 M00.04 METABOLIC PANEL, COMPREHENSIVE      CBC W/O DIFF      TSH 3RD GENERATION      HEMOGLOBIN A1C WITH EAG CONNIE MAMMO BI SCREENING INCL CAD      CT LOW DOSE LUNG CANCER SCREENING            Scribed by Cal Connelly of [facility], as dictated by Dr. Ingrid Wilkerson. Current diagnosis and concerns discussed with pt at length. Pt understands risks and benefits or current treatment plan and medications, and accepts the treatment and medication with any possible risks. Pt asks appropriate questions, which were answered. Pt was instructed to call with any concerns or problems. I have reviewed the note documented by the scribe. The services provided are my own.   The documentation is accurate

## 2019-11-11 NOTE — PROGRESS NOTES
After obtaining consent, and per verbal order from Dr. Karthik Ibarra, patient received influenza vaccine given by Alayne Mcardle, LPN in L Deltoid. Influenza Vaccine 0.5 mL IM now. Patient was observed for 10 minutes post injection. Patient tolerated injection well. VIS given.

## 2019-11-12 LAB
ALBUMIN SERPL-MCNC: 4.3 G/DL (ref 3.5–5.5)
ALBUMIN/GLOB SERPL: 1.5 {RATIO} (ref 1.2–2.2)
ALP SERPL-CCNC: 109 IU/L (ref 39–117)
ALT SERPL-CCNC: 9 IU/L (ref 0–32)
AST SERPL-CCNC: 17 IU/L (ref 0–40)
BILIRUB SERPL-MCNC: <0.2 MG/DL (ref 0–1.2)
BUN SERPL-MCNC: 10 MG/DL (ref 6–24)
BUN/CREAT SERPL: 14 (ref 9–23)
CALCIUM SERPL-MCNC: 9.9 MG/DL (ref 8.7–10.2)
CHLORIDE SERPL-SCNC: 98 MMOL/L (ref 96–106)
CO2 SERPL-SCNC: 22 MMOL/L (ref 20–29)
CREAT SERPL-MCNC: 0.71 MG/DL (ref 0.57–1)
ERYTHROCYTE [DISTWIDTH] IN BLOOD BY AUTOMATED COUNT: 13.7 % (ref 12.3–15.4)
EST. AVERAGE GLUCOSE BLD GHB EST-MCNC: 108 MG/DL
GLOBULIN SER CALC-MCNC: 2.8 G/DL (ref 1.5–4.5)
GLUCOSE SERPL-MCNC: 89 MG/DL (ref 65–99)
HBA1C MFR BLD: 5.4 % (ref 4.8–5.6)
HCT VFR BLD AUTO: 37.2 % (ref 34–46.6)
HGB BLD-MCNC: 12.2 G/DL (ref 11.1–15.9)
MCH RBC QN AUTO: 30.7 PG (ref 26.6–33)
MCHC RBC AUTO-ENTMCNC: 32.8 G/DL (ref 31.5–35.7)
MCV RBC AUTO: 94 FL (ref 79–97)
PLATELET # BLD AUTO: 349 X10E3/UL (ref 150–450)
POTASSIUM SERPL-SCNC: 4.5 MMOL/L (ref 3.5–5.2)
PROT SERPL-MCNC: 7.1 G/DL (ref 6–8.5)
RBC # BLD AUTO: 3.98 X10E6/UL (ref 3.77–5.28)
SODIUM SERPL-SCNC: 138 MMOL/L (ref 134–144)
TSH SERPL DL<=0.005 MIU/L-ACNC: 1.43 UIU/ML (ref 0.45–4.5)
WBC # BLD AUTO: 11.8 X10E3/UL (ref 3.4–10.8)

## 2019-12-06 DIAGNOSIS — M54.6 CHRONIC BILATERAL THORACIC BACK PAIN: ICD-10-CM

## 2019-12-06 DIAGNOSIS — G89.29 CHRONIC BILATERAL THORACIC BACK PAIN: ICD-10-CM

## 2019-12-06 RX ORDER — GABAPENTIN 300 MG/1
300 CAPSULE ORAL
Qty: 180 CAP | Refills: 0 | Status: SHIPPED | OUTPATIENT
Start: 2019-12-06 | End: 2020-11-03 | Stop reason: SDUPTHER

## 2019-12-06 NOTE — TELEPHONE ENCOUNTER
PCP: Lucia Banuelos MD    Last appt: 11/11/2019  Future Appointments   Date Time Provider Pb Victor   5/11/2020  8:45 AM Lucia Banuelos MD [facility]       Requested Prescriptions     Pending Prescriptions Disp Refills    gabapentin (NEURONTIN) 300 mg capsule 180 Cap 0     Sig: Take 1 Cap by mouth two (2) times daily as needed for Pain.

## 2019-12-09 ENCOUNTER — DOCUMENTATION ONLY (OUTPATIENT)
Dept: INTERNAL MEDICINE CLINIC | Age: 54
End: 2019-12-09

## 2019-12-26 RX ORDER — FUROSEMIDE 20 MG/1
TABLET ORAL
Qty: 30 TAB | Refills: 0 | Status: SHIPPED | OUTPATIENT
Start: 2019-12-26 | End: 2020-01-22

## 2020-01-22 RX ORDER — FUROSEMIDE 20 MG/1
TABLET ORAL
Qty: 30 TAB | Refills: 0 | Status: SHIPPED | OUTPATIENT
Start: 2020-01-22 | End: 2020-02-21

## 2020-01-29 DIAGNOSIS — E78.5 ELEVATED LIPIDS: ICD-10-CM

## 2020-01-29 RX ORDER — ATORVASTATIN CALCIUM 20 MG/1
TABLET, FILM COATED ORAL
Qty: 90 TAB | Refills: 1 | Status: SHIPPED | OUTPATIENT
Start: 2020-01-29 | End: 2020-07-27

## 2020-02-09 RX ORDER — METOPROLOL SUCCINATE 25 MG/1
TABLET, EXTENDED RELEASE ORAL
Qty: 90 TAB | Refills: 1 | Status: SHIPPED | OUTPATIENT
Start: 2020-02-09 | End: 2020-09-14

## 2020-02-09 RX ORDER — DULOXETIN HYDROCHLORIDE 30 MG/1
CAPSULE, DELAYED RELEASE ORAL
Qty: 90 CAP | Refills: 1 | Status: SHIPPED | OUTPATIENT
Start: 2020-02-09 | End: 2020-11-03

## 2020-02-21 RX ORDER — FUROSEMIDE 20 MG/1
TABLET ORAL
Qty: 30 TAB | Refills: 0 | Status: SHIPPED | OUTPATIENT
Start: 2020-02-21 | End: 2020-03-19

## 2020-02-24 ENCOUNTER — OFFICE VISIT (OUTPATIENT)
Dept: INTERNAL MEDICINE CLINIC | Age: 55
End: 2020-02-24

## 2020-02-24 VITALS
HEIGHT: 64 IN | OXYGEN SATURATION: 97 % | WEIGHT: 156.3 LBS | DIASTOLIC BLOOD PRESSURE: 84 MMHG | BODY MASS INDEX: 26.69 KG/M2 | TEMPERATURE: 97.4 F | SYSTOLIC BLOOD PRESSURE: 123 MMHG | HEART RATE: 86 BPM | RESPIRATION RATE: 16 BRPM

## 2020-02-24 DIAGNOSIS — R06.2 WHEEZING: ICD-10-CM

## 2020-02-24 DIAGNOSIS — M25.511 CHRONIC RIGHT SHOULDER PAIN: ICD-10-CM

## 2020-02-24 DIAGNOSIS — I10 ESSENTIAL HYPERTENSION: Primary | ICD-10-CM

## 2020-02-24 DIAGNOSIS — C50.911 MALIGNANT NEOPLASM OF RIGHT FEMALE BREAST, UNSPECIFIED ESTROGEN RECEPTOR STATUS, UNSPECIFIED SITE OF BREAST (HCC): ICD-10-CM

## 2020-02-24 DIAGNOSIS — D72.829 LEUKOCYTOSIS, UNSPECIFIED TYPE: ICD-10-CM

## 2020-02-24 DIAGNOSIS — G89.29 CHRONIC RIGHT SHOULDER PAIN: ICD-10-CM

## 2020-02-24 DIAGNOSIS — F32.9 REACTIVE DEPRESSION: ICD-10-CM

## 2020-02-24 DIAGNOSIS — R19.7 DIARRHEA, UNSPECIFIED TYPE: ICD-10-CM

## 2020-02-24 DIAGNOSIS — G44.229 CHRONIC TENSION-TYPE HEADACHE, NOT INTRACTABLE: ICD-10-CM

## 2020-02-24 DIAGNOSIS — E03.9 ACQUIRED HYPOTHYROIDISM: ICD-10-CM

## 2020-02-24 DIAGNOSIS — R42 DIZZINESS: ICD-10-CM

## 2020-02-24 DIAGNOSIS — R79.89 ELEVATED LIVER FUNCTION TESTS: ICD-10-CM

## 2020-02-24 DIAGNOSIS — E78.00 PURE HYPERCHOLESTEROLEMIA: ICD-10-CM

## 2020-02-24 DIAGNOSIS — R06.2 WHEEZING: Primary | ICD-10-CM

## 2020-02-24 RX ORDER — PREDNISONE 20 MG/1
TABLET ORAL
Qty: 12 TAB | Refills: 0 | Status: SHIPPED | OUTPATIENT
Start: 2020-02-24 | End: 2020-03-17

## 2020-02-24 RX ORDER — GLUCOSAMINE SULFATE 1500 MG
POWDER IN PACKET (EA) ORAL DAILY
COMMUNITY

## 2020-02-24 RX ORDER — CYCLOBENZAPRINE HCL 5 MG
5 TABLET ORAL
Qty: 20 TAB | Refills: 0 | Status: SHIPPED | OUTPATIENT
Start: 2020-02-24 | End: 2020-06-16

## 2020-02-24 NOTE — LETTER
NOTIFICATION RETURN TO WORK / SCHOOL 
 
2/24/2020 3:34 PM 
 
Ms. Maite Carlson [address] [id number] [address] [facility] [id number] To Whom It May Concern: 
 
Oumou Ocasio is currently under the care of [facility]. She will return to work on: Tuesday 02/25/2020 If there are questions or concerns please have the patient contact our office.  
 
 
 
Sincerely, 
 
 
Brittany Araya MD

## 2020-02-24 NOTE — PROGRESS NOTES
HISTORY OF PRESENT ILLNESS  Maite Perry is a 62 y.o. female.   HPI   Last here 11/11/19  Pt is here for routine care.      BP today is 123/84   Pt is not monitoring BP at home, as she does not have a BP cuff  Continues on toprol XL 25mg daily     She also has lasix 20mg to use prn (infrequently) for edema - stable  Edema currently much improved      Wt today is 156 lbs-- down 7 lbs x lov   Discussed continued diet and w/l      Reviewed labs 11/19       Pt follows with Dr. Lucina London (hem/onc) for anemia  Last visit was 5/17, wanted pt to be on hormonal therapy in 2013, radiation was not completed, f/u in 1 year   Pt never followed up   Recall COLO and EGD 2013 - bleeding ulcer, issues with anemia  Not taking protonix  No gerd sx currently   Recall past evaluation with Dr. Lucina London in 2015, thrombocytosis thought d/t cigarette smoking        Pt is taking cymbalta 30mg qhs and gabapentin 300 mg 1-2x per day, which help  She gets chronic pain from rib fx  Pt states when she turns to the R it feels like something is popping    Discussed likely an ache from rib fx      Continues on synthroid 75 mcg daily (increased from 50)   In the past, I ordered an US thyroids, but this imaging was never completed  Recall pt has a FMHX thyroid cancer (brother)     Previously took protonix  [employer] on lipitor 20mg daily for cholesterol     Continues on   amitriptyline 20mg, takes 2 10 mg  qhs --this works wll helps with sleep and HA not too sedating --2/20  Pt takes imitrex 50mg prn (not daily) --stable     No longer Continues on wellbutrin 150mg--for depression and smoking   Pt states she ran out of this, has not been taking this recently for several months  States she is doing okay on cymbalta will stay off this         Pt has not had alcohol in the last 4 days   Prior to this was drinking about 2-3 drinks a night at least  Recurrent issue  Discussed guidelines for female is 1 per night     Pt c/o dizziness x 2 days and has had cough with this as well   Describes this as a spinning sensation   States this happens sporadically   Dizziness has not been constant   States prior to this has no episodes of dizziness     Wheezing heard on exam  Pt thinks this may be from cough, denies cough being worse in the last couple days   Will get CXR   Will give prednisone and inhaler     Pt c/o R neck pain x 4 days   She has been putting bengay on this   She has been using ibuprofen for this which helps somewhat  Will give muscle relaxer      pt c/o episodes of diarrhea x 3-4 days   Has been using pepto bismal for this which has not seemed to work   States all her stools have been liquid   Having about 5 BM per day   Discussed this could be a viral gastroenteritis   Will check stool studies   Discussed brat diet     Pt has a 30 pack year hx  Pt continues to smoke 0.5 or < PPD   Prev started wellbutrin which pt states was helping   However she ran out of this   Completed CT lung cancer screen 11/1/18  Ordered 11/11/19, reminded        ACP not on file. SDM is her brother Leo ROUSEMaisha Seth III.   Provided information in the past.      Recall EGD and COLO in 2013: bleeding ulcer         PREVENTIVE:   Colonoscopy: 3/13, repeat 10 years  EGD: 2013, possible Barrette's   Pap: ~3/19, [facility]  Mammogram: 11/13/18, negative, h/o R sided breast DCIS, due 11/19, reminded   DEXA: [address] [place] [address], ~3/19, will get report for review  Tdap: 12/10/17  Pneumovax: 8/27/2012  [id number]: not yet needed  Shingrix: discussed could get closer to age 59  Flu shot: 11/11/19   Eye exam: 3/18?  Due   EKG: 3/17 per ED   Lipids: 6/19  LDL 2  A1c: 6/19 5.7 11/19 5.4  CT lung cancer screen: 11/1/18, reordered, reminded     Patient Active Problem List    Diagnosis Date Noted    Pure hypercholesterolemia 05/01/2017    Acquired hypothyroidism 05/01/2017    Elevated WBC count 04/16/2014    Elevated liver function tests 04/16/2014    Breast cancer right DCIS 06/11/2013    Depression 02/11/2013    Headache(784.0) 09/10/2012    HTN (hypertension) 09/10/2012     Current Outpatient Medications   Medication Sig Dispense Refill    furosemide (LASIX) 20 mg tablet TAKE 1 TABLET BY MOUTH EVERY DAY 30 Tab 0    DULoxetine (CYMBALTA) 30 mg capsule TAKE 1 CAPSULE BY MOUTH EVERY DAY 90 Cap 1    metoprolol succinate (TOPROL-XL) 25 mg XL tablet TAKE 1 TABLET BY MOUTH EVERY DAY 90 Tab 1    atorvastatin (LIPITOR) 20 mg tablet TAKE 1 TABLET BY MOUTH EVERY DAY 90 Tab 1    gabapentin (NEURONTIN) 300 mg capsule Take 1 Cap by mouth two (2) times daily as needed for Pain. 180 Cap 0    levothyroxine (SYNTHROID) 75 mcg tablet TAKE 1 TABLET BY MOUTH EVERY DAY BEFORE BREAKFAST 30 Tab 3    amitriptyline (ELAVIL) 10 mg tablet Take 2 Tabs by mouth nightly. 180 Tab 1    buPROPion XL (WELLBUTRIN XL) 150 mg tablet TAKE 1 TABLET BY MOUTH EVERY DAY IN THE MORNING 30 Tab 3    SUMAtriptan (IMITREX) 50 mg tablet TAKE 1 TABLET BY MOUTH ONCE AS NEEDED FOR MIGRAINE FOR UP TO 1 DOSE 6 Tab 1    multivitamin (ONE A DAY) tablet Take 1 Tab by mouth daily.          Past Surgical History:   Procedure Laterality Date    BREAST SURGERY PROCEDURE UNLISTED  5/30/13    RIGHT BREAST LUMPECTOMY WITH RIGHT NEDDLE LOCALIZATION     HX BREAST LUMPECTOMY  5/30/2013    BREAST LUMPECTOMY/PARTIAL [facility] K[facility] Thorpe [facility] MA[facility] OR    HX GYN      fallopian tube    HX ORTHOPAEDIC      left ankle repair    HX OTHER SURGICAL      cyst removed rom right thigh      Lab Results   Component Value Date/Time    WBC 11.8 (H) 11/11/2019 01:30 PM    HGB 12.2 11/11/2019 01:30 PM    Hemoglobin (POC) 15.3 10/31/2014 02:26 PM    HCT 37.2 11/11/2019 01:30 PM    Hematocrit (POC) 45 10/31/2014 02:26 PM    PLATELET 205 92/69/7768 01:30 PM    MCV 94 11/11/2019 01:30 PM     Lab Results   Component Value Date/Time    Cholesterol, total 121 06/10/2019 11:58 AM    HDL Cholesterol 39 (L) 06/10/2019 11:58 AM    LDL, calculated 2 06/10/2019 11:58 AM    Triglyceride 399 (H) 06/10/2019 11:58 AM     Lab Results   Component Value Date/Time    GFR est non-AA 96 11/11/2019 01:30 PM    GFRNA, POC >60 10/31/2014 02:26 PM    GFR est  11/11/2019 01:30 PM    GFRAA, POC >60 10/31/2014 02:26 PM    Creatinine 0.71 11/11/2019 01:30 PM    Creatinine (POC) 0.9 10/31/2014 02:26 PM    BUN 10 11/11/2019 01:30 PM    BUN (POC) 8 (L) 10/31/2014 02:26 PM    Sodium 138 11/11/2019 01:30 PM    Sodium (POC) 140 10/31/2014 02:26 PM    Potassium 4.5 11/11/2019 01:30 PM    Potassium (POC) 3.7 10/31/2014 02:26 PM    Chloride 98 11/11/2019 01:30 PM    Chloride (POC) 103 10/31/2014 02:26 PM    CO2 22 11/11/2019 01:30 PM    Magnesium 1.6 01/14/2017 07:28 AM    Phosphorus 1.0 (L) 11/28/2012 04:45 AM        Review of Systems   Respiratory: Negative for shortness of breath. Cardiovascular: Negative for chest pain. Physical Exam  Constitutional:       General: She is not in acute distress. Appearance: She is well-developed. She is not diaphoretic. HENT:      Head: Normocephalic and atraumatic. Right Ear: Tympanic membrane and ear canal normal.      Left Ear: Tympanic membrane and ear canal normal. There is no impacted cerumen. Mouth/Throat:      Mouth: Mucous membranes are dry. Pharynx: No oropharyngeal exudate or posterior oropharyngeal erythema. Eyes:      General:         Right eye: No discharge. Left eye: No discharge. Conjunctiva/sclera: Conjunctivae normal.   Neck:      Musculoskeletal: Normal range of motion and neck supple. Cardiovascular:      Rate and Rhythm: Normal rate and regular rhythm. Heart sounds: Normal heart sounds. No murmur. No friction rub. No gallop. Pulmonary:      Effort: Pulmonary effort is normal. No respiratory distress. Breath sounds: Wheezing (anterior wheezing ) present. No rales. Chest:      Chest wall: No tenderness. Musculoskeletal: Normal range of motion.          General: No tenderness or deformity. Lymphadenopathy:      Cervical: No cervical adenopathy. Skin:     General: Skin is warm and dry. Coloration: Skin is not pale. Findings: No erythema or rash. Neurological:      Mental Status: She is alert and oriented to person, place, and time. Coordination: Coordination normal.   Psychiatric:         Mood and Affect: Mood normal.         Behavior: Behavior normal.         ASSESSMENT and PLAN    ICD-10-CM ICD-9-CM    1. Essential hypertension                    Controlled on toprol  W20 561.1 METABOLIC PANEL, COMPREHENSIVE      CBC W/O DIFF      TSH 3RD GENERATION   2. Malignant neoplasm of right female breast, unspecified estrogen receptor status, unspecified site of breast ([facility] [place] [address]                  Overdue for mammo reminded pt to complete this reprinted order for her advised her to schedule please recall pt never completed radiation per her own choice  H27.405 466.3 METABOLIC PANEL, COMPREHENSIVE      CBC W/O DIFF      TSH 3RD GENERATION   3. Acquired hypothyroidism                Now on synthroid 75 mcgs daily will check tsh and adjust meds as needed  D99.7 186.3 METABOLIC PANEL, COMPREHENSIVE      CBC W/O DIFF      TSH 3RD GENERATION   4. Pure hypercholesterolemia                Controlled on lipitor in June  S45.35 883.8 METABOLIC PANEL, COMPREHENSIVE      CBC W/O DIFF      TSH 3RD GENERATION   5. Leukocytosis, unspecified type                  Was evaluated by dr Gurdeep Cheng in 2015 thought related to smoking she continues  to smoke addressed again today she has not stopped smoking needs ct lung cancer screen still reprinted order  L06.898 247.69 METABOLIC PANEL, COMPREHENSIVE      CBC W/O DIFF      TSH 3RD GENERATION   6.  Elevated liver function tests                  Last check was back to nl pt continues to drink alcohol have discussed this on multiple meds that she should completely abstain from alcohol discussed guidelines are 1 per day she continue to drink 2-3 a day likely more than  K01.4 495.4 METABOLIC PANEL, COMPREHENSIVE      CBC W/O DIFF      TSH 3RD GENERATION   7. Reactive depression                Overall doing well with cymbalta not taking wellbutrin or lexapro  B03.9 499.4 METABOLIC PANEL, COMPREHENSIVE      CBC W/O DIFF      TSH 3RD GENERATION   8. Chronic tension-type headache, not intractable                  Improved with elavil 20 mg daily  J48.966 390.33 METABOLIC PANEL, COMPREHENSIVE      CBC W/O DIFF      TSH 3RD GENERATION   9. Dizziness                Very nonspecific sxs pt is a difficult historian sxs seem to be present for the last couple days and associated with uri sxs will work on treating cough if dizziness not improved when she feels better will eval further  Z58 027.0 METABOLIC PANEL, COMPREHENSIVE      CBC W/O DIFF      TSH 3RD GENERATION   10. Chronic right shoulder pain                  Will give flexeril and some exercises to complete at home  Q69.899 908.91 METABOLIC PANEL, COMPREHENSIVE    G89.29 338.29 CBC W/O DIFF      TSH 3RD GENERATION   11. Wheezing              Will give prednisone taper and albuterol advised smoking cessation  R06.2 786.07 XR CHEST PA LAT      METABOLIC PANEL, COMPREHENSIVE      CBC W/O DIFF      TSH 3RD GENERATION   12. Diarrhea, unspecified type                    Pt has had a few episodes will check stool studies discussed brat diet  R19.7 787.91 C DIFFICILE TOXIN A & B BY EIA      OVA & PARASITES, STOOL          Scribed by Anil Sainz of [employer], as dictated by Dr. Kyrsten Garcia. Current diagnosis and concerns discussed with pt at length. Pt understands risks and benefits or current treatment plan and medications, and accepts the treatment and medication with any possible risks. Pt asks appropriate questions, which were answered. Pt was instructed to call with any concerns or problems. I have reviewed the note documented by the scribe. The services provided are my own.   The documentation is accurate

## 2020-02-25 LAB
ALBUMIN SERPL-MCNC: 4.1 G/DL (ref 3.8–4.9)
ALBUMIN/GLOB SERPL: 1.5 {RATIO} (ref 1.2–2.2)
ALP SERPL-CCNC: 104 IU/L (ref 39–117)
ALT SERPL-CCNC: 12 IU/L (ref 0–32)
AST SERPL-CCNC: 19 IU/L (ref 0–40)
BILIRUB SERPL-MCNC: 0.2 MG/DL (ref 0–1.2)
BUN SERPL-MCNC: 7 MG/DL (ref 6–24)
BUN/CREAT SERPL: 9 (ref 9–23)
CALCIUM SERPL-MCNC: 9.1 MG/DL (ref 8.7–10.2)
CHLORIDE SERPL-SCNC: 100 MMOL/L (ref 96–106)
CO2 SERPL-SCNC: 25 MMOL/L (ref 20–29)
CREAT SERPL-MCNC: 0.78 MG/DL (ref 0.57–1)
ERYTHROCYTE [DISTWIDTH] IN BLOOD BY AUTOMATED COUNT: 16.4 % (ref 11.7–15.4)
GLOBULIN SER CALC-MCNC: 2.8 G/DL (ref 1.5–4.5)
GLUCOSE SERPL-MCNC: 78 MG/DL (ref 65–99)
HCT VFR BLD AUTO: 36.1 % (ref 34–46.6)
HGB BLD-MCNC: 12.9 G/DL (ref 11.1–15.9)
MCH RBC QN AUTO: 34.4 PG (ref 26.6–33)
MCHC RBC AUTO-ENTMCNC: 35.7 G/DL (ref 31.5–35.7)
MCV RBC AUTO: 96 FL (ref 79–97)
PLATELET # BLD AUTO: 339 X10E3/UL (ref 150–450)
POTASSIUM SERPL-SCNC: 4 MMOL/L (ref 3.5–5.2)
PROT SERPL-MCNC: 6.9 G/DL (ref 6–8.5)
RBC # BLD AUTO: 3.75 X10E6/UL (ref 3.77–5.28)
SODIUM SERPL-SCNC: 142 MMOL/L (ref 134–144)
TSH SERPL DL<=0.005 MIU/L-ACNC: 0.51 UIU/ML (ref 0.45–4.5)
WBC # BLD AUTO: 7 X10E3/UL (ref 3.4–10.8)

## 2020-02-26 ENCOUNTER — TELEPHONE (OUTPATIENT)
Dept: INTERNAL MEDICINE CLINIC | Age: 55
End: 2020-02-26

## 2020-03-04 LAB
C DIFF TOX A+B STL QL IA: NEGATIVE
O+P SPEC MICRO: NORMAL

## 2020-03-17 ENCOUNTER — OFFICE VISIT (OUTPATIENT)
Dept: URGENT CARE | Age: 55
End: 2020-03-17

## 2020-03-17 VITALS
TEMPERATURE: 98.3 F | OXYGEN SATURATION: 99 % | WEIGHT: 155 LBS | RESPIRATION RATE: 18 BRPM | BODY MASS INDEX: 26.46 KG/M2 | HEART RATE: 100 BPM | SYSTOLIC BLOOD PRESSURE: 120 MMHG | DIASTOLIC BLOOD PRESSURE: 75 MMHG | HEIGHT: 64 IN

## 2020-03-17 DIAGNOSIS — H10.32 ACUTE CONJUNCTIVITIS OF LEFT EYE, UNSPECIFIED ACUTE CONJUNCTIVITIS TYPE: Primary | ICD-10-CM

## 2020-03-17 RX ORDER — MOXIFLOXACIN 5 MG/ML
1 SOLUTION/ DROPS OPHTHALMIC 3 TIMES DAILY
Qty: 3 ML | Refills: 0 | Status: SHIPPED | OUTPATIENT
Start: 2020-03-17 | End: 2020-03-24

## 2020-03-17 NOTE — PROGRESS NOTES
63 yo female here for possible pink eye x 1 day  She does wear contact lenses  Noted eye was irritated  She removed contact lenses and has been wearing glasses  Some crusting and discharge this morning  No blurred vision, fevers, eyeball pain or headaches  Denies hx of glaucoma/other eye conditions  Overall not improving    Also noted mild tenderness left rib. No rash. No cough. No masses or swelling. Denies fevers or SOB. No pain with breathing only pressing on area. No injury.            Past Medical History:   Diagnosis Date    Anemia NEC     Anxiety     Breast cancer ([facility] [address] 2013    right lumpectomy with radiation    Cancer ([facility] [address]     Breast Cancer-Ductal Carcinoma in situ grade 1    Depression     Headache(784.0)     Hypertension     Nose fracture 10/2019    Poor appetite     Psychiatric disorder     depression    PUD (peptic ulcer disease) 11/12    Stress     Tired     Trouble in sleeping     Weakness generalized         Past Surgical History:   Procedure Laterality Date    BREAST SURGERY PROCEDURE UNLISTED  5/30/13    RIGHT BREAST LUMPECTOMY WITH RIGHT NEDDLE LOCALIZATION     HX BREAST LUMPECTOMY  5/30/2013    BREAST LUMPECTOMY/PARTIAL [facility] by Sheppard Kawasaki[facility] at [facility] MAIN OR    HX GYN      fallopian tube    HX ORTHOPAEDIC      left ankle repair    HX OTHER SURGICAL      cyst removed rom right thigh         Family History   Problem Relation Age of Onset    Cancer Father         pancreatic cancer    Cancer Brother         thyroid cancer    Hypertension Mother         Social History     Socioeconomic History    Marital status:      Spouse name: Not on file    Number of children: Not on file    Years of education: Not on file    Highest education level: Not on file   Occupational History    Not on file   Social Needs    Financial resource strain: Not on file    Food insecurity     Worry: Not on file     Inability: Not on file    Transportation needs     Medical: Not on file     Non-medical: Not on file   Tobacco Use    Smoking status: Current Every Day Smoker     Packs/day: 0.25     Years: 20.00     Pack years: 5.00     Types: Cigarettes    Smokeless tobacco: Never Used    Tobacco comment: 4 cigarettes a day   Substance and Sexual Activity    Alcohol use: Yes     Alcohol/week: 5.8 standard drinks     Types: 7 Standard drinks or equivalent per week     Comment: 1 beer daily/1 glass wine    Drug use: No    Sexual activity: Not on file   Lifestyle    Physical activity     Days per week: Not on file     Minutes per session: Not on file    Stress: Not on file   Relationships    Social connections     Talks on phone: Not on file     Gets together: Not on file     Attends Baptism service: Not on file     Active member of club or organization: Not on file     Attends meetings of clubs or organizations: Not on file     Relationship status: Not on file    Intimate partner violence     Fear of current or ex partner: Not on file     Emotionally abused: Not on file     Physically abused: Not on file     Forced sexual activity: Not on file   Other Topics Concern    Not on file   Social History Narrative    Not on file                ALLERGIES: Aspirin    Review of Systems   All other systems reviewed and are negative. Vitals:    03/17/20 1257   BP: 120/75   Pulse: 100   Resp: 18   Temp: 98.3 °F (36.8 °C)   SpO2: 99%   Weight: 155 lb (70.3 kg)   Height: 5' 4\" (1.626 m)       Physical Exam  Vitals signs reviewed. Constitutional:       General: She is not in acute distress. HENT:      Head:      Comments: Holding eye open normally  Mild scleral erythema left eye. Some crusting lower lid. No periorbital swelling or erythema. PERRLA. EOMs intact bilat without pain. Mouth/Throat:      Mouth: Mucous membranes are moist.   Eyes:      Extraocular Movements: Extraocular movements intact. Pupils: Pupils are equal, round, and reactive to light.    Cardiovascular:      Rate and Rhythm: Normal rate and regular rhythm. Pulses: Normal pulses. Heart sounds: Normal heart sounds. No murmur. No friction rub. No gallop. Pulmonary:      Effort: Pulmonary effort is normal. No respiratory distress. Breath sounds: Normal breath sounds. No stridor. No wheezing, rhonchi or rales. Musculoskeletal:        Arms:    Skin:     General: Skin is warm. Capillary Refill: Capillary refill takes less than 2 seconds. Findings: No rash. Neurological:      Mental Status: She is alert and oriented to person, place, and time. Psychiatric:         Mood and Affect: Mood normal.         Behavior: Behavior normal.         Thought Content: Thought content normal.         MDM     Differential Diagnosis; Clinical Impression; Plan:       CLINICAL IMPRESSION:  (M94.0) Costochondritis  (primary encounter diagnosis)  (H10.32) Acute conjunctivitis of left eye, unspecified acute conjunctivitis type     Orders Placed This Encounter      moxifloxacin (VIGAMOX) 0.5 % ophthalmic solution          Sig: Apply 1 Drop to eye three (3) times daily for 7 days. Dispense:  3 mL          Refill:  0      Plan:  Rib pain- possible costochondritis- warm compresses; monitor  Start cipro for pink eye given contact lenses wear. Cool compresses  See eye doctor if not improving over next 48-72 hours    We have reviewed concerning signs/symptoms, normal vs abnormal progression of medical condition and when to seek immediate medical attention. You should see your PCP for updates on your routine health maintenance.              Procedures

## 2020-03-17 NOTE — LETTER
[address] 
[place] [address] 
[phone] Work/School Note Date: 3/17/2020 To Whom It May concern: 
 
Margaux Andriylonnie was seen and treated today in the urgent care center by the following: Dutch Lee DNP Sincerely, Kieran Has

## 2020-03-18 ENCOUNTER — TELEPHONE (OUTPATIENT)
Dept: INTERNAL MEDICINE CLINIC | Age: 55
End: 2020-03-18

## 2020-03-18 NOTE — TELEPHONE ENCOUNTER
#[phone] pt is out of work and went to a  Urgent Care. Pt states her employer did not like the letter they wrote for her. Pt originally went for her eye. Pt had upper respiratory problems detected as she was there. Pt would like to be seen to get a new letter from Dr. Guillen Branch     Please call pt to access. Pt calling again today and is asking for a call back pertaining to this letter.

## 2020-03-19 RX ORDER — FUROSEMIDE 20 MG/1
TABLET ORAL
Qty: 30 TAB | Refills: 0 | Status: SHIPPED | OUTPATIENT
Start: 2020-03-19 | End: 2020-11-03

## 2020-03-19 NOTE — TELEPHONE ENCOUNTER
Cristal Alfaro Chaparrita             Patient return call     Caller's first and last name and relationship (if not the patient):       Best contact number(s):   [phone]     Whose call is being returned:   Pt not sure     Details to clarify the request:   Possibly to discuss paperwork for work.      Tatyana Bonilla     Copy/Paste  [email or web address]

## 2020-03-24 ENCOUNTER — TELEPHONE (OUTPATIENT)
Dept: INTERNAL MEDICINE CLINIC | Age: 55
End: 2020-03-24

## 2020-03-24 NOTE — TELEPHONE ENCOUNTER
----- Message from Td Kumar sent at 3/24/2020  3:40 PM EDT -----  Regarding: Dr. Royer Friedman / Jyoti Bearded: [phone]  Caller's first and last name: N/A  Reason for call: Pt returning call of nurse Davis Castillo under Dr. Royer Friedman. Says she needs a callback to discuss paperwork needed to return to work.    Callback required yes/no and why: Yes  Best contact number(s): [phone]  Details to clarify the request: N/A      Copy/paste envera

## 2020-03-24 NOTE — TELEPHONE ENCOUNTER
----- Message from Deb Staff sent at 3/24/2020  4:01 PM EDT -----  Regarding: Dr. Bhumi Odom / Telephone  Pt is returning a phone call.  It regarding paper work and she is needing her call back today    Best contact:  [phone]        Copy/paste ralph

## 2020-03-25 NOTE — TELEPHONE ENCOUNTER
Called, spoke to pt. Two pt identifiers confirmed. Pt states seeing urgent care on 3/17/20. Pt states that they gave her a note stating that she was seen, but not to return to work. Pt states that her employer is asking if Dr. Mindy Joe can fill out a form for pt to return. Pt informed that Dr. Mindy Joe did NOT see nor treat pt and Anything related to the urgent care visit needs to come from there. Pt verbalized understanding of information discussed w/ no further questions at this time.

## 2020-03-25 NOTE — TELEPHONE ENCOUNTER
Patient states she needs a call back Asap this morning please in reference to needing to go back to work today & Forms still needing to be completed to Return to Work Today. Please call.  Thank you

## 2020-03-25 NOTE — TELEPHONE ENCOUNTER
Called, spoke to pt. Two pt identifiers confirmed. Pt states seeing urgent care on 3/17/20. Pt states that they gave her a note stating that she was seen, but not to return to work. Pt states that her employer is asking if Dr. Leilani Solis can fill out a form for pt to return. Pt informed that Dr. Leilani Solis did NOT see nor treat pt and Anything related to the urgent care visit needs to come from there. Pt verbalized understanding of information discussed w/ no further questions at this time.

## 2020-04-30 ENCOUNTER — VIRTUAL VISIT (OUTPATIENT)
Dept: INTERNAL MEDICINE CLINIC | Age: 55
End: 2020-04-30

## 2020-04-30 ENCOUNTER — TELEPHONE (OUTPATIENT)
Dept: INTERNAL MEDICINE CLINIC | Age: 55
End: 2020-04-30

## 2020-04-30 DIAGNOSIS — R05.9 COUGH: Primary | ICD-10-CM

## 2020-04-30 DIAGNOSIS — R19.7 DIARRHEA, UNSPECIFIED TYPE: ICD-10-CM

## 2020-04-30 RX ORDER — CODEINE PHOSPHATE AND GUAIFENESIN 10; 100 MG/5ML; MG/5ML
5 SOLUTION ORAL
Qty: 120 ML | Refills: 0 | Status: SHIPPED | OUTPATIENT
Start: 2020-04-30 | End: 2020-05-03

## 2020-04-30 NOTE — PROGRESS NOTES
HISTORY OF PRESENT ILLNESS  Maite Silvestre is a 62 y.o. female. HPI     This is an established visit completed with telemedicine was completed with video assist  the patient acknowledges and agrees to this method of visitation  doxyme    the patient is worried b/c there is someone in her office who has been out for 2 weeks and she does not know what she has    She wears a mask at work    the patient reports a dry cough for 2 weeks  No wheezing  Hard to clear her throat    Has had diarrhea since Sunday  4-5 bm per day     No n/v    Not taking anything for her sx    States had decreased appetite but this is improving    Overall sx the same    Has not been using the albuterol  Feels like ti aggravated her dry cough                                   Patient Active Problem List   Diagnosis Code    Headache R51    HTN (hypertension) I10    Depression F32.9    Breast cancer right DCIS C50.919    Elevated WBC count D72.829    Elevated liver function tests R94.5    Pure hypercholesterolemia E78.00    Acquired hypothyroidism E03.9     Current Outpatient Medications   Medication Sig Dispense Refill    furosemide (LASIX) 20 mg tablet TAKE 1 TABLET BY MOUTH EVERY DAY 30 Tab 0    cholecalciferol (VITAMIN D3) 25 mcg (1,000 unit) cap Take  by mouth daily.  albuterol sulfate (PROAIR RESPICLICK) 90 mcg/actuation aepb Take 1 Puff by inhalation every six (6) hours as needed for Wheezing. 1 Inhaler 0    cyclobenzaprine (FLEXERIL) 5 mg tablet Take 1 Tab by mouth nightly as needed for Muscle Spasm(s). 20 Tab 0    DULoxetine (CYMBALTA) 30 mg capsule TAKE 1 CAPSULE BY MOUTH EVERY DAY 90 Cap 1    metoprolol succinate (TOPROL-XL) 25 mg XL tablet TAKE 1 TABLET BY MOUTH EVERY DAY 90 Tab 1    atorvastatin (LIPITOR) 20 mg tablet TAKE 1 TABLET BY MOUTH EVERY DAY 90 Tab 1    gabapentin (NEURONTIN) 300 mg capsule Take 1 Cap by mouth two (2) times daily as needed for Pain.  180 Cap 0    levothyroxine (SYNTHROID) 75 mcg tablet TAKE 1 TABLET BY MOUTH EVERY DAY BEFORE BREAKFAST 30 Tab 3    amitriptyline (ELAVIL) 10 mg tablet Take 2 Tabs by mouth nightly. 180 Tab 1    SUMAtriptan (IMITREX) 50 mg tablet TAKE 1 TABLET BY MOUTH ONCE AS NEEDED FOR MIGRAINE FOR UP TO 1 DOSE 6 Tab 1    multivitamin (ONE A DAY) tablet Take 1 Tab by mouth daily. Lab Results   Component Value Date/Time    GFR est non-AA 85 02/24/2020 03:39 PM    GFRNA, POC >60 10/31/2014 02:26 PM    GFR est AA 98 02/24/2020 03:39 PM    GFRAA, POC >60 10/31/2014 02:26 PM    Creatinine 0.78 02/24/2020 03:39 PM    Creatinine (POC) 0.9 10/31/2014 02:26 PM    BUN 7 02/24/2020 03:39 PM    BUN (POC) 8 (L) 10/31/2014 02:26 PM    Sodium 142 02/24/2020 03:39 PM    Sodium (POC) 140 10/31/2014 02:26 PM    Potassium 4.0 02/24/2020 03:39 PM    Potassium (POC) 3.7 10/31/2014 02:26 PM    Chloride 100 02/24/2020 03:39 PM    Chloride (POC) 103 10/31/2014 02:26 PM    CO2 25 02/24/2020 03:39 PM    Magnesium 1.6 01/14/2017 07:28 AM    Phosphorus 1.0 (L) 11/28/2012 04:45 AM        Review of Systems   Constitutional: Negative for fever. HENT: Negative for ear discharge, ear pain, sinus pain and sore throat. Respiratory: Negative for shortness of breath and wheezing. Cardiovascular: Negative for chest pain. Gastrointestinal: Negative for nausea and vomiting. Physical Exam  Constitutional:       General: She is not in acute distress. Appearance: Normal appearance. She is not ill-appearing, toxic-appearing or diaphoretic. HENT:      Head: Normocephalic and atraumatic. Eyes:      General:         Right eye: No discharge. Left eye: No discharge. Conjunctiva/sclera: Conjunctivae normal.   Neurological:      General: No focal deficit present. Mental Status: She is alert and oriented to person, place, and time.    Psychiatric:         Mood and Affect: Mood normal.         Behavior: Behavior normal.     very well appearing no audible wheezing     ASSESSMENT and PLAN    ICD-10-CM ICD-9-CM    1. Cough    Zyrtec and flonase otc      Has albuterol for prn    Sx sound viral not bacterial or may be allergy mediated    If not improving next week will get cxr    No abx for now    Provided cough syrup     Work note return to work wed R05 786.2 guaiFENesin-codeine (ROBITUSSIN AC) 100-10 mg/5 mL solution   2. Diarrhea, unspecified type    BRAT diet    immodium prn       Of note discussed risk for covid  Pt has no fever and mild sx and improving so will not pursue additional covid testing at this time. R19.7 787.91        Varinder Brito is a 62 y.o. female being evaluated by a Virtual Visit (video visit) encounter to address concerns as mentioned above. A caregiver was present when appropriate. Due to this being a TeleHealth encounter (During JJRVD-26 public health emergency), evaluation of the following organ systems was limited: Vitals/Constitutional/EENT/Resp/CV/GI//MS/Neuro/Skin/Heme-Lymph-Imm. Pursuant to the emergency declaration under the [address] [place] [address] authority and the [employer] Act, this Virtual Visit was conducted with patient's (and/or legal guardian's) consent, to reduce the risk of exposure to COVID-19 and provide necessary medical care. Services were provided through a video synchronous discussion virtually to substitute for in-person encounter. --Jenny Olivier MD on 4/30/2020 at 11:52 AM    An electronic signature was used to authenticate this note.

## 2020-04-30 NOTE — TELEPHONE ENCOUNTER
#[phone] pt states she has had diarrhea, sweats, and dry cough. No fever. Pt states someone she works with tested positive for [place]. Pt states she was sent home from work last night. Please call as pt wants to know if she needs to be tested?

## 2020-04-30 NOTE — TELEPHONE ENCOUNTER
Called, spoke to pt. Two pt identifiers confirmed. Pt offered/accepted Virtual appt for 4/30/20 at 1130. Informed pt that it will be billed under insurance and pt may expect a co-pay. Informed that pt must have access to a smartphone and/or a computer w/ a camera and a microphone. Informed pt that a PSR may contact pt roughly 15 minutes prior to Anu Hern[address]dez 1237 for check-in. Pt verbalized understanding of information discussed w/ no further questions at this time.

## 2020-05-14 RX ORDER — AMITRIPTYLINE HYDROCHLORIDE 10 MG/1
TABLET, FILM COATED ORAL
Qty: 180 TAB | Refills: 1 | Status: SHIPPED | OUTPATIENT
Start: 2020-05-14 | End: 2021-01-28 | Stop reason: SDUPTHER

## 2020-06-15 ENCOUNTER — TELEPHONE (OUTPATIENT)
Dept: INTERNAL MEDICINE CLINIC | Age: 55
End: 2020-06-15

## 2020-06-15 ENCOUNTER — VIRTUAL VISIT (OUTPATIENT)
Dept: INTERNAL MEDICINE CLINIC | Age: 55
End: 2020-06-15

## 2020-06-15 DIAGNOSIS — R07.81 RIB PAIN ON RIGHT SIDE: Primary | ICD-10-CM

## 2020-06-15 DIAGNOSIS — S69.91XA INJURY OF FINGER OF RIGHT HAND, INITIAL ENCOUNTER: ICD-10-CM

## 2020-06-15 DIAGNOSIS — J06.9 ACUTE URI: ICD-10-CM

## 2020-06-15 RX ORDER — TRAMADOL HYDROCHLORIDE 50 MG/1
50 TABLET ORAL
Qty: 30 TAB | Refills: 0 | Status: SHIPPED | OUTPATIENT
Start: 2020-06-15 | End: 2020-06-19 | Stop reason: SINTOL

## 2020-06-15 NOTE — PROGRESS NOTES
Brant Esquivel is a 60 y.o. female evaluated via audio only technology on 6/15/2020. Consent: She and/or her health care decision maker is aware that she may receive a bill for this audio only encounter, depending on her insurance coverage, and has provided verbal consent to proceed: Yes    I affirm this is a Patient-Initiated Episode with a Patient who has not had a related appointment within my department in the past 7 days or scheduled within the next 24 hours. Total Time: minutes: 11-20 minutes    Note: not billable if this call serves to triage the patient into an appointment for the relevant concern      Juan M Johnson MD         Assessment & Plan:     Diagnoses and all orders for this visit:    1. Rib pain on right side  -     XR RIBS RT UNI 2 V; Future  -     traMADoL (ULTRAM) 50 mg tablet; Take 1 Tab by mouth every six (6) hours as needed for Pain for up to 7 days. Max Daily Amount: 200 mg.    2. Acute URI: Possibly COVID 19. She should self quarantine for 2 weeks unless ruled out--could go to our \"red clinic\" ([facility] on [address]) for testing. 3. Injury of finger of right hand, initial encounter      Follow-up and Dispositions    · Return if symptoms worsen or fail to improve. Subjective:   Brant Esquivel is a 60 y.o. female who was seen for Side Pain (pt c.o pain in R side x 3 days; pt states her refrigerator was leaking- and pt slip and fell; pt rates pain 9/10);  Sore Throat (pt had sore throat and lost her voice); and Hand Pain (pt sliced her index finger on R hand while cooking; pt was seen at [facility] )    Chief Complaint   Patient presents with    Side Pain     pt c.o pain in R side x 3 days; pt states her refrigerator was leaking- and pt slip and fell; pt rates pain 9/10    Sore Throat     pt had sore throat and lost her voice    Hand Pain     pt sliced her index finger on R hand while cooking; pt was seen at [facility]        She slipped and fell, landing on her back and R side. \"It is the same place where I broke some ribs years ago. It's hard to turn, it's hard to even sleep. \"  No SOB. No pleuritic pain. She has URI symptoms, including dry cough. Last week she had sweats and chills. She has had some coworkers who have had [address]. Last night she sliced her R index finger, and went to [facility]. \"They couldn't stitch it or glue it. \" Bleeding has stopped. She has a bandage on it. Prior to Admission medications    Medication Sig Start Date End Date Taking? Authorizing Provider   amitriptyline (ELAVIL) 10 mg tablet TAKE 2 TABLETS BY MOUTH IN THE EVENING 5/14/20  Yes Shila Granados MD   furosemide (LASIX) 20 mg tablet TAKE 1 TABLET BY MOUTH EVERY DAY 3/19/20  Yes Shila Granados MD   cholecalciferol (VITAMIN D3) 25 mcg (1,000 unit) cap Take  by mouth daily. Yes Provider, Historical   albuterol sulfate (PROAIR RESPICLICK) 90 mcg/actuation aepb Take 1 Puff by inhalation every six (6) hours as needed for Wheezing. 2/24/20  Yes Shila Granados MD   cyclobenzaprine (FLEXERIL) 5 mg tablet Take 1 Tab by mouth nightly as needed for Muscle Spasm(s). 2/24/20  Yes Shila Granados MD   DULoxetine (CYMBALTA) 30 mg capsule TAKE 1 CAPSULE BY MOUTH EVERY DAY 2/9/20  Yes Shila Granados MD   metoprolol succinate (TOPROL-XL) 25 mg XL tablet TAKE 1 TABLET BY MOUTH EVERY DAY 2/9/20  Yes Shila Granados MD   atorvastatin (LIPITOR) 20 mg tablet TAKE 1 TABLET BY MOUTH EVERY DAY 1/29/20  Yes Shila Granados MD   gabapentin (NEURONTIN) 300 mg capsule Take 1 Cap by mouth two (2) times daily as needed for Pain. 12/6/19  Yes Shila Granados MD   levothyroxine (SYNTHROID) 75 mcg tablet TAKE 1 TABLET BY MOUTH EVERY DAY BEFORE BREAKFAST 11/11/19  Yes Shila Granados MD   SUMAtriptan (IMITREX) 50 mg tablet TAKE 1 TABLET BY MOUTH ONCE AS NEEDED FOR MIGRAINE FOR UP TO 1 DOSE 3/20/18  Yes Shila Granados MD   multivitamin (ONE A DAY) tablet Take 1 Tab by mouth daily. Yes Provider, Historical     Allergies   Allergen Reactions    Aspirin Itching

## 2020-06-16 ENCOUNTER — HOSPITAL ENCOUNTER (OUTPATIENT)
Dept: GENERAL RADIOLOGY | Age: 55
Discharge: HOME | End: 2020-06-16
Payer: COMMERCIAL

## 2020-06-16 ENCOUNTER — OFFICE VISIT (OUTPATIENT)
Dept: URGENT CARE | Age: 55
End: 2020-06-16

## 2020-06-16 ENCOUNTER — TELEPHONE (OUTPATIENT)
Dept: INTERNAL MEDICINE CLINIC | Age: 55
End: 2020-06-16

## 2020-06-16 VITALS — RESPIRATION RATE: 16 BRPM | TEMPERATURE: 98.6 F | OXYGEN SATURATION: 95 % | HEART RATE: 95 BPM

## 2020-06-16 DIAGNOSIS — R05.9 COUGH: Primary | ICD-10-CM

## 2020-06-16 DIAGNOSIS — R07.81 RIB PAIN ON RIGHT SIDE: ICD-10-CM

## 2020-06-16 PROCEDURE — 71100 X-RAY EXAM RIBS UNI 2 VIEWS: CPT

## 2020-06-16 NOTE — LETTER
NOTIFICATION RETURN TO WORK / SCHOOL 
 
6/16/2020 10:18 AM 
 
MsMaisha Maite BRAVO [place]valdo [address] [facility] [address] To Whom It May Concern: 
 
Roman Post is currently under the care of [address] [place] [address]. Patient was seen and evaluated today 06/16/2020. Please excuse until 06/22/2020 If there are questions or concerns please have the patient contact our office. Sincerely, GHE PROVIDER

## 2020-06-16 NOTE — PROGRESS NOTES
Called, spoke with Pt. Two pt identifiers confirmed. Pt informed of X-Ray results per Dr. Booker Fung. Pt asked for documentation to go along with the diagnosis and how long she should be out of work/light duty. Pt informed will let Dr. Booker Fung know and will call her back when the letter is ready. Pt verbalized understanding of information discussed w/ no further questions at this time.

## 2020-06-16 NOTE — LETTER
FLU CLINIC [facility] [address] 
FLU CLINIC [facility] [address] [facility] URGENT [facility]ARE 
[address] [address] [address] [facility] [address] 
[phone] Work/School Note Date: 6/16/2020 To Whom It May concern: 
 
Pilar Pedro was seen and treated today in the urgent care center by the following provider(s): 
No providers found. Maite Kirby must quarantine for 2 weeks beginning 6/17/20. Sincerely, Prince Willett RN

## 2020-06-16 NOTE — PROGRESS NOTES
Subjective: (As above and below)       This patient was seen in Flu Clinic at [address] [place] [address] Urgent Care while in their vehicle due to COVID-19 pandemic with PPE and focused examination in order to decrease community viral transmission. The patient/guardian gave verbal consent to treat. Chief Complaint   Patient presents with   Milena Palacio is a 60 y.o. female who present for COVID 19 testing recommended by PCP during phone consultation visit. Symptom onset of cough 4 days ago Preceding illness: none . Has rx for albuterol that has helped temporarily. She has pending CXR that was ordered by PCP she plans on getting today. No aggravating or alleviating factors. Symptoms are constant and overall not improving. Promotes no decrease in PO intake of fluids. Denies: severe lethargy, worsening or severe SOB, syncope/near syncope, vomiting/diarrhea, chest pain, chest pain with breathing, labored breathing, severe headache,  fevers, LE edema    Recent travel: none  Known Exposure to COVID-19: no  Known flu or strep contact: no       Review of Systems - negative except as listed above    Reviewed PmHx, RxHx, FmHx, SocHx, AllgHx and updated in chart.   Family History   Problem Relation Age of Onset    Cancer Father         pancreatic cancer    Cancer Brother         thyroid cancer    Hypertension Mother        Past Medical History:   Diagnosis Date    Anemia NEC     Anxiety     Breast cancer ([facility] [address] 2013    right lumpectomy with radiation    Cancer ([facility] [address]     Breast Cancer-Ductal Carcinoma in situ grade 1    Depression     Headache(784.0)     Hypertension     Nose fracture 10/2019    Poor appetite     Psychiatric disorder     depression    PUD (peptic ulcer disease) 11/12    Stress     Tired     Trouble in sleeping     Weakness generalized       Social History     Socioeconomic History    Marital status:      Spouse name: Not on file    Number of children: Not on file    Years of education: Not on file    Highest education level: Not on file   Tobacco Use    Smoking status: Current Every Day Smoker     Packs/day: 0.25     Years: 20.00     Pack years: 5.00     Types: Cigarettes    Smokeless tobacco: Never Used    Tobacco comment: 4 cigarettes a day   Substance and Sexual Activity    Alcohol use: Yes     Alcohol/week: 5.8 standard drinks     Types: 7 Standard drinks or equivalent per week     Comment: 1 beer daily/1 glass wine    Drug use: No          Current Outpatient Medications   Medication Sig    traMADoL (ULTRAM) 50 mg tablet Take 1 Tab by mouth every six (6) hours as needed for Pain for up to 7 days. Max Daily Amount: 200 mg.    amitriptyline (ELAVIL) 10 mg tablet TAKE 2 TABLETS BY MOUTH IN THE EVENING    furosemide (LASIX) 20 mg tablet TAKE 1 TABLET BY MOUTH EVERY DAY    cholecalciferol (VITAMIN D3) 25 mcg (1,000 unit) cap Take  by mouth daily.  albuterol sulfate (PROAIR RESPICLICK) 90 mcg/actuation aepb Take 1 Puff by inhalation every six (6) hours as needed for Wheezing.  DULoxetine (CYMBALTA) 30 mg capsule TAKE 1 CAPSULE BY MOUTH EVERY DAY    metoprolol succinate (TOPROL-XL) 25 mg XL tablet TAKE 1 TABLET BY MOUTH EVERY DAY    atorvastatin (LIPITOR) 20 mg tablet TAKE 1 TABLET BY MOUTH EVERY DAY    gabapentin (NEURONTIN) 300 mg capsule Take 1 Cap by mouth two (2) times daily as needed for Pain.  levothyroxine (SYNTHROID) 75 mcg tablet TAKE 1 TABLET BY MOUTH EVERY DAY BEFORE BREAKFAST    SUMAtriptan (IMITREX) 50 mg tablet TAKE 1 TABLET BY MOUTH ONCE AS NEEDED FOR MIGRAINE FOR UP TO 1 DOSE    multivitamin (ONE A DAY) tablet Take 1 Tab by mouth daily. No current facility-administered medications for this visit.         Objective:     Vitals:    06/16/20 1003   Pulse: 95   Resp: 16   Temp: 98.6 °F (37 °C)   SpO2: 95%       Physical Exam  General appearance - appears well hydrated and does not appear toxic, no acute distress  Eyes - EOMs intact. Non injected. No scleral icterus   Ears - no external swelling  Nose - No purulent drainage  Mouth - OP clear and moist, exudate or lesion. Mucus membranes moist. Uvula midline. Neck/Lymphatics - trachea midline, full AROM  Chest - normal breathing effort. Soft scattered wheeze throughout lung fields bilat. No rales, rhonchi or diminishments. Heart - RRR. No murmur rubs clicks or gallops. Skin - no observable rashes or pallor  Neurologic- alert and oriented x 3  Psychiatric- normal mood, behavior and though content. Assessment/ Plan:     1. Cough  - NOVEL CORONAVIRUS (COVID-19)      Plan:  Continue albuterol  No evidence of complication of illness at this time. Supportive home care- maintain adequate fluid intake, lozenges, over the counter Tylenol. Patient is aware that the differential includes COVID-19 and was advised the following: social distancing, self-quarantine for 2 weeks, avoiding high risk individuals, washing hands frequently, avoid touching face, eyes or mucus membranes, wearing surgical mask if they are in public to reduce transmission to others. Follow up:  Keep PCP plan in place- complete CXR and treatment plan as advised. Will call with COVID results. We have reviewed, in detail, particular presentations/worsening/concerning signs and symptoms that may warrant seeking immediate care in the ED setting and patient verbalized being aware of what to watch for. For other non-severe changes, non-improvement or questions, patient aware to contact PCP office or consider a virtual online medical consultation. Reviewed with her that COVID-19 pandemic is an evolving situation with rapidly changing recommendations & guidelines. Medical decisions are made based on the the best information available at the time.   Recommended she stay tuned for updates published by trusted sources and to advise your PCP of any unexpected changes in clinical condition     Stella Miramontes NP

## 2020-06-18 ENCOUNTER — TELEPHONE (OUTPATIENT)
Dept: INTERNAL MEDICINE CLINIC | Age: 55
End: 2020-06-18

## 2020-06-18 LAB — SARS-COV-2, NAA: NOT DETECTED

## 2020-06-18 NOTE — TELEPHONE ENCOUNTER
Dr. Tyler Toledo Patient last had VV with Dr. Priscilla Randolph on 6/15/20, states she needs a call back to discuss if something else can be prescribed for her pain. Patient states medication prescribed, Tramadol, started making her itch. Please call to discuss.  Thank you

## 2020-06-19 ENCOUNTER — TELEPHONE (OUTPATIENT)
Dept: INTERNAL MEDICINE CLINIC | Age: 55
End: 2020-06-19

## 2020-06-19 RX ORDER — NAPROXEN 500 MG/1
500 TABLET ORAL 2 TIMES DAILY WITH MEALS
Qty: 60 TAB | Refills: 1 | Status: SHIPPED | OUTPATIENT
Start: 2020-06-19

## 2020-06-19 NOTE — TELEPHONE ENCOUNTER
Patient is asking to drop off paperwork that needs to be filled out today for work.  Please call back

## 2020-06-23 NOTE — TELEPHONE ENCOUNTER
Patient states she needs a call back to be advised if Paperwork was received yesterday that was faxed to Fax# [phone]. Please call to update.  Thank you

## 2020-06-24 ENCOUNTER — TELEPHONE (OUTPATIENT)
Dept: INTERNAL MEDICINE CLINIC | Age: 55
End: 2020-06-24

## 2020-06-24 RX ORDER — ALBUTEROL SULFATE 90 UG/1
POWDER, METERED RESPIRATORY (INHALATION)
Qty: 1 INHALER | Refills: 0 | Status: SHIPPED | OUTPATIENT
Start: 2020-06-24 | End: 2021-01-10

## 2020-06-24 NOTE — TELEPHONE ENCOUNTER
#[phone]  Pt is asking if you received the fax she sent on 6-23-20? Pt is asking for a call back yet today if possible.

## 2020-06-29 NOTE — TELEPHONE ENCOUNTER
Pt states she knows you have the forms that need to be completed. She has been taken out of work through 7-2-20. What she needs is the document(s) to be faxed so that she can go back to work. Please call pt with any questions or further information. Thanks.

## 2020-07-01 NOTE — TELEPHONE ENCOUNTER
----- Message from Anu Main sent at 7/1/2020 11:19 AM EDT -----  Regarding: DR Deutsch/telephone  Contact: [phone]  General Message/Vendor Calls    Caller's first and last name:Maite Kirby      Reason for call:pt wants to know if the leave of absence paperwork and light duty paperwork has been completed and faxed  back to the pts job.       Callback required yes/no and why:yes      Best contact number(s):[phone]      Details to clarify the request:      Anu Main

## 2020-07-01 NOTE — TELEPHONE ENCOUNTER
#[phone] pt states she needs to know if the documents have been filled out for her to return to work? Pt states Dr. Ken Bocanegra is the physician to take her out of work. Messages have been sent and pt hasn't gotten an answer back. 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 I believe these messages have been going to Dr. Ken Bocanegra and not pcp. Please call pt back yet today. Thanks.

## 2020-08-13 ENCOUNTER — VIRTUAL VISIT (OUTPATIENT)
Dept: INTERNAL MEDICINE CLINIC | Age: 55
End: 2020-08-13
Payer: COMMERCIAL

## 2020-08-13 ENCOUNTER — TELEPHONE (OUTPATIENT)
Dept: INTERNAL MEDICINE CLINIC | Age: 55
End: 2020-08-13

## 2020-08-13 DIAGNOSIS — M62.838 NECK MUSCLE SPASM: Primary | ICD-10-CM

## 2020-08-13 PROCEDURE — 99213 OFFICE O/P EST LOW 20 MIN: CPT | Performed by: FAMILY MEDICINE

## 2020-08-13 RX ORDER — CYCLOBENZAPRINE HCL 5 MG
5-10 TABLET ORAL
Qty: 12 TAB | Refills: 0 | Status: SHIPPED | OUTPATIENT
Start: 2020-08-13

## 2020-08-13 NOTE — LETTER
8/13/2020 3:23 PM 
 
Ms. Maite Morgan Hodgkin [address] [facility] [address] To Whom It May Concern: 
 
Willis Nolan is currently under the care of [facility]. She was seen by virtual visit today. Please excuse work absence on Wednesday, August 12, Thursday August 13, and Friday August 14 for illness. Return to work Saturday, August 15. If there are questions or concerns please have the patient contact our office. Sincerely, 
 
 
Suzie Lundy MD 
Electronically signed.

## 2020-08-13 NOTE — PROGRESS NOTES
Gali Jurado is a 60 y.o. female patient of Dr. Sidney Rueda who presents with left neck pain. She reports awakened with the pain. Onset 2 days. Using camron balderas, taking ibuprofen 400mg every 6-8 hours. This is an established visit conducted via telemedicine with video. The patient has been instructed that this meets HIPAA criteria and acknowledges and agrees to this method of visitation. Pursuant to the emergency declaration under the [address] [place] [address] waiver authority and the [employer] Act, this Virtual Visit was conducted, with patient's consent, to reduce the patient's risk of exposure to COVID-19 and provide continuity of care for an established patient. Services were provided through a video synchronous discussion virtually to substitute for in-person clinic visit.         Past Medical History:   Diagnosis Date    Anemia NEC     Anxiety     Breast cancer ([facility] [address] 2013    right lumpectomy with radiation    Cancer ([facility] [address]     Breast Cancer-Ductal Carcinoma in situ grade 1    Depression     Headache(784.0)     Hypertension     Nose fracture 10/2019    Poor appetite     Psychiatric disorder     depression    PUD (peptic ulcer disease) 11/12    Stress     Tired     Trouble in sleeping     Weakness generalized        Family History   Problem Relation Age of Onset    Cancer Father         pancreatic cancer    Cancer Brother         thyroid cancer    Hypertension Mother        Social History     Socioeconomic History    Marital status:      Spouse name: Not on file    Number of children: Not on file    Years of education: Not on file    Highest education level: Not on file   Occupational History    Not on file   Social Needs    Financial resource strain: Not on file    Food insecurity     Worry: Not on file     Inability: Not on file    Transportation needs     Medical: Not on file Non-medical: Not on file   Tobacco Use    Smoking status: Current Every Day Smoker     Packs/day: 0.25     Years: 20.00     Pack years: 5.00     Types: Cigarettes    Smokeless tobacco: Never Used    Tobacco comment: 4 cigarettes a day   Substance and Sexual Activity    Alcohol use: Yes     Alcohol/week: 5.8 standard drinks     Types: 7 Standard drinks or equivalent per week     Comment: 1 beer daily/1 glass wine    Drug use: No    Sexual activity: Not on file   Lifestyle    Physical activity     Days per week: Not on file     Minutes per session: Not on file    Stress: Not on file   Relationships    Social connections     Talks on phone: Not on file     Gets together: Not on file     Attends Judaism service: Not on file     Active member of club or organization: Not on file     Attends meetings of clubs or organizations: Not on file     Relationship status: Not on file    Intimate partner violence     Fear of current or ex partner: Not on file     Emotionally abused: Not on file     Physically abused: Not on file     Forced sexual activity: Not on file   Other Topics Concern    Not on file   Social History Narrative    Not on file       Current Outpatient Medications on File Prior to Visit   Medication Sig Dispense Refill    atorvastatin (LIPITOR) 20 mg tablet TAKE 1 TABLET BY MOUTH EVERY DAY 90 Tab 0    levothyroxine (SYNTHROID) 75 mcg tablet TAKE 1 TABLET BY MOUTH EVERY DAY BEFORE BREAKFAST 90 Tab 0    ProAir RespiClick 90 mcg/actuation breath activated inhaler TAKE 1 PUFF BY INHALATION EVERY SIX (6) HOURS AS NEEDED FOR WHEEZING. 1 Inhaler 0    naproxen (NAPROSYN) 500 mg tablet Take 1 Tab by mouth two (2) times daily (with meals). 60 Tab 1    amitriptyline (ELAVIL) 10 mg tablet TAKE 2 TABLETS BY MOUTH IN THE EVENING 180 Tab 1    furosemide (LASIX) 20 mg tablet TAKE 1 TABLET BY MOUTH EVERY DAY 30 Tab 0    cholecalciferol (VITAMIN D3) 25 mcg (1,000 unit) cap Take  by mouth daily.       DULoxetine (CYMBALTA) 30 mg capsule TAKE 1 CAPSULE BY MOUTH EVERY DAY 90 Cap 1    metoprolol succinate (TOPROL-XL) 25 mg XL tablet TAKE 1 TABLET BY MOUTH EVERY DAY 90 Tab 1    gabapentin (NEURONTIN) 300 mg capsule Take 1 Cap by mouth two (2) times daily as needed for Pain. 180 Cap 0    SUMAtriptan (IMITREX) 50 mg tablet TAKE 1 TABLET BY MOUTH ONCE AS NEEDED FOR MIGRAINE FOR UP TO 1 DOSE 6 Tab 1    multivitamin (ONE A DAY) tablet Take 1 Tab by mouth daily. No current facility-administered medications on file prior to visit. Review of Systems  Pertinent items are noted in HPI. Objective:     Gen: well appearing female  Neck: patient does not feel enlarged or tender LAD or masses. Left sided muscle pain. Resp: normal respiratory effort, no audible wheezing. CV: patient does not feel palpitations or heart irregularity  Neuro: Alert and oriented, able to answer questions without difficulty      Assessment/Plan:       ICD-10-CM ICD-9-CM    1. Neck muscle spasm  M62.838 728.85 cyclobenzaprine (FLEXERIL) 5 mg tablet   use heat, stretch, voltaren gel, flexeril at bedtime. Work note Wednesday, Thursday, Friday. RTW Saturday. This was a telemedicine visit with video.         Meagan Mena MD

## 2020-08-17 ENCOUNTER — TELEPHONE (OUTPATIENT)
Dept: INTERNAL MEDICINE CLINIC | Age: 55
End: 2020-08-17

## 2020-08-17 NOTE — TELEPHONE ENCOUNTER
----- Message from Marilyn Solares sent at 8/17/2020  3:40 PM EDT -----  Regarding: Dr. Casper : [phone]  Caller's first and last name: N/A  Reason for call: Pt needs a new work note because she wasn't able to work on 8/15/20 or 08/16/20 to be faxed to [phone] addressed to Ms. Winnie Koroma. Callback required yes/no and why: yes  Best contact number(s): [phone]  Details to clarify the request: Pt advised that Dr. Riley Gill was originally going to make her work note for until today but she wanted to try to work, and then she just couldn't. Supervisor may also need to be addressed is [employer].       Copy/paste envera

## 2020-08-17 NOTE — LETTER
NOTIFICATION RETURN TO WORK / SCHOOL 
 
8/18/2020 12:31 PM 
 
Ms. Maite Alcaraz [address] [facility] [address] To Whom It May Concern: 
 
Jorden Myers is currently under the care of [facility]. She was seen by virtual visit today. Please excuse work absence on Wednesday, August 12, Thursday August 13, and Friday August 14, Saturday, August 15, and Sunday, August 16 due to illness. Return to work on Monday, August 17. If there are questions or concerns please have the patient contact our office. Sincerely, 
 
 
Sofi Pina MD 
Electronically signed.

## 2020-09-27 ENCOUNTER — TELEPHONE (OUTPATIENT)
Dept: INTERNAL MEDICINE CLINIC | Age: 55
End: 2020-09-27

## 2020-09-27 DIAGNOSIS — R05.9 COUGH: Primary | ICD-10-CM

## 2020-09-27 RX ORDER — CODEINE PHOSPHATE AND GUAIFENESIN 10; 100 MG/5ML; MG/5ML
5 SOLUTION ORAL
Qty: 118 ML | Refills: 0 | Status: SHIPPED | OUTPATIENT
Start: 2020-09-27 | End: 2020-10-04

## 2020-09-27 NOTE — TELEPHONE ENCOUNTER
She is having coughing, wheezing, and hoarseness. Staying home from work. \"I worry that I may have been exposed to [place]. \"  Requests cough syrup and a work note. I suggested she consider going to [facility] for testing if she suspects COVID. Work note will be faxed tomorrow to her number at [phone]. Orders Placed This Encounter    guaiFENesin-codeine (Robafen AC) 100-10 mg/5 mL solution     Sig: Take 5 mL by mouth three (3) times daily as needed for Cough for up to 7 days. Max Daily Amount: 15 mL.      Dispense:  118 mL     Refill:  0

## 2020-10-19 ENCOUNTER — TELEPHONE (OUTPATIENT)
Dept: INTERNAL MEDICINE CLINIC | Age: 55
End: 2020-10-19

## 2020-10-19 NOTE — TELEPHONE ENCOUNTER
[facility] Office Pool               General Message/Vendor Calls     Caller's first and last name: Tiburcio Bolaños       Reason for call: Pt spoke with doctor on call last night Dr. Carolina Perez on 10/18/20. Pt wanted to make sure the info was given to Dr. Mariah Hughes, as she is suppose to come in and  a note for her job.    Severiano Paris required yes/no and why: yes       Best contact number(s):[phone]       Details to clarify the request: N/A       Mark Shea

## 2020-10-19 NOTE — TELEPHONE ENCOUNTER
Patient states she needs a call back in reference to note for her Employer/Job. Please see previous encounter.  Thank you

## 2020-10-20 NOTE — TELEPHONE ENCOUNTER
Called out and spoke to pt. Two pt identifiers confirmed. Pt informed letter is ready from Dr. Jessenia Pagan w/ screener up front. Pt asking letter also be faxed to the nurse w/ her Employer's office: Ms. Glenn Townsend at   [phone]. Will fax over letter. Pt verbalized understanding of information discussed w/ no further questions at this time.

## 2020-10-20 NOTE — TELEPHONE ENCOUNTER
[facility] [address] Office Pool               General Message     Caller's first and last name: self     Reason for call: Pt needs to return to work tomorrow night and MUST  the work letter today.  Pt's been out, 10/15/20 - 10/19/20. Callback required yes/no and why: Yes, to confirm letter is ready for . Best contact number(s): [phone]     Details to clarify the request: Please supply one extra copy of letter, Pt will be dropping off at employer today.

## 2020-11-02 ENCOUNTER — TELEPHONE (OUTPATIENT)
Dept: INTERNAL MEDICINE CLINIC | Age: 55
End: 2020-11-02

## 2020-11-02 NOTE — TELEPHONE ENCOUNTER
General Message/Vendor Calls     Caller's first and last name: Pt       Reason for call:  pt needs a new Rx for her water pills and Dutoxetine [facility]. As she has no more refills, pt is also out of both.      Callback required yes/no and why: yes       Best contact number(s):[phone]       Details to clarify the request: N/A       Mark Shea

## 2020-11-02 NOTE — TELEPHONE ENCOUNTER
----- Message from Orin Vigil sent at 11/2/2020  1:12 PM EST -----  Regarding: Dr. Donney Ormond  Appointment not available    Caller's first and last name and relationship to patient (if not the patient): Pt      Best contact number: [phone]      Preferred date and time: asap      Scheduled appointment date and time: N/A      Reason for appointment: Pt slipped in the shower on 10/30/20, and have pain in ribs and left side. Details to clarify the request: Pt needs a note for missed work from 10/30/20 to 11/1/20, due to the fall. Pt also would like a call back about what to take for the pain meantime, as ibuprofen is not working for her.     Message from Ling Flores

## 2020-11-03 ENCOUNTER — TELEPHONE (OUTPATIENT)
Dept: INTERNAL MEDICINE CLINIC | Age: 55
End: 2020-11-03

## 2020-11-03 ENCOUNTER — OFFICE VISIT (OUTPATIENT)
Dept: INTERNAL MEDICINE CLINIC | Age: 55
End: 2020-11-03
Payer: COMMERCIAL

## 2020-11-03 VITALS
HEIGHT: 64 IN | TEMPERATURE: 97.7 F | DIASTOLIC BLOOD PRESSURE: 80 MMHG | HEART RATE: 100 BPM | BODY MASS INDEX: 26.98 KG/M2 | WEIGHT: 158 LBS | SYSTOLIC BLOOD PRESSURE: 125 MMHG | RESPIRATION RATE: 18 BRPM | OXYGEN SATURATION: 94 %

## 2020-11-03 DIAGNOSIS — R79.89 ELEVATED LIVER FUNCTION TESTS: ICD-10-CM

## 2020-11-03 DIAGNOSIS — Z23 NEEDS FLU SHOT: ICD-10-CM

## 2020-11-03 DIAGNOSIS — D72.829 LEUKOCYTOSIS, UNSPECIFIED TYPE: ICD-10-CM

## 2020-11-03 DIAGNOSIS — C50.911 MALIGNANT NEOPLASM OF RIGHT FEMALE BREAST, UNSPECIFIED ESTROGEN RECEPTOR STATUS, UNSPECIFIED SITE OF BREAST (HCC): ICD-10-CM

## 2020-11-03 DIAGNOSIS — Z23 ENCOUNTER FOR IMMUNIZATION: ICD-10-CM

## 2020-11-03 DIAGNOSIS — E03.9 ACQUIRED HYPOTHYROIDISM: ICD-10-CM

## 2020-11-03 DIAGNOSIS — I10 ESSENTIAL HYPERTENSION: Primary | ICD-10-CM

## 2020-11-03 DIAGNOSIS — F32.9 REACTIVE DEPRESSION: ICD-10-CM

## 2020-11-03 DIAGNOSIS — Z87.891 PERSONAL HISTORY OF NICOTINE DEPENDENCE: ICD-10-CM

## 2020-11-03 DIAGNOSIS — R73.01 IFG (IMPAIRED FASTING GLUCOSE): ICD-10-CM

## 2020-11-03 DIAGNOSIS — E78.00 PURE HYPERCHOLESTEROLEMIA: ICD-10-CM

## 2020-11-03 DIAGNOSIS — R07.81 RIB PAIN ON LEFT SIDE: ICD-10-CM

## 2020-11-03 DIAGNOSIS — G89.29 CHRONIC BILATERAL THORACIC BACK PAIN: ICD-10-CM

## 2020-11-03 DIAGNOSIS — Z12.39 BREAST SCREENING: ICD-10-CM

## 2020-11-03 DIAGNOSIS — M54.6 CHRONIC BILATERAL THORACIC BACK PAIN: ICD-10-CM

## 2020-11-03 DIAGNOSIS — G44.229 CHRONIC TENSION-TYPE HEADACHE, NOT INTRACTABLE: ICD-10-CM

## 2020-11-03 PROCEDURE — 99214 OFFICE O/P EST MOD 30 MIN: CPT | Performed by: INTERNAL MEDICINE

## 2020-11-03 PROCEDURE — 90471 IMMUNIZATION ADMIN: CPT | Performed by: INTERNAL MEDICINE

## 2020-11-03 PROCEDURE — 90472 IMMUNIZATION ADMIN EACH ADD: CPT | Performed by: INTERNAL MEDICINE

## 2020-11-03 PROCEDURE — 90750 HZV VACC RECOMBINANT IM: CPT | Performed by: INTERNAL MEDICINE

## 2020-11-03 PROCEDURE — 90686 IIV4 VACC NO PRSV 0.5 ML IM: CPT | Performed by: INTERNAL MEDICINE

## 2020-11-03 RX ORDER — DULOXETIN HYDROCHLORIDE 60 MG/1
60 CAPSULE, DELAYED RELEASE ORAL DAILY
Qty: 30 CAP | Refills: 1 | Status: SHIPPED | OUTPATIENT
Start: 2020-11-03 | End: 2020-12-01 | Stop reason: SDUPTHER

## 2020-11-03 RX ORDER — DULOXETIN HYDROCHLORIDE 30 MG/1
CAPSULE, DELAYED RELEASE ORAL
Qty: 90 CAP | Refills: 1 | Status: SHIPPED | OUTPATIENT
Start: 2020-11-03 | End: 2020-11-03

## 2020-11-03 RX ORDER — FUROSEMIDE 20 MG/1
TABLET ORAL
Qty: 30 TAB | Refills: 0 | Status: SHIPPED | OUTPATIENT
Start: 2020-11-03 | End: 2020-11-26

## 2020-11-03 RX ORDER — GABAPENTIN 300 MG/1
300 CAPSULE ORAL
Qty: 180 CAP | Refills: 0 | Status: SHIPPED | OUTPATIENT
Start: 2020-11-03 | End: 2021-02-04 | Stop reason: SDUPTHER

## 2020-11-03 NOTE — TELEPHONE ENCOUNTER
Caller's first and last name: Do Davila       Reason for call: Pt has sore ribs and requesting a call back. Callback required yes/no and why: yes       Best contact number(s):[phone]       Details to clarify the request: Pt also needs work note for Friday, Saturday, and Sunday (Oct 30 - Nov 1).        Tanya Neal

## 2020-11-03 NOTE — PROGRESS NOTES
HISTORY OF PRESENT ILLNESS  Maite Lei is a 60 y.o. female.   HPI     Last here 4/30/20  Pt is here for routine care/acute care.      She c/o L sided rib pain   Getting out of the shower, she slid and fell Friday 10/30/20  She took ibuprofen which did not help   Will refill gabapentin to help with rib pain   Will get xray  No bruising on exam  Tender on palpation over L ribcage   Discussed aleve for pain    BP today is 125/80  No home BP checks  Continues on toprol XL 25mg daily   She also has lasix 20mg daily for edema - stable has not needed this recently though would like a refill      Wt is 158 lbs - up 2 lbs x lov    Discussed continued diet and w/l      Will get labs today       Pt follows with Dr. Faye Evans (hem/onc) for anemia  Last visit was 5/17, wanted pt to be on hormonal therapy in 2013, radiation was not completed, f/u in 1 year   Pt never followed up, reminded  Recall COLO and EGD 2013 - bleeding ulcer, issues with anemia  Not taking protonix  No gerd sx currently   Recall past evaluation with Dr. Faye Evans in 2015, thrombocytosis thought d/t cigarette smoking     Pt is taking cymbalta 30mg qhs and gabapentin 300 mg 1-2x per day, which helps  Will increase to cymbalta 60 mg   She gets chronic pain from rib fx  Reviewed xr ribs 6/16/20 : multiple right rib fractures of varying age.     Continues on synthroid 75 mcg daily   In the past, I ordered an US thyroids, but this imaging was never completed  Recall pt has a FMHX thyroid cancer (brother)     Previously took protonix no current GERD symptoms      ontinues on lipitor 20mg daily for cholesterol     Continues on amitriptyline 20mg, takes 2 10 mg  qhs --this works wll helps with sleep and HA not too sedating --11/20  Pt takes imitrex 50mg prn (not daily) --stable     No longer Continues on wellbutrin 150mg--for depression and smoking   She is on cymbalta for anxiety -- she is unsure if this is helping   Will increase to 60 mg      She is drinking about 2-3 drinks a night at least likely more like 4-5  Recurrent issue  Discussed guidelines for female is 1 per night      Pt has a 30 pack year hx  Pt continues to smoke < 0.5 PPD  Prev was on wellbutrin which pt states was helping   Completed CT lung cancer screen 11/1/18  Reordered 11/03/20, reminded again      ACP not on file. SDM is her brother Paula Vivas Mems III.   Provided information in the past.      Recall EGD and COLO in 2013: bleeding ulcer         PREVENTIVE:   Colonoscopy: 3/13, repeat 10 years  EGD: 2013, possible Barrette's   Pap: ~3/19, [facility]  Mammogram: 11/13/18, negative, h/o R sided breast DCIS, due  reminded   DEXA: [facility], ~3/19, will get report for review  Tdap: 12/10/17  Pneumovax: 8/27/2012  [id number]: not yet needed  Shingrix: will get 1st round today 11/03/20  Flu shot: 11/11/19, will get today 11/03/20  Eye exam: 3/18? Due   EKG: 3/17 per ED   Lipids: 6/19  LDL 2  A1c: 6/19 5.7 11/19 5.4  CT lung cancer screen: 11/1/18, reordered, reminded     Patient Active Problem List    Diagnosis Date Noted    Pure hypercholesterolemia 05/01/2017    Acquired hypothyroidism 05/01/2017    Elevated WBC count 04/16/2014    Elevated liver function tests 04/16/2014    Breast cancer right DCIS 06/11/2013    Depression 02/11/2013    Headache 09/10/2012    HTN (hypertension) 09/10/2012     Current Outpatient Medications   Medication Sig Dispense Refill    DULoxetine (CYMBALTA) 60 mg capsule Take 1 Cap by mouth daily. 30 Cap 1    gabapentin (NEURONTIN) 300 mg capsule Take 1 Cap by mouth two (2) times daily as needed for Pain. 180 Cap 0    metoprolol succinate (TOPROL-XL) 25 mg XL tablet TAKE 1 TABLET BY MOUTH EVERY DAY 7 Tab 0    cyclobenzaprine (FLEXERIL) 5 mg tablet Take 1-2 Tabs by mouth nightly.  As needed 12 Tab 0    atorvastatin (LIPITOR) 20 mg tablet TAKE 1 TABLET BY MOUTH EVERY DAY 90 Tab 0    levothyroxine (SYNTHROID) 75 mcg tablet TAKE 1 TABLET BY MOUTH EVERY DAY BEFORE BREAKFAST 90 Tab 0    ProAir RespiClick 90 mcg/actuation breath activated inhaler TAKE 1 PUFF BY INHALATION EVERY SIX (6) HOURS AS NEEDED FOR WHEEZING. 1 Inhaler 0    naproxen (NAPROSYN) 500 mg tablet Take 1 Tab by mouth two (2) times daily (with meals). 60 Tab 1    amitriptyline (ELAVIL) 10 mg tablet TAKE 2 TABLETS BY MOUTH IN THE EVENING 180 Tab 1    cholecalciferol (VITAMIN D3) 25 mcg (1,000 unit) cap Take  by mouth daily.  SUMAtriptan (IMITREX) 50 mg tablet TAKE 1 TABLET BY MOUTH ONCE AS NEEDED FOR MIGRAINE FOR UP TO 1 DOSE 6 Tab 1    multivitamin (ONE A DAY) tablet Take 1 Tab by mouth daily.         furosemide (LASIX) 20 mg tablet TAKE 1 TABLET BY MOUTH EVERY DAY 30 Tab 0     Past Surgical History:   Procedure Laterality Date    BREAST SURGERY PROCEDURE UNLISTED  5/30/13    RIGHT BREAST LUMPECTOMY WITH RIGHT NEDDLE LOCALIZATION     HX BREAST LUMPECTOMY  5/30/2013    BREAST LUMPECTOMY/PARTIAL performed by Melisa Barrera MD at [facility] MAIN OR    HX GYN      fallopian tube    HX ORTHOPAEDIC      left ankle repair    HX OTHER SURGICAL      cyst removed rom right thigh      Lab Results   Component Value Date/Time    WBC 7.0 02/24/2020 03:39 PM    HGB 12.9 02/24/2020 03:39 PM    Hemoglobin (POC) 15.3 10/31/2014 02:26 PM    HCT 36.1 02/24/2020 03:39 PM    Hematocrit (POC) 45 10/31/2014 02:26 PM    PLATELET 531 28/62/4947 03:39 PM    MCV 96 02/24/2020 03:39 PM     Lab Results   Component Value Date/Time    Cholesterol, total 121 06/10/2019 11:58 AM    HDL Cholesterol 39 (L) 06/10/2019 11:58 AM    LDL, calculated 2 06/10/2019 11:58 AM    Triglyceride 399 (H) 06/10/2019 11:58 AM     Lab Results   Component Value Date/Time    GFR est non-AA 85 02/24/2020 03:39 PM    GFRNA, POC >60 10/31/2014 02:26 PM    GFR est AA 98 02/24/2020 03:39 PM    GFRAA, POC >60 10/31/2014 02:26 PM    Creatinine 0.78 02/24/2020 03:39 PM    Creatinine (POC) 0.9 10/31/2014 02:26 PM    BUN 7 02/24/2020 03:39 PM    BUN (POC) 8 (L) 10/31/2014 02:26 PM    Sodium 142 02/24/2020 03:39 PM    Sodium (POC) 140 10/31/2014 02:26 PM    Potassium 4.0 02/24/2020 03:39 PM    Potassium (POC) 3.7 10/31/2014 02:26 PM    Chloride 100 02/24/2020 03:39 PM    Chloride (POC) 103 10/31/2014 02:26 PM    CO2 25 02/24/2020 03:39 PM    Magnesium 1.6 01/14/2017 07:28 AM    Phosphorus 1.0 (L) 11/28/2012 04:45 AM        Review of Systems   Respiratory: Negative for shortness of breath. Cardiovascular: Negative for chest pain. Physical Exam  Constitutional:       General: She is not in acute distress. Appearance: Normal appearance. She is not ill-appearing, toxic-appearing or diaphoretic. HENT:      Head: Normocephalic and atraumatic. Right Ear: External ear normal.      Left Ear: External ear normal.   Eyes:      General:         Right eye: No discharge. Left eye: No discharge. Conjunctiva/sclera: Conjunctivae normal.      Pupils: Pupils are equal, round, and reactive to light. Neck:      Musculoskeletal: Normal range of motion and neck supple. Cardiovascular:      Rate and Rhythm: Normal rate and regular rhythm. Pulses: Normal pulses. Heart sounds: Normal heart sounds. No murmur. No friction rub. No gallop. Pulmonary:      Effort: No respiratory distress. Breath sounds: Normal breath sounds. No wheezing or rales. Chest:      Chest wall: No tenderness. Musculoskeletal: Normal range of motion. General: Tenderness present. Right lower leg: No edema. Left lower leg: No edema. Comments: No bruising on exam  Tenderness on palpation over L ribcage      Skin:     General: Skin is warm and dry. Findings: No bruising. Neurological:      Mental Status: She is alert and oriented to person, place, and time. Mental status is at baseline.       Coordination: Coordination normal.      Gait: Gait normal.   Psychiatric:         Mood and Affect: Mood normal.         Behavior: Behavior normal. ASSESSMENT and PLAN    ICD-10-CM ICD-9-CM    1. Essential hypertension       Adequately controlled on Toprol continue     I10 401.9 LIPID PANEL      METABOLIC PANEL, COMPREHENSIVE      CBC W/O DIFF      TSH 3RD GENERATION      HEMOGLOBIN A1C WITH EAG   2. Acquired hypothyroidism  E03.9 244.9 LIPID PANEL   On Synthroid 75 mcg daily check TSH adjust dose as needed   METABOLIC PANEL, COMPREHENSIVE      CBC W/O DIFF      TSH 3RD GENERATION      HEMOGLOBIN A1C WITH EAG   3. Pure hypercholesterolemia  E78.00 272.0 LIPID PANEL   On Lipitor 20 mg check lipids adjust dose as needed   METABOLIC PANEL, COMPREHENSIVE      CBC W/O DIFF      TSH 3RD GENERATION      HEMOGLOBIN A1C WITH EAG   4. Reactive depression  F32.9 300.4 LIPID PANEL   Progressive symptoms we will increase Cymbalta to 60 mg currently on 30    Also on Elavil to help with headaches continue   METABOLIC PANEL, COMPREHENSIVE      CBC W/O DIFF      TSH 3RD GENERATION      HEMOGLOBIN A1C WITH EAG   5. Elevated liver function tests  R79.89 790.6 LIPID PANEL   Elevated in the past unfortunately continues to drink large amounts of alcohol counseled her on this again again encouraged her to stop drinking entirely repeat LFTs   METABOLIC PANEL, COMPREHENSIVE      CBC W/O DIFF      TSH 3RD GENERATION      HEMOGLOBIN A1C WITH EAG   6. Leukocytosis, unspecified type  D72.829 288.60 LIPID PANEL   Saw hematology in the past has not recently been there thought related to her smoking encouraged smoking cessation   METABOLIC PANEL, COMPREHENSIVE      CBC W/O DIFF      TSH 3RD GENERATION      HEMOGLOBIN A1C WITH EAG   7. Rib pain on left side  R07.81 786.50 XR RIBS LT W PA CXR MIN 3 V      LIPID PANEL   Patient fell check rib series to rule out fracture   METABOLIC PANEL, COMPREHENSIVE      CBC W/O DIFF      TSH 3RD GENERATION      HEMOGLOBIN A1C WITH EAG   8.  Chronic tension-type headache, not intractable  G44.229 339.12 LIPID PANEL   Improved and Elavil   METABOLIC PANEL, COMPREHENSIVE      CBC W/O DIFF      TSH 3RD GENERATION      HEMOGLOBIN A1C WITH EAG   9. Malignant neoplasm of right female breast, unspecified estrogen receptor status, unspecified site of breast ([facility]  C50.911 174.9 LIPID PANEL   Overdue for mammogram address this with her    Also she is overdue to see her hematologist she is very overdue to see her home hematologist actually I discussed this at every office visit again told her today to schedule appointment recall she never completed full therapy plan   METABOLIC PANEL, COMPREHENSIVE      CBC W/O DIFF      TSH 3RD GENERATION      HEMOGLOBIN A1C WITH EAG   10. IFG (impaired fasting glucose)  R73.01 790.21 LIPID PANEL   Check A1c diet controlled   METABOLIC PANEL, COMPREHENSIVE      CBC W/O DIFF      TSH 3RD GENERATION      HEMOGLOBIN A1C WITH EAG   11. Chronic bilateral thoracic back pain  M54.6 724.1 gabapentin (NEURONTIN) 300 mg capsule   Previously on gabapentin for this she is out    She currently has new rib pain will refill the gabapentin    She had had a fracture on the right side in the past and had some chronic pain from this and gabapentin helped G89.29 338.29    12. Personal history of nicotine dependence       CT lung cancer screen ordered counseled on smoking cessation Z87.891 V15.82 CT LOW DOSE LUNG CANCER SCREENING   13. Breast screening  Z12.39 V76.10 CT LOW DOSE LUNG CANCER SCREENING      CONNIE MAMMO BI SCREENING INCL CAD      14 edema Lasix as needed     Scribed by Jacinta Lord of [address] [place] [address] [address], as dictated by Dr. Jeannine Gray. Current diagnosis and concerns discussed with pt at length. Pt understands risks and benefits or current treatment plan and medications, and accepts the treatment and medication with any possible risks. Pt asks appropriate questions, which were answered. Pt was instructed to call with any concerns or problems. I have reviewed the note documented by the scribe. The services provided are my own.   The documentation is accurate

## 2020-11-03 NOTE — TELEPHONE ENCOUNTER
Called out and spoke to pt. Two pt identifiers confirmed. Pt offered and accepted in-office appt for 11/3/20 at 1130. Pt verbalized understanding of information discussed w/ no further questions at this time.

## 2020-11-03 NOTE — TELEPHONE ENCOUNTER
Spoke to [facility]--hung up prior to getting his name. Checking status of lasix 20mg and duloxetine 60mg were received. He states both were received--Escribed. He states that pt did not present a script for the gabapentin to be filled.      **Gabapentin was printed

## 2020-11-03 NOTE — TELEPHONE ENCOUNTER
Left vm for pt that 2 prescriptions (lasix, cymbalta) were escribed and the gabapentin was handed to her at [address] today; informed pt has to take to pharmacy to be filled. Callback prn. Closing.

## 2020-11-03 NOTE — LETTER
NOTIFICATION RETURN TO WORK / SCHOOL 
 
11/3/2020 12:30 PM 
 
Ms. Maite Dubon [address] [facility] [address] To Whom It May Concern: 
 
Rohini Dupont is currently under the care of [facility]. She will return to work/school on: 11/05/2020 If there are questions or concerns please have the patient contact our office.  
 
 
 
Sincerely, 
 
 
Lily Herrmann MD

## 2020-11-03 NOTE — TELEPHONE ENCOUNTER
Caller's first and last name: self   Reason for call: unknown Medications   Callback required yes/no and why: yes   Best contact number(s): [phone]   Details to clarify the request: Pt is filling new medications and they were not at the pharmacy, the names are unknown

## 2020-11-03 NOTE — LETTER
NOTIFICATION RETURN TO WORK / SCHOOL 
 
11/3/2020 12:31 PM 
 
Ms. Maite Santana [address] [facility] [address] To Whom It May Concern: 
 
Savannah Hodgkins is currently under the care of [facility] on 10/30/2020-11/04/2020 She will return to work/school on: 11/05/2020 If there are questions or concerns please have the patient contact our office.  
 
 
 
Sincerely, 
 
 
Tootie Holly MD

## 2020-11-04 ENCOUNTER — DOCUMENTATION ONLY (OUTPATIENT)
Dept: INTERNAL MEDICINE CLINIC | Age: 55
End: 2020-11-04

## 2020-11-04 DIAGNOSIS — E78.00 PURE HYPERCHOLESTEROLEMIA: Primary | ICD-10-CM

## 2020-11-04 LAB
ALBUMIN SERPL-MCNC: 3.7 G/DL (ref 3.8–4.9)
ALBUMIN/GLOB SERPL: 1.2 {RATIO} (ref 1.2–2.2)
ALP SERPL-CCNC: 126 IU/L (ref 39–117)
ALT SERPL-CCNC: 22 IU/L (ref 0–32)
AST SERPL-CCNC: 39 IU/L (ref 0–40)
BILIRUB SERPL-MCNC: <0.2 MG/DL (ref 0–1.2)
BUN SERPL-MCNC: 4 MG/DL (ref 6–24)
BUN/CREAT SERPL: 5 (ref 9–23)
CALCIUM SERPL-MCNC: 9.2 MG/DL (ref 8.7–10.2)
CHLORIDE SERPL-SCNC: 100 MMOL/L (ref 96–106)
CHOLEST SERPL-MCNC: 169 MG/DL (ref 100–199)
CO2 SERPL-SCNC: 26 MMOL/L (ref 20–29)
CREAT SERPL-MCNC: 0.76 MG/DL (ref 0.57–1)
ERYTHROCYTE [DISTWIDTH] IN BLOOD BY AUTOMATED COUNT: 15 % (ref 11.7–15.4)
EST. AVERAGE GLUCOSE BLD GHB EST-MCNC: 111 MG/DL
GLOBULIN SER CALC-MCNC: 3.2 G/DL (ref 1.5–4.5)
GLUCOSE SERPL-MCNC: 81 MG/DL (ref 65–99)
HBA1C MFR BLD: 5.5 % (ref 4.8–5.6)
HCT VFR BLD AUTO: 38.4 % (ref 34–46.6)
HDLC SERPL-MCNC: 35 MG/DL
HGB BLD-MCNC: 12.8 G/DL (ref 11.1–15.9)
LDLC SERPL CALC-MCNC: 64 MG/DL (ref 0–99)
MCH RBC QN AUTO: 32.3 PG (ref 26.6–33)
MCHC RBC AUTO-ENTMCNC: 33.3 G/DL (ref 31.5–35.7)
MCV RBC AUTO: 97 FL (ref 79–97)
PLATELET # BLD AUTO: 384 X10E3/UL (ref 150–450)
POTASSIUM SERPL-SCNC: 4.7 MMOL/L (ref 3.5–5.2)
PROT SERPL-MCNC: 6.9 G/DL (ref 6–8.5)
RBC # BLD AUTO: 3.96 X10E6/UL (ref 3.77–5.28)
SODIUM SERPL-SCNC: 140 MMOL/L (ref 134–144)
TRIGL SERPL-MCNC: 455 MG/DL (ref 0–149)
TSH SERPL DL<=0.005 MIU/L-ACNC: 3.57 UIU/ML (ref 0.45–4.5)
VLDLC SERPL CALC-MCNC: 70 MG/DL (ref 5–40)
WBC # BLD AUTO: 7.3 X10E3/UL (ref 3.4–10.8)

## 2020-11-04 RX ORDER — FENOFIBRATE 145 MG/1
145 TABLET, COATED ORAL DAILY
Qty: 30 TAB | Refills: 3 | Status: SHIPPED | OUTPATIENT
Start: 2020-11-04 | End: 2021-01-20

## 2020-11-04 NOTE — PROGRESS NOTES
Please call patient back about results  tg remain elevated.   Continue lipitor, add fenofibrate 145mg daily     Rest fine    Repeat lipids in 6 weeks

## 2020-11-04 NOTE — PROGRESS NOTES
Received call from pt. Two pt identifiers confirmed. Pt informed per Dr. Mellissa Clark the triglycerides remain elevated; continue lipitor and add fenofibrate 145mg daily--pt agreed/pended. Pt informed per Dr. Mellissa Clark repeat lipids in 6 weeks--Labs ordered. Pt informed rest of labs stable. Pt verbalized understanding of information discussed w/ no further questions at this time.

## 2020-11-04 NOTE — PROGRESS NOTES
Return to Duty Medical Certification forms completed, signed, and faxed to [facility] at [phone] w/ confirmation received. Copy placed into in-office scanning.

## 2020-11-26 RX ORDER — FUROSEMIDE 20 MG/1
TABLET ORAL
Qty: 30 TAB | Refills: 0 | Status: SHIPPED | OUTPATIENT
Start: 2020-11-26 | End: 2020-12-22

## 2020-12-01 RX ORDER — DULOXETIN HYDROCHLORIDE 60 MG/1
60 CAPSULE, DELAYED RELEASE ORAL DAILY
Qty: 90 CAP | Refills: 1 | Status: SHIPPED | OUTPATIENT
Start: 2020-12-01 | End: 2021-06-05

## 2020-12-01 NOTE — TELEPHONE ENCOUNTER
Future Appointments:  Future Appointments   Date Time Provider Pb Nelsoni   5/4/2021 10:00 AM Santosh Abarca MD [facility] BS AMB        Last Appointment With Me:  11/3/2020     Requested Prescriptions     Pending Prescriptions Disp Refills    DULoxetine (CYMBALTA) 60 mg capsule 90 Cap 1     Sig: Take 1 Cap by mouth daily.

## 2020-12-15 ENCOUNTER — TELEPHONE (OUTPATIENT)
Dept: INTERNAL MEDICINE CLINIC | Age: 55
End: 2020-12-15

## 2020-12-15 NOTE — TELEPHONE ENCOUNTER
Yes, thanks for the reminder. Letter has been printed. Please fax to [phone] attention Ms. Dumont Seek.

## 2020-12-15 NOTE — TELEPHONE ENCOUNTER
----- Message from Eduardo Mariscal sent at 12/15/2020  7:51 AM EST -----  Regarding: Dr. Js Edge  Caller's first and last name: self  Reason for call: Dr. 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 Callback required yes/no and why: Yes, to confirm note was sent. Best contact number(s): [phone]  Details to clarify the request: Note should be sent to [phone] Ms. Gloriarjake Herndon. States that she will be out until Wednesday.     Copy/paste Envera

## 2020-12-15 NOTE — LETTER
NOTIFICATION RETURN TO WORK 
 
12/15/2020 10:09 AM 
 
Ms. Maite Linton [address] [facility] [address] To Whom It May Concern: 
 
Lyndsay Roland is currently under the care of [facility]. Please excuse from work absence due to illness. Work absence starting Saturday, December 12 with return to work Wednesday, December 16. If there are questions or concerns please have the patient contact our office. Sincerely, 
 
 
Kristi Del Toro M.D. Electronically signed

## 2020-12-15 NOTE — TELEPHONE ENCOUNTER
Letter faxed to Ms. Russellrossana Louis at [phone] w/ confirmation received. Left vm for pt that letter faxed w/ confirmation received. Call back prn.

## 2020-12-18 RX ORDER — LEVOTHYROXINE SODIUM 75 UG/1
TABLET ORAL
Qty: 90 TAB | Refills: 0 | Status: SHIPPED | OUTPATIENT
Start: 2020-12-18 | End: 2021-01-27

## 2020-12-22 RX ORDER — FUROSEMIDE 20 MG/1
TABLET ORAL
Qty: 30 TAB | Refills: 0 | Status: SHIPPED | OUTPATIENT
Start: 2020-12-22 | End: 2021-01-15

## 2021-01-10 RX ORDER — ALBUTEROL SULFATE 90 UG/1
POWDER, METERED RESPIRATORY (INHALATION)
Qty: 1 INHALER | Refills: 0 | Status: SHIPPED | OUTPATIENT
Start: 2021-01-10 | End: 2021-10-12 | Stop reason: SDUPTHER

## 2021-01-20 RX ORDER — FENOFIBRATE 145 MG/1
TABLET, COATED ORAL
Qty: 90 TAB | Refills: 1 | Status: SHIPPED | OUTPATIENT
Start: 2021-01-20 | End: 2021-07-08

## 2021-01-27 RX ORDER — LEVOTHYROXINE SODIUM 75 UG/1
TABLET ORAL
Qty: 90 TAB | Refills: 0 | Status: SHIPPED | OUTPATIENT
Start: 2021-01-27 | End: 2021-10-04

## 2021-01-28 RX ORDER — AMITRIPTYLINE HYDROCHLORIDE 10 MG/1
TABLET, FILM COATED ORAL
Qty: 180 TAB | Refills: 1 | Status: SHIPPED | OUTPATIENT
Start: 2021-01-28 | End: 2022-10-20

## 2021-01-28 NOTE — TELEPHONE ENCOUNTER
Future Appointments:  Future Appointments   Date Time Provider Pb Nelsoni   5/4/2021 10:00 AM Yolette Tavarez MD [facility] BS AMB        Last Appointment With Me:  11/3/2020     Requested Prescriptions     Pending Prescriptions Disp Refills    amitriptyline (ELAVIL) 10 mg tablet 180 Tab 1     Sig: TAKE 2 TABLETS BY MOUTH IN THE EVENING

## 2021-02-04 DIAGNOSIS — G89.29 CHRONIC BILATERAL THORACIC BACK PAIN: ICD-10-CM

## 2021-02-04 DIAGNOSIS — M54.6 CHRONIC BILATERAL THORACIC BACK PAIN: ICD-10-CM

## 2021-02-04 NOTE — TELEPHONE ENCOUNTER
Future Appointments:  Future Appointments   Date Time Provider Pb Nelsoni   5/4/2021 10:00 AM Fede Carver MD [facility] BS AMB        Last Appointment With Me:  11/3/2020     Requested Prescriptions     Pending Prescriptions Disp Refills    gabapentin (NEURONTIN) 300 mg capsule 180 Cap 0     Sig: Take 1 Cap by mouth two (2) times daily as needed for Pain.

## 2021-02-05 RX ORDER — GABAPENTIN 300 MG/1
300 CAPSULE ORAL
Qty: 180 CAP | Refills: 0 | Status: SHIPPED | OUTPATIENT
Start: 2021-02-05 | End: 2021-05-04 | Stop reason: SDUPTHER

## 2021-02-05 NOTE — TELEPHONE ENCOUNTER
"Requested Prescriptions   Pending Prescriptions Disp Refills     NOVOLOG FLEXPEN 100 UNIT/ML soln [Pharmacy Med Name: NovoLOG FlexPen 100 UNIT/ML Solution pen-injector] 9 mL 10     Sig: INJECT 1 TO 5 UNITS SUB Q THREE TIMES DAILY WITH MEALS PER SLIDING SCALE       Short Acting Insulin Protocol Failed - 2/5/2021  1:36 PM        Failed - HgbA1C in past 3 or 6 months     If HgbA1C is 8 or greater, it needs to be on file within the past 3 months.  If less than 8, must be on file within the past 6 months.     Recent Labs   Lab Test 08/14/20  1546   A1C 9.0*             Passed - Serum creatinine on file in past 12 months     Recent Labs   Lab Test 12/09/20  1319 09/01/17  1049 09/01/17  1049   CR 1.37*   < >  --    CREAT  --   --  1.6*    < > = values in this interval not displayed.       Ok to refill medication if creatinine is low          Passed - Medication is active on med list        Passed - Patient is age 18 or older        Passed - Recent (6 mo) or future (30 days) visit within the authorizing provider's specialty     Patient had office visit in the last 6 months or has a visit in the next 30 days with authorizing provider or within the authorizing provider's specialty.  See \"Patient Info\" tab in inbasket, or \"Choose Columns\" in Meds & Orders section of the refill encounter.                 " ----- Message from Brayan King sent at 1/26/2018  8:40 AM EST -----  Regarding: Dr. William Juraez  Pt is returning a call from [place], and is stating that she is having ongoing side pain, and would like to know what she should do. Best contact number [phone].     Message copied/pasted from [facility]

## 2021-03-05 NOTE — PATIENT INSTRUCTIONS
Start wellbutrin 150mg in evening [Patient Intake Form Reviewed] : Patient intake form was reviewed [As Noted in HPI] : as noted in HPI [Negative] : Heme/Lymph

## 2021-04-24 ENCOUNTER — IMMUNIZATION (OUTPATIENT)
Dept: FAMILY MEDICINE CLINIC | Age: 56
End: 2021-04-24
Payer: COMMERCIAL

## 2021-04-24 DIAGNOSIS — Z23 ENCOUNTER FOR IMMUNIZATION: Primary | ICD-10-CM

## 2021-04-24 PROCEDURE — 91300 COVID-19, MRNA, LNP-S, PF, 30MCG/0.3ML DOSE(PFIZER): CPT | Performed by: FAMILY MEDICINE

## 2021-04-24 PROCEDURE — 0001A COVID-19, MRNA, LNP-S, PF, 30MCG/0.3ML DOSE(PFIZER): CPT | Performed by: FAMILY MEDICINE

## 2021-04-30 NOTE — PROGRESS NOTES
HISTORY OF PRESENT ILLNESS  Maite Rahman is a 60 y.o. female.   HPI     Last here 11/3/20  Pt is here for routine care/acute care.  Aimee Oviedo, She c/o L sided rib pain recall she has a history of multiple falls resulting in rib fractures in the past  Reviewed xr ribs 11/3/20: Anterior ribs deformities as above symptoms overall improved    She c/o foot numbness on and off just feels cold sometimes  Discussed gabapentin helps with this     BP today is 130/89  BP at home 128-132/80s  Continues on toprol XL 25mg daily - she ran out of this, needs refill   She also has lasix 20mg daily for edema which is stable  She c/o mouth dryness- discussed this is from lasix and alcohol use we will check basic labs as well      Wt is 169 lbs - up 11 lbs x lov   She doesn't have an appetite, however she is still drinking 1-2 beers a night amongst other things  Discussed calories can be from alcohol  Will reorder protonix due to bloating  Will repeat US abd check labs and evaluate further as needed  Discussed continued diet and w/l      Reviewed labs   Will get labs today      Pt follows with Dr. Lonny Lawton (hem/onc) for anemia  Last visit was 5/17, wanted pt to be on hormonal therapy in 2013, radiation was not completed, f/u in 1 year   Pt never followed up, reminded to do this on multiple occasions  Recall COLO and EGD 2013 - bleeding ulcer, issues with anemia  Not taking protonix for gerd but will be restarting  Will reorder for current bloating sxs   Recall past evaluation with Dr. Lonny Lawton in 2015, thrombocytosis thought d/t cigarette smoking     Pt is taking  gabapentin 300 mg 1-2x per day for back pain, which helps  Lov, increased cymbalta from 30 mg to 60 mg   She gets pain from prev rib fracture      Continues on synthroid 75 mcg daily   In the past, I ordered an [address] thyroids however no nodule on exam pt did not complete this  Recall pt has a FMHX thyroid cancer (brother)     Previously took protonix no current GERD symptoms        continues on lipitor 20mg daily for cholesterol - she is out of this, needs refill   After last labs, added fenofibrate - compliant     Continues on amitriptyline 20mg, takes 2 10 mg  qhs --this works wll helps with sleep and HA not too sedating --5/21  Pt takes imitrex 50mg prn (not daily) --stable     No longer Continues on wellbutrin 150mg--for depression and smoking   She has been stressed with situation at work   She is on cymbalta 60 mg for anxiety   She c/o worsening back pain  Will set up with PT      She is drinking about 2-3 drinks a night    Discussed cessation     Pt has a 30 pack year hx  Pt continues to smoke < 0.5 PPD  Prev was on wellbutrin which pt states was helping   Discussed cessation   Completed CT lung cancer screen 11/1/18 - due reminded     ACP not on file. SDM is her brother Heron ROUSEMaisha  Tj Forward III.   Provided information in the past.      Recall EGD and COLO in 2013: bleeding ulcer         PREVENTIVE:   Colonoscopy: 3/13, repeat 10 years  EGD: 2013, possible Barrette's   Pap: ~3/19, [facility], due reminded  Mammogram: 11/13/18, negative, h/o R sided breast DCIS, due  reminded   DEXA: [facility], ~3/19, will get report for review  Tdap: 12/10/17  Pneumovax: 8/27/2012  [id number]: not yet needed  Shingrix:  1st 11/03/20, will have her come back in at next office visit due to covid vaccine   Flu shot:  11/03/20  Eye exam: 3/18? Due reminded  EKG: 3/17 per ED   Lipids: 11/20  LDL 64  A1c: 6/19 5.7 11/19 5.4 11/20 5.5  CT lung cancer screen: 11/1/18, reordered, reminded   Covid: 1st (pfizer)    Patient Active Problem List    Diagnosis Date Noted    Pure hypercholesterolemia 05/01/2017    Acquired hypothyroidism 05/01/2017    Elevated WBC count 04/16/2014    Elevated liver function tests 04/16/2014    Breast cancer right DCIS 06/11/2013    Depression 02/11/2013    Headache 09/10/2012    HTN (hypertension) 09/10/2012     Current Outpatient Medications Medication Sig Dispense Refill    atorvastatin (LIPITOR) 20 mg tablet TAKE 1 TABLET BY MOUTH EVERY DAY 90 Tab 1    gabapentin (NEURONTIN) 300 mg capsule Take 1 Cap by mouth two (2) times daily as needed for Pain. 180 Cap 0    metoprolol succinate (TOPROL-XL) 25 mg XL tablet Take 1 tablet daily 90 Tab 1    amitriptyline (ELAVIL) 10 mg tablet TAKE 2 TABLETS BY MOUTH IN THE EVENING 180 Tab 1    levothyroxine (SYNTHROID) 75 mcg tablet TAKE 1 TABLET BY MOUTH EVERY DAY BEFORE BREAKFAST 90 Tab 0    fenofibrate nanocrystallized (TRICOR) 145 mg tablet TAKE 1 TABLET BY MOUTH EVERY DAY 90 Tab 1    furosemide (LASIX) 20 mg tablet TAKE 1 TABLET BY MOUTH EVERY DAY 30 Tab 2    ProAir RespiClick 90 mcg/actuation breath activated inhaler TAKE 1 PUFF BY INHALATION EVERY SIX (6) HOURS AS NEEDED FOR WHEEZING. 1 Inhaler 0    DULoxetine (CYMBALTA) 60 mg capsule Take 1 Cap by mouth daily. 90 Cap 1    cyclobenzaprine (FLEXERIL) 5 mg tablet Take 1-2 Tabs by mouth nightly. As needed 12 Tab 0    naproxen (NAPROSYN) 500 mg tablet Take 1 Tab by mouth two (2) times daily (with meals). 60 Tab 1    cholecalciferol (VITAMIN D3) 25 mcg (1,000 unit) cap Take  by mouth daily.  SUMAtriptan (IMITREX) 50 mg tablet TAKE 1 TABLET BY MOUTH ONCE AS NEEDED FOR MIGRAINE FOR UP TO 1 DOSE 6 Tab 1    multivitamin (ONE A DAY) tablet Take 1 Tab by mouth daily.          Past Surgical History:   Procedure Laterality Date    HX BREAST LUMPECTOMY  5/30/2013    BREAST LUMPECTOMY/PARTIAL performed [facility]dem[facility] MA[facility] OR    HX GYN      fallopian tube    HX ORTHOPAEDIC      left ankle repair    HX OTHER SURGICAL      cyst removed rom right thigh    MO BREAST SURGERY PROCEDURE UNLISTED  5/30/13    RIGHT BREAST LUMPECTOMY WITH RIGHT NEDDLE LOCALIZATION       Lab Results   Component Value Date/Time    WBC 7.3 11/03/2020 12:22 PM    HGB 12.8 11/03/2020 12:22 PM    Hemoglobin (POC) 15.3 10/31/2014 02:26 PM    HCT 38.4 11/03/2020 12:22 PM Hematocrit (POC) 45 10/31/2014 02:26 PM    PLATELET 613 [birth year]1927 12:22 PM    MCV 97 11/03/2020 12:22 PM     Lab Results   Component Value Date/Time    Cholesterol, total 169 11/03/2020 12:22 PM    HDL Cholesterol 35 (L) 11/03/2020 12:22 PM    LDL, calculated 64 11/03/2020 12:22 PM    LDL, calculated 2 06/10/2019 11:58 AM    Triglyceride 455 (H) 11/03/2020 12:22 PM     Lab Results   Component Value Date/Time    GFR est non-AA 87 11/03/2020 12:22 PM    GFRNA, POC >60 10/31/2014 02:26 PM    GFR est  11/03/2020 12:22 PM    GFRAA, POC >60 10/31/2014 02:26 PM    Creatinine 0.76 11/03/2020 12:22 PM    Creatinine (POC) 0.9 10/31/2014 02:26 PM    BUN 4 (L) 11/03/2020 12:22 PM    BUN (POC) 8 (L) 10/31/2014 02:26 PM    Sodium 140 11/03/2020 12:22 PM    Sodium (POC) 140 10/31/2014 02:26 PM    Potassium 4.7 11/03/2020 12:22 PM    Potassium (POC) 3.7 10/31/2014 02:26 PM    Chloride 100 11/03/2020 12:22 PM    Chloride (POC) 103 10/31/2014 02:26 PM    CO2 26 11/03/2020 12:22 PM    Magnesium 1.6 01/14/2017 07:28 AM    Phosphorus 1.0 (L) 11/28/2012 04:45 AM        Review of Systems   Respiratory: Negative for shortness of breath. Cardiovascular: Negative for chest pain. Physical Exam  Constitutional:       General: She is not in acute distress. Appearance: Normal appearance. She is not ill-appearing, toxic-appearing or diaphoretic. HENT:      Head: Normocephalic and atraumatic. Right Ear: External ear normal.      Left Ear: External ear normal.   Eyes:      General:         Right eye: No discharge. Left eye: No discharge. Conjunctiva/sclera: Conjunctivae normal.      Pupils: Pupils are equal, round, and reactive to light. Neck:      Musculoskeletal: Normal range of motion and neck supple. Cardiovascular:      Rate and Rhythm: Normal rate and regular rhythm. Pulses: Normal pulses. Heart sounds: Normal heart sounds. No murmur. No friction rub. No gallop.     Pulmonary: Effort: No respiratory distress. Breath sounds: Normal breath sounds. No wheezing or rales. Chest:      Chest wall: No tenderness. Abdominal:      General: Abdomen is flat. There is no distension. Palpations: Abdomen is soft. There is no mass. Tenderness: There is no abdominal tenderness. There is no guarding or rebound. Hernia: No hernia is present. Comments: No fluid waves   Musculoskeletal: Normal range of motion. Right lower leg: Edema (trace) present. Left lower leg: Edema present. Skin:     General: Skin is warm and dry. Neurological:      Mental Status: She is alert and oriented to person, place, and time. Mental status is at baseline. Coordination: Coordination normal.      Gait: Gait normal.   Psychiatric:         Mood and Affect: Mood normal.         Behavior: Behavior normal.         ASSESSMENT and PLAN    ICD-10-CM ICD-9-CM    1. Essential hypertension       Controlled on Toprol-XL 25 mg daily       I10 401.9 LIPID PANEL      METABOLIC PANEL, COMPREHENSIVE      CBC W/O DIFF      TSH 3RD GENERATION      HEMOGLOBIN A1C WITH EAG   2. Elevated lipids  E78.5 272.4 atorvastatin (LIPITOR) 20 mg tablet      LIPID PANEL   On Lipitor now fenofibrate due to elevated triglycerides check lipids adjust medication as needed   METABOLIC PANEL, COMPREHENSIVE      CBC W/O DIFF      TSH 3RD GENERATION      HEMOGLOBIN A1C WITH EAG   3. Chronic bilateral thoracic back pain  M54.6 724.1 gabapentin (NEURONTIN) 300 mg capsule    G89.29 338.29 LIPID PANEL   On gabapentin which improves her symptoms Elavil helps as well we will set her up with physical therapy   METABOLIC PANEL, COMPREHENSIVE      CBC W/O DIFF      TSH 3RD GENERATION      HEMOGLOBIN A1C WITH EAG      REFERRAL TO PHYSICAL THERAPY   4.  Acquired hypothyroidism  E03.9 244.9 LIPID PANEL      METABOLIC PANEL, COMPREHENSIVE   On Synthroid check TSH adjust dose as needed   CBC W/O DIFF      TSH 3RD GENERATION HEMOGLOBIN A1C WITH EAG   5. Pure hypercholesterolemia  E78.00 272.0 LIPID PANEL   See above   METABOLIC PANEL, COMPREHENSIVE      CBC W/O DIFF      TSH 3RD GENERATION      HEMOGLOBIN A1C WITH EAG   6. Elevated liver function tests  R79.89 790.6 LIPID PANEL   Had severe fatty liver in the past reports some abdominal bloating will get ultrasound of the liver as her last ultrasound was in 2017    We will check LFTs again    Unfortunately she has gained weight and unfortunately she continues to drink alcohol discussed not drinking alcohol discussed complete abstinence again with her    She expressed understanding    Ultimately she may need to see a liver clinic    Await results   METABOLIC PANEL, COMPREHENSIVE      CBC W/O DIFF      TSH 3RD GENERATION      HEMOGLOBIN A1C WITH EAG      US ABD COMP   7. Reactive depression  F32.9 300.4 LIPID PANEL      METABOLIC PANEL, COMPREHENSIVE   Improved Cymbalta 60 mg continue   CBC W/O DIFF      TSH 3RD GENERATION      HEMOGLOBIN A1C WITH EAG   8. Malignant neoplasm of right female breast, unspecified estrogen receptor status, unspecified site of breast ([facility] [address]  C50.911 174.9 LIPID PANEL   Still much overdue for her mammogram she was previously seen by hematology did not complete hormonal therapy and has not followed up since 2017 still needs to follow-up with them   METABOLIC PANEL, COMPREHENSIVE      CBC W/O DIFF      TSH 3RD GENERATION      HEMOGLOBIN A1C WITH EAG     Z00.00 V70.9 REFERRAL TO OBSTETRICS AND GYNECOLOGY      LIPID PANEL      METABOLIC PANEL, COMPREHENSIVE      CBC W/O DIFF      TSH 3RD GENERATION      HEMOGLOBIN A1C WITH EAG   10. Bloating       Start Protonix again get liver ultrasound check labs     R14.0 787.3 US ABD COMP   11. Dry mouth    Likely related to alcohol use and Lasix we will check basic labs to include Sjogren's     R68.2 527.7 SJOGREN'S ABS, SSA AND SSB   12. B12 deficiency    Check level treat as needed     E53.8 266.2 VITAMIN B12   13.  Localized edema    Stable on Lasix check electrolytes adjust medications     R60.0 782. 3            Scribed by Ernesto Hauser, as dictated by Dr. Ramy Gonzáles. Current diagnosis and concerns discussed with pt at length. Pt understands risks and benefits or current treatment plan and medications, and accepts the treatment and medication with any possible risks. Pt asks appropriate questions, which were answered. Pt was instructed to call with any concerns or problems. I have reviewed the note documented by the scribe. The services provided are my own.   The documentation is accurate

## 2021-05-04 ENCOUNTER — OFFICE VISIT (OUTPATIENT)
Dept: INTERNAL MEDICINE CLINIC | Age: 56
End: 2021-05-04
Payer: COMMERCIAL

## 2021-05-04 VITALS
DIASTOLIC BLOOD PRESSURE: 89 MMHG | OXYGEN SATURATION: 97 % | TEMPERATURE: 97.8 F | BODY MASS INDEX: 28.95 KG/M2 | HEIGHT: 64 IN | RESPIRATION RATE: 16 BRPM | SYSTOLIC BLOOD PRESSURE: 130 MMHG | WEIGHT: 169.6 LBS | HEART RATE: 103 BPM

## 2021-05-04 DIAGNOSIS — Z00.00 PHYSICAL EXAM: ICD-10-CM

## 2021-05-04 DIAGNOSIS — I10 ESSENTIAL HYPERTENSION: Primary | ICD-10-CM

## 2021-05-04 DIAGNOSIS — R14.0 BLOATING: ICD-10-CM

## 2021-05-04 DIAGNOSIS — R60.0 LOCALIZED EDEMA: ICD-10-CM

## 2021-05-04 DIAGNOSIS — E78.00 PURE HYPERCHOLESTEROLEMIA: ICD-10-CM

## 2021-05-04 DIAGNOSIS — E03.9 ACQUIRED HYPOTHYROIDISM: ICD-10-CM

## 2021-05-04 DIAGNOSIS — E78.5 ELEVATED LIPIDS: ICD-10-CM

## 2021-05-04 DIAGNOSIS — E53.8 B12 DEFICIENCY: ICD-10-CM

## 2021-05-04 DIAGNOSIS — R68.2 DRY MOUTH: ICD-10-CM

## 2021-05-04 DIAGNOSIS — C50.911 MALIGNANT NEOPLASM OF RIGHT FEMALE BREAST, UNSPECIFIED ESTROGEN RECEPTOR STATUS, UNSPECIFIED SITE OF BREAST (HCC): ICD-10-CM

## 2021-05-04 DIAGNOSIS — M54.6 CHRONIC BILATERAL THORACIC BACK PAIN: ICD-10-CM

## 2021-05-04 DIAGNOSIS — G89.29 CHRONIC BILATERAL THORACIC BACK PAIN: ICD-10-CM

## 2021-05-04 DIAGNOSIS — R79.89 ELEVATED LIVER FUNCTION TESTS: ICD-10-CM

## 2021-05-04 DIAGNOSIS — F32.9 REACTIVE DEPRESSION: ICD-10-CM

## 2021-05-04 LAB
ALBUMIN SERPL-MCNC: 3.4 G/DL (ref 3.5–5)
ALBUMIN/GLOB SERPL: 0.9 {RATIO} (ref 1.1–2.2)
ALP SERPL-CCNC: 154 U/L (ref 45–117)
ALT SERPL-CCNC: 20 U/L (ref 12–78)
ANION GAP SERPL CALC-SCNC: 10 MMOL/L (ref 5–15)
AST SERPL-CCNC: 20 U/L (ref 15–37)
BILIRUB SERPL-MCNC: 0.2 MG/DL (ref 0.2–1)
BUN SERPL-MCNC: 6 MG/DL (ref 6–20)
BUN/CREAT SERPL: 8 (ref 12–20)
CALCIUM SERPL-MCNC: 9.6 MG/DL (ref 8.5–10.1)
CHLORIDE SERPL-SCNC: 97 MMOL/L (ref 97–108)
CHOLEST SERPL-MCNC: 208 MG/DL
CO2 SERPL-SCNC: 30 MMOL/L (ref 21–32)
CREAT SERPL-MCNC: 0.77 MG/DL (ref 0.55–1.02)
ERYTHROCYTE [DISTWIDTH] IN BLOOD BY AUTOMATED COUNT: 13 % (ref 11.5–14.5)
EST. AVERAGE GLUCOSE BLD GHB EST-MCNC: 111 MG/DL
GLOBULIN SER CALC-MCNC: 4 G/DL (ref 2–4)
GLUCOSE SERPL-MCNC: 92 MG/DL (ref 65–100)
HBA1C MFR BLD: 5.5 % (ref 4–5.6)
HCT VFR BLD AUTO: 40 % (ref 35–47)
HDLC SERPL-MCNC: 31 MG/DL
HDLC SERPL: 6.7 {RATIO} (ref 0–5)
HGB BLD-MCNC: 13.4 G/DL (ref 11.5–16)
LDLC SERPL CALC-MCNC: 135.8 MG/DL (ref 0–100)
LIPID PROFILE,FLP: ABNORMAL
MCH RBC QN AUTO: 31.2 PG (ref 26–34)
MCHC RBC AUTO-ENTMCNC: 33.5 G/DL (ref 30–36.5)
MCV RBC AUTO: 93 FL (ref 80–99)
NRBC # BLD: 0 K/UL (ref 0–0.01)
NRBC BLD-RTO: 0 PER 100 WBC
PLATELET # BLD AUTO: 438 K/UL (ref 150–400)
PMV BLD AUTO: 9.7 FL (ref 8.9–12.9)
POTASSIUM SERPL-SCNC: 4 MMOL/L (ref 3.5–5.1)
PROT SERPL-MCNC: 7.4 G/DL (ref 6.4–8.2)
RBC # BLD AUTO: 4.3 M/UL (ref 3.8–5.2)
SODIUM SERPL-SCNC: 137 MMOL/L (ref 136–145)
TRIGL SERPL-MCNC: 206 MG/DL (ref ?–150)
TSH SERPL DL<=0.05 MIU/L-ACNC: 3.66 UIU/ML (ref 0.36–3.74)
VIT B12 SERPL-MCNC: 955 PG/ML (ref 193–986)
VLDLC SERPL CALC-MCNC: 41.2 MG/DL
WBC # BLD AUTO: 11.4 K/UL (ref 3.6–11)

## 2021-05-04 PROCEDURE — 99396 PREV VISIT EST AGE 40-64: CPT | Performed by: INTERNAL MEDICINE

## 2021-05-04 PROCEDURE — 99213 OFFICE O/P EST LOW 20 MIN: CPT | Performed by: INTERNAL MEDICINE

## 2021-05-04 RX ORDER — PANTOPRAZOLE SODIUM 20 MG/1
20 TABLET, DELAYED RELEASE ORAL DAILY
Qty: 90 TAB | Refills: 1 | Status: SHIPPED | OUTPATIENT
Start: 2021-05-04 | End: 2021-10-12 | Stop reason: SDUPTHER

## 2021-05-04 RX ORDER — GABAPENTIN 300 MG/1
300 CAPSULE ORAL
Qty: 180 CAP | Refills: 0 | Status: SHIPPED | OUTPATIENT
Start: 2021-05-04 | End: 2021-11-29

## 2021-05-04 RX ORDER — METOPROLOL SUCCINATE 25 MG/1
TABLET, EXTENDED RELEASE ORAL
Qty: 90 TAB | Refills: 1 | Status: SHIPPED | OUTPATIENT
Start: 2021-05-04 | End: 2021-10-25

## 2021-05-04 RX ORDER — ATORVASTATIN CALCIUM 20 MG/1
TABLET, FILM COATED ORAL
Qty: 90 TAB | Refills: 1 | Status: SHIPPED | OUTPATIENT
Start: 2021-05-04 | End: 2021-10-25

## 2021-05-04 NOTE — PROGRESS NOTES
Last here 11/3/20  Pt is here for routine care/acute care.    cpe     BP today is 130/89        Wt is 169 lbs - up 11 lbs x lov   She doesn't have an appetite, however she is still drinking 1-2 beers a night amongst other things  Discussed calories can be from alcohol  Will reorder protonix due to bloating  Will repeat US abd check labs and evaluate further as needed  Discussed continued diet and w/l      Reviewed labs   Will get labs today           She is drinking about 2-3 drinks a night    Discussed cessation     Pt has a 30 pack year hx  Pt continues to smoke < 0.5 PPD  Prev was on wellbutrin which pt states was helping   Discussed cessation   Completed CT lung cancer screen 11/1/18 - due reminded     ACP not on file. SDM is her brother Phil Rasmussen III.   Provided information in the past.      Recall EGD and COLO in 2013: bleeding ulcer         PREVENTIVE:   Colonoscopy: 3/13, repeat 10 years  EGD: 2013, possible Barrette's   Pap: ~3/19, [facility], due reminded  Mammogram: 11/13/18, negative, h/o R sided breast DCIS, due  reminded   DEXA: [facility], ~3/19, will get report for review  Tdap: 12/10/17  Pneumovax: 8/27/2012  [id number]: not yet needed  Shingrix:  1st 11/03/20, will have her come back in at next office visit due to covid vaccine   Flu shot:  11/03/20  Eye exam: 3/18? Due reminded  EKG: 3/17 per ED   Lipids: 11/20  LDL 64  A1c: 6/19 5.7 11/19 5.4 11/20 5.5  CT lung cancer screen: 11/1/18, reordered, reminded   Covid: 1st (pfizer)    Patient Active Problem List    Diagnosis Date Noted    Pure hypercholesterolemia 05/01/2017    Acquired hypothyroidism 05/01/2017    Elevated WBC count 04/16/2014    Elevated liver function tests 04/16/2014    Breast cancer right DCIS 06/11/2013    Depression 02/11/2013    Headache 09/10/2012    HTN (hypertension) 09/10/2012     Current Outpatient Medications   Medication Sig Dispense Refill    atorvastatin (LIPITOR) 20 mg tablet TAKE 1 TABLET BY MOUTH EVERY DAY 90 Tab 1    gabapentin (NEURONTIN) 300 mg capsule Take 1 Cap by mouth two (2) times daily as needed for Pain. 180 Cap 0    metoprolol succinate (TOPROL-XL) 25 mg XL tablet Take 1 tablet daily 90 Tab 1    amitriptyline (ELAVIL) 10 mg tablet TAKE 2 TABLETS BY MOUTH IN THE EVENING 180 Tab 1    levothyroxine (SYNTHROID) 75 mcg tablet TAKE 1 TABLET BY MOUTH EVERY DAY BEFORE BREAKFAST 90 Tab 0    fenofibrate nanocrystallized (TRICOR) 145 mg tablet TAKE 1 TABLET BY MOUTH EVERY DAY 90 Tab 1    furosemide (LASIX) 20 mg tablet TAKE 1 TABLET BY MOUTH EVERY DAY 30 Tab 2    ProAir RespiClick 90 mcg/actuation breath activated inhaler TAKE 1 PUFF BY INHALATION EVERY SIX (6) HOURS AS NEEDED FOR WHEEZING. 1 Inhaler 0    DULoxetine (CYMBALTA) 60 mg capsule Take 1 Cap by mouth daily. 90 Cap 1    cyclobenzaprine (FLEXERIL) 5 mg tablet Take 1-2 Tabs by mouth nightly. As needed 12 Tab 0    naproxen (NAPROSYN) 500 mg tablet Take 1 Tab by mouth two (2) times daily (with meals). 60 Tab 1    cholecalciferol (VITAMIN D3) 25 mcg (1,000 unit) cap Take  by mouth daily.  SUMAtriptan (IMITREX) 50 mg tablet TAKE 1 TABLET BY MOUTH ONCE AS NEEDED FOR MIGRAINE FOR UP TO 1 DOSE 6 Tab 1    multivitamin (ONE A DAY) tablet Take 1 Tab by mouth daily.          Past Surgical History:   Procedure Laterality Date    HX BREAST LUMPECTOMY  5/30/2013    BREAST LUMPECTOMY/PARTIAL performed by Bharat Agee MD at hospitals MA[facility] OR    HX GYN      fallopian tube    HX ORTHOPAEDIC      left ankle repair    HX OTHER SURGICAL      cyst removed rom right thigh    [facility] BREAST SURGERY PROCEDURE UNLISTED  5/30/13    RIGHT BREAST LUMPECTOMY WITH RIGHT NEDDLE LOCALIZATION       Lab Results   Component Value Date/Time    WBC 7.3 11/03/2020 12:22 PM    HGB 12.8 11/03/2020 12:22 PM    Hemoglobin (POC) 15.3 10/31/2014 02:26 PM    HCT 38.4 11/03/2020 12:22 PM    Hematocrit (POC) 45 10/31/2014 02:26 PM    PLATELET 040 14/27/0810 12:22 PM    MCV 97 11/03/2020 12:22 PM     Lab Results   Component Value Date/Time    Cholesterol, total 169 11/03/2020 12:22 PM    HDL Cholesterol 35 (L) 11/03/2020 12:22 PM    LDL, calculated 64 11/03/2020 12:22 PM    LDL, calculated 2 06/10/2019 11:58 AM    Triglyceride 455 (H) 11/03/2020 12:22 PM     Lab Results   Component Value Date/Time    GFR est non-AA 87 11/03/2020 12:22 PM    GFRNA, POC >60 10/31/2014 02:26 PM    GFR est  11/03/2020 12:22 PM    GFRAA, POC >60 10/31/2014 02:26 PM    Creatinine 0.76 11/03/2020 12:22 PM    Creatinine (POC) 0.9 10/31/2014 02:26 PM    BUN 4 (L) 11/03/2020 12:22 PM    BUN (POC) 8 (L) 10/31/2014 02:26 PM    Sodium 140 11/03/2020 12:22 PM    Sodium (POC) 140 10/31/2014 02:26 PM    Potassium 4.7 11/03/2020 12:22 PM    Potassium (POC) 3.7 10/31/2014 02:26 PM    Chloride 100 11/03/2020 12:22 PM    Chloride (POC) 103 10/31/2014 02:26 PM    CO2 26 11/03/2020 12:22 PM    Magnesium 1.6 01/14/2017 07:28 AM    Phosphorus 1.0 (L) 11/28/2012 04:45 AM        Review of Systems   Respiratory: Negative for shortness of breath. Cardiovascular: Negative for chest pain. Physical Exam  Constitutional:       General: She is not in acute distress. Appearance: Normal appearance. She is not ill-appearing, toxic-appearing or diaphoretic. HENT:      Head: Normocephalic and atraumatic. Right Ear: External ear normal.      Left Ear: External ear normal.   Eyes:      General:         Right eye: No discharge. Left eye: No discharge. Conjunctiva/sclera: Conjunctivae normal.      Pupils: Pupils are equal, round, and reactive to light. Neck:      Musculoskeletal: Normal range of motion and neck supple. Cardiovascular:      Rate and Rhythm: Normal rate and regular rhythm. Pulses: Normal pulses. Heart sounds: Normal heart sounds. No murmur. No friction rub. No gallop. Pulmonary:      Effort: No respiratory distress. Breath sounds: Normal breath sounds.  No wheezing or rales.   Chest:      Chest wall: No tenderness. Abdominal:      General: Abdomen is flat. There is no distension. Palpations: Abdomen is soft. There is no mass. Tenderness: There is no abdominal tenderness. There is no guarding or rebound. Hernia: No hernia is present. Comments: No fluid waves   Musculoskeletal: Normal range of motion. Right lower leg: Edema (trace) present. Left lower leg: Edema present. Skin:     General: Skin is warm and dry. Neurological:      Mental Status: She is alert and oriented to person, place, and time. Mental status is at baseline. Coordination: Coordination normal.      Gait: Gait normal.   Psychiatric:         Mood and Affect: Mood normal.         Behavior: Behavior normal.         ASSESSMENT and PLAN                                                                                                                                                                                                                                                                                         9. Physical exam  Z00.00 V70.9 REFERRAL TO OBSTETRICS AND GYNECOLOGY   Overdue for mammogram pelvic exam eye exam    Labs ordered    Colonoscopy is up-to-date    CT lung cancer screen is still needed    She is started the Pfizer vaccine series once this is complete we will get her second shingles vaccine       LIPID PANEL      METABOLIC PANEL, COMPREHENSIVE      CBC W/O DIFF      TSH 3RD GENERATION      HEMOGLOBIN A1C WITH EAG     R14.0 787.3 US ABD COMP     R68.2 527.7 SJOGREN'S ABS, SSA AND SSB     E53.8 266.2 VITAMIN B12     R60.0 782. 3            Scribed by Jin Iqbal, as dictated by Dr. Philip Pierre. Current diagnosis and concerns discussed with pt at length. Pt understands risks and benefits or current treatment plan and medications, and accepts the treatment and medication with any possible risks.  Pt asks appropriate questions, which were answered. Pt was instructed to call with any concerns or problems. I have reviewed the note documented by the scribe. The services provided are my own.   The documentation is accurate

## 2021-05-04 NOTE — PATIENT INSTRUCTIONS
Return in 1 month for shingles vaccine    Labs today     Complete mammogram and pelvic exam and lung cancer screen and eye exam

## 2021-05-04 NOTE — TELEPHONE ENCOUNTER
----- Message from Jimmy Jones sent at 5/4/2021  1:17 PM EDT -----  Regarding: Dr. Marylee Raja  General Message/Vendor Calls    Caller's first and last name: Cas Umaña      Reason for call: Wondering why water pills weren't refilled      Callback required yes/no and why: Yes      Best contact number(s): [phone]      Details to clarify the request: Pt is wondering why Dr. Heidi Abernathy didn't refill her water pills at this morning's appt at 10:00 AM.      Jimmy Jones

## 2021-05-05 LAB
ENA SS-A AB SER-ACNC: <0.2 AI (ref 0–0.9)
ENA SS-B AB SER-ACNC: <0.2 AI (ref 0–0.9)

## 2021-05-05 RX ORDER — FUROSEMIDE 20 MG/1
TABLET ORAL
Qty: 90 TAB | Refills: 1 | Status: SHIPPED | OUTPATIENT
Start: 2021-05-05 | End: 2022-03-24

## 2021-05-05 NOTE — TELEPHONE ENCOUNTER
Future Appointments:  Future Appointments   Date Time Provider Pb Nelsoni   5/15/2021  9:10 AM [facility] 21 DAY [facility] BS AMB   5/17/2021 10:30 AM Kaz Kovacs, PT MRM OPPT [facility]   6/3/2021  8:30 AM Kristi York MD [facility] BS AMB   9/7/2021 11:30 AM Kristi York MD [facility] BS AMB        Last Appointment With Me:  5/4/2021     Requested Prescriptions     Pending Prescriptions Disp Refills    furosemide (LASIX) 20 mg tablet 90 Tab 1

## 2021-05-06 DIAGNOSIS — D72.829 LEUKOCYTOSIS, UNSPECIFIED TYPE: Primary | ICD-10-CM

## 2021-05-06 DIAGNOSIS — I10 ESSENTIAL HYPERTENSION: ICD-10-CM

## 2021-05-06 DIAGNOSIS — E78.00 PURE HYPERCHOLESTEROLEMIA: ICD-10-CM

## 2021-05-06 NOTE — PROGRESS NOTES
Please call patient back about results  Lipids up d/t noncompliance w meds, make sure she is back on lipitor/fenofibrate    Wbc up  Likely d/t smoking--saw hematology about this they thought reactive related to smoking       Will monitor    Repeat lipids, cmp, cbc, 1 week prior to f/u

## 2021-05-06 NOTE — PROGRESS NOTES
Called out and spoke to pt. Two pt identifiers confirmed. Pt informed per Dr. Milan Cash that   Lipids up d/t noncompliance w meds, make sure she is back on lipitor/fenofibrate   Pt informed per Dr. Milan Cash the Wbc is up likely d/t smoking; will monitor. Pt informed per Dr. Milan Cash repeat lipids, cmp, cbc, 1 week prior to f/u--Labs ordered and mailed to pt. Pt verbalized understanding of information discussed w/ no further questions at this time.

## 2021-05-15 ENCOUNTER — IMMUNIZATION (OUTPATIENT)
Dept: FAMILY MEDICINE CLINIC | Age: 56
End: 2021-05-15
Payer: COMMERCIAL

## 2021-05-15 DIAGNOSIS — Z23 ENCOUNTER FOR IMMUNIZATION: Primary | ICD-10-CM

## 2021-05-15 PROCEDURE — 91300 COVID-19, MRNA, LNP-S, PF, 30MCG/0.3ML DOSE(PFIZER): CPT | Performed by: FAMILY MEDICINE

## 2021-05-15 PROCEDURE — 0002A COVID-19, MRNA, LNP-S, PF, 30MCG/0.3ML DOSE(PFIZER): CPT | Performed by: FAMILY MEDICINE

## 2021-05-17 ENCOUNTER — APPOINTMENT (OUTPATIENT)
Dept: PHYSICAL THERAPY | Age: 56
End: 2021-05-17

## 2021-05-24 ENCOUNTER — HOSPITAL ENCOUNTER (OUTPATIENT)
Dept: PHYSICAL THERAPY | Age: 56
End: 2021-05-24

## 2021-06-05 RX ORDER — DULOXETIN HYDROCHLORIDE 60 MG/1
CAPSULE, DELAYED RELEASE ORAL
Qty: 90 CAPSULE | Refills: 1 | Status: SHIPPED | OUTPATIENT
Start: 2021-06-05 | End: 2022-03-24

## 2021-06-14 ENCOUNTER — APPOINTMENT (OUTPATIENT)
Dept: PHYSICAL THERAPY | Age: 56
End: 2021-06-14

## 2021-06-29 ENCOUNTER — HOSPITAL ENCOUNTER (OUTPATIENT)
Dept: MAMMOGRAPHY | Age: 56
Discharge: HOME | End: 2021-06-29
Attending: INTERNAL MEDICINE
Payer: COMMERCIAL

## 2021-06-29 DIAGNOSIS — Z12.39 BREAST SCREENING: ICD-10-CM

## 2021-06-29 PROCEDURE — 77067 SCR MAMMO BI INCL CAD: CPT

## 2021-07-01 ENCOUNTER — HOSPITAL ENCOUNTER (OUTPATIENT)
Dept: PHYSICAL THERAPY | Age: 56
Discharge: HOME | End: 2021-07-01
Payer: COMMERCIAL

## 2021-07-01 PROCEDURE — 97162 PT EVAL MOD COMPLEX 30 MIN: CPT | Performed by: PHYSICAL THERAPIST

## 2021-07-01 PROCEDURE — 97110 THERAPEUTIC EXERCISES: CPT | Performed by: PHYSICAL THERAPIST

## 2021-07-01 NOTE — PROGRESS NOTES
PT INITIAL EVALUATION NOTE 2-15    Patient Name: Dhara Pizarro  Date:2021  : 1965  [x]  Patient  Verified  Payor: [facility] HEALTH PLAN - NON OBRA / Plan: [employer]SI [facility] HEALTH PLAN - NON OBRA / Product Type: Commerical /    In time: 7:40am  Out time: 8:30am  Total Treatment Time (min): 50  Visit #: 1     Treatment Area: Low back pain [M54.5]    SUBJECTIVE  Pain Level (0-10 scale): 1 (0-10/10)  Any medication changes, allergies to medications, adverse drug reactions, diagnosis change, or new procedure performed?: [] No    [x] Yes (see summary sheet for update)  Subjective: The patient reports that she's had back pain for over a year and it's become worse. She works for the [employer], 40 hrs. On concrete, constantly walking, 8pm-5am, Wed- nights. She usually falls asleep at 9am until 5-5:30pm. 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 The right leg started to fall asleep about a month ago in bed, then the other day while driving (about 63FAF). About 3-4 hours into work, back will really irritate. Reaching up helps her feel better. She has scoliosis and a history of mulitple rib fractures.          OBJECTIVE/EXAMINATION  Posture:  Scapular protraction bilaterally/kyphotic  Other Observations:  Thoracic scoliosis: SLS: L= 6s; R= 7s  Gait and Functional Mobility:  Right hard step/trunk lean  Palpation: slight tenderness to lumbar paraspinals        Lumbar AROM:          R  L    Flexion    80%      Extension   WFL      Side Bending   Slightly limited bilaterally      Rotation   Slightly limited bilaterally          LOWER QUARTER   MUSCLE STRENGTH  KEY       R  L  0 - No Contraction  L1, L2 Psoas  4  4  1 - Trace   L3 Quads  4  4  2 - Poor   L4 Tib Ant  4  4  3 - Fair    L5 EHL  4  4  4 - Good   S1 Peroneals  4  4  5 - Normal   S2 Hams  4  4    Flexibility: tight hamstrings  Mobility Assessment: hypomobile lumbar      MMT:               HIP Abd: 3+/5 bilaterally  Neurological: Reflexes / Sensations: nt  Special Tests:    H.S. SLR: + for tight hamstrings   Long Sit: left longer leg by 1/2 inch (scoliosis)    25 min Therapeutic Exercise:  [x] See flow sheet :   Rationale: increase ROM, increase strength and improve coordination to improve the patients ability to perform daily activities.           With   [x] TE   [] TA   [] Neuro   [] SC   [] other: Patient Education: [x] Review HEP    [x] Progressed/Changed HEP based on:   [x] positioning   [x] body mechanics   [] transfers   [] heat/ice application    [] other:        Other Objective/Functional Measures: FOTO Functional Measure: 61/100                  Pain Level (0-10 scale) post treatment: 1      ASSESSMENT:      [x]  See Plan of [facility], PT 7/1/2021

## 2021-07-01 NOTE — PROGRESS NOTES
[facility]. Physical Therapy  [address] [facility] [facility] [address] [place]  Phone: [phone] Fax: [phone]    Plan of Care/Statement of Necessity for Physical Therapy Services  2-15    Patient name: Daniela Ramesh  : 1965  Provider#: [id number]  Referral source: Denny Tijerina MD      Medical/Treatment Diagnosis: Low back pain [M54.5]     Prior Hospitalization: see medical history     Comorbidities: anxiety, back pain, depression, headaches, HTN, seizures, sleep dysfunction  Prior Level of Function: 20 min of exercise seldom or never  Medications: Verified on Patient Summary List    Start of Care: 21      Onset Date: chronic       The Plan of Care and following information is based on the information from the initial evaluation. Assessment/ key information: The patient presents with a chief complaint of chronic lumbar pain that has become worse over the past year. She works nights at [employer] and is on her feet, constantly standing/lifting/twisting/reaching. She will have occasional right leg tingling/numbness if lying down or sitting for a long time. She has scoliosis and a history of multiple rib fractures, which alters her trunk mobility and has resulted in a left longer leg (approximately 1/2 inch) that causes gait abnormalities. Her signs and symptoms are consistent with lumbar DDD with radiculopathy. Her decreased hip/glute strength and general hypomobile in the lumbar spine exacerbates her symptoms. The patient will benefit from guided therapeutic interventions such as therex for strengthening and neuromuscular re-eduction, manual techniques for joint mobility and soft tissue extensibility, and modalities for pain management in order to improve functional mobility with daily activities.     Evaluation Complexity History MEDIUM  Complexity : 1-2 comorbidities / personal factors will impact the outcome/ POC ; Examination MEDIUM Complexity : 3 Standardized tests and measures addressing body structure, function, activity limitation and / or participation in recreation  ;Presentation MEDIUM Complexity : Evolving with changing characteristics  ; Clinical Decision Making MEDIUM Complexity : FOTO score of 26-74  Overall Complexity Rating: MEDIUM    Problem List: pain affecting function, decrease ROM, decrease strength, edema affecting function, impaired gait/ balance, decrease ADL/ functional abilitiies, decrease activity tolerance, decrease flexibility/ joint mobility and decrease transfer abilities   Treatment Plan may include any combination of the following: Therapeutic exercise, Therapeutic activities, Neuromuscular re-education, Physical agent/modality, Gait/balance training, Manual therapy, Patient education, Self Care training, Functional mobility training, Home safety training and Stair training  Patient / Family readiness to learn indicated by: asking questions, trying to perform skills and interest  Persons(s) to be included in education: patient (P)  Barriers to Learning/Limitations: None  Patient Goal (s): some relief  Patient Self Reported Health Status: good  Rehabilitation Potential: good    Short Term Goals: To be accomplished in 2 treatments:                         1.) The patient will be independent with their HEP consistently for at least one week. Long Term Goals: To be accomplished in 16 treatments:                         1.) The patient will have at most 4/10 pain with daily activities. 2.) The patient will be able to work for at least 3 hours with at most 5/10 pain. 3.) The patient will improve their FOTO score from 61 to at least 66 to show improvements in functional mobility. Frequency / Duration: Patient to be seen 2 times per week for 16 treatments.       Patient/ Caregiver education and instruction: self care, activity modification and exercises    [x]  Plan of care has been reviewed with PATTI Malhotra, PT 7/1/2021     ________________________________________________________________________    I certify that the above Therapy Services are being furnished while the patient is under my care. I agree with the treatment plan and certify that this therapy is necessary.     Physician's Signature:____________________  Date:____________Time: _________      Yassine Atkins MD

## 2021-07-08 ENCOUNTER — HOSPITAL ENCOUNTER (OUTPATIENT)
Dept: PHYSICAL THERAPY | Age: 56
End: 2021-07-08
Payer: COMMERCIAL

## 2021-07-08 RX ORDER — FENOFIBRATE 145 MG/1
TABLET, COATED ORAL
Qty: 90 TABLET | Refills: 1 | Status: SHIPPED | OUTPATIENT
Start: 2021-07-08

## 2021-07-12 ENCOUNTER — APPOINTMENT (OUTPATIENT)
Dept: PHYSICAL THERAPY | Age: 56
End: 2021-07-12
Payer: COMMERCIAL

## 2021-07-19 ENCOUNTER — APPOINTMENT (OUTPATIENT)
Dept: PHYSICAL THERAPY | Age: 56
End: 2021-07-19
Payer: COMMERCIAL

## 2021-07-26 ENCOUNTER — TELEPHONE (OUTPATIENT)
Dept: INTERNAL MEDICINE CLINIC | Age: 56
End: 2021-07-26

## 2021-07-26 ENCOUNTER — APPOINTMENT (OUTPATIENT)
Dept: PHYSICAL THERAPY | Age: 56
End: 2021-07-26
Payer: COMMERCIAL

## 2021-07-26 NOTE — TELEPHONE ENCOUNTER
Returned page Sunday July 25 at 18:25. Pt having loose stools, watery diarrhea. Onset Friday with n/v at that time also. Vomiting stopped Saturday. No f/c. No melena or mucus. Staying hydrated. Suggested imodium. If not improved, will contact dr Flores Li.

## 2021-07-30 ENCOUNTER — APPOINTMENT (OUTPATIENT)
Dept: PHYSICAL THERAPY | Age: 56
End: 2021-07-30
Payer: COMMERCIAL

## 2021-09-02 NOTE — PROGRESS NOTES
[address] Physical Therapy  [address] [facility] [address] [facility]  Phone: [phone] Fax: [phone]    Discharge Summary  2-15    Patient name: Porfirio Welsh  : 1965  Provider#: [id number]  Referral source: Mandi Archuleta MD      Medical/Treatment Diagnosis: Low back pain [M54.5]     Prior Hospitalization: see medical history     Comorbidities: See Plan of Care  Prior Level of Function:See Plan of Care  Medications: Verified on Patient Summary List    Start of Care: 21      Onset Date: chronic   Visits from Start of Care: 1     Missed Visits: 4  Reporting Period : 21 to 21      ASSESSMENT/SUMMARY OF CARE: The patient did not return for any follow up appointments after the initial evaluation for her right lumbar radiculopathy pain. Pt is discharged today, 2021, as they have stopped attending therapy. Final objective data and outcomes were unable to be obtained.     RECOMMENDATIONS:  [x]Discontinue therapy: [x]Patient has reached or is progressing toward set goals      []Patient is non-compliant or has abdicated      []Due to lack of appreciable progress towards set goals      []Lalitha Valdovinos PT 2021

## 2021-10-04 RX ORDER — LEVOTHYROXINE SODIUM 75 UG/1
TABLET ORAL
Qty: 90 TABLET | Refills: 0 | Status: SHIPPED | OUTPATIENT
Start: 2021-10-04 | End: 2021-12-10

## 2021-10-11 NOTE — PROGRESS NOTES
HISTORY OF PRESENT ILLNESS  Maite Pace is a 60 y.o. female. HPI     Last here 5/04/21. Pt is here for routine care/acute care.     She states that she did not go to work a weekend ago because she was lightheaded and dizzy on a Friday and Saturday  She reports a syncopal episode related to the lightheadedness she did have a prodrome discussed if this were to recur she needs to go to the emergency department discussed she needs to monitor her blood pressure     BP at home 131/86  She is on Toprol-XL for blood pressure       most recently but did not check it when she felt lightheaded and dizzy     Wt was 169 lbs lov, pt is unsure of current wt I suspect she has gained weight  Discussed continued diet and w/l   She states that she hasn't been eating too much, drinking water and powerade  Discussed not drinking caloric beverages, counting calories, [employer]     Reviewed labs   Ordered labs        Pt follows with Dr. Petey Couch (hem/onc) for anemia  Last visit was 5/17, wanted pt to be on hormonal therapy in 2013, radiation was not completed, f/u in 1 year   Pt never followed up, reminded to do this on multiple occasions  Recall past evaluation with Dr. Petey Couch in 2015, thrombocytosis thought d/t cigarette smoking        Recall COLO and EGD 2013 - bleeding ulcer, issues with anemia  Lov restarted protonix due to bloating and abdominal fullness this is helped her symptoms but not resolved them she is to follow-up with GI       Pt is taking gabapentin 300 mg 1-2x per day for back pain, which helps  Taking cymbalta 60 mg higher dose from last time  She gets pain from prev rib fracture      Continues on synthroid 75 mcg daily   Recall pt has a FMHX thyroid cancer (brother)     continues on lipitor 20mg daily for cholesterol and fenofibrate     She is taking amitriptyline 20mg to help with sleep still has some difficulty falling asleep at times  Pt takes imitrex 50mg prn (not daily) --stable     No longer continues on wellbutrin 150mg--for depression and smoking   She is on cymbalta 60 mg for anxiety  which helps    She is drinking about 1 drink per night suspect it is actually more  Ordered an ultrasound of the abdomen last office visit she declines due to cost     Pt has a 30 pack year hx  Pt continues to smoke 0.5 PPD  Discussed cessation   Completed CT lung cancer screen 11/1/18 - due reminded reordered 10/12/21 reprinted the order encouraged her to complete the test    Reviewed mammo 6/29/21: negative     ACP not on file. SDM is her brother Citlali Buchanan III.   Provided information in the past.      Recall EGD and COLO in 2013: bleeding ulcer     PREVENTIVE:   Colonoscopy: 3/13, repeat 10 years  EGD: 2013, possible [facility]   Pap: ~3/19, [facility], due reminded  Mammogram: 6/29/21 negative, h/o R sided breast DCIS  DEXA: [facility], ~3/19  Tdap: 12/10/17  Pneumovax: 8/27/2012  [id number]: not yet needed  Shingrix: 1st 11/03/20, will get next office visit  Flu shot: 11/03/20, due will get next office visit  Eye exam: 3/18? due reminded  EKG: 3/17 per ED   Lipids: 5/21   A1c: 6/19 5.7 11/19 5.4 11/20 5.5 5/21 5.5  CT lung cancer screen: 11/1/18, reordered, reminded   Covid: both [employer])    Patient Active Problem List    Diagnosis Date Noted    Pure hypercholesterolemia 05/01/2017    Acquired hypothyroidism 05/01/2017    Elevated WBC count 04/16/2014    Elevated liver function tests 04/16/2014    Breast cancer right DCIS 06/11/2013    Depression 02/11/2013    Headache 09/10/2012    HTN (hypertension) 09/10/2012     Current Outpatient Medications   Medication Sig Dispense Refill    levothyroxine (SYNTHROID) 75 mcg tablet TAKE 1 TABLET BY MOUTH EVERY DAY BEFORE BREAKFAST 90 Tablet 0    fenofibrate nanocrystallized (TRICOR) 145 mg tablet TAKE 1 TABLET BY MOUTH EVERY DAY 90 Tablet 1    DULoxetine (CYMBALTA) 60 mg capsule TAKE 1 CAPSULE BY MOUTH EVERY DAY 90 Capsule 1    furosemide (LASIX) 20 mg tablet qd 90 Tab 1    atorvastatin (LIPITOR) 20 mg tablet TAKE 1 TABLET BY MOUTH EVERY DAY 90 Tab 1    gabapentin (NEURONTIN) 300 mg capsule Take 1 Cap by mouth two (2) times daily as needed for Pain. 180 Cap 0    metoprolol succinate (TOPROL-XL) 25 mg XL tablet Take 1 tablet daily 90 Tab 1    pantoprazole (PROTONIX) 20 mg tablet Take 1 Tab by mouth daily. 90 Tab 1    amitriptyline (ELAVIL) 10 mg tablet TAKE 2 TABLETS BY MOUTH IN THE EVENING 180 Tab 1    ProAir RespiClick 90 mcg/actuation breath activated inhaler TAKE 1 PUFF BY INHALATION EVERY SIX (6) HOURS AS NEEDED FOR WHEEZING. 1 Inhaler 0    cyclobenzaprine (FLEXERIL) 5 mg tablet Take 1-2 Tabs by mouth nightly. As needed 12 Tab 0    naproxen (NAPROSYN) 500 mg tablet Take 1 Tab by mouth two (2) times daily (with meals). 60 Tab 1    cholecalciferol (VITAMIN D3) 25 mcg (1,000 unit) cap Take  by mouth daily.  SUMAtriptan (IMITREX) 50 mg tablet TAKE 1 TABLET BY MOUTH ONCE AS NEEDED FOR MIGRAINE FOR UP TO 1 DOSE 6 Tab 1    multivitamin (ONE A DAY) tablet Take 1 Tab by mouth daily.          Past Surgical History:   Procedure Laterality Date    HX BREAST LUMPECTOMY  5/30/2013    BREAST LUMPECTOMY/PARTIAL performed [facility]fano [facility] OR    HX GYN      fallopian tube    HX ORTHOPAEDIC      left ankle repair    HX OTHER SURGICAL      cyst removed rom right thigh    CO BREAST SURGERY PROCEDURE UNLISTED  5/30/13    RIGHT BREAST LUMPECTOMY WITH RIGHT NEDDLE LOCALIZATION       Lab Results   Component Value Date/Time    WBC 11.4 (H) 05/04/2021 10:39 AM    HGB 13.4 05/04/2021 10:39 AM    Hemoglobin (POC) 15.3 10/31/2014 02:26 PM    HCT 40.0 05/04/2021 10:39 AM    Hematocrit (POC) 45 10/31/2014 02:26 PM    PLATELET 157 (H) 67/09/6779 10:39 AM    MCV 93.0 05/04/2021 10:39 AM     Lab Results   Component Value Date/Time    Cholesterol, total 208 (H) 05/04/2021 10:39 AM    HDL Cholesterol 31 05/04/2021 10:39 AM    LDL, calculated 135.8 (H) 05/04/2021 10:39 AM    Triglyceride 206 (H) 05/04/2021 10:39 AM    CHOL/HDL Ratio 6.7 (H) 05/04/2021 10:39 AM     Lab Results   Component Value Date/Time    GFR est non-AA >60 05/04/2021 10:39 AM    GFRNA, POC >60 10/31/2014 02:26 PM    GFR est AA >60 05/04/2021 10:39 AM    GFRAA, POC >60 10/31/2014 02:26 PM    Creatinine 0.77 05/04/2021 10:39 AM    Creatinine (POC) 0.9 10/31/2014 02:26 PM    BUN 6 05/04/2021 10:39 AM    BUN (POC) 8 (L) 10/31/2014 02:26 PM    Sodium 137 05/04/2021 10:39 AM    Sodium (POC) 140 10/31/2014 02:26 PM    Potassium 4.0 05/04/2021 10:39 AM    Potassium (POC) 3.7 10/31/2014 02:26 PM    Chloride 97 05/04/2021 10:39 AM    Chloride (POC) 103 10/31/2014 02:26 PM    CO2 30 05/04/2021 10:39 AM    Magnesium 1.6 01/14/2017 07:28 AM    Phosphorus 1.0 (L) 11/28/2012 04:45 AM        Review of Systems   Respiratory: Negative for shortness of breath. Cardiovascular: Negative for chest pain. Physical Exam  Constitutional:       General: She is not in acute distress. Appearance: Normal appearance. She is not ill-appearing, toxic-appearing or diaphoretic. HENT:      Head: Normocephalic and atraumatic. Right Ear: External ear normal.      Left Ear: External ear normal.   Eyes:      General:         Right eye: No discharge. Left eye: No discharge. Conjunctiva/sclera: Conjunctivae normal.      Pupils: Pupils are equal, round, and reactive to light. Cardiovascular:      Rate and Rhythm: Normal rate and regular rhythm. Heart sounds: No murmur heard. No friction rub. No gallop. Pulmonary:      Effort: No respiratory distress. Breath sounds: Normal breath sounds. No wheezing or rales. Chest:      Chest wall: No tenderness. Musculoskeletal:         General: Normal range of motion. Cervical back: Normal range of motion and neck supple. Skin:     General: Skin is warm and dry.    Neurological:      Mental Status: She is alert and oriented to person, place, and time. Mental status is at baseline. Coordination: Coordination normal.      Gait: Gait normal.   Psychiatric:         Mood and Affect: Mood normal.         Behavior: Behavior normal.         ASSESSMENT and PLAN    ICD-10-CM ICD-9-CM    1. Primary hypertension  I10 401.9 LIPID PANEL      METABOLIC PANEL, COMPREHENSIVE      CBC W/O DIFF      HEMOGLOBIN A1C WITH EAG   Controlled on Toprol-XL   TSH 3RD GENERATION      LIPID PANEL      METABOLIC PANEL, COMPREHENSIVE      CBC W/O DIFF      HEMOGLOBIN A1C WITH EAG      TSH 3RD GENERATION   2. Acquired hypothyroidism  E03.9 244.9 LIPID PANEL      METABOLIC PANEL, COMPREHENSIVE      CBC W/O DIFF      HEMOGLOBIN A1C WITH EAG   On Synthroid check TSH and adjust medication further as needed   TSH 3RD GENERATION      LIPID PANEL      METABOLIC PANEL, COMPREHENSIVE      CBC W/O DIFF      HEMOGLOBIN A1C WITH EAG      TSH 3RD GENERATION   3. Pure hypercholesterolemia  E78.00 272.0 LIPID PANEL      METABOLIC PANEL, COMPREHENSIVE      CBC W/O DIFF      HEMOGLOBIN A1C WITH EAG   Controlled Lipitor and fenofibrate in the past then though most recent LDL was above goal due to noncompliance she reports she is now compliant I think her weight has climbed as well we will repeat lipids and adjust medication as needed   TSH 3RD GENERATION      LIPID PANEL      METABOLIC PANEL, COMPREHENSIVE      CBC W/O DIFF      HEMOGLOBIN A1C WITH EAG      TSH 3RD GENERATION   4. Reactive depression  F32.9 300.4 LIPID PANEL      METABOLIC PANEL, COMPREHENSIVE      CBC W/O DIFF   Improved Cymbalta continue   HEMOGLOBIN A1C WITH EAG      TSH 3RD GENERATION      LIPID PANEL      METABOLIC PANEL, COMPREHENSIVE      CBC W/O DIFF      HEMOGLOBIN A1C WITH EAG      TSH 3RD GENERATION   5.  Elevated liver function tests  R79.89 790.6 LIPID PANEL      METABOLIC PANEL, COMPREHENSIVE      CBC W/O DIFF      HEMOGLOBIN A1C WITH EAG   Related to alcohol in the past encouraged cessation again today unfortunately she continues to drink on a daily basis   TSH 3RD GENERATION      LIPID PANEL      METABOLIC PANEL, COMPREHENSIVE      CBC W/O DIFF      HEMOGLOBIN A1C WITH EAG      TSH 3RD GENERATION   6. Leukocytosis, unspecified type  D72.829 288.60 LIPID PANEL      METABOLIC PANEL, COMPREHENSIVE      CBC W/O DIFF      HEMOGLOBIN A1C WITH EAG      TSH 3RD GENERATION   Had past hematology evaluation thought to be related to smoking   LIPID PANEL      METABOLIC PANEL, COMPREHENSIVE      CBC W/O DIFF      HEMOGLOBIN A1C WITH EAG      TSH 3RD GENERATION   7. IFG (impaired fasting glucose)  R73.01 790.21 LIPID PANEL      METABOLIC PANEL, COMPREHENSIVE      CBC W/O DIFF      HEMOGLOBIN A1C WITH EAG   Check A1c diet controlled   TSH 3RD GENERATION      LIPID PANEL      METABOLIC PANEL, COMPREHENSIVE      CBC W/O DIFF      HEMOGLOBIN A1C WITH EAG      TSH 3RD GENERATION   8. Vasovagal syncope     Patient reports 2 days of lightheadedness unclear if there is a provoking factor unclear if she had a fair amount of alcohol during those episodes or not unclear what his blood pressure was she did not check it    Of note she is on metoprolol    She is not had any recurrent episodes    We will check basic labs encourage her to monitor her blood pressure will order EKG discussed ER for any recurrent symptoms R55 780.2 EKG, 12 LEAD, INITIAL   Continue gabapentin for thoracic rib pain      Scribed by Petrona Hu of [facility], as dictated by Dr. Louie Mac. Current diagnosis and concerns discussed with pt at length. Pt understands risks and benefits or current treatment plan and medications, and accepts the treatment and medication with any possible risks. Pt asks appropriate questions, which were answered. Pt was instructed to call with any concerns or problems. I have reviewed the note documented by the scribe. The services provided are my own.   The documentation is accurate

## 2021-10-12 ENCOUNTER — OFFICE VISIT (OUTPATIENT)
Dept: INTERNAL MEDICINE CLINIC | Age: 56
End: 2021-10-12
Payer: COMMERCIAL

## 2021-10-12 VITALS — DIASTOLIC BLOOD PRESSURE: 86 MMHG | SYSTOLIC BLOOD PRESSURE: 131 MMHG

## 2021-10-12 DIAGNOSIS — D72.829 LEUKOCYTOSIS, UNSPECIFIED TYPE: ICD-10-CM

## 2021-10-12 DIAGNOSIS — I10 PRIMARY HYPERTENSION: Primary | ICD-10-CM

## 2021-10-12 DIAGNOSIS — R55 VASOVAGAL SYNCOPE: ICD-10-CM

## 2021-10-12 DIAGNOSIS — E03.9 ACQUIRED HYPOTHYROIDISM: ICD-10-CM

## 2021-10-12 DIAGNOSIS — R79.89 ELEVATED LIVER FUNCTION TESTS: ICD-10-CM

## 2021-10-12 DIAGNOSIS — R73.01 IFG (IMPAIRED FASTING GLUCOSE): ICD-10-CM

## 2021-10-12 DIAGNOSIS — F32.9 REACTIVE DEPRESSION: ICD-10-CM

## 2021-10-12 DIAGNOSIS — E78.00 PURE HYPERCHOLESTEROLEMIA: ICD-10-CM

## 2021-10-12 PROCEDURE — 99214 OFFICE O/P EST MOD 30 MIN: CPT | Performed by: INTERNAL MEDICINE

## 2021-10-12 RX ORDER — PANTOPRAZOLE SODIUM 20 MG/1
20 TABLET, DELAYED RELEASE ORAL DAILY
Qty: 90 TABLET | Refills: 1 | Status: SHIPPED | OUTPATIENT
Start: 2021-10-12 | End: 2022-11-01

## 2021-10-12 NOTE — Clinical Note
Pt will come in Thursday for labs, check bp needs flu shot and shingrix--create nurse note dont put into todays notes.

## 2021-10-25 DIAGNOSIS — E78.5 ELEVATED LIPIDS: ICD-10-CM

## 2021-10-25 RX ORDER — ATORVASTATIN CALCIUM 20 MG/1
TABLET, FILM COATED ORAL
Qty: 90 TABLET | Refills: 1 | Status: SHIPPED | OUTPATIENT
Start: 2021-10-25 | End: 2022-07-02 | Stop reason: SDUPTHER

## 2021-10-25 RX ORDER — METOPROLOL SUCCINATE 25 MG/1
TABLET, EXTENDED RELEASE ORAL
Qty: 90 TABLET | Refills: 1 | Status: SHIPPED | OUTPATIENT
Start: 2021-10-25 | End: 2022-07-01

## 2021-11-27 DIAGNOSIS — M54.6 CHRONIC BILATERAL THORACIC BACK PAIN: ICD-10-CM

## 2021-11-27 DIAGNOSIS — G89.29 CHRONIC BILATERAL THORACIC BACK PAIN: ICD-10-CM

## 2021-11-29 RX ORDER — GABAPENTIN 300 MG/1
300 CAPSULE ORAL
Qty: 180 CAPSULE | Refills: 1 | Status: SHIPPED | OUTPATIENT
Start: 2021-11-29 | End: 2022-10-20

## 2021-12-10 RX ORDER — LEVOTHYROXINE SODIUM 75 UG/1
TABLET ORAL
Qty: 90 TABLET | Refills: 0 | Status: SHIPPED | OUTPATIENT
Start: 2021-12-10 | End: 2022-07-02 | Stop reason: SDUPTHER

## 2021-12-15 NOTE — TELEPHONE ENCOUNTER
Called, spoke to pt  Received two pt identifiers  Pt offered and accepted virtual appt for 08/13/20 @ [phone] w/ Dr. Lynn Wadsworth understanding of the instructions and has no further questions at this time.
Pt states her neck is very stiff, cannot turn neck, and having issue picking things up with hands - radiating into left shoulder/chest area    Pt triaged
Received triage call from pt. Received two pt identifiers  Pt states pain goes from left ear to neck, down to shoulder. Pt states pain stays on left side. Pt states cant reach out w/ left arm. Pt states it hurts when she rotates neck either way. Pt states no tingling or numbness. Pt advised to take otc ibuprofen or motrin and can use otc diclofenac or IcyHot while awaiting response from Dr. Rod Mustafa. Pt states was not able to go to work at the [employer] today or yesterday and would like a note as well. Informed pt with send to Dr. Rod Mustafa.
[Time Spent: ___ minutes] : I have spent [unfilled] minutes of time on the encounter.

## 2022-03-19 PROBLEM — E03.9 ACQUIRED HYPOTHYROIDISM: Status: ACTIVE | Noted: 2017-05-01

## 2022-03-20 PROBLEM — E78.00 PURE HYPERCHOLESTEROLEMIA: Status: ACTIVE | Noted: 2017-05-01

## 2022-03-24 RX ORDER — FUROSEMIDE 20 MG/1
TABLET ORAL
Qty: 30 TABLET | Refills: 0 | Status: SHIPPED | OUTPATIENT
Start: 2022-03-24 | End: 2022-04-28

## 2022-03-29 ENCOUNTER — OFFICE VISIT (OUTPATIENT)
Dept: URGENT CARE | Age: 57
End: 2022-03-29
Payer: COMMERCIAL

## 2022-03-29 VITALS
HEART RATE: 87 BPM | SYSTOLIC BLOOD PRESSURE: 123 MMHG | WEIGHT: 166 LBS | HEIGHT: 64 IN | TEMPERATURE: 98.6 F | RESPIRATION RATE: 16 BRPM | DIASTOLIC BLOOD PRESSURE: 79 MMHG | BODY MASS INDEX: 28.34 KG/M2 | OXYGEN SATURATION: 99 %

## 2022-03-29 DIAGNOSIS — J20.9 BRONCHITIS, ACUTE, WITH BRONCHOSPASM: Primary | ICD-10-CM

## 2022-03-29 DIAGNOSIS — R53.83 FATIGUE, UNSPECIFIED TYPE: ICD-10-CM

## 2022-03-29 LAB — GLUCOSE POC: 124 MG/DL

## 2022-03-29 PROCEDURE — 99214 OFFICE O/P EST MOD 30 MIN: CPT | Performed by: FAMILY MEDICINE

## 2022-03-29 PROCEDURE — 82962 GLUCOSE BLOOD TEST: CPT | Performed by: FAMILY MEDICINE

## 2022-03-29 RX ORDER — AZITHROMYCIN 250 MG/1
TABLET, FILM COATED ORAL
Qty: 6 TABLET | Refills: 0 | Status: SHIPPED | OUTPATIENT
Start: 2022-03-29 | End: 2022-09-22

## 2022-03-29 RX ORDER — BENZONATATE 200 MG/1
200 CAPSULE ORAL
Qty: 21 CAPSULE | Refills: 0 | Status: SHIPPED | OUTPATIENT
Start: 2022-03-29 | End: 2022-04-05

## 2022-03-29 NOTE — PROGRESS NOTES
Fatigue  This is a new problem. The current episode started more than 1 week ago. The problem occurs daily. The problem has not changed since onset. Pertinent negatives include no chest pain, no abdominal pain, no headaches and no shortness of breath. Nothing aggravates the symptoms. Nothing relieves the symptoms. She has tried nothing for the symptoms. Cough  The history is provided by the patient. This is a new problem. The current episode started 2 days ago. The problem occurs every few minutes. The problem has not changed since onset. The cough is non-productive. There has been no fever. Pertinent negatives include no chest pain, no chills, no headaches, no rhinorrhea, no sore throat, no shortness of breath, no wheezing and no nausea. She has tried nothing for the symptoms. Risk factors: no covid exposure  She is a smoker. Her past medical history is significant for bronchitis. Her past medical history does not include pneumonia, COPD or asthma.         Past Medical History:   Diagnosis Date    Anemia NEC     Anxiety     Breast cancer ([facility] [address] 2013    right lumpectomy with radiation    Cancer ([facility] [place] [address]     Breast Cancer-Ductal Carcinoma in situ grade 1    Depression     Headache(784.0)     Hypertension     Nose fracture 10/2019    Poor appetite     Psychiatric disorder     depression    PUD (peptic ulcer disease) 11/12    Stress     Tired     Trouble in sleeping     Weakness generalized         Past Surgical History:   Procedure Laterality Date    HX BREAST LUMPECTOMY  5/30/2013    BREAST LUMPECTOMY/PARTIAL performed [facility]u [facility]rellana [facility] MA[facility] OR    HX GYN      fallopian tube    HX ORTHOPAEDIC      left ankle repair    HX OTHER SURGICAL      cyst removed rom right thigh    ID BREAST SURGERY PROCEDURE UNLISTED  5/30/13    RIGHT BREAST LUMPECTOMY WITH RIGHT NEDDLE LOCALIZATION          Family History   Problem Relation Age of Onset    Cancer Father         pancreatic cancer    Cancer Brother         thyroid cancer    Hypertension Mother         Social History     Socioeconomic History    Marital status: SINGLE     Spouse name: Not on file    Number of children: Not on file    Years of education: Not on file    Highest education level: Not on file   Occupational History    Not on file   Tobacco Use    Smoking status: Current Every Day Smoker     Packs/day: 0.25     Years: 20.00     Pack years: 5.00     Types: Cigarettes    Smokeless tobacco: Never Used    Tobacco comment: 4 cigarettes a day   Substance and Sexual Activity    Alcohol use: Yes     Alcohol/week: 5.8 standard drinks     Types: 7 Standard drinks or equivalent per week     Comment: 1 beer daily/1 glass wine    Drug use: No    Sexual activity: Not on file   Other Topics Concern    Not on file   Social History Narrative    Not on file     Social Determinants of Health     Financial Resource Strain:     Difficulty of Paying Living Expenses: Not on file   Food Insecurity:     Worried About Running Out of Food in the Last Year: Not on file    Mp of Food in the Last Year: Not on file   Transportation Needs:     Lack of Transportation (Medical): Not on file    Lack of Transportation (Non-Medical):  Not on file   Physical Activity:     Days of Exercise per Week: Not on file    Minutes of Exercise per Session: Not on file   Stress:     Feeling of Stress : Not on file   Social Connections:     Frequency of Communication with Friends and Family: Not on file    Frequency of Social Gatherings with Friends and Family: Not on file    Attends Caodaism Services: Not on file    Active Member of Clubs or Organizations: Not on file    Attends Club or Organization Meetings: Not on file    Marital Status: Not on file   Intimate Partner Violence:     Fear of Current or Ex-Partner: Not on file    Emotionally Abused: Not on file    Physically Abused: Not on file    Sexually Abused: Not on file   Housing Stability:     Unable to Pay for Housing in the Last Year: Not on file    Number of Places Lived in the Last Year: Not on file    Unstable Housing in the Last Year: Not on file                ALLERGIES: Aspirin and Tramadol    Review of Systems   Constitutional: Positive for fatigue. Negative for chills. HENT: Negative for congestion, rhinorrhea and sore throat. Respiratory: Positive for cough. Negative for chest tightness, shortness of breath and wheezing. Cardiovascular: Negative for chest pain. Gastrointestinal: Negative for abdominal pain and nausea. Neurological: Negative for headaches. All other systems reviewed and are negative. Vitals:    03/29/22 1144   BP: 123/79   Pulse: 87   Resp: 16   Temp: 98.6 °F (37 °C)   SpO2: 99%   Weight: 166 lb (75.3 kg)   Height: 5' 4\" (1.626 m)       Physical Exam  Vitals and nursing note reviewed. Constitutional:       General: She is not in acute distress. HENT:      Right Ear: Tympanic membrane and ear canal normal.      Left Ear: Tympanic membrane and ear canal normal.      Nose: Nose normal.      Mouth/Throat:      Pharynx: No oropharyngeal exudate or posterior oropharyngeal erythema. Eyes:      General:         Right eye: No discharge. Left eye: No discharge. Conjunctiva/sclera: Conjunctivae normal.   Pulmonary:      Effort: Pulmonary effort is normal. No respiratory distress. Breath sounds: Decreased breath sounds and rhonchi present. No wheezing or rales. Comments: Bronchial breathing   Musculoskeletal:      Cervical back: Neck supple. Lymphadenopathy:      Cervical: No cervical adenopathy. Skin:     Findings: No rash. MDM    Procedures        ICD-10-CM ICD-9-CM    1. Bronchitis, acute, with bronchospasm  J20.9 466.0 XR CHEST PA LAT   2.  Fatigue, unspecified type  R53.83 780.79 AMB POC URINALYSIS DIP STICK AUTO W/O MICRO      AMB POC GLUCOSE BLOOD, BY GLUCOSE MONITORING DEVICE       Follow with PCP- needs labs to check TSH, Ana D and routine lab panel      Medications Ordered Today   Medications    benzonatate (TESSALON) 200 mg capsule     Sig: Take 1 Capsule by mouth three (3) times daily as needed for Cough for up to 7 days. Dispense:  21 Capsule     Refill:  0    azithromycin (ZITHROMAX) 250 mg tablet     Sig: Take two tablets today then one tablet daily     Dispense:  6 Tablet     Refill:  0    albuterol sulfate (ProAir RespiClick) 90 mcg/actuation breath activated inhaler     Sig: Take 1 Puff by inhalation every six (6) hours as needed for Wheezing. Dispense:  1 Each     Refill:  1     Results for orders placed or performed in visit on 03/29/22   XR CHEST PA LAT    Narrative    Clinical indication: Cough. Frontal and lateral views of the chest obtained, comparison February 24, 2020. The  heart size is normal. No acute infiltrate. Impression     impression: Negative chest.   Results for orders placed or performed in visit on 03/29/22   AMB POC GLUCOSE BLOOD, BY GLUCOSE MONITORING DEVICE   Result Value Ref Range    Glucose  MG/DL     The patients condition was discussed with the patient and they understand. The patient is to follow up with primary care doctor. If signs and symptoms become worse the pt is to go to the ER. The patient is to take medications as prescribed.

## 2022-03-29 NOTE — PATIENT INSTRUCTIONS
Fatigue: Care Instructions  Your Care Instructions     Fatigue is a feeling of tiredness, exhaustion, or lack of energy. You may feel fatigue because of too much or not enough activity. It can also come from stress, lack of sleep, boredom, and poor diet. Many medical problems, such as viral infections, can cause fatigue. Emotional problems, especially depression, are often the cause of fatigue. Fatigue is most often a symptom of another problem. Treatment for fatigue depends on the cause. For example, if you have fatigue because you have a certain health problem, treating this problem also treats your fatigue. If depression or anxiety is the cause, treatment may help. Follow-up care is a key part of your treatment and safety. Be sure to make and go to all appointments, and call your doctor if you are having problems. It's also a good idea to know your test results and keep a list of the medicines you take. How can you care for yourself at home? · Get regular exercise. But don't overdo it. Go back and forth between rest and exercise. · Get plenty of rest.  · Eat a healthy diet. Do not skip meals, especially breakfast.  · Reduce your use of caffeine, tobacco, and alcohol. Caffeine is most often found in coffee, tea, cola drinks, and chocolate. · Limit medicines that can cause fatigue. This includes tranquilizers and cold and allergy medicines. When should you call for help? Watch closely for changes in your health, and be sure to contact your doctor if:    · You have new symptoms such as fever or a rash.     · Your fatigue gets worse.     · You have been feeling down, depressed, or hopeless. Or you may have lost interest in things that you usually enjoy.     · You are not getting better as expected. Where can you learn more? Go to http://www.gray.com/  Enter [id number] in the search box to learn more about \"Fatigue: Care Instructions. \"  Current as of: July 1, 2021               Content Version: 13.2  © 3763-6265 [employer]. Care instructions adapted under license by [employer] (which disclaims liability or warranty for this information). If you have questions about a medical condition or this instruction, always ask your healthcare professional. [address] any warranty or liability for your use of this information.

## 2022-03-30 RX ORDER — ALBUTEROL SULFATE 90 UG/1
2 AEROSOL, METERED RESPIRATORY (INHALATION)
Qty: 18 G | Refills: 0 | Status: SHIPPED | OUTPATIENT
Start: 2022-03-30

## 2022-04-21 ENCOUNTER — TELEPHONE (OUTPATIENT)
Dept: INTERNAL MEDICINE CLINIC | Age: 57
End: 2022-04-21

## 2022-04-21 NOTE — TELEPHONE ENCOUNTER
Called, spoke to pt  Received two pt identifiers  Per Dr. Bradley Bullard, the FDA just approved for the emergency use of a 2nd covid  booster for those that are 48years old and up if it has been 4 months since the 1st booster. Pt offered and accepted appt for 05/09/22 @ 8am  Pt verbalizes understanding of the instructions and has no further questions at this time.

## 2022-04-21 NOTE — TELEPHONE ENCOUNTER
Patient states she's returning a call & No Documentation or appts on schedule. Please call if need to speak with patient.  Thank you

## 2022-04-21 NOTE — TELEPHONE ENCOUNTER
Had 2 covid shots and booster,     States she wonders if she needs a 2nd booster    States please call to advise [phone]  (detailed voice mail ok per patient as she works nights and typically is asleep during the day)

## 2022-04-28 RX ORDER — FUROSEMIDE 20 MG/1
TABLET ORAL
Qty: 30 TABLET | Refills: 0 | Status: SHIPPED | OUTPATIENT
Start: 2022-04-28 | End: 2022-07-11

## 2022-05-19 ENCOUNTER — TELEPHONE (OUTPATIENT)
Dept: INTERNAL MEDICINE CLINIC | Age: 57
End: 2022-05-19

## 2022-05-19 NOTE — TELEPHONE ENCOUNTER
Called, spoke with Pt. Two pt identifiers confirmed. Pt informed Dr. Rubén Fletcher did not take her out of work and could not write her back to work since she hasn't seen her. Pt stated she had an appt on 05/09/22 but received a message Dr. Rubén Fletcher was not in the office that work. Asked patient why she did not do a virtual appt. Pt states she was told to cancel and r/s. Pt stated she has an appt on 05/23/22 and will discuss everything then. Pt verbalized understanding of information discussed w/ no further questions at this time.

## 2022-05-20 ENCOUNTER — OFFICE VISIT (OUTPATIENT)
Dept: URGENT CARE | Age: 57
End: 2022-05-20
Payer: COMMERCIAL

## 2022-05-20 VITALS
TEMPERATURE: 98.8 F | OXYGEN SATURATION: 96 % | HEART RATE: 90 BPM | SYSTOLIC BLOOD PRESSURE: 113 MMHG | RESPIRATION RATE: 18 BRPM | DIASTOLIC BLOOD PRESSURE: 75 MMHG

## 2022-05-20 DIAGNOSIS — R53.83 TIREDNESS: ICD-10-CM

## 2022-05-20 DIAGNOSIS — R05.9 COUGH: Primary | ICD-10-CM

## 2022-05-20 PROCEDURE — 99213 OFFICE O/P EST LOW 20 MIN: CPT | Performed by: NURSE PRACTITIONER

## 2022-05-20 RX ORDER — CETIRIZINE HCL 10 MG
10 TABLET ORAL
Qty: 30 TABLET | Refills: 0 | Status: SHIPPED | OUTPATIENT
Start: 2022-05-20

## 2022-05-20 RX ORDER — DOXYCYCLINE 100 MG/1
100 TABLET ORAL 2 TIMES DAILY
Qty: 14 TABLET | Refills: 0 | Status: SHIPPED | OUTPATIENT
Start: 2022-05-20 | End: 2022-05-27

## 2022-05-20 NOTE — LETTER
5/20/2022 8:47 AM    Ms. Butch Hernandez [phone]      To Whom It May Concern:    Maite Shrestha was in our medical office today. She was evaluated today: 05/20/2022.     Sincerely,  [facility] PROVIDER

## 2022-05-20 NOTE — PROGRESS NOTES
HISTORY OF PRESENT ILLNESS  Maite Castro is a 60 y.o. female. HPI     Last here 10/12/21. Pt is here for routine care.     She is currently taking antibiotics Z-Chadd for cough was evaluated in urgent care on May 20, discussed waiting until done with this to start hydroxyzine    She brought in paperwork to be filled out to return to work  States that she had to leave work because she did not come to appt 5/09/22, is not able to return to work until she gets this paperwork filled out  Medical problem that took her out of work in the first place is unclear      She was at urgent care 5/20/22 for cough  She continues to have cough improved, thinks that this might be from allergies or smoking  She has not taken a covid test    She went to urgent care 3/29/22 for bronchitis    Reviewed cxr 3/29/22:  Negative chest.    BP today is 114/53  She is on Toprol-XL   She did not take it today does not monitor her blood pressure at home     Wt today is 164 lbs, down 5 lbs x lov   Discussed continued diet and w/l      Reviewed labs      Pt follows with Dr. Sara Hall (hem/onc) for anemia in the past and for breast cancer  Last visit was 5/17, he had wanted pt to be on hormonal therapy in 2013, radiation was not completed, f/u in 1 year   Pt never followed up, reminded to do this on multiple occasions  Recall past evaluation with Dr. Sara Hall in 2015, thrombocytosis thought d/t cigarette smoking    Recall COLO and EGD 2013 - bleeding ulcer, issues with anemia    Recall she has Protonix for abdominal bloating and reflux which she uses as needed     Pt is taking gabapentin 300 mg 1-2x per day for back pain, which helps  Taking cymbalta 60 mg as well  She gets pain from prev rib fracture      History of hypothyroidism  Continues on synthroid 75 mcg daily   Recall pt has a FMHX thyroid cancer (brother)     continues on lipitor 20mg daily for cholesterol and fenofibrate     She is taking amitriptyline 20mg to help with sleep still has some difficulty falling asleep- discussed can stop since not working much  Will start hydroxyzine to use instead  Pt takes imitrex 50mg prn (not daily) --stable     No longer continues on wellbutrin 150mg--for depression and smoking   She is on cymbalta 60 mg for anxiety  which helps     She is drinking about 1 drink per night  per her report we have discussed this on multiple occasions  She has a long history of drinking significant amounts of wine discussed in complete cessation has history of LFT elevations  Ordered an ultrasound of the abdomen previously she declines due to cost     Pt has a 30 pack year hx  Pt continues to smoke 0.5 PPD varies how much she smokes  Discussed cessation, she is working on this  Completed CT lung cancer screen 11/1/18 - due reminded reordered      ACP not on file. SDM is her brother Issac ROUSEMaisha Moraels III.   Provided information in the past.      Recall EGD and COLO in 2013: bleeding ulcer     PREVENTIVE:   Colonoscopy: 3/13, repeat 10 years  EGD: 2013, possible [facility]   Pap: ~3/19, [facility], due reminded  Mammogram: 6/29/21 negative, h/o R sided breast DCIS  DEXA: [facility], ~3/19  Tdap: 12/10/17  Pneumovax: 8/27/2012  [id number]: not yet needed  Shingrix: 1st 11/03/20 will get 2nd dose today 05/23/22  Flu shot: 11/03/20 will complete in the fall  Eye exam: 3/18? due reminded  EKG: 3/17 per ED   Lipids: 5/21   A1c: 6/19 5.7 11/19 5.4 11/20 5.5 5/21 5.5  CT lung cancer screen: 11/1/18, reordered, reminded   Covid: four doses [employer])    Patient Active Problem List    Diagnosis Date Noted    Pure hypercholesterolemia 05/01/2017    Acquired hypothyroidism 05/01/2017    Elevated WBC count 04/16/2014    Elevated liver function tests 04/16/2014    Breast cancer right DCIS 06/11/2013    Depression 02/11/2013    Headache 09/10/2012    HTN (hypertension) 09/10/2012     Current Outpatient Medications   Medication Sig Dispense Refill    hydrOXYzine HCL (ATARAX) 25 mg tablet Take 1 Tablet by mouth nightly as needed for Sleep for up to 10 days. 30 Tablet 0    cetirizine (ZYRTEC) 10 mg tablet Take 1 Tablet by mouth nightly. 30 Tablet 0    doxycycline (ADOXA) 100 mg tablet Take 1 Tablet by mouth two (2) times a day for 7 days. 14 Tablet 0    furosemide (LASIX) 20 mg tablet TAKE 1 TABLET BY MOUTH EVERY DAY 30 Tablet 0    albuterol (PROVENTIL HFA, VENTOLIN HFA, PROAIR HFA) 90 mcg/actuation inhaler Take 2 Puffs by inhalation every six (6) hours as needed for Wheezing. 18 g 0    azithromycin (ZITHROMAX) 250 mg tablet Take two tablets today then one tablet daily 6 Tablet 0    DULoxetine (CYMBALTA) 60 mg capsule TAKE 1 CAPSULE BY MOUTH EVERY DAY 30 Capsule 0    levothyroxine (SYNTHROID) 75 mcg tablet TAKE 1 TABLET BY MOUTH EVERY DAY BEFORE BREAKFAST 90 Tablet 0    gabapentin (NEURONTIN) 300 mg capsule TAKE 1 CAP BY MOUTH TWO (2) TIMES DAILY AS NEEDED FOR PAIN. 180 Capsule 1    atorvastatin (LIPITOR) 20 mg tablet TAKE 1 TABLET BY MOUTH EVERY DAY 90 Tablet 1    metoprolol succinate (TOPROL-XL) 25 mg XL tablet TAKE 1 TABLET BY MOUTH EVERY DAY 90 Tablet 1    pantoprazole (PROTONIX) 20 mg tablet Take 1 Tablet by mouth daily. 90 Tablet 1    fenofibrate nanocrystallized (TRICOR) 145 mg tablet TAKE 1 TABLET BY MOUTH EVERY DAY 90 Tablet 1    amitriptyline (ELAVIL) 10 mg tablet TAKE 2 TABLETS BY MOUTH IN THE EVENING 180 Tab 1    cyclobenzaprine (FLEXERIL) 5 mg tablet Take 1-2 Tabs by mouth nightly. As needed 12 Tab 0    naproxen (NAPROSYN) 500 mg tablet Take 1 Tab by mouth two (2) times daily (with meals). 60 Tab 1    cholecalciferol (VITAMIN D3) 25 mcg (1,000 unit) cap Take  by mouth daily.  SUMAtriptan (IMITREX) 50 mg tablet TAKE 1 TABLET BY MOUTH ONCE AS NEEDED FOR MIGRAINE FOR UP TO 1 DOSE 6 Tab 1    multivitamin (ONE A DAY) tablet Take 1 Tab by mouth daily.          Past Surgical History:   Procedure Laterality Date    HX BREAST LUMPECTOMY  5/30/2013 BREAST LUMPECTOMY/PARTIAL performed by Mitch Barrios MD at [facility] MAIN OR    HX GYN      fallopian tube    HX ORTHOPAEDIC      left ankle repair    HX OTHER SURGICAL      cyst removed rom right thigh    [place] BREAST SURGERY PROCEDURE UNLISTED  5/30/13    RIGHT BREAST LUMPECTOMY WITH RIGHT NEDDLE LOCALIZATION       Lab Results   Component Value Date/Time    WBC 11.4 (H) 05/04/2021 10:39 AM    HGB 13.4 05/04/2021 10:39 AM    Hemoglobin (POC) 15.3 10/31/2014 02:26 PM    HCT 40.0 05/04/2021 10:39 AM    Hematocrit (POC) 45 10/31/2014 02:26 PM    PLATELET 435 (H) 79/32/3025 10:39 AM    MCV 93.0 05/04/2021 10:39 AM     Lab Results   Component Value Date/Time    Cholesterol, total 208 (H) 05/04/2021 10:39 AM    HDL Cholesterol 31 05/04/2021 10:39 AM    LDL, calculated 135.8 (H) 05/04/2021 10:39 AM    Triglyceride 206 (H) 05/04/2021 10:39 AM    CHOL/HDL Ratio 6.7 (H) 05/04/2021 10:39 AM     Lab Results   Component Value Date/Time    GFR est non-AA >60 05/04/2021 10:39 AM    GFRNA, POC >60 10/31/2014 02:26 PM    GFR est AA >60 05/04/2021 10:39 AM    GFRAA, POC >60 10/31/2014 02:26 PM    Creatinine 0.77 05/04/2021 10:39 AM    Creatinine (POC) 0.9 10/31/2014 02:26 PM    BUN 6 05/04/2021 10:39 AM    BUN (POC) 8 (L) 10/31/2014 02:26 PM    Sodium 137 05/04/2021 10:39 AM    Sodium (POC) 140 10/31/2014 02:26 PM    Potassium 4.0 05/04/2021 10:39 AM    Potassium (POC) 3.7 10/31/2014 02:26 PM    Chloride 97 05/04/2021 10:39 AM    Chloride (POC) 103 10/31/2014 02:26 PM    CO2 30 05/04/2021 10:39 AM    Magnesium 1.6 01/14/2017 07:28 AM    Phosphorus 1.0 (L) 11/28/2012 04:45 AM        Review of Systems   Respiratory: Positive for cough. Negative for shortness of breath. Cardiovascular: Negative for chest pain. Physical Exam  Constitutional:       General: She is not in acute distress. Appearance: Normal appearance. She is not ill-appearing, toxic-appearing or diaphoretic. HENT:      Head: Normocephalic and atraumatic. Right Ear: External ear normal.      Left Ear: External ear normal.   Eyes:      General:         Right eye: No discharge. Left eye: No discharge. Conjunctiva/sclera: Conjunctivae normal.      Pupils: Pupils are equal, round, and reactive to light. Cardiovascular:      Rate and Rhythm: Normal rate and regular rhythm. Heart sounds: No murmur heard. No friction rub. No gallop. Pulmonary:      Effort: No respiratory distress. Breath sounds: Normal breath sounds. No wheezing or rales. Chest:      Chest wall: No tenderness. Musculoskeletal:         General: Normal range of motion. Cervical back: Normal range of motion and neck supple. Skin:     General: Skin is warm and dry. Neurological:      Mental Status: She is alert and oriented to person, place, and time. Mental status is at baseline. Coordination: Coordination normal.      Gait: Gait normal.   Psychiatric:         Mood and Affect: Mood normal.         Behavior: Behavior normal.         ASSESSMENT and PLAN    ICD-10-CM ICD-9-CM    1. Personal history of nicotine dependence     Overdue for CT lung cancer screening discussed again ordered again   Z87.891 V15.82 CT LOW DOSE LUNG CANCER SCREENING   2. Malignant neoplasm of right female breast, unspecified estrogen receptor status, unspecified site of breast ([facility] [address]     Will need repeat mammogram next month patient did not complete treatment with her hematologist and declined following back up with him     C50.911 174.9    3. Primary hypertension     Controlled of note she has some mild elevated heart rate but did not take her Toprol today     I10 401.9    4. Acquired hypothyroidism      On Synthroid check level adjust dose as needed     E03.9 244.9    5. Pure hypercholesterolemia     Controlled on Lipitor E78.00 272.0    6. Reactive depression       Controlled on Cymbalta F32.9 300.4    7. Neuropathy     From back pain improved with gabapentin G62.9 355.9    8.  Primary insomnia     Will give trial hydroxyzine when she has completed the antibiotics     F51.01 307.42    9. Elevated liver function tests       Related to alcohol use we will repeat LFTs today patient declined following up on ultrasound imaging and continues to drink alcohol despite being in instructed not to do so R79.89 790.6         Of note patient brings in paperwork today to return back to work upon questioning why she left work she states it is because she needs paperwork to go back. I am unable to give an answer as to why she had to leave work to begin with despite questioning multiple times. I would not be able to complete the paperwork accurately as I am not clear why she has left work so I do not know what she is recovering from. She later told my rooming nurse that she was out of work due to a stomach bug that has resolved      Depression screen reviewed and negative     Scribed by Iva Vinson of [address] [place] [address] [address], as dictated by Dr. Vy Araya. Current diagnosis and concerns discussed with pt at length. Pt understands risks and benefits or current treatment plan and medications, and accepts the treatment and medication with any possible risks. Pt asks appropriate questions, which were answered. Pt was instructed to call with any concerns or problems. I have reviewed the note documented by the scribe. The services provided are my own.   The documentation is accurate

## 2022-05-20 NOTE — PATIENT INSTRUCTIONS
Cough: Care Instructions  Overview     A cough is your body's response to something that bothers your throat or airways. Many things can cause a cough. You might cough because of a cold or the flu, bronchitis, or asthma. Smoking, postnasal drip, allergies, and stomach acid that backs up into your throat also can cause coughs. A cough is a symptom, not a disease. Most coughs stop when the cause, such as a cold, goes away. You can take a few steps at home to cough less and feel better. Follow-up care is a key part of your treatment and safety. Be sure to make and go to all appointments, and call your doctor if you are having problems. It's also a good idea to know your test results and keep a list of the medicines you take. How can you care for yourself at home? · Drink lots of water and other fluids. This helps thin the mucus and soothes a dry or sore throat. Honey or lemon juice in hot water or tea may ease a dry cough. · Take cough medicine as directed by your doctor. · Prop up your head on pillows to help you breathe and ease a dry cough. · Try cough drops or hard candy to soothe a dry or sore throat. · Do not smoke. Avoid secondhand smoke. If you need help quitting, talk to your doctor about stop-smoking programs and medicines. These can increase your chances of quitting for good. When should you call for help? Call 911 anytime you think you may need emergency care. For example, call if:    · You have severe trouble breathing. Call your doctor now or seek immediate medical care if:    · You cough up blood.     · You have new or worse trouble breathing.     · You have a new or higher fever.     · You have a new rash. Watch closely for changes in your health, and be sure to contact your doctor if:    · You cough more deeply or more often, especially if you notice more mucus or a change in the color of your mucus.     · You have new symptoms, such as a sore throat, an earache, or sinus pain.   · You do not get better as expected. Where can you learn more? Go to http://www.gray.com/  Enter D279 in the search box to learn more about \"Cough: Care Instructions. \"  Current as of: July 6, 2021               Content Version: 13.2  © 5895-2125 [employer]wise, [employer]. Care instructions adapted under license by [employer] (which disclaims liability or warranty for this information). If you have questions about a medical condition or this instruction, always ask your healthcare professional. [place] [address] any warranty or liability for your use of this information.

## 2022-05-20 NOTE — PROGRESS NOTES
Subjective: (As above and below)     The patient/guardian gave verbal consent to treat. Chief Complaint   Patient presents with    Concern For COVID-19 (Coronavirus)     Pt c/o cough and fatigue that has worsened over the past 7-8 days. Lidia Dick is a 60 y.o. female who presents primarily today to [place] forms filled out\"  She had return to work forms filled out by Dr. John Ng (urgent care) with return date of 03/30/2022. Patient states she went to work on 03/31/2022 and was told \"form is not adequate\" she then has been out of work from 03/21/2022 through today (05/20/2022) and requests that I fill out paperwork excusing her for prior (almost 2 months) off of work. She denies that her cough/tiredness was reason for being unable to work; states that she just never got initial form fixed. States she has PCP visit this week. Mentions she has had ongoing cough since end of march (2 months ago) . In chart review had normal CXR at the time. Was treated with z pack, tessalon and albuterol inhaler at the time. Promotes improvement but still having some cough. It is dry. Infrequent throughout day. Some associated post nasal drip. General tiredness that is not worse or better (chronic). She denies any weight loss, night sweats, SOB, fevers or hemoptysis. ROS  Review of Systems - negative except as listed above    Reviewed PmHx, RxHx, FmHx, SocHx, AllgHx and updated in chart.   Family History   Problem Relation Age of Onset    Cancer Father         pancreatic cancer    Cancer Brother         thyroid cancer    Hypertension Mother        Past Medical History:   Diagnosis Date    Anemia NEC     Anxiety     Breast cancer ([facility] [address] 2013    right lumpectomy with radiation    Cancer ([facility] [address]     Breast Cancer-Ductal Carcinoma in situ grade 1    Depression     Headache(784.0)     Hypertension     Nose fracture 10/2019    Poor appetite     Psychiatric disorder     depression    PUD (peptic ulcer disease) 11/12    Stress     Tired     Trouble in sleeping     Weakness generalized       Social History     Socioeconomic History    Marital status: SINGLE   Tobacco Use    Smoking status: Current Every Day Smoker     Packs/day: 0.25     Years: 20.00     Pack years: 5.00     Types: Cigarettes    Smokeless tobacco: Never Used    Tobacco comment: 4 cigarettes a day   Substance and Sexual Activity    Alcohol use: Yes     Alcohol/week: 5.8 standard drinks     Types: 7 Standard drinks or equivalent per week     Comment: 1 beer daily/1 glass wine    Drug use: No          Current Outpatient Medications   Medication Sig    cetirizine (ZYRTEC) 10 mg tablet Take 1 Tablet by mouth nightly.  doxycycline (ADOXA) 100 mg tablet Take 1 Tablet by mouth two (2) times a day for 7 days.  furosemide (LASIX) 20 mg tablet TAKE 1 TABLET BY MOUTH EVERY DAY    albuterol (PROVENTIL HFA, VENTOLIN HFA, PROAIR HFA) 90 mcg/actuation inhaler Take 2 Puffs by inhalation every six (6) hours as needed for Wheezing.  azithromycin (ZITHROMAX) 250 mg tablet Take two tablets today then one tablet daily    DULoxetine (CYMBALTA) 60 mg capsule TAKE 1 CAPSULE BY MOUTH EVERY DAY    levothyroxine (SYNTHROID) 75 mcg tablet TAKE 1 TABLET BY MOUTH EVERY DAY BEFORE BREAKFAST    gabapentin (NEURONTIN) 300 mg capsule TAKE 1 CAP BY MOUTH TWO (2) TIMES DAILY AS NEEDED FOR PAIN.  atorvastatin (LIPITOR) 20 mg tablet TAKE 1 TABLET BY MOUTH EVERY DAY    metoprolol succinate (TOPROL-XL) 25 mg XL tablet TAKE 1 TABLET BY MOUTH EVERY DAY    pantoprazole (PROTONIX) 20 mg tablet Take 1 Tablet by mouth daily.  fenofibrate nanocrystallized (TRICOR) 145 mg tablet TAKE 1 TABLET BY MOUTH EVERY DAY    amitriptyline (ELAVIL) 10 mg tablet TAKE 2 TABLETS BY MOUTH IN THE EVENING    cyclobenzaprine (FLEXERIL) 5 mg tablet Take 1-2 Tabs by mouth nightly. As needed    naproxen (NAPROSYN) 500 mg tablet Take 1 Tab by mouth two (2) times daily (with meals).  cholecalciferol (VITAMIN D3) 25 mcg (1,000 unit) cap Take  by mouth daily.  SUMAtriptan (IMITREX) 50 mg tablet TAKE 1 TABLET BY MOUTH ONCE AS NEEDED FOR MIGRAINE FOR UP TO 1 DOSE    multivitamin (ONE A DAY) tablet Take 1 Tab by mouth daily. No current facility-administered medications for this visit. Objective:     Vitals:    05/20/22 0823 05/20/22 0846 05/20/22 0848   BP: 113/75     Pulse: (!) 109 90    Resp: 18     Temp: 98.8 °F (37.1 °C)     SpO2: 94%  96%       Physical Exam  General appearance  appears well hydrated and does not appear toxic, no acute distress  Eyes - EOMs intact. Non injected. No scleral icterus   Ears - no external swelling. TMs normal bilat. Nose - nasal congestion. No purulent drainage  Mouth - OP clear without swelling, exudate or lesion. Mucus membranes moist. Uvula midline. Neck/Lymphatics  trachea midline, full AROM, no LAD of neck  Chest - Normal breathing effort no wheeze rales, rhonchi or diminishments bilaterally. Heart - RRR, no murmurs  Skin - no observable rashes or pallor  Neurologic- alert and oriented x 3  Psychiatric- normal mood, behavior and though content. Assessment/ Plan:     1. Cough    - cetirizine (ZYRTEC) 10 mg tablet; Take 1 Tablet by mouth nightly. Dispense: 30 Tablet; Refill: 0  - doxycycline (ADOXA) 100 mg tablet; Take 1 Tablet by mouth two (2) times a day for 7 days. Dispense: 14 Tablet; Refill: 0    2. Tiredness    - cetirizine (ZYRTEC) 10 mg tablet; Take 1 Tablet by mouth nightly. Dispense: 30 Tablet; Refill: 0  - doxycycline (ADOXA) 100 mg tablet; Take 1 Tablet by mouth two (2) times a day for 7 days. Dispense: 14 Tablet; Refill: 0    I have discussed in detail that I will NOT fill out paperwork excusing her for period of (03/31/2022 until 05/20/2022)    As far as cough is concerned; her symptoms are not worse; unresolved.  Was advised at visit in march to see PCP for general labs/TSH/vit D for further work up of fatigue. Will cover with doxycycline and zyrtec for possible post nasal drip/sinusitis related cause of her cough.   She otherwise has unremarkable exam with stable VS.  Keep PCP follow up for general medical management/further work up of fatigue- consider repeating CXR due to hx of smoking        Madan Shannon NP

## 2022-05-23 ENCOUNTER — TELEPHONE (OUTPATIENT)
Dept: INTERNAL MEDICINE CLINIC | Age: 57
End: 2022-05-23

## 2022-05-23 ENCOUNTER — OFFICE VISIT (OUTPATIENT)
Dept: INTERNAL MEDICINE CLINIC | Age: 57
End: 2022-05-23
Payer: COMMERCIAL

## 2022-05-23 VITALS
SYSTOLIC BLOOD PRESSURE: 114 MMHG | HEART RATE: 102 BPM | TEMPERATURE: 97.7 F | HEIGHT: 64 IN | DIASTOLIC BLOOD PRESSURE: 53 MMHG | RESPIRATION RATE: 16 BRPM | BODY MASS INDEX: 28 KG/M2 | OXYGEN SATURATION: 96 % | WEIGHT: 164 LBS

## 2022-05-23 DIAGNOSIS — R79.89 ELEVATED LIVER FUNCTION TESTS: ICD-10-CM

## 2022-05-23 DIAGNOSIS — Z23 ENCOUNTER FOR IMMUNIZATION: ICD-10-CM

## 2022-05-23 DIAGNOSIS — Z87.891 PERSONAL HISTORY OF NICOTINE DEPENDENCE: ICD-10-CM

## 2022-05-23 DIAGNOSIS — C50.911 MALIGNANT NEOPLASM OF RIGHT FEMALE BREAST, UNSPECIFIED ESTROGEN RECEPTOR STATUS, UNSPECIFIED SITE OF BREAST (HCC): ICD-10-CM

## 2022-05-23 DIAGNOSIS — F51.01 PRIMARY INSOMNIA: ICD-10-CM

## 2022-05-23 DIAGNOSIS — E03.9 ACQUIRED HYPOTHYROIDISM: ICD-10-CM

## 2022-05-23 DIAGNOSIS — I10 PRIMARY HYPERTENSION: Primary | ICD-10-CM

## 2022-05-23 DIAGNOSIS — F32.9 REACTIVE DEPRESSION: ICD-10-CM

## 2022-05-23 DIAGNOSIS — G62.9 NEUROPATHY: ICD-10-CM

## 2022-05-23 DIAGNOSIS — E78.00 PURE HYPERCHOLESTEROLEMIA: ICD-10-CM

## 2022-05-23 PROCEDURE — 90471 IMMUNIZATION ADMIN: CPT | Performed by: INTERNAL MEDICINE

## 2022-05-23 PROCEDURE — 99214 OFFICE O/P EST MOD 30 MIN: CPT | Performed by: INTERNAL MEDICINE

## 2022-05-23 PROCEDURE — 90750 HZV VACC RECOMBINANT IM: CPT | Performed by: INTERNAL MEDICINE

## 2022-05-23 RX ORDER — HYDROXYZINE 25 MG/1
25 TABLET, FILM COATED ORAL
Qty: 30 TABLET | Refills: 0 | Status: SHIPPED | OUTPATIENT
Start: 2022-05-23 | End: 2022-06-02

## 2022-05-23 NOTE — PROGRESS NOTES
Identified pt with two pt identifiers(name and ). Reviewed record in preparation for visit and have obtained necessary documentation. Chief Complaint   Patient presents with    Follow-up     6 month f/up        Visit Vitals  BP (!) 114/53 (BP 1 Location: Right upper arm, BP Patient Position: Sitting, BP Cuff Size: Adult)   Pulse (!) 102   Temp 97.7 °F (36.5 °C) (Temporal)   Resp 16   Ht 5' 4\" (1.626 m)   Wt 164 lb (74.4 kg)   SpO2 96%   BMI 28.15 kg/m²       Health Maintenance Due   Topic    Pneumococcal 0-64 years (1 - PCV)    Cervical cancer screen     Shingrix Vaccine Age 50> (2 of 2)    Lipid Screen         1. \"Have you been to the ER, urgent care clinic since your last visit? Hospitalized since your last visit? \" No    2. \"Have you seen or consulted any other health care providers outside of the [address] [place] [address] since your last visit? \" No     3. For patients aged 39-70: Has the patient had a colonoscopy / FIT/ Cologuard? Yes - no Care Gap present      If the patient is female:    4. For patients aged 41-77: Has the patient had a mammogram within the past 2 years? Yes - no Care Gap present      5. For patients aged 21-65: Has the patient had a pap smear? Yes - no Care Gap present     Med Reconciliation: Completed        Patient is accompanied by self I have received verbal consent from [facility] to discuss any/all medical information while they are present in the room.

## 2022-05-23 NOTE — TELEPHONE ENCOUNTER
Pt states that she wanted to find out if forms were completed yet that had been dropped off during visit. States trying to get back to work on wed. Caller advised that forms will be completed as soon as possible, however there can be a 7-10 business-day turn around time on completion of forms. Per consult with  El[place], psr did also advise pt that we will try our best to get the forms completed for her by tomorrow. Pt advised she can call back anytime to check status. Pt states understanding. Please be advised.

## 2022-05-24 LAB
ALBUMIN SERPL-MCNC: 3.2 G/DL (ref 3.8–4.9)
ALBUMIN/GLOB SERPL: 1 {RATIO} (ref 1.2–2.2)
ALP SERPL-CCNC: 171 IU/L (ref 44–121)
ALT SERPL-CCNC: 16 IU/L (ref 0–32)
AST SERPL-CCNC: 28 IU/L (ref 0–40)
BILIRUB SERPL-MCNC: 0.3 MG/DL (ref 0–1.2)
BUN SERPL-MCNC: 5 MG/DL (ref 6–24)
BUN/CREAT SERPL: 7 (ref 9–23)
CALCIUM SERPL-MCNC: 9.3 MG/DL (ref 8.7–10.2)
CHLORIDE SERPL-SCNC: 96 MMOL/L (ref 96–106)
CHOLEST SERPL-MCNC: 125 MG/DL (ref 100–199)
CO2 SERPL-SCNC: 28 MMOL/L (ref 20–29)
CREAT SERPL-MCNC: 0.72 MG/DL (ref 0.57–1)
EGFR: 97 ML/MIN/1.73
ERYTHROCYTE [DISTWIDTH] IN BLOOD BY AUTOMATED COUNT: 12.9 % (ref 11.7–15.4)
EST. AVERAGE GLUCOSE BLD GHB EST-MCNC: 108 MG/DL
GLOBULIN SER CALC-MCNC: 3.3 G/DL (ref 1.5–4.5)
GLUCOSE SERPL-MCNC: 100 MG/DL (ref 65–99)
HBA1C MFR BLD: 5.4 % (ref 4.8–5.6)
HCT VFR BLD AUTO: 38.6 % (ref 34–46.6)
HDLC SERPL-MCNC: 30 MG/DL
HGB BLD-MCNC: 13 G/DL (ref 11.1–15.9)
LDLC SERPL CALC-MCNC: 52 MG/DL (ref 0–99)
MCH RBC QN AUTO: 31.5 PG (ref 26.6–33)
MCHC RBC AUTO-ENTMCNC: 33.7 G/DL (ref 31.5–35.7)
MCV RBC AUTO: 94 FL (ref 79–97)
PLATELET # BLD AUTO: 396 X10E3/UL (ref 150–450)
POTASSIUM SERPL-SCNC: 4 MMOL/L (ref 3.5–5.2)
PROT SERPL-MCNC: 6.5 G/DL (ref 6–8.5)
RBC # BLD AUTO: 4.13 X10E6/UL (ref 3.77–5.28)
SODIUM SERPL-SCNC: 138 MMOL/L (ref 134–144)
TRIGL SERPL-MCNC: 275 MG/DL (ref 0–149)
TSH SERPL DL<=0.005 MIU/L-ACNC: 2.19 UIU/ML (ref 0.45–4.5)
VLDLC SERPL CALC-MCNC: 43 MG/DL (ref 5–40)
WBC # BLD AUTO: 8.2 X10E3/UL (ref 3.4–10.8)

## 2022-05-24 NOTE — TELEPHONE ENCOUNTER
Called, spoke to pt  Received two pt identifiers  Pt informed forms filled out and faxed w/confirmation. Forms scanned into chart as well. Pt verbalizes understanding of the instructions and has no further questions at this time.

## 2022-05-24 NOTE — TELEPHONE ENCOUNTER
----- Message from Xander Contreras sent at 5/24/2022  9:12 AM EDT -----  Subject: Message to Provider    QUESTIONS  Information for Provider? Patient is wanting to know if her return to work   paperwork is ready. She really needs it today. It was left with the office   yesterday,05/23. Please call to confirm and to arrange pickup. Can call or   text.  ---------------------------------------------------------------------------  --------------  CALL BACK INFO  What is the best way for the office to contact you? OK to leave message on   voicemail  Preferred Call Back Phone Number?  [phone]  ---------------------------------------------------------------------------  --------------  SCRIPT ANSWERS  undefined

## 2022-05-26 LAB
GGT SERPL-CCNC: 33 IU/L (ref 0–60)
SPECIMEN STATUS REPORT, ROLRST: NORMAL

## 2022-05-27 NOTE — PROGRESS NOTES
Please call patient back about results  Alk phos elevation from bone  Lets get bone scan (not dxa) for further eval  Diet to imporve triglycerides  Rest labs fine

## 2022-06-02 ENCOUNTER — TELEPHONE (OUTPATIENT)
Dept: INTERNAL MEDICINE CLINIC | Age: 57
End: 2022-06-02

## 2022-06-02 DIAGNOSIS — R74.8 ELEVATED ALKALINE PHOSPHATASE LEVEL: Primary | ICD-10-CM

## 2022-06-02 NOTE — PROGRESS NOTES
*final Attempt*  No answer, left msg to call us back regarding lab results. Results letter printed and mailed.

## 2022-06-06 RX ORDER — DULOXETIN HYDROCHLORIDE 60 MG/1
CAPSULE, DELAYED RELEASE ORAL
Qty: 30 CAPSULE | Refills: 0 | Status: SHIPPED | OUTPATIENT
Start: 2022-06-06 | End: 2022-07-11

## 2022-07-01 RX ORDER — METOPROLOL SUCCINATE 25 MG/1
TABLET, EXTENDED RELEASE ORAL
Qty: 90 TABLET | Refills: 1 | Status: SHIPPED | OUTPATIENT
Start: 2022-07-01 | End: 2022-11-01

## 2022-07-02 DIAGNOSIS — E78.5 ELEVATED LIPIDS: ICD-10-CM

## 2022-07-02 RX ORDER — LEVOTHYROXINE SODIUM 75 UG/1
TABLET ORAL
Qty: 90 TABLET | Refills: 0 | Status: SHIPPED | OUTPATIENT
Start: 2022-07-02 | End: 2022-09-20

## 2022-07-02 RX ORDER — ATORVASTATIN CALCIUM 20 MG/1
TABLET, FILM COATED ORAL
Qty: 90 TABLET | Refills: 1 | Status: SHIPPED | OUTPATIENT
Start: 2022-07-02

## 2022-07-11 RX ORDER — FUROSEMIDE 20 MG/1
TABLET ORAL
Qty: 30 TABLET | Refills: 0 | Status: SHIPPED | OUTPATIENT
Start: 2022-07-11 | End: 2022-09-20

## 2022-07-11 RX ORDER — DULOXETIN HYDROCHLORIDE 60 MG/1
CAPSULE, DELAYED RELEASE ORAL
Qty: 30 CAPSULE | Refills: 0 | Status: SHIPPED | OUTPATIENT
Start: 2022-07-11 | End: 2022-09-20

## 2022-09-20 RX ORDER — LEVOTHYROXINE SODIUM 75 UG/1
TABLET ORAL
Qty: 90 TABLET | Refills: 0 | Status: SHIPPED | OUTPATIENT
Start: 2022-09-20 | End: 2022-11-01

## 2022-09-20 RX ORDER — FUROSEMIDE 20 MG/1
TABLET ORAL
Qty: 30 TABLET | Refills: 0 | Status: SHIPPED | OUTPATIENT
Start: 2022-09-20 | End: 2022-10-10

## 2022-09-20 RX ORDER — DULOXETIN HYDROCHLORIDE 60 MG/1
CAPSULE, DELAYED RELEASE ORAL
Qty: 30 CAPSULE | Refills: 0 | Status: SHIPPED | OUTPATIENT
Start: 2022-09-20

## 2022-09-22 ENCOUNTER — OFFICE VISIT (OUTPATIENT)
Dept: URGENT CARE | Age: 57
End: 2022-09-22
Payer: COMMERCIAL

## 2022-09-22 VITALS
OXYGEN SATURATION: 98 % | SYSTOLIC BLOOD PRESSURE: 122 MMHG | DIASTOLIC BLOOD PRESSURE: 68 MMHG | HEART RATE: 82 BPM | RESPIRATION RATE: 16 BRPM | WEIGHT: 162 LBS | BODY MASS INDEX: 27.81 KG/M2 | TEMPERATURE: 97.2 F

## 2022-09-22 DIAGNOSIS — R60.0 BILATERAL EDEMA OF LOWER EXTREMITY: Primary | ICD-10-CM

## 2022-09-22 LAB
ALBUMIN SERPL-MCNC: 3.1 G/DL
ALKALINE PHOS POC: 135
ALT SERPL-CCNC: 27 U/L (ref 7–35)
AST SERPL-CCNC: 39 U/L (ref 13–35)
BUN BLD-MCNC: 8 MG/DL
CALCIUM BLD-MCNC: 9.2 MG/DL
CHLORIDE BLD-SCNC: 96 MMOL/L
CO2 POC: 29 MEQ/L
CREAT BLD-MCNC: 0.7 MG/DL
EGFR (POC): ABNORMAL
GLUCOSE POC: 107 MG/DL
POTASSIUM SERPL-SCNC: 3.8 MMOL/L
PROT SERPL-MCNC: 7.4 G/DL
SODIUM SERPL-SCNC: 136 MMOL/L
TOTAL BILIRUBIN POC: 0.5 MG/DL

## 2022-09-22 PROCEDURE — 80053 COMPREHEN METABOLIC PANEL: CPT | Performed by: FAMILY MEDICINE

## 2022-09-22 PROCEDURE — 99213 OFFICE O/P EST LOW 20 MIN: CPT | Performed by: FAMILY MEDICINE

## 2022-09-22 NOTE — PROGRESS NOTES
Aayush Vasquez is a 59 y.o. female who presents with bilateral LE edema x 3 days; reports has high sodium pot roast prior to onset. Works standing on her feet and has been very painful to do so the past few days. Reports takes lasix daily for LE swelling. Denies SOB, CP, palpitations, dizziness. The history is provided by the patient. Past Medical History:   Diagnosis Date    Anemia NEC     Anxiety     Breast cancer ([facility] 2013    right lumpectomy with radiation    Cancer ([facility] [address]     Breast Cancer-Ductal Carcinoma in situ grade 1    Depression     Headache(784.0)     Hypertension     Nose fracture 10/2019    Poor appetite     Psychiatric disorder     depression    PUD (peptic ulcer disease) 11/12    Stress     Tired     Trouble in sleeping     Weakness generalized         Past Surgical History:   Procedure Laterality Date    HX BREAST LUMPECTOMY  5/30/2013    BREAST LUMPECTOMY/PARTIAL performed by So Kearney MD at [facility] MAIN OR    HX GYN      fallopian tube    HX ORTHOPAEDIC      left ankle repair    HX OTHER SURGICAL      cyst removed rom right thigh    SC BREAST SURGERY PROCEDURE UNLISTED  5/30/13    RIGHT BREAST LUMPECTOMY WITH RIGHT NEDDLE LOCALIZATION          Family History   Problem Relation Age of Onset    Cancer Father         pancreatic cancer    Cancer Brother         thyroid cancer    Hypertension Mother         Social History     Socioeconomic History    Marital status: SINGLE     Spouse name: Not on file    Number of children: Not on file    Years of education: Not on file    Highest education level: Not on file   Occupational History    Not on file   Tobacco Use    Smoking status: Every Day     Packs/day: 0.25     Years: 20.00     Pack years: 5.00     Types: Cigarettes    Smokeless tobacco: Never    Tobacco comments:     4 cigarettes a day   Substance and Sexual Activity    Alcohol use:  Yes     Alcohol/week: 5.8 standard drinks     Types: 7 Standard drinks or equivalent per week     Comment: 1 beer daily/1 glass wine    Drug use: No    Sexual activity: Not on file   Other Topics Concern    Not on file   Social History Narrative    Not on file     Social Determinants of Health     Financial Resource Strain: Not on file   Food Insecurity: Not on file   Transportation Needs: Not on file   Physical Activity: Not on file   Stress: Not on file   Social Connections: Not on file   Intimate Partner Violence: Not on file   Housing Stability: Not on file                ALLERGIES: Aspirin and Tramadol    Review of Systems   Constitutional:  Negative for activity change, appetite change and fever. Respiratory:  Negative for cough and shortness of breath. Cardiovascular:  Positive for leg swelling. Negative for chest pain. Vitals:    22 0957   BP: 122/68   Pulse: 82   Resp: 16   Temp: 97.2 °F (36.2 °C)   SpO2: 98%   Weight: 162 lb (73.5 kg)       Physical Exam  Vitals and nursing note reviewed. Constitutional:       General: She is not in acute distress. Appearance: She is well-developed. She is not diaphoretic. Cardiovascular:      Rate and Rhythm: Normal rate and regular rhythm. Heart sounds: Normal heart sounds. Pulmonary:      Effort: Pulmonary effort is normal. No respiratory distress. Breath sounds: Normal breath sounds. No stridor. No wheezing, rhonchi or rales. Musculoskeletal:      Right lower le+ Edema present. Left lower le+ Edema present. Neurological:      Mental Status: She is alert. Psychiatric:         Behavior: Behavior normal.         Thought Content: Thought content normal.         Judgment: Judgment normal.       [facility]    ICD-10-CM ICD-9-CM   1. Bilateral edema of lower extremity  R60.0 782.3       Orders Placed This Encounter    AMB POC COMPREHENSIVE METABOLIC PANEL      Elevate legs at rest  Continue Lasix as directed  Follow up with PCP     If signs and symptoms become worse the pt is to go to the ER.      Results for orders placed or performed in visit on 09/22/22   AMB POC COMPREHENSIVE METABOLIC PANEL   Result Value Ref Range    GLUCOSE 107 mg/dL    BUN 8 mg/dL    Creatinine (POC) 0.7 mg/dL    Sodium (POC) 136 MMOL/L    Potassium (POC) 3.8 MMOL/L    CHLORIDE 96 MMOL/L    CO2 29 mEq/L    CALCIUM 9.2 mg/dL    TOTAL PROTEIN 7.4 g/dL    ALBUMIN 3.1     AST (POC) 39 (A) 13 - 35 U/L    ALT (POC) 27 7 - 35 U/L    ALKALINE PHOS 135     TOTAL BILIRUBIN 0.5 mg/dL    eGFR (POC)           Procedures

## 2022-09-22 NOTE — PATIENT INSTRUCTIONS
Elevate legs at rest  Continue Lasix as directed  Follow up with PCP     ER if worse    Results for orders placed or performed in visit on 09/22/22   AMB POC COMPREHENSIVE METABOLIC PANEL   Result Value Ref Range    GLUCOSE 107 mg/dL    BUN 8 mg/dL    Creatinine (POC) 0.7 mg/dL    Sodium (POC) 136 MMOL/L    Potassium (POC) 3.8 MMOL/L    CHLORIDE 96 MMOL/L    CO2 29 mEq/L    CALCIUM 9.2 mg/dL    TOTAL PROTEIN 7.4 g/dL    ALBUMIN 3.1     AST (POC) 39 (A) 13 - 35 U/L    ALT (POC) 27 7 - 35 U/L    ALKALINE PHOS 135     TOTAL BILIRUBIN 0.5 mg/dL    eGFR (POC)

## 2022-09-22 NOTE — LETTER
NOTIFICATION TO RETURN TO WORK / SCHOOL    09/22/22     Ms. Boogie [address]       To Whom It May Concern:    Maite Harding was seen today at [facility]. She  will return to work on light duty starting night of 9/23/2022. If there are questions or concerns please have the patient contact our office.         Sincerely,      Salvador Gonzales MD

## 2022-10-10 RX ORDER — FUROSEMIDE 20 MG/1
TABLET ORAL
Qty: 30 TABLET | Refills: 0 | Status: SHIPPED | OUTPATIENT
Start: 2022-10-10 | End: 2022-11-01

## 2022-10-18 DIAGNOSIS — M54.6 CHRONIC BILATERAL THORACIC BACK PAIN: ICD-10-CM

## 2022-10-18 DIAGNOSIS — G89.29 CHRONIC BILATERAL THORACIC BACK PAIN: ICD-10-CM

## 2022-10-20 RX ORDER — AMITRIPTYLINE HYDROCHLORIDE 10 MG/1
TABLET, FILM COATED ORAL
Qty: 180 TABLET | Refills: 1 | Status: SHIPPED | OUTPATIENT
Start: 2022-10-20 | End: 2022-10-21

## 2022-10-20 RX ORDER — GABAPENTIN 300 MG/1
300 CAPSULE ORAL
Qty: 180 CAPSULE | Refills: 0 | Status: SHIPPED | OUTPATIENT
Start: 2022-10-20

## 2022-10-24 ENCOUNTER — TELEPHONE (OUTPATIENT)
Dept: INTERNAL MEDICINE CLINIC | Age: 57
End: 2022-10-24

## 2022-10-24 RX ORDER — AMITRIPTYLINE HYDROCHLORIDE 10 MG/1
TABLET, FILM COATED ORAL
Qty: 60 TABLET | Refills: 0 | Status: SHIPPED | OUTPATIENT
Start: 2022-10-24

## 2022-10-24 NOTE — TELEPHONE ENCOUNTER
----- Message from Vedicis sent at 10/24/2022 10:19 AM EDT -----  Subject: Refill Request    QUESTIONS  Name of Medication? amitriptyline (ELAVIL) 10 mg tablet  Patient-reported dosage and instructions? takes daily   How many days do you have left? 3  Preferred Pharmacy? [id number]  Pharmacy phone number (if available)? [phone]  Additional Information for Provider? Pt has questions regarding walk in   clinic three weeks ago.  ---------------------------------------------------------------------------  --------------  CALL BACK INFO  What is the best way for the office to contact you? OK to leave message on   voicemail  Preferred Call Back Phone Number? [phone]  ---------------------------------------------------------------------------  --------------  SCRIPT ANSWERS  Relationship to Patient?  Self

## 2022-10-24 NOTE — TELEPHONE ENCOUNTER
**TRIAGE**    Received call from patient  Two pt identifiers confirmed.     Complaint: went to  about 3 weeks ago and they completed work forms for her, she will need to have them redone  Urgent care notes on record  Is taking her lasix  Appt scheduled with Dr. Giana Li for 845 am 10/25/2022, arrive at 830 am

## 2022-10-24 NOTE — PROGRESS NOTES
HISTORY OF PRESENT ILLNESS  Maite Lan is a 59 y.o. female. HPI  Last here 5/23/22. Pt is here for routine care. multiple complaints    Pt c/o hoarseness x 1-2 days. She presented to urgent care 9/22/22 w/ BL LE edema x 3 days. Given Lasix 20 mg, she is taking this. Notes she has only been able to work 4 hours/day since she cannot stand 8 hours/day and would like forms filled out. She states compression hose \"cut off my circulation. \"  Discussed compression hose should allow her to get back to 8 hours/day. Discussed she can get regular for now, then measured. Will release for regular duty 11/28/22 unless she calls back earlier.      BP today is 110/76  No home BP readings for review  She is on Toprol-XL 25 mg, has not taken this today    Now taking Lasix 20 mg for BL LE edema     Wt today is 161 lbs, down 3 lbs since lov   Discussed continued diet and w/l      Reviewed labs   Ordered labs  Discussed climbing alkaline phosphotase level and nl GGT   Reminded pt to complete bone scan     Pt follows with Dr. Oskar Friedman (hem/onc) for anemia in the past and for breast cancer  Last visit was 5/17, he had wanted pt to be on hormonal therapy in 2013, radiation was not completed, f/u in 1 year   Pt never followed up, reminded to do this on multiple occasions, discussed calling again  Recall past evaluation with Dr. Oskar Friedman in 2015, thrombocytosis thought d/t cigarette smoking      Recall COLO and EGD 2013 - bleeding ulcer, issues with anemia     Recall she has Protonix for abdominal bloating and reflux which she uses as needed     Pt is taking gabapentin 300 mg 1-2x per day for back pain, which helps  Taking cymbalta 60 mg as well  She gets pain from prev rib fracture      History of hypothyroidism  Continues on synthroid 75 mcg daily -- last TSH at goal   Recall pt has a FMHX thyroid cancer (brother)     continues on lipitor 20mg daily for cholesterol and fenofibrate     She is taking amitriptyline 20mg (2 tablets 10 mg) to help with sleep   Lov I ordered hyroxyzine, she did not take this  Pt takes imitrex 50mg prn (not daily) --stable     She is on cymbalta 60 mg for anxiety  which helps  No longer continues on wellbutrin 150mg--for depression and smoking      She is drinking about 2-4 glasses of wine per night  per her report we have discussed this on multiple occasions  She has a long history of drinking significant amounts of wine discussed in complete cessation has history of LFT elevations  Previously ordered an ultrasound of the abdomen she declines due to cost  Recall US abd in 2017 showed severe steotosis   I previously referred her to hepatology in 2014, she never went   Ordered repeat US abd     Pt has a 30 pack year hx  Pt continues to smoke 0.5 PPD varies how much she smokes  Discussed cessation, she is working on this  Completed CT lung cancer screen 11/1/18 - due reminded reordered      ACP not on file. SDM is her brother Rebeca Tran. Kofi III.    Provided information in the past.      Recall EGD and COLO in 2013: bleeding ulcer     PREVENTIVE:   Colonoscopy: 3/13, repeat 10 years, due 3/23   EGD: 2013, possible [facility]   Pap: ~3/19, [facility], due reminded  Mammogram: 6/29/21 negative, h/o R sided breast DCIS, due ordered   DEXA: [facility], ~3/19  Tdap: 12/10/17  Pneumovax: 8/27/2012  Prevnar 20: due, can get at local pharmacy  Shingrix: 1st 11/03/20 2nd dose 05/23/22  Flu shot: 11/03/20 will complete in the fall  Eye exam: 3/18? due reminded  EKG: 3/17 per ED   Lipids: 5/22 LDL 52  A1c: 6/19 5.7 11/19 5.4 11/20 5.5 5/21 5.5 5/22 5.4  CT lung cancer screen: 11/1/18, reordered, reminded   Covid: four doses [employer])    Patient Active Problem List    Diagnosis Date Noted    Pure hypercholesterolemia 05/01/2017    Acquired hypothyroidism 05/01/2017    Elevated WBC count 04/16/2014    Elevated liver function tests 04/16/2014    Breast cancer right DCIS 06/11/2013    Depression 02/11/2013    Headache 09/10/2012    HTN (hypertension) 09/10/2012     Current Outpatient Medications   Medication Sig Dispense Refill    amitriptyline (ELAVIL) 10 mg tablet TAKE 2 TABLETS BY MOUTH IN THE EVENING 180 Tablet 0    gabapentin (NEURONTIN) 300 mg capsule TAKE 1 CAP BY MOUTH TWO (2) TIMES DAILY AS NEEDED FOR PAIN. 180 Capsule 0    furosemide (LASIX) 20 mg tablet TAKE 1 TABLET BY MOUTH EVERY DAY 30 Tablet 0    DULoxetine (CYMBALTA) 60 mg capsule TAKE 1 CAPSULE BY MOUTH EVERY DAY 30 Capsule 0    levothyroxine (SYNTHROID) 75 mcg tablet TAKE 1 TABLET BY MOUTH EVERY DAY BEFORE BREAKFAST 90 Tablet 0    atorvastatin (LIPITOR) 20 mg tablet TAKE 1 TABLET BY MOUTH EVERY DAY 90 Tablet 1    metoprolol succinate (TOPROL-XL) 25 mg XL tablet TAKE 1 TABLET BY MOUTH EVERY DAY 90 Tablet 1    cetirizine (ZYRTEC) 10 mg tablet Take 1 Tablet by mouth nightly. 30 Tablet 0    albuterol (PROVENTIL HFA, VENTOLIN HFA, PROAIR HFA) 90 mcg/actuation inhaler Take 2 Puffs by inhalation every six (6) hours as needed for Wheezing. 18 g 0    pantoprazole (PROTONIX) 20 mg tablet Take 1 Tablet by mouth daily. 90 Tablet 1    fenofibrate nanocrystallized (TRICOR) 145 mg tablet TAKE 1 TABLET BY MOUTH EVERY DAY 90 Tablet 1    cyclobenzaprine (FLEXERIL) 5 mg tablet Take 1-2 Tabs by mouth nightly. As needed 12 Tab 0    naproxen (NAPROSYN) 500 mg tablet Take 1 Tab by mouth two (2) times daily (with meals). 60 Tab 1    cholecalciferol (VITAMIN D3) 25 mcg (1,000 unit) cap Take  by mouth daily. SUMAtriptan (IMITREX) 50 mg tablet TAKE 1 TABLET BY MOUTH ONCE AS NEEDED FOR MIGRAINE FOR UP TO 1 DOSE 6 Tab 1    multivitamin (ONE A DAY) tablet Take 1 Tab by mouth daily.          Past Surgical History:   Procedure Laterality Date    HX BREAST LUMPECTOMY  5/30/2013    BREAST LUMPECTOMY/PARTIAL performed by Royer Castillo MD at [facility] MAIN OR    HX GYN      fallopian tube    HX ORTHOPAEDIC      left ankle repair    HX OTHER SURGICAL      cyst removed rom right thigh    MO BREAST SURGERY PROCEDURE UNLISTED  5/30/13    RIGHT BREAST LUMPECTOMY WITH RIGHT NEDDLE LOCALIZATION       Lab Results   Component Value Date/Time    WBC 8.2 05/23/2022 12:00 AM    HGB 13.0 05/23/2022 12:00 AM    Hemoglobin (POC) 15.3 10/31/2014 02:26 PM    HCT 38.6 05/23/2022 12:00 AM    Hematocrit (POC) 45 10/31/2014 02:26 PM    PLATELET 330 03/51/4962 12:00 AM    MCV 94 05/23/2022 12:00 AM     Lab Results   Component Value Date/Time    Cholesterol, total 125 05/23/2022 12:00 AM    HDL Cholesterol 30 (L) 05/23/2022 12:00 AM    LDL, calculated 52 05/23/2022 12:00 AM    LDL, calculated 135.8 (H) 05/04/2021 10:39 AM    Triglyceride 275 (H) 05/23/2022 12:00 AM    CHOL/HDL Ratio 6.7 (H) 05/04/2021 10:39 AM     Lab Results   Component Value Date/Time    GFR est non-AA >60 05/04/2021 10:39 AM    GFRNA, POC >60 10/31/2014 02:26 PM    GFR est AA >60 05/04/2021 10:39 AM    GFRAA, POC >60 10/31/2014 02:26 PM    Creatinine 0.72 05/23/2022 12:00 AM    Creatinine (POC) 0.7 09/22/2022 10:45 AM    Creatinine (POC) 0.9 10/31/2014 02:26 PM    BUN 8 09/22/2022 10:45 AM    BUN 5 (L) 05/23/2022 12:00 AM    BUN (POC) 8 (L) 10/31/2014 02:26 PM    Sodium 138 05/23/2022 12:00 AM    Sodium (POC) 136 09/22/2022 10:45 AM    Sodium (POC) 140 10/31/2014 02:26 PM    Potassium 4.0 05/23/2022 12:00 AM    Potassium (POC) 3.8 09/22/2022 10:45 AM    Potassium (POC) 3.7 10/31/2014 02:26 PM    Chloride 96 05/23/2022 12:00 AM    CHLORIDE 96 09/22/2022 10:45 AM    Chloride (POC) 103 10/31/2014 02:26 PM    CO2 29 09/22/2022 10:45 AM    CO2 28 05/23/2022 12:00 AM    Magnesium 1.6 01/14/2017 07:28 AM    Phosphorus 1.0 (L) 11/28/2012 04:45 AM        Review of Systems   Respiratory:  Negative for shortness of breath. Cardiovascular:  Negative for chest pain. Physical Exam  Constitutional:       General: She is not in acute distress. Appearance: She is not ill-appearing, toxic-appearing or diaphoretic. HENT:      Head: Normocephalic and atraumatic. Right Ear: External ear normal.      Left Ear: External ear normal.   Eyes:      General:         Right eye: No discharge. Left eye: No discharge. Conjunctiva/sclera: Conjunctivae normal.      Pupils: Pupils are equal, round, and reactive to light. Neck:      Vascular: No carotid bruit. Cardiovascular:      Rate and Rhythm: Normal rate and regular rhythm. Heart sounds: No murmur heard. No friction rub. No gallop. Pulmonary:      Effort: No respiratory distress. Breath sounds: Normal breath sounds. No wheezing or rales. Chest:      Chest wall: No tenderness. Abdominal:      General: There is no distension. Palpations: Abdomen is soft. Tenderness: There is no abdominal tenderness. Musculoskeletal:         General: Normal range of motion. Cervical back: Normal.      Right lower leg: Edema (1+ pitting) present. Left lower leg: Edema (1+ pitting) present. Skin:     General: Skin is warm and dry. Neurological:      Mental Status: She is oriented to person, place, and time. Mental status is at baseline. Coordination: Coordination normal.      Gait: Gait normal.   Psychiatric:         Mood and Affect: Mood normal.         Behavior: Behavior normal.       ASSESSMENT and PLAN    ICD-10-CM ICD-9-CM      Z00.00 V70.9 REFERRAL TO GASTROENTEROLOGY      CONNIE MAMMO BI SCREENING INCL CAD      GGT      METABOLIC PANEL, COMPREHENSIVE      HEMOGLOBIN A1C WITH EAG      TSH 3RD GENERATION      GGT      METABOLIC PANEL, COMPREHENSIVE      HEMOGLOBIN A1C WITH EAG      TSH 3RD GENERATION      2. Encounter for immunization  Z23 V03.89 INFLUENZA, FLUARIX, FLULAVAL, FLUZONE (AGE 6 MO+), AFLURIA(AGE 3Y+) IM, PF, 0.5 ML      GGT      METABOLIC PANEL, COMPREHENSIVE      HEMOGLOBIN A1C WITH EAG      TSH 3RD GENERATION      GGT      METABOLIC PANEL, COMPREHENSIVE      HEMOGLOBIN A1C WITH EAG      TSH 3RD GENERATION      3.  Colon cancer screening  Z12.11 V76.51 REFERRAL TO GASTROENTEROLOGY      GGT      METABOLIC PANEL, COMPREHENSIVE      HEMOGLOBIN A1C WITH EAG      TSH 3RD GENERATION      GGT      METABOLIC PANEL, COMPREHENSIVE      HEMOGLOBIN A1C WITH EAG      TSH 3RD GENERATION      4. Primary hypertension  I10 224.9 GGT      METABOLIC PANEL, COMPREHENSIVE      HEMOGLOBIN A1C WITH EAG      TSH 3RD GENERATION      GGT   Blood pressure is adequately controlled on Toprol continue   METABOLIC PANEL, COMPREHENSIVE      HEMOGLOBIN A1C WITH EAG      TSH 3RD GENERATION      5. Alkaline phosphatase elevation  R74.8 040.2 GGT      METABOLIC PANEL, COMPREHENSIVE   Patient had progressive elevation of alkaline phosphatase on last labs with normal GGT we had ordered a bone scan for further evaluation which she unfortunately did not complete I reprinted the order for her today and gave her the information as well   HEMOGLOBIN A1C WITH EAG      TSH 3RD GENERATION      GGT      METABOLIC PANEL, COMPREHENSIVE      HEMOGLOBIN A1C WITH EAG      TSH 3RD GENERATION      US ABD LTD      6. Acquired hypothyroidism  E03.9 244.9    Will check TSH today patient is on Synthroid 75 mcg adjust medication as needed   7. Reactive depression  F32.9 300.4    Controlled with Cymbalta   8. Pure hypercholesterolemia  E78.00 272.0    Controlled Lipitor monitor lipids   9.  Fatty liver  K76.0 571.8 US ABD LTD   Patient with a long history of fatty liver severe steatosis seen in the ultrasound of her liver in the past discussed repeating this as her last ultrasound was about 2017 I had previously ordered an ultrasound to update it but she did not complete this    I previously sent her to hepatology but she did not go    I have counseled her on multiple times regarding the need for alcohol cessation which is damaging her liver further however she has not made any changes here we discussed all of the above again today    Will get liver ultrasound and repeat labs first and then will determine if hepatology referral required again today   10. Leg swelling  M79.89 729.81 CBC W/O DIFF      CBC W/O DIFF   Patient with long history of recurrent leg swelling continue Lasix daily check labs today for K creatinine sodium levels    She needs to wear compression hose to improve her symptoms ordered measured compression hose for her    Of note she went to an urgent care and was put on light duty for 4 hours of work per day with 4 hours of sitting after initial 4 hours and this light duty is good until sometime in November    She has been on light duty since September    Discussed that what she needs to get his compression hose and she can go back to full duty working on leg elevation when possible    When she gets her compression hose since she already has light duty through November she will contact me for release for full duty    Made a copy of her original limitation paperwork and discussed all the above   AMB SUPPLY ORDER      11. Neuropathy  G62.9 355.9    Uses gabapentin as needed improves her symptoms takes this every night actually   12 headaches improved on amitriptyline  13 breast cancer previously saw hematology on her own self DC'd hormonal therapy ultimately last hematology notes wants annual mammogram she likely only needs to go back to see them as needed she will contact her clinic and see if they require any follow-up at this time she also has a history of thrombocytosis which is thought related to cigarette smoking and would need to go back to see them if this progressively worsened    14 hoarseness likely viral URI supportive treatment OTC measures  Depression screen reviewed and negative. Scribed by Naila eJan, as dictated by Dr. Octavia Quezada. Current diagnosis and concerns discussed with pt at length. Pt understands risks and benefits or current treatment plan and medications, and accepts the treatment and medication with any possible risks.  Pt asks appropriate questions, which were answered. Pt was instructed to call with any concerns or problems. I have reviewed the note documented by the scribe. The services provided are my own. The documentation is accurate.

## 2022-10-24 NOTE — TELEPHONE ENCOUNTER
Future Appointments:  Future Appointments   Date Time Provider Pb Nelsoni   10/25/2022  8:45 AM Fabiana Polo MD [facility] BS AMB   11/28/2022  9:15 AM Fabiana Polo MD [facility] BS AMB        Last Appointment With Me:  5/23/2022     Requested Prescriptions     Pending Prescriptions Disp Refills    amitriptyline (ELAVIL) 10 mg tablet 180 Tablet 0

## 2022-10-25 ENCOUNTER — OFFICE VISIT (OUTPATIENT)
Dept: INTERNAL MEDICINE CLINIC | Age: 57
End: 2022-10-25
Payer: COMMERCIAL

## 2022-10-25 VITALS
OXYGEN SATURATION: 99 % | SYSTOLIC BLOOD PRESSURE: 110 MMHG | DIASTOLIC BLOOD PRESSURE: 76 MMHG | TEMPERATURE: 97.7 F | WEIGHT: 161.4 LBS | BODY MASS INDEX: 27.55 KG/M2 | RESPIRATION RATE: 16 BRPM | HEART RATE: 90 BPM | HEIGHT: 64 IN

## 2022-10-25 DIAGNOSIS — M79.89 LEG SWELLING: ICD-10-CM

## 2022-10-25 DIAGNOSIS — I10 PRIMARY HYPERTENSION: ICD-10-CM

## 2022-10-25 DIAGNOSIS — R74.8 ALKALINE PHOSPHATASE ELEVATION: ICD-10-CM

## 2022-10-25 DIAGNOSIS — Z12.11 COLON CANCER SCREENING: ICD-10-CM

## 2022-10-25 DIAGNOSIS — Z23 ENCOUNTER FOR IMMUNIZATION: ICD-10-CM

## 2022-10-25 DIAGNOSIS — F32.9 REACTIVE DEPRESSION: ICD-10-CM

## 2022-10-25 DIAGNOSIS — K76.0 FATTY LIVER: ICD-10-CM

## 2022-10-25 DIAGNOSIS — E78.00 PURE HYPERCHOLESTEROLEMIA: ICD-10-CM

## 2022-10-25 DIAGNOSIS — Z00.00 PHYSICAL EXAM: Primary | ICD-10-CM

## 2022-10-25 DIAGNOSIS — E03.9 ACQUIRED HYPOTHYROIDISM: ICD-10-CM

## 2022-10-25 DIAGNOSIS — G62.9 NEUROPATHY: ICD-10-CM

## 2022-10-25 PROCEDURE — 99213 OFFICE O/P EST LOW 20 MIN: CPT | Performed by: INTERNAL MEDICINE

## 2022-10-25 PROCEDURE — 90686 IIV4 VACC NO PRSV 0.5 ML IM: CPT | Performed by: INTERNAL MEDICINE

## 2022-10-25 PROCEDURE — 90471 IMMUNIZATION ADMIN: CPT | Performed by: INTERNAL MEDICINE

## 2022-10-25 PROCEDURE — 99396 PREV VISIT EST AGE 40-64: CPT | Performed by: INTERNAL MEDICINE

## 2022-10-25 NOTE — LETTER
NOTIFICATION RETURN TO WORK / SCHOOL    10/25/2022 9:44 AM    Ms. Nabil Jay      To Whom It May Concern:    Charlie Winslow is currently under the care of [facility]. She was seen in the office 10/25/22  She will return to work on: 10/26/22    If there are questions or concerns please have the patient contact our office.         Sincerely,      Fela Muprhy MD

## 2022-10-25 NOTE — PROGRESS NOTES
Room: 4B    Identified pt with two pt identifiers(name and ). Reviewed record in preparation for visit and have obtained necessary documentation. All patient medications has been reviewed. Chief Complaint   Patient presents with    Foot Swelling    Form Completion       Health Maintenance Due   Topic    Pneumococcal 0-64 years (1 - PCV)    Cervical cancer screen     Breast Cancer Screen Mammogram     Flu Vaccine (1)       Vitals:    10/25/22 0827   BP: 110/76   Pulse: 90   Resp: 16   Temp: 97.7 °F (36.5 °C)   TempSrc: Temporal   SpO2: 99%   Weight: 161 lb 6.4 oz (73.2 kg)   Height: 5' 4\" (1.626 m)   PainSc:   0 - No pain       4. Have you been to the ER, urgent care clinic since your last visit? Hospitalized since your last visit? Yes      5. Have you seen or consulted any other health care providers outside of the [address] [place] [address] since your last visit? Include any pap smears or colon screening. No    Patient is accompanied by self I have received verbal consent from James to discuss any/all medical information while they are present in the room.

## 2022-10-25 NOTE — PROGRESS NOTES
Last here 5/23/22. Pt is here for routine care. cpe       Wt today is 161 lbs, down 3 lbs since lov   Discussed continued diet and w/l      Reviewed labs   Ordered labs       She is drinking about 2-4 glasses of wine per night  per her report we have discussed this on multiple occasions     Pt has a 30 pack year hx  Pt continues to smoke 0.5 PPD varies how much she smokes  Discussed cessation, she is working on this  Completed CT lung cancer screen 11/1/18 - due reminded reordered      ACP not on file. SDM is her brother Amber Larios. Kofi GOYAL.    Provided information in the past.      Recall EGD and COLO in 2013: bleeding ulcer     PREVENTIVE:   Colonoscopy: 3/13, repeat 10 years, due 3/23   EGD: 2013, possible [facility]   Pap: ~3/19, [facility], due reminded  Mammogram: 6/29/21 negative, h/o R sided breast DCIS, due ordered   DEXA: [facility], ~3/19  Tdap: 12/10/17  Pneumovax: 8/27/2012  Prevnar 20: due, can get at local pharmacy  Shingrix: 1st 11/03/20 2nd dose 05/23/22  Flu shot: 11/03/20 will complete in the fall  Eye exam: 3/18? due reminded  EKG: 3/17 per ED   Lipids: 5/22 LDL 52  A1c: 6/19 5.7 11/19 5.4 11/20 5.5 5/21 5.5 5/22 5.4  CT lung cancer screen: 11/1/18, reordered, reminded   Covid: four doses [employer])    Patient Active Problem List    Diagnosis Date Noted    Pure hypercholesterolemia 05/01/2017    Acquired hypothyroidism 05/01/2017    Elevated WBC count 04/16/2014    Elevated liver function tests 04/16/2014    Breast cancer right DCIS 06/11/2013    Depression 02/11/2013    Headache 09/10/2012    HTN (hypertension) 09/10/2012     Current Outpatient Medications   Medication Sig Dispense Refill    amitriptyline (ELAVIL) 10 mg tablet TAKE 2 TABLETS BY MOUTH IN THE EVENING 180 Tablet 0    gabapentin (NEURONTIN) 300 mg capsule TAKE 1 CAP BY MOUTH TWO (2) TIMES DAILY AS NEEDED FOR PAIN. 180 Capsule 0    furosemide (LASIX) 20 mg tablet TAKE 1 TABLET BY MOUTH EVERY DAY 30 Tablet 0    DULoxetine (CYMBALTA) 60 mg capsule TAKE 1 CAPSULE BY MOUTH EVERY DAY 30 Capsule 0    levothyroxine (SYNTHROID) 75 mcg tablet TAKE 1 TABLET BY MOUTH EVERY DAY BEFORE BREAKFAST 90 Tablet 0    atorvastatin (LIPITOR) 20 mg tablet TAKE 1 TABLET BY MOUTH EVERY DAY 90 Tablet 1    metoprolol succinate (TOPROL-XL) 25 mg XL tablet TAKE 1 TABLET BY MOUTH EVERY DAY 90 Tablet 1    cetirizine (ZYRTEC) 10 mg tablet Take 1 Tablet by mouth nightly. 30 Tablet 0    albuterol (PROVENTIL HFA, VENTOLIN HFA, PROAIR HFA) 90 mcg/actuation inhaler Take 2 Puffs by inhalation every six (6) hours as needed for Wheezing. 18 g 0    pantoprazole (PROTONIX) 20 mg tablet Take 1 Tablet by mouth daily. 90 Tablet 1    fenofibrate nanocrystallized (TRICOR) 145 mg tablet TAKE 1 TABLET BY MOUTH EVERY DAY 90 Tablet 1    cyclobenzaprine (FLEXERIL) 5 mg tablet Take 1-2 Tabs by mouth nightly. As needed 12 Tab 0    naproxen (NAPROSYN) 500 mg tablet Take 1 Tab by mouth two (2) times daily (with meals). 60 Tab 1    cholecalciferol (VITAMIN D3) 25 mcg (1,000 unit) cap Take  by mouth daily. SUMAtriptan (IMITREX) 50 mg tablet TAKE 1 TABLET BY MOUTH ONCE AS NEEDED FOR MIGRAINE FOR UP TO 1 DOSE 6 Tab 1    multivitamin (ONE A DAY) tablet Take 1 Tab by mouth daily.          Past Surgical History:   Procedure Laterality Date    HX BREAST LUMPECTOMY  5/30/2013    BREAST LUMPECTOMY/PARTIAL performed by Miles Mistry MD at [facility] MAIN OR    HX GYN      fallopian tube    HX ORTHOPAEDIC      left ankle repair    HX OTHER SURGICAL      cyst removed rom right thigh    ID BREAST SURGERY PROCEDURE UNLISTED  5/30/13    RIGHT BREAST LUMPECTOMY WITH RIGHT NEDDLE LOCALIZATION       Lab Results   Component Value Date/Time    WBC 8.2 05/23/2022 12:00 AM    HGB 13.0 05/23/2022 12:00 AM    Hemoglobin (POC) 15.3 10/31/2014 02:26 PM    HCT 38.6 05/23/2022 12:00 AM    Hematocrit (POC) 45 10/31/2014 02:26 PM    PLATELET 720 12/57/2515 12:00 AM    MCV 94 05/23/2022 12:00 AM     Lab Results Component Value Date/Time    Cholesterol, total 125 05/23/2022 12:00 AM    HDL Cholesterol 30 (L) 05/23/2022 12:00 AM    LDL, calculated 52 05/23/2022 12:00 AM    LDL, calculated 135.8 (H) 05/04/2021 10:39 AM    Triglyceride 275 (H) 05/23/2022 12:00 AM    CHOL/HDL Ratio 6.7 (H) 05/04/2021 10:39 AM     Lab Results   Component Value Date/Time    GFR est non-AA >60 05/04/2021 10:39 AM    GFRNA, POC >60 10/31/2014 02:26 PM    GFR est AA >60 05/04/2021 10:39 AM    GFRAA, POC >60 10/31/2014 02:26 PM    Creatinine 0.72 05/23/2022 12:00 AM    Creatinine (POC) 0.7 09/22/2022 10:45 AM    Creatinine (POC) 0.9 10/31/2014 02:26 PM    BUN 8 09/22/2022 10:45 AM    BUN 5 (L) 05/23/2022 12:00 AM    BUN (POC) 8 (L) 10/31/2014 02:26 PM    Sodium 138 05/23/2022 12:00 AM    Sodium (POC) 136 09/22/2022 10:45 AM    Sodium (POC) 140 10/31/2014 02:26 PM    Potassium 4.0 05/23/2022 12:00 AM    Potassium (POC) 3.8 09/22/2022 10:45 AM    Potassium (POC) 3.7 10/31/2014 02:26 PM    Chloride 96 05/23/2022 12:00 AM    CHLORIDE 96 09/22/2022 10:45 AM    Chloride (POC) 103 10/31/2014 02:26 PM    CO2 29 09/22/2022 10:45 AM    CO2 28 05/23/2022 12:00 AM    Magnesium 1.6 01/14/2017 07:28 AM    Phosphorus 1.0 (L) 11/28/2012 04:45 AM        Review of Systems   Respiratory:  Negative for shortness of breath. Cardiovascular:  Negative for chest pain. Physical Exam  Constitutional:       General: She is not in acute distress. Appearance: She is not ill-appearing, toxic-appearing or diaphoretic. HENT:      Head: Normocephalic and atraumatic. Right Ear: External ear normal.      Left Ear: External ear normal.   Eyes:      General:         Right eye: No discharge. Left eye: No discharge. Conjunctiva/sclera: Conjunctivae normal.      Pupils: Pupils are equal, round, and reactive to light. Neck:      Vascular: No carotid bruit. Cardiovascular:      Rate and Rhythm: Normal rate and regular rhythm.       Heart sounds: No murmur heard.    No friction rub. No gallop. Pulmonary:      Effort: No respiratory distress. Breath sounds: Normal breath sounds. No wheezing or rales. Chest:      Chest wall: No tenderness. Abdominal:      General: There is no distension. Palpations: Abdomen is soft. Tenderness: There is no abdominal tenderness. Musculoskeletal:         General: Normal range of motion. Cervical back: Normal.      Right lower leg: Edema (1+ pitting) present. Left lower leg: Edema (1+ pitting) present. Skin:     General: Skin is warm and dry. Neurological:      Mental Status: She is oriented to person, place, and time. Mental status is at baseline. Coordination: Coordination normal.      Gait: Gait normal.   Psychiatric:         Mood and Affect: Mood normal.         Behavior: Behavior normal.       ASSESSMENT and PLAN    ICD-10-CM ICD-9-CM    1. Physical exam  Z00.00 V70.9 REFERRAL TO GASTROENTEROLOGY      [facility] MAMMO BI SCREENING INCL CAD   Discussed diet and weight loss    Discussed smoking cessation she is not able to quit yet she is due for a CT lung cancer screen which I previously ordered I reprinted the order for her    Discussed alcohol abuse she continues to drink minimal 2 to 3 glasses of wine per night if not more she tends to drink more on the weekend she has a long history of abnormal LFTs which I discussed the need for cessation unfortunately she continues to drink we discussed all this today    Colonoscopy is due in March placed referral for her pelvic exam is due we discussed mammogram is overdue we discussed this as well    Discussed getting a Prevnar 20 at her local pharmacy completed flu shot today Shingrix is up-to-date COVID-vaccine up-to-date    Labs are ordered today       GGT      METABOLIC PANEL, COMPREHENSIVE      HEMOGLOBIN A1C WITH EAG      TSH 3RD GENERATION      GGT      METABOLIC PANEL, COMPREHENSIVE      HEMOGLOBIN A1C WITH EAG      TSH 3RD GENERATION      2.  Encounter for immunization  Z23 V03.89 INFLUENZA, FLUARIX, FLULAVAL, FLUZONE (AGE 6 MO+), AFLURIA(AGE 3Y+) IM, PF, 0.5 ML      GGT      METABOLIC PANEL, COMPREHENSIVE      HEMOGLOBIN A1C WITH EAG      TSH 3RD GENERATION      GGT      METABOLIC PANEL, COMPREHENSIVE      HEMOGLOBIN A1C WITH EAG      TSH 3RD GENERATION        Depression screen reviewed and negative. Scribed by Fidel Juarez, as dictated by Dr. Isaura Atkinson. Current diagnosis and concerns discussed with pt at length. Pt understands risks and benefits or current treatment plan and medications, and accepts the treatment and medication with any possible risks. Pt asks appropriate questions, which were answered. Pt was instructed to call with any concerns or problems. I have reviewed the note documented by the scribe. The services provided are my own.  The documentation is accurate

## 2022-10-26 LAB
ALBUMIN SERPL-MCNC: 3.3 G/DL (ref 3.8–4.9)
ALBUMIN/GLOB SERPL: 1.1 {RATIO} (ref 1.2–2.2)
ALP SERPL-CCNC: 113 IU/L (ref 44–121)
ALT SERPL-CCNC: 13 IU/L (ref 0–32)
AST SERPL-CCNC: 21 IU/L (ref 0–40)
BILIRUB SERPL-MCNC: <0.2 MG/DL (ref 0–1.2)
BUN SERPL-MCNC: 4 MG/DL (ref 6–24)
BUN/CREAT SERPL: 7 (ref 9–23)
CALCIUM SERPL-MCNC: 9.3 MG/DL (ref 8.7–10.2)
CHLORIDE SERPL-SCNC: 99 MMOL/L (ref 96–106)
CO2 SERPL-SCNC: 28 MMOL/L (ref 20–29)
CREAT SERPL-MCNC: 0.61 MG/DL (ref 0.57–1)
EGFR: 103 ML/MIN/1.73
ERYTHROCYTE [DISTWIDTH] IN BLOOD BY AUTOMATED COUNT: 12.2 % (ref 11.7–15.4)
EST. AVERAGE GLUCOSE BLD GHB EST-MCNC: 111 MG/DL
GGT SERPL-CCNC: 71 IU/L (ref 0–60)
GLOBULIN SER CALC-MCNC: 2.9 G/DL (ref 1.5–4.5)
GLUCOSE SERPL-MCNC: 97 MG/DL (ref 70–99)
HBA1C MFR BLD: 5.5 % (ref 4.8–5.6)
HCT VFR BLD AUTO: 34.1 % (ref 34–46.6)
HGB BLD-MCNC: 11.4 G/DL (ref 11.1–15.9)
MCH RBC QN AUTO: 30.7 PG (ref 26.6–33)
MCHC RBC AUTO-ENTMCNC: 33.4 G/DL (ref 31.5–35.7)
MCV RBC AUTO: 92 FL (ref 79–97)
PLATELET # BLD AUTO: 498 X10E3/UL (ref 150–450)
POTASSIUM SERPL-SCNC: 4.4 MMOL/L (ref 3.5–5.2)
PROT SERPL-MCNC: 6.2 G/DL (ref 6–8.5)
RBC # BLD AUTO: 3.71 X10E6/UL (ref 3.77–5.28)
SODIUM SERPL-SCNC: 140 MMOL/L (ref 134–144)
TSH SERPL DL<=0.005 MIU/L-ACNC: 0.64 UIU/ML (ref 0.45–4.5)
WBC # BLD AUTO: 5.8 X10E3/UL (ref 3.4–10.8)

## 2022-10-26 NOTE — PROGRESS NOTES
Please call patient back about results  Continued elevation of liver test/GGT  Work on alcohol cessation likely related to fatty liver complete ultrasound as already ordered have her follow-up with hepatology clinic  Rest of her labs are stable

## 2022-10-27 DIAGNOSIS — R79.89 ELEVATED LIVER FUNCTION TESTS: Primary | ICD-10-CM

## 2022-10-27 NOTE — PROGRESS NOTES
Called, spoke to pt  Received two pt identifiers   Pt informed per Dr. Laura Dunn Continued elevation of liver test/GGT  Pt informed per Dr. Laura Dunn Work on alcohol cessation likely related to fatty liver complete ultrasound as already ordered   Informed pt will place new referral to Dr. Daniel Better office(Hepatology)  Rest labs are okay  Pt verbalizes understanding of the instructions and has no further questions at this time.

## 2022-11-01 RX ORDER — PANTOPRAZOLE SODIUM 20 MG/1
TABLET, DELAYED RELEASE ORAL
Qty: 90 TABLET | Refills: 1 | Status: SHIPPED | OUTPATIENT
Start: 2022-11-01

## 2022-11-01 RX ORDER — LEVOTHYROXINE SODIUM 75 UG/1
TABLET ORAL
Qty: 90 TABLET | Refills: 0 | Status: SHIPPED | OUTPATIENT
Start: 2022-11-01

## 2022-11-01 RX ORDER — FUROSEMIDE 20 MG/1
TABLET ORAL
Qty: 30 TABLET | Refills: 0 | Status: SHIPPED | OUTPATIENT
Start: 2022-11-01

## 2022-11-01 RX ORDER — METOPROLOL SUCCINATE 25 MG/1
TABLET, EXTENDED RELEASE ORAL
Qty: 90 TABLET | Refills: 1 | Status: SHIPPED | OUTPATIENT
Start: 2022-11-01

## 2022-11-08 DIAGNOSIS — E78.5 ELEVATED LIPIDS: ICD-10-CM

## 2022-11-08 RX ORDER — ATORVASTATIN CALCIUM 20 MG/1
TABLET, FILM COATED ORAL
Qty: 90 TABLET | Refills: 1 | Status: SHIPPED | OUTPATIENT
Start: 2022-11-08

## 2022-11-08 RX ORDER — DULOXETIN HYDROCHLORIDE 60 MG/1
CAPSULE, DELAYED RELEASE ORAL
Qty: 30 CAPSULE | Refills: 0 | Status: SHIPPED | OUTPATIENT
Start: 2022-11-08

## 2022-11-16 ENCOUNTER — OFFICE VISIT (OUTPATIENT)
Dept: URGENT CARE | Age: 57
End: 2022-11-16
Payer: COMMERCIAL

## 2022-11-16 VITALS
BODY MASS INDEX: 27.49 KG/M2 | OXYGEN SATURATION: 97 % | DIASTOLIC BLOOD PRESSURE: 71 MMHG | HEIGHT: 64 IN | WEIGHT: 161 LBS | RESPIRATION RATE: 18 BRPM | TEMPERATURE: 98 F | HEART RATE: 84 BPM | SYSTOLIC BLOOD PRESSURE: 116 MMHG

## 2022-11-16 DIAGNOSIS — R07.81 RIB PAIN ON LEFT SIDE: Primary | ICD-10-CM
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
## 2022-11-16 PROCEDURE — 99213 OFFICE O/P EST LOW 20 MIN: CPT | Performed by: NURSE PRACTITIONER

## 2022-11-16 PROCEDURE — 3078F DIAST BP <80 MM HG: CPT | Performed by: NURSE PRACTITIONER

## 2022-11-16 PROCEDURE — 3074F SYST BP LT 130 MM HG: CPT | Performed by: NURSE PRACTITIONER

## 2022-11-16 RX ORDER — HYDROCODONE BITARTRATE AND ACETAMINOPHEN 5; 325 MG/1; MG/1
1 TABLET ORAL
Qty: 10 TABLET | Refills: 0 | Status: SHIPPED | OUTPATIENT
Start: 2022-11-16 | End: 2022-11-19

## 2022-11-16 NOTE — LETTER
NOTIFICATION RETURN TO WORK / SCHOOL    11/16/2022 10:06 AM    Ms. Nabil Jay      To Whom It May Concern:    Ghada Marina is currently under the care of [address]. She will return to work/school on 11/16/2022 on light duty. See additional forms. If there are questions or concerns please have the patient contact our office.         Sincerely,      GHE PROVIDER

## 2022-11-16 NOTE — PROGRESS NOTES
Fall  This is a new problem. Episode onset: 1 week ago. The problem has not changed since onset. Pertinent negatives include no chest pain and no shortness of breath. Associated symptoms comments: Rib pain. The symptoms are aggravated by bending and twisting. Nothing relieves the symptoms. She has tried acetaminophen for the symptoms. The treatment provided no relief. Radha Serge on stairs at home. Continues to have left side and rib pain. Smokes cigarettes.     Past Medical History:   Diagnosis Date    Anemia NEC     Anxiety     Breast cancer ([facility] [address] 2013    right lumpectomy with radiation    Cancer ([facility] [address]     Breast Cancer-Ductal Carcinoma in situ grade 1    Depression     Headache(784.0)     Hypertension     Nose fracture 10/2019    Poor appetite     Psychiatric disorder     depression    PUD (peptic ulcer disease) 11/12    Stress     Tired     Trouble in sleeping     Weakness generalized         Past Surgical History:   Procedure Laterality Date    HX BREAST LUMPECTOMY  5/30/2013    BREAST LUMPECTOMY/PARTIAL performed by Jumana Moran MD at [facility] MAIN OR    HX GYN      fallopian tube    HX ORTHOPAEDIC      left ankle repair    HX OTHER SURGICAL      cyst removed rom right thigh    [place] BREAST SURGERY PROCEDURE UNLISTED  5/30/13    RIGHT BREAST LUMPECTOMY WITH RIGHT NEDDLE LOCALIZATION          Family History   Problem Relation Age of Onset    Cancer Father         pancreatic cancer    Cancer Brother         thyroid cancer    Hypertension Mother         Social History     Socioeconomic History    Marital status: SINGLE     Spouse name: Not on file    Number of children: Not on file    Years of education: Not on file    Highest education level: Not on file   Occupational History    Not on file   Tobacco Use    Smoking status: Every Day     Packs/day: 0.25     Years: 20.00     Pack years: 5.00     Types: Cigarettes    Smokeless tobacco: Never    Tobacco comments:     4 cigarettes a day   Substance and Sexual Activity Alcohol use: Yes     Alcohol/week: 5.8 standard drinks     Types: 7 Standard drinks or equivalent per week     Comment: 1 beer daily/1 glass wine    Drug use: No    Sexual activity: Not on file   Other Topics Concern    Not on file   Social History Narrative    Not on file     Social Determinants of Health     Financial Resource Strain: Low Risk     Difficulty of Paying Living Expenses: Not very hard   Food Insecurity: No Food Insecurity    Worried About Running Out of Food in the Last Year: Never true    Ran Out of Food in the Last Year: Never true   Transportation Needs: Not on file   Physical Activity: Not on file   Stress: Not on file   Social Connections: Not on file   Intimate Partner Violence: Not on file   Housing Stability: Not on file                ALLERGIES: Aspirin and Tramadol    Review of Systems   Constitutional:  Negative for chills and fever. Respiratory:  Negative for cough, shortness of breath and wheezing. Rib pain   Cardiovascular:  Negative for chest pain. Gastrointestinal:  Negative for diarrhea, nausea and vomiting. Vitals:    11/16/22 0850   BP: 116/71   Pulse: 84   Resp: 18   Temp: 98 °F (36.7 °C)   SpO2: 97%   Weight: 161 lb (73 kg)   Height: 5' 4\" (1.626 m)       Physical Exam  Constitutional:       General: She is not in acute distress. Appearance: Normal appearance. She is not ill-appearing or toxic-appearing. HENT:      Head: Normocephalic and atraumatic. Mouth/Throat:      Mouth: Mucous membranes are moist.      Pharynx: Oropharynx is clear. Eyes:      Extraocular Movements: Extraocular movements intact. Conjunctiva/sclera: Conjunctivae normal.      Pupils: Pupils are equal, round, and reactive to light. Cardiovascular:      Rate and Rhythm: Normal rate and regular rhythm. Pulmonary:      Effort: Pulmonary effort is normal.      Breath sounds: Wheezing (few faint wheezes) present. Chest:      Chest wall: Tenderness present.  No deformity, swelling, crepitus or edema. Comments: Tenderness to left lower rib cage, no step off. No ecchymosis or tenderness. There is firm area just below rib cage to soft tissue that is tender to touch, suspect resolving hematoma  Musculoskeletal:      Cervical back: Normal range of motion and neck supple. Lymphadenopathy:      Cervical: No cervical adenopathy. Neurological:      Mental Status: She is alert. XR Results (most recent):  Results from Appointment encounter on 11/16/22    XR RIBS LT W PA CXR MIN 3 V    Narrative  EXAM:  XR RIBS LT W PA CXR MIN 3 V    INDICATION:   left lower rib pain; fall one week ago    COMPARISON: Chest 3/29/2022. Ribs 11/3/2020    FINDINGS: A frontal radiograph of the chest and 3 oblique views of the left  ribs. The lungs are clear. The heart is normal size. There is scoliosis of the  spine. There is chronic deformity of the left lateral eighth, ninth, and 10th  ribs. There is chronic deformity of the right fifth through 10th ribs. No  evidence of an acute fracture. Impression  No acute rib fracture identified. ICD-10-CM ICD-9-CM   1. Rib pain on left side  R07.81 786.50   2. Fall, initial encounter  [address] [place] E888.9       Orders Placed This Encounter    XR RIBS LT W PA CXR MIN 3 V     Standing Status:   Future     Number of Occurrences:   1     Standing Expiration Date:   12/16/2023     Order Specific Question:   Reason for Exam     Answer:   left lower rib pain; fall one week ago    HYDROcodone-acetaminophen (NORCO) 5-325 mg per tablet     Sig: Take 1 Tablet by mouth every eight (8) hours as needed for Pain for up to 3 days. Max Daily Amount: 3 Tablets. Dispense:  10 Tablet     Refill:  0      Continue tylenol and take hydrocodone before bed. Light duty for one week. Follow up with PCP if needs extension. The patient is to follow up with PCP. If signs and symptoms become worse the pt is to go to the ER.      Daniel Luna NP MDM    Procedures

## 2022-12-12 RX ORDER — AMITRIPTYLINE HYDROCHLORIDE 10 MG/1
TABLET, FILM COATED ORAL
Qty: 60 TABLET | Refills: 0 | Status: SHIPPED | OUTPATIENT
Start: 2022-12-12

## 2022-12-17 NOTE — TELEPHONE ENCOUNTER
Pt presented in clinic 2/28/20 regarding forms. [employer] forms filled out and given to pt. Copy placed to in-scanning. Pt verbalized understanding of information discussed w/ no further questions at this time.
[facility]   Phone Number: [phone]             Caller's first and last name and relationship (if not the patient): n/a   Best contact number(s): [phone]   Whose call is being returned: Pt returning to call to nurse [facility].  Please verify   Details to clarify the request: n/a     Copy/Paste  ENVERA
(V5) oriented

## 2022-12-18 RX ORDER — FUROSEMIDE 20 MG/1
TABLET ORAL
Qty: 30 TABLET | Refills: 0 | Status: SHIPPED | OUTPATIENT
Start: 2022-12-18

## 2022-12-24 ENCOUNTER — OFFICE VISIT (OUTPATIENT)
Dept: URGENT CARE | Age: 57
End: 2022-12-24
Payer: COMMERCIAL

## 2022-12-24 VITALS
TEMPERATURE: 98.7 F | DIASTOLIC BLOOD PRESSURE: 78 MMHG | BODY MASS INDEX: 26.64 KG/M2 | WEIGHT: 155.2 LBS | RESPIRATION RATE: 18 BRPM | OXYGEN SATURATION: 98 % | HEART RATE: 99 BPM | SYSTOLIC BLOOD PRESSURE: 128 MMHG

## 2022-12-24 DIAGNOSIS — U07.1 COVID-19: Primary | ICD-10-CM

## 2022-12-24 NOTE — PATIENT INSTRUCTIONS
Follow Up with PCP in 3-5 days if no improvement/routine care. Call to be seen sooner or return Here/ER, if no improvement with treatments advised/prescribed or symptoms/pain worsen (develop fever, pain worsens significantly, or develop NEW symptoms). Rest, vitamin C, Zinc, and plenty of fluids are my primary suggestions. You may also try alternating Tylenol and Motrin (if not allergic) every 3 hours. This may help your symptoms too. Use of any OTC meds for your symptoms is appropriate. Lastly, if you develop any shortness of breath, chest pain, or extreme fatigue; those are concerning for pneumonia and you should report to an urgent care with xray or ER. Otherwise, stay home and self-isolate for 5-10 days, pending symtoms and vaccination status. Take 40 mg of protonix, if already taken then ADD Pepcid once daily to help with decreased appetite.

## 2022-12-24 NOTE — LETTER
NOTIFICATION RETURN TO WORK / SCHOOL    12/24/2022 2:42 PM    Ms. Nabil Jay      To Whom It May Concern:    Jasmina Hodge is currently under the care of [address] [facility]. She will return to work/school on: 12/26/22, as she started with COVID symptom 12/14/22 and tested positive 12/22/22. If there are questions or concerns please have the patient contact our office.         Sincerely,      E PROVIDER

## 2022-12-24 NOTE — PROGRESS NOTES
HISTORY OF PRESENT ILLNESS  Maite Amanda is a 59 y.o. female. Pt presents today concerned for COVID infection for past 10 days. no known exposure to confirmed POSITIVE person. Having fatigue and decreased appetite, thought it was because she went from light duty to full duty working 8 hrs shifts again recently. Denies fever, cough, CP, SOB,runny nose/congestion, loss of sense of taste and smell, sore throat, myalgias, N/V/D/Abd pain, & HA. Needs paperwork completed to return to work. Tested positive on 12/22, but next scheduled shift is night of 12/28 th. Patient Active Problem List   Diagnosis Code    Headache R51.9    HTN (hypertension) I10    Depression F32. A    Breast cancer right DCIS C50.919    Elevated WBC count D72.829    Elevated liver function tests R79.89    Pure hypercholesterolemia E78.00    Acquired hypothyroidism E03.9     Patient Active Problem List    Diagnosis Date Noted    Pure hypercholesterolemia 05/01/2017    Acquired hypothyroidism 05/01/2017    Elevated WBC count 04/16/2014    Elevated liver function tests 04/16/2014    Breast cancer right DCIS 06/11/2013    Depression 02/11/2013    Headache 09/10/2012    HTN (hypertension) 09/10/2012     Current Outpatient Medications   Medication Sig Dispense Refill    furosemide (LASIX) 20 mg tablet TAKE 1 TABLET BY MOUTH EVERY DAY 30 Tablet 0    amitriptyline (ELAVIL) 10 mg tablet TAKE 2 TABLETS BY MOUTH IN THE EVENING 60 Tablet 0    DULoxetine (CYMBALTA) 60 mg capsule TAKE 1 CAPSULE BY MOUTH EVERY DAY 30 Capsule 0    atorvastatin (LIPITOR) 20 mg tablet TAKE 1 TABLET BY MOUTH EVERY DAY 90 Tablet 1    pantoprazole (PROTONIX) 20 mg tablet TAKE 1 TABLET BY MOUTH EVERY DAY 90 Tablet 1    levothyroxine (SYNTHROID) 75 mcg tablet TAKE 1 TABLET BY MOUTH EVERY DAY BEFORE BREAKFAST 90 Tablet 0    metoprolol succinate (TOPROL-XL) 25 mg XL tablet TAKE 1 TABLET BY MOUTH EVERY DAY 90 Tablet 1    gabapentin (NEURONTIN) 300 mg capsule TAKE 1 CAP BY MOUTH TWO (2) TIMES DAILY AS NEEDED FOR PAIN. 180 Capsule 0    albuterol (PROVENTIL HFA, VENTOLIN HFA, PROAIR HFA) 90 mcg/actuation inhaler Take 2 Puffs by inhalation every six (6) hours as needed for Wheezing. 18 g 0    fenofibrate nanocrystallized (TRICOR) 145 mg tablet TAKE 1 TABLET BY MOUTH EVERY DAY 90 Tablet 1    cyclobenzaprine (FLEXERIL) 5 mg tablet Take 1-2 Tabs by mouth nightly. As needed 12 Tab 0    naproxen (NAPROSYN) 500 mg tablet Take 1 Tab by mouth two (2) times daily (with meals). 60 Tab 1    multivitamin (ONE A DAY) tablet Take 1 Tab by mouth daily. LISINOPRIL PO lisinopril (Patient not taking: Reported on 12/24/2022)      cetirizine (ZYRTEC) 10 mg tablet Take 1 Tablet by mouth nightly. (Patient not taking: No sig reported) 30 Tablet 0    cholecalciferol (VITAMIN D3) 25 mcg (1,000 unit) cap Take  by mouth daily.  (Patient not taking: Reported on 12/24/2022)      SUMAtriptan (IMITREX) 50 mg tablet TAKE 1 TABLET BY MOUTH ONCE AS NEEDED FOR MIGRAINE FOR UP TO 1 DOSE (Patient not taking: Reported on 12/24/2022) 6 Tab 1     Allergies   Allergen Reactions    Aspirin Itching    Tramadol Itching     Past Medical History:   Diagnosis Date    Anemia NEC     Anxiety     Breast cancer ([facility] [address] 2013    right lumpectomy with radiation    Cancer ([facility] [address]     Breast Cancer-Ductal Carcinoma in situ grade 1    Depression     Headache(784.0)     Hypertension     Nose fracture 10/2019    Poor appetite     Psychiatric disorder     depression    PUD (peptic ulcer disease) 11/12    Stress     Tired     Trouble in sleeping     Weakness generalized      Past Surgical History:   Procedure Laterality Date    HX BREAST LUMPECTOMY  5/30/2013    BREAST LUMPECTOMY/PARTIAL performed by Lord Murray MD at [facility] MAIN OR    HX GYN      fallopian tube    HX ORTHOPAEDIC      left ankle repair    HX OTHER SURGICAL      cyst removed rom right thigh    TX BREAST SURGERY PROCEDURE UNLISTED  5/30/13    RIGHT BREAST LUMPECTOMY WITH RIGHT NEDDLE LOCALIZATION      Family History   Problem Relation Age of Onset    Cancer Father         pancreatic cancer    Cancer Brother         thyroid cancer    Hypertension Mother      Social History     Tobacco Use    Smoking status: Every Day     Packs/day: 0.25     Years: 20.00     Pack years: 5.00     Types: Cigarettes    Smokeless tobacco: Never    Tobacco comments:     4 cigarettes a day   Substance Use Topics    Alcohol use: Yes     Alcohol/week: 5.8 standard drinks     Types: 7 Standard drinks or equivalent per week     Comment: 1 beer daily/1 glass wine          Review of Systems   Constitutional:  Positive for malaise/fatigue. Negative for chills and fever. HENT:  Negative for congestion and sore throat. Respiratory:  Negative for cough and shortness of breath. Cardiovascular:  Negative for chest pain. Gastrointestinal:  Negative for abdominal pain, diarrhea, nausea and vomiting. Decreased appetite   Musculoskeletal:  Negative for myalgias. Neurological:  Negative for headaches. All other systems reviewed and are negative. Physical Exam  Vitals and nursing note reviewed. Constitutional:       General: She is not in acute distress. Appearance: She is well-developed. She is not diaphoretic. HENT:      Head: Normocephalic and atraumatic. Right Ear: External ear normal.      Left Ear: External ear normal.   Cardiovascular:      Rate and Rhythm: Normal rate. Pulmonary:      Effort: Pulmonary effort is normal. No respiratory distress. Breath sounds: Normal breath sounds. No stridor. No wheezing or rhonchi. Abdominal:      General: Bowel sounds are normal. There is no distension. Palpations: There is no mass. Tenderness: There is no abdominal tenderness. Hernia: No hernia is present. Neurological:      Mental Status: She is alert and oriented to person, place, and time.    Psychiatric:         Behavior: Behavior normal.         Judgment: Judgment normal. ASSESSMENT and PLAN  CLINICAL IMPRESSION:     ICD-10-CM ICD-9-CM    1. COVID-19  U07.1 079.89             Plan:  F/U with PCP/Specialty in 2-3 days  Orders Placed This Encounter    LISINOPRIL PO     Sig: lisinopril       Results for orders placed or performed in visit on 10/25/22   GGT   Result Value Ref Range    GGT 71 (H) 0 - 60 IU/L   METABOLIC PANEL, COMPREHENSIVE   Result Value Ref Range    Glucose 97 70 - 99 mg/dL    BUN 4 (L) 6 - 24 mg/dL    Creatinine 0.61 0.57 - 1.00 mg/dL    eGFR 103 >59 mL/min/1.73    BUN/Creatinine ratio 7 (L) 9 - 23    Sodium 140 134 - 144 mmol/L    Potassium 4.4 3.5 - 5.2 mmol/L    Chloride 99 96 - 106 mmol/L    CO2 28 20 - 29 mmol/L    Calcium 9.3 8.7 - 10.2 mg/dL    Protein, total 6.2 6.0 - 8.5 g/dL    Albumin 3.3 (L) 3.8 - 4.9 g/dL    GLOBULIN, TOTAL 2.9 1.5 - 4.5 g/dL    A-G Ratio 1.1 (L) 1.2 - 2.2    Bilirubin, total <0.2 0.0 - 1.2 mg/dL    Alk. phosphatase 113 44 - 121 IU/L    AST (SGOT) 21 0 - 40 IU/L    ALT (SGPT) 13 0 - 32 IU/L   HEMOGLOBIN A1C WITH EAG   Result Value Ref Range    Hemoglobin A1c 5.5 4.8 - 5.6 %    Estimated average glucose 111 mg/dL   TSH 3RD GENERATION   Result Value Ref Range    TSH 0.637 0.450 - 4.500 uIU/mL   CBC W/O DIFF   Result Value Ref Range    WBC 5.8 3.4 - 10.8 x10E3/uL    RBC 3.71 (L) 3.77 - 5.28 x10E6/uL    HGB 11.4 11.1 - 15.9 g/dL    HCT 34.1 34.0 - 46.6 %    MCV 92 79 - 97 fL    MCH 30.7 26.6 - 33.0 pg    MCHC 33.4 31.5 - 35.7 g/dL    RDW 12.2 11.7 - 15.4 %    PLATELET 691 (H) 847 - 450 x10E3/uL         The patients condition was discussed with the patient and they understand. The patient is to follow up with PCP. If signs and symptoms become worse the pt is to go to the ER. The patient is to take medications as prescribed.

## 2022-12-24 NOTE — PROGRESS NOTES
Gabapentin rx faxed to pharmacy on file per dr June Giordano Patient has no objection to blood transfusions.

## 2022-12-30 ENCOUNTER — TELEPHONE (OUTPATIENT)
Dept: INTERNAL MEDICINE CLINIC | Age: 57
End: 2022-12-30

## 2022-12-30 NOTE — TELEPHONE ENCOUNTER
----- Message from Maricruz Meljess sent at 12/30/2022 10:03 AM EST -----  Subject: Appointment Request    Reason for Call: Established Patient Appointment needed: Routine Existing   Condition Follow Up    QUESTIONS    Reason for appointment request? Available appointments did not meet   patient need     Additional Information for Provider?  Eliceo Contreras is wanting a callback   as she is needing some paperwork filled out to go back to work due to   having Covid.   ---------------------------------------------------------------------------  --------------  Warden Busch DFPQ  [phone]; OK to leave message on voicemail  ---------------------------------------------------------------------------  --------------  SCRIPT ANSWERS  COVID Screen: Red Localized Dermabrasion With Wire Brush Text: The patient was draped in routine manner.  Localized dermabrasion using 3 x 17 mm wire brush was performed in routine manner to papillary dermis. This spot dermabrasion is being performed to complete skin cancer reconstruction. It also will eliminate the other sun damaged precancerous cells that are known to be part of the regional effect of a lifetime's worth of sun exposure. This localized dermabrasion is therapeutic and should not be considered cosmetic in any regard. Localized Dermabrasion Text: The patient was draped in routine manner.  Localized dermabrasion using 3 x 17 mm wire brush was performed in routine manner to papillary dermis. This spot dermabrasion is being performed to complete skin cancer reconstruction. It also will eliminate the other sun damaged precancerous cells that are known to be part of the regional effect of a lifetime's worth of sun exposure. This localized dermabrasion is therapeutic and should not be considered cosmetic in any regard.

## 2022-12-30 NOTE — TELEPHONE ENCOUNTER
Called patient, ID verified. Informed that can not fill out forms for this because we did not see her for this issue and wouldn't be able to back up the dates. Informed patient to go to [facility] where she went initially, to have them fill out the forms.  Per Patient request, scheduled VV for Covid Follow up with Dr. Frank Murillo 01/04/2022

## 2023-01-04 ENCOUNTER — TELEPHONE (OUTPATIENT)
Dept: INTERNAL MEDICINE CLINIC | Age: 58
End: 2023-01-04

## 2023-01-04 NOTE — PROGRESS NOTES
HISTORY OF PRESENT ILLNESS  Maite Velazquez is a 59 y.o. female. HPI  Last here 10/25/22. Pt is here for routine care. This is an established visit completed with telemedicine was completed with video assist. The patient acknowledges and agrees to this method of visitation. Pt states she needs paperwork explaining she can work without restrictions. Pt went to urgent care 12/24/22 for covid (fatigue, decreased appetite). Denies ever having fever, cough, CP, SOB. Was not given paxlovid. Notes she is feeling much better, sx's have resolved. Will fax note to [phone]. Pt notes numbness and pain in her BL feet. Notes she stands on concrete for 8 hours/day at work. No injury keeps her up at night sometimes  Discussed gabapentin can help with this. Referred to podiatry. She went to urgent care 11/16/22 for rib pain  Reviewed XR ribs L 11/16/22:  Impression  No acute rib fracture identified.      No home BP readings for review   BP at urgent care: 128/78  She is on Toprol-XL 25 mg, has not taken this today     taking Lasix 20 mg for BL LE edema  Swelling is stable   We have previously discussed compression hose     Wt lov 161 lbs  Discussed continued diet and w/l      Reviewed labs   Ordered labs  Discussed climbing alkaline phosphotase level--however on recent labs this went back to normal bone scan has not been completed but this is okay GGT actually elevated on recent labs so I ordered an ultrasound to evaluate liver  Ordered US abd (see below)     Pt follows with Dr. Bay Tanner (hem/onc) for anemia in the past and for breast cancer  Last visit was 5/17, he had wanted pt to be on hormonal therapy in 2013, radiation was not completed, f/u in 1 year   Pt never followed up, reminded to do this on multiple occasions, discussed calling again  Recall past evaluation with Dr. Bay Tanner in 2015, thrombocytosis thought d/t cigarette smoking      Recall COLO and EGD 2013 - bleeding ulcer, issues with anemia Recall she has Protonix for abdominal bloating and reflux which she uses as needed     Pt is taking gabapentin 300 mg 1-2x per day for back pain, which helps  Taking cymbalta 60 mg as well  She gets pain from prev rib fracture      History of hypothyroidism  Continues on synthroid 75 mcg daily -- last TSH at goal   Recall pt has a FMHX thyroid cancer (brother)     Continues on lipitor 20mg daily for cholesterol and fenofibrate     She is taking amitriptyline 20mg (2 tablets 10 mg) to help with sleep   Previously I ordered hyroxyzine, she did not take this  Pt takes imitrex 50mg prn (not daily) --stable     She is on cymbalta 60 mg for anxiety which helps  No longer continues on wellbutrin 150mg--for depression and smoking      She is drinking about 2-4 glasses of wine per night  per her report we have discussed this on multiple occasions  She has a long history of drinking significant amounts of wine discussed in complete cessation has history of LFT elevations  Previously ordered an ultrasound of the abdomen she declines due to cost  Recall US abd in 2017 showed severe steotosis   I previously referred her to hepatology in 2014, she never went   Ordered repeat US abd, this was not completed, discussed again     Pt has a 30 pack year hx  Pt continues to smoke 0.5 PPD varies how much she smokes  Discussed cessation, she is working on this  Completed CT lung cancer screen 11/1/18 - due reminded reordered      ACP not on file. SDM is her brother Figueroa Ching. Kofi III.    Provided information in the past.      Recall EGD and COLO in 2013: bleeding ulcer     PREVENTIVE:   Colonoscopy: 3/13, repeat 10 years, due 3/23   EGD: 2013, possible [facility]   Pap: ~3/19, [facility], due reminded  Mammogram: 6/29/21 negative, h/o R sided breast DCIS, due ordered   DEXA: [facility], ~3/19  Tdap: 12/10/17  Pneumovax: 8/27/2012  Prevnar 20: due, can get at local pharmacy  Shingrix: 1st 11/03/20 2nd dose 05/23/22  Flu shot: 10/25/22  Eye exam: 3/18? due reminded  EKG: 3/17 per ED   Lipids: 5/22 LDL 52  A1c: 6/19 5.7 11/19 5.4 11/20 5.5 5/21 5.5 5/22 5.4 10/22 5.5  CT lung cancer screen: 11/1/18, reordered, reminded   Covid: four doses [employer]), had covid 12/22        Patient Active Problem List    Diagnosis Date Noted    Pure hypercholesterolemia 05/01/2017    Acquired hypothyroidism 05/01/2017    Elevated WBC count 04/16/2014    Elevated liver function tests 04/16/2014    Breast cancer right DCIS 06/11/2013    Depression 02/11/2013    Headache 09/10/2012    HTN (hypertension) 09/10/2012     Current Outpatient Medications   Medication Sig Dispense Refill    furosemide (LASIX) 20 mg tablet TAKE 1 TABLET BY MOUTH EVERY DAY 90 Tablet 0    DULoxetine (CYMBALTA) 60 mg capsule TAKE 1 CAPSULE BY MOUTH EVERY DAY 90 Capsule 0    LISINOPRIL PO lisinopril (Patient not taking: Reported on 12/24/2022)      amitriptyline (ELAVIL) 10 mg tablet TAKE 2 TABLETS BY MOUTH IN THE EVENING 60 Tablet 0    atorvastatin (LIPITOR) 20 mg tablet TAKE 1 TABLET BY MOUTH EVERY DAY 90 Tablet 1    pantoprazole (PROTONIX) 20 mg tablet TAKE 1 TABLET BY MOUTH EVERY DAY 90 Tablet 1    levothyroxine (SYNTHROID) 75 mcg tablet TAKE 1 TABLET BY MOUTH EVERY DAY BEFORE BREAKFAST 90 Tablet 0    metoprolol succinate (TOPROL-XL) 25 mg XL tablet TAKE 1 TABLET BY MOUTH EVERY DAY 90 Tablet 1    gabapentin (NEURONTIN) 300 mg capsule TAKE 1 CAP BY MOUTH TWO (2) TIMES DAILY AS NEEDED FOR PAIN. 180 Capsule 0    cetirizine (ZYRTEC) 10 mg tablet Take 1 Tablet by mouth nightly. (Patient not taking: No sig reported) 30 Tablet 0    albuterol (PROVENTIL HFA, VENTOLIN HFA, PROAIR HFA) 90 mcg/actuation inhaler Take 2 Puffs by inhalation every six (6) hours as needed for Wheezing. 18 g 0    fenofibrate nanocrystallized (TRICOR) 145 mg tablet TAKE 1 TABLET BY MOUTH EVERY DAY 90 Tablet 1    cyclobenzaprine (FLEXERIL) 5 mg tablet Take 1-2 Tabs by mouth nightly.  As needed 12 Tab 0    naproxen (NAPROSYN) 500 mg tablet Take 1 Tab by mouth two (2) times daily (with meals). 60 Tab 1    cholecalciferol (VITAMIN D3) 25 mcg (1,000 unit) cap Take  by mouth daily. (Patient not taking: Reported on 12/24/2022)      SUMAtriptan (IMITREX) 50 mg tablet TAKE 1 TABLET BY MOUTH ONCE AS NEEDED FOR MIGRAINE FOR UP TO 1 DOSE (Patient not taking: Reported on 12/24/2022) 6 Tab 1    multivitamin (ONE A DAY) tablet Take 1 Tab by mouth daily.          Past Surgical History:   Procedure Laterality Date    HX BREAST LUMPECTOMY  5/30/2013    BREAST LUMPECTOMY/PARTIAL performed by So Kearney MD at [facility] MAIN OR    HX GYN      fallopian tube    HX ORTHOPAEDIC      left ankle repair    HX OTHER SURGICAL      cyst removed rom right thigh    MO BREAST SURGERY PROCEDURE UNLISTED  5/30/13    RIGHT BREAST LUMPECTOMY WITH RIGHT NEDDLE LOCALIZATION       Lab Results   Component Value Date/Time    WBC 5.8 10/25/2022 12:00 AM    HGB 11.4 10/25/2022 12:00 AM    Hemoglobin (POC) 15.3 10/31/2014 02:26 PM    HCT 34.1 10/25/2022 12:00 AM    Hematocrit (POC) 45 10/31/2014 02:26 PM    PLATELET 805 (H) 38/70/9718 12:00 AM    MCV 92 10/25/2022 12:00 AM     Lab Results   Component Value Date/Time    Cholesterol, total 125 05/23/2022 12:00 AM    HDL Cholesterol 30 (L) 05/23/2022 12:00 AM    LDL, calculated 52 05/23/2022 12:00 AM    LDL, calculated 135.8 (H) 05/04/2021 10:39 AM    Triglyceride 275 (H) 05/23/2022 12:00 AM    CHOL/HDL Ratio 6.7 (H) 05/04/2021 10:39 AM     Lab Results   Component Value Date/Time    GFR est non-AA >60 05/04/2021 10:39 AM    GFRNA, POC >60 10/31/2014 02:26 PM    GFR est AA >60 05/04/2021 10:39 AM    GFRAA, POC >60 10/31/2014 02:26 PM    Creatinine 0.61 10/25/2022 12:00 AM    Creatinine (POC) 0.7 09/22/2022 10:45 AM    Creatinine (POC) 0.9 10/31/2014 02:26 PM    BUN 4 (L) 10/25/2022 12:00 AM    BUN 8 09/22/2022 10:45 AM    BUN (POC) 8 (L) 10/31/2014 02:26 PM    Sodium 140 10/25/2022 12:00 AM    Sodium (POC) 136 09/22/2022 10:45 AM    Sodium (POC) 140 10/31/2014 02:26 PM    Potassium 4.4 10/25/2022 12:00 AM    Potassium (POC) 3.8 09/22/2022 10:45 AM    Potassium (POC) 3.7 10/31/2014 02:26 PM    Chloride 99 10/25/2022 12:00 AM    CHLORIDE 96 09/22/2022 10:45 AM    Chloride (POC) 103 10/31/2014 02:26 PM    CO2 28 10/25/2022 12:00 AM    CO2 29 09/22/2022 10:45 AM    Magnesium 1.6 01/14/2017 07:28 AM    Phosphorus 1.0 (L) 11/28/2012 04:45 AM        Review of Systems   Respiratory:  Negative for shortness of breath. Cardiovascular:  Negative for chest pain. Physical Exam  Constitutional:       General: She is not in acute distress. Appearance: Normal appearance. She is not ill-appearing, toxic-appearing or diaphoretic. HENT:      Head: Normocephalic and atraumatic. Eyes:      General:         Right eye: No discharge. Left eye: No discharge. Conjunctiva/sclera: Conjunctivae normal.   Neurological:      General: No focal deficit present. Mental Status: She is alert and oriented to person, place, and time. Psychiatric:         Mood and Affect: Mood normal.         Behavior: Behavior normal.       ASSESSMENT and PLAN    ICD-10-CM ICD-9-CM    1. Primary hypertension  I10 401.9    Well-controlled on Toprol continue   2. Acquired hypothyroidism  E03.9 244.9    Reviewed last labs at goal on Synthroid no change to dose   3. Pure hypercholesterolemia  E78.00 272.0    Controlled Lipitor monitor lipids   4. Localized edema  R60.0 782. 3    Chronically on Lasix now stable monitor electrolytes   5. Elevated liver function tests  R79.89 790.6    Long history of alcohol abuse which we have discussed routinely past ultrasound liver showed severe steatosis GGT elevated on last labs I ordered a repeat ultrasound she still needs to complete this reminded her of this    I also referred her to Dr. Kirsten Fragoso hepatologist but she still has to make this appointment   6.  Neuropathy  G62.9 355.9 REFERRAL TO PODIATRY   Has a thoracic radiculopathy gabapentin improves her symptoms   7. Malignant neoplasm of right female breast, unspecified estrogen receptor status, unspecified site of breast ([facility] [address]  C50.911 174.9    Overdue for mammogram discussed this she is aware she knows to schedule   8. Pain in both feet  M79.671 729.5 REFERRAL TO PODIATRY    M79.672     Patient with pain in both of her feet when she standing too much bothers her at night as well possible neuropathy may be due to tissue fittings she is not diabetic reviewed recent labs discussed gabapentin should improve her symptoms we will also set up with podiatry to evaluate for shoe fittings   Smoking--continues to smoke not ready to quit overdue for CT lung cancer screen discussed that she is aware  COVID--resolved minimal symptoms did not require treatment discussed vaccination she is able to go back to work at this time its been more than 14 days since her symptoms began we will fax a note to her work stating she can return to work with full restrictions she normally works an 8-hour shift fax number is [phone]  Depression screen reviewed and negative. Scribed by Nehemiah Gunn, as dictated by Dr. Rabia Bocanegra. Current diagnosis and concerns discussed with pt at length. Pt understands risks and benefits or current treatment plan and medications, and accepts the treatment and medication with any possible risks. Pt asks appropriate questions, which were answered. Pt was instructed to call with any concerns or problems. I have reviewed the note documented by the scribe. The services provided are my own. The documentation is accurate. Maite Stoddard, who was evaluated through a synchronous (real-time) audio-video encounter, and/or her healthcare decision maker, is aware that it is a billable service, which includes applicable co-pays, with coverage as determined by her insurance carrier.  She provided verbal consent to proceed and patient identification was verified. This visit was conducted pursuant to the emergency declaration under the [facility], [address] [place] [address] authority and the Rol[employer] Driverdo and [employer] General Act. A caregiver was present when appropriate. Ability to conduct physical exam was limited.  The patient was located at: Home: [facility]  The provider was located at: Home: [unfilled]    --Sandy Pinzon on 1/4/2023 at 4:38 PM

## 2023-01-04 NOTE — TELEPHONE ENCOUNTER
Pt states that she had a 2 pm appt today with Dr. Angie Ceron and has not gotten a call. I don't see an appt on the books. Pt is asking for a call back about her appt.

## 2023-01-04 NOTE — TELEPHONE ENCOUNTER
Patient states she needs a call back in reference to VV appt she was supposed to have with Dr. Wyatt Oneill today at 2 pm. Please call.  Thank you

## 2023-01-05 NOTE — TELEPHONE ENCOUNTER
Called patient, ID verified. Informed patient of mistake that when scheduling appointment, that it did not save, but per pcp Dr. Zaina Drake, scheduled VV for 8am 01/06/2022. Confirmed appointment scheduled.

## 2023-01-06 ENCOUNTER — VIRTUAL VISIT (OUTPATIENT)
Dept: INTERNAL MEDICINE CLINIC | Age: 58
End: 2023-01-06
Payer: COMMERCIAL

## 2023-01-06 DIAGNOSIS — M79.671 PAIN IN BOTH FEET: ICD-10-CM

## 2023-01-06 DIAGNOSIS — C50.911 MALIGNANT NEOPLASM OF RIGHT FEMALE BREAST, UNSPECIFIED ESTROGEN RECEPTOR STATUS, UNSPECIFIED SITE OF BREAST (HCC): ICD-10-CM

## 2023-01-06 DIAGNOSIS — I10 PRIMARY HYPERTENSION: Primary | ICD-10-CM

## 2023-01-06 DIAGNOSIS — E03.9 ACQUIRED HYPOTHYROIDISM: ICD-10-CM

## 2023-01-06 DIAGNOSIS — G62.9 NEUROPATHY: ICD-10-CM

## 2023-01-06 DIAGNOSIS — R60.0 LOCALIZED EDEMA: ICD-10-CM

## 2023-01-06 DIAGNOSIS — E78.00 PURE HYPERCHOLESTEROLEMIA: ICD-10-CM

## 2023-01-06 DIAGNOSIS — M79.672 PAIN IN BOTH FEET: ICD-10-CM

## 2023-01-06 DIAGNOSIS — R79.89 ELEVATED LIVER FUNCTION TESTS: ICD-10-CM

## 2023-01-06 NOTE — LETTER
NOTIFICATION RETURN TO WORK / SCHOOL    1/6/2023 8:49 AM    Ms. Nabil Jay      To Whom It May Concern:    Rod Guzman is currently under the care of [facility]. She will return to work/school on: 01/07/2022 without restrictions. If there are questions or concerns please have the patient contact our office.         Sincerely,      Eliu Meadows MD

## 2023-01-06 NOTE — PROGRESS NOTES
1. \"Have you been to the ER, urgent care clinic since your last visit? Hospitalized since your last visit? \" Yes [facility] ED for covid 12/24/2022    2. \"Have you seen or consulted any other health care providers outside of the [address] [place] [address] since your last visit? \" No     3. For patients aged 39-70: Has the patient had a colonoscopy / FIT/ Cologuard? Yes - Care Gap present. Most recent result on file      If the patient is female:    4. For patients aged 41-77: Has the patient had a mammogram within the past 2 years? Yes - Care Gap present. Most recent result on file      5. For patients aged 21-65: Has the patient had a pap smear?  No

## 2023-01-30 RX ORDER — DULOXETIN HYDROCHLORIDE 60 MG/1
CAPSULE, DELAYED RELEASE ORAL
Qty: 90 CAPSULE | Refills: 0 | Status: SHIPPED | OUTPATIENT
Start: 2023-01-30

## 2023-01-30 RX ORDER — AMITRIPTYLINE HYDROCHLORIDE 10 MG/1
TABLET, FILM COATED ORAL
Qty: 60 TABLET | Refills: 0 | Status: SHIPPED | OUTPATIENT
Start: 2023-01-30

## 2023-02-28 DIAGNOSIS — M54.6 CHRONIC BILATERAL THORACIC BACK PAIN: ICD-10-CM

## 2023-02-28 DIAGNOSIS — G89.29 CHRONIC BILATERAL THORACIC BACK PAIN: ICD-10-CM

## 2023-03-01 RX ORDER — GABAPENTIN 300 MG/1
300 CAPSULE ORAL
Qty: 180 CAPSULE | Refills: 0 | Status: SHIPPED | OUTPATIENT
Start: 2023-03-01

## 2023-03-10 ENCOUNTER — APPOINTMENT (OUTPATIENT)
Dept: CT IMAGING | Age: 58
End: 2023-03-10
Attending: EMERGENCY MEDICINE
Payer: COMMERCIAL

## 2023-03-10 ENCOUNTER — APPOINTMENT (OUTPATIENT)
Dept: GENERAL RADIOLOGY | Age: 58
End: 2023-03-10
Attending: EMERGENCY MEDICINE
Payer: COMMERCIAL

## 2023-03-10 ENCOUNTER — HOSPITAL ENCOUNTER (EMERGENCY)
Age: 58
Discharge: HOME | End: 2023-03-10
Attending: EMERGENCY MEDICINE
Payer: COMMERCIAL

## 2023-03-10 VITALS
HEART RATE: 88 BPM | RESPIRATION RATE: 16 BRPM | SYSTOLIC BLOOD PRESSURE: 131 MMHG | DIASTOLIC BLOOD PRESSURE: 87 MMHG | TEMPERATURE: 98.4 F | WEIGHT: 160 LBS | OXYGEN SATURATION: 97 % | BODY MASS INDEX: 27.31 KG/M2 | HEIGHT: 64 IN

## 2023-03-10 DIAGNOSIS — S80.02XA CONTUSION OF LEFT KNEE, INITIAL ENCOUNTER: ICD-10-CM

## 2023-03-10 DIAGNOSIS — S09.90XA CLOSED HEAD INJURY, INITIAL ENCOUNTER: ICD-10-CM

## 2023-03-10 DIAGNOSIS — S42.201A CLOSED FRACTURE OF PROXIMAL END OF RIGHT HUMERUS, UNSPECIFIED FRACTURE MORPHOLOGY, INITIAL ENCOUNTER: Primary | ICD-10-CM

## 2023-03-10 PROCEDURE — 74011250637 HC RX REV CODE- 250/637: Performed by: EMERGENCY MEDICINE

## 2023-03-10 PROCEDURE — 99284 EMERGENCY DEPT VISIT MOD MDM: CPT

## 2023-03-10 PROCEDURE — 70450 CT HEAD/BRAIN W/O DYE: CPT

## 2023-03-10 PROCEDURE — 73562 X-RAY EXAM OF KNEE 3: CPT

## 2023-03-10 PROCEDURE — 73200 CT UPPER EXTREMITY W/O DYE: CPT

## 2023-03-10 PROCEDURE — 73030 X-RAY EXAM OF SHOULDER: CPT

## 2023-03-10 RX ORDER — OXYCODONE AND ACETAMINOPHEN 5; 325 MG/1; MG/1
2 TABLET ORAL
Status: COMPLETED | OUTPATIENT
Start: 2023-03-10 | End: 2023-03-10

## 2023-03-10 RX ORDER — OXYCODONE AND ACETAMINOPHEN 5; 325 MG/1; MG/1
1 TABLET ORAL
Qty: 10 TABLET | Refills: 0 | Status: SHIPPED | OUTPATIENT
Start: 2023-03-10 | End: 2023-03-13

## 2023-03-10 RX ORDER — FENTANYL CITRATE 50 UG/ML
100 INJECTION, SOLUTION INTRAMUSCULAR; INTRAVENOUS
Status: DISCONTINUED | OUTPATIENT
Start: 2023-03-10 | End: 2023-03-10 | Stop reason: HOSPADM

## 2023-03-10 RX ADMIN — OXYCODONE AND ACETAMINOPHEN 2 TABLET: 5; 325 TABLET ORAL at 05:11

## 2023-03-10 NOTE — ED PROVIDER NOTES
EMERGENCY DEPARTMENT HISTORY AND PHYSICAL EXAM     ----------------------------------------------------------------------------  Please note that this dictation was completed with [employer], the [employer] voice recognition software. Quite often unanticipated grammatical, syntax, homophones, and other interpretive errors are inadvertently transcribed by the computer software. Please disregard these errors. Please exc pretension use any errors that have escaped final proofreading  ----------------------------------------------------------------------------      Date: 3/10/2023  Patient Name: Charis Ferguson    History of Presenting Illness     Chief Complaint   Patient presents with    Fall       History obtainted from:  Patient    Other independent source of history: family    HPI: Charis Ferguson is a 59 y.o. female, with significant pmhx of hypertension, anemia, anxiety who presents via EMS to the ED with c/o right shoulder and left knee pain after suffering a ground-level fall. Notes that she works at the [employer] and tripped over a metal bar causing her to fall to the concrete and striking her right shoulder first.  Also reports having struck her left knee and head without loss of consciousness or visual disturbance. Denies associated nausea, vomiting, chest pain since that time. Having severe right proximal humerus pain that is worse with any type of palpation or movement. Has been amatory since the incident and able to bear weight on her left leg although notes swelling and pain to the knee. Denies use of blood thinning agent.       PCP: Jeannie Newman MD    Allergies   Allergen Reactions    Aspirin Itching    Tramadol Itching       Current Facility-Administered Medications   Medication Dose Route Frequency Provider Last Rate Last Admin    fentaNYL citrate (PF) injection 100 mcg  100 mcg IntraVENous NOW Xavier Marcila MD         Current Outpatient Medications   Medication Sig Dispense Refill oxyCODONE-acetaminophen (Percocet) 5-325 mg per tablet Take 1 Tablet by mouth every eight (8) hours as needed for Pain for up to 3 days. Max Daily Amount: 3 Tablets. Indications: pain 10 Tablet 0    gabapentin (NEURONTIN) 300 mg capsule TAKE 1 CAP BY MOUTH TWO (2) TIMES DAILY AS NEEDED FOR PAIN. 180 Capsule 0    DULoxetine (CYMBALTA) 60 mg capsule TAKE 1 CAPSULE BY MOUTH EVERY DAY 90 Capsule 0    amitriptyline (ELAVIL) 10 mg tablet TAKE 2 TABLETS BY MOUTH IN THE EVENING 60 Tablet 0    furosemide (LASIX) 20 mg tablet TAKE 1 TABLET BY MOUTH EVERY DAY 90 Tablet 0    LISINOPRIL PO lisinopril (Patient not taking: Reported on 1/6/2023)      atorvastatin (LIPITOR) 20 mg tablet TAKE 1 TABLET BY MOUTH EVERY DAY 90 Tablet 1    pantoprazole (PROTONIX) 20 mg tablet TAKE 1 TABLET BY MOUTH EVERY DAY 90 Tablet 1    levothyroxine (SYNTHROID) 75 mcg tablet TAKE 1 TABLET BY MOUTH EVERY DAY BEFORE BREAKFAST 90 Tablet 0    metoprolol succinate (TOPROL-XL) 25 mg XL tablet TAKE 1 TABLET BY MOUTH EVERY DAY 90 Tablet 1    cetirizine (ZYRTEC) 10 mg tablet Take 1 Tablet by mouth nightly. 30 Tablet 0    albuterol (PROVENTIL HFA, VENTOLIN HFA, PROAIR HFA) 90 mcg/actuation inhaler Take 2 Puffs by inhalation every six (6) hours as needed for Wheezing. 18 g 0    fenofibrate nanocrystallized (TRICOR) 145 mg tablet TAKE 1 TABLET BY MOUTH EVERY DAY 90 Tablet 1    cyclobenzaprine (FLEXERIL) 5 mg tablet Take 1-2 Tabs by mouth nightly. As needed 12 Tab 0    naproxen (NAPROSYN) 500 mg tablet Take 1 Tab by mouth two (2) times daily (with meals). 60 Tab 1    cholecalciferol (VITAMIN D3) 25 mcg (1,000 unit) cap Take  by mouth daily. (Patient not taking: No sig reported)      SUMAtriptan (IMITREX) 50 mg tablet TAKE 1 TABLET BY MOUTH ONCE AS NEEDED FOR MIGRAINE FOR UP TO 1 DOSE (Patient not taking: No sig reported) 6 Tab 1    multivitamin (ONE A DAY) tablet Take 1 Tab by mouth daily.            Past History     Past Medical History:  Past Medical History: Diagnosis Date    Anemia NEC     Anxiety     Breast cancer ([facility] [address] 2013    right lumpectomy with radiation    Cancer ([facility] [address]     Breast Cancer-Ductal Carcinoma in situ grade 1    Depression     Headache(784.0)     Hypertension     Nose fracture 10/2019    Poor appetite     Psychiatric disorder     depression    PUD (peptic ulcer disease) 11/12    Stress     Tired     Trouble in sleeping     Weakness generalized        Past Surgical History:  Past Surgical History:   Procedure Laterality Date    HX BREAST LUMPECTOMY  5/30/2013    BREAST LUMPECTOMY/PARTIAL performed by Ana Ramos MD at [facility] MAIN OR    HX GYN      fallopian tube    HX ORTHOPAEDIC      left ankle repair    HX OTHER SURGICAL      cyst removed rom right thigh    OR UNLISTED PROCEDURE BREAST  5/30/13    RIGHT BREAST LUMPECTOMY WITH RIGHT NEDDLE LOCALIZATION        Family History:  Family History   Problem Relation Age of Onset    Cancer Father         pancreatic cancer    Cancer Brother         thyroid cancer    Hypertension Mother        Social History:  Social History     Tobacco Use    Smoking status: Every Day     Packs/day: 0.25     Years: 20.00     Pack years: 5.00     Types: Cigarettes    Smokeless tobacco: Never    Tobacco comments:     4 cigarettes a day   Substance Use Topics    Alcohol use: Yes     Alcohol/week: 5.8 standard drinks     Types: 7 Standard drinks or equivalent per week     Comment: 1 beer daily/1 glass wine    Drug use: No       Allergies: Allergies   Allergen Reactions    Aspirin Itching    Tramadol Itching         Review of Systems   Review of Systems   Constitutional: Negative. Negative for fever. Eyes: Negative. Respiratory: Negative. Negative for shortness of breath. Cardiovascular:  Negative for chest pain. Gastrointestinal:  Negative for abdominal pain, diarrhea, nausea and vomiting. Endocrine: Negative. Genitourinary: Negative. Musculoskeletal:  Positive for arthralgias, joint swelling and myalgias. Negative for gait problem. Skin: Negative. Neurological: Negative. Physical Exam     ED Triage Vitals [03/10/23 0150]   ED Encounter Vitals Group      /87      Pulse (Heart Rate) 88      Resp Rate 16      Temp 98.4 °F (36.9 °C)      Temp src       O2 Sat (%) 97 %      Weight 160 lb      Height 5' 4\"      Physical Exam  Vitals and nursing note reviewed. Constitutional:       General: She is not in acute distress. Appearance: She is well-developed. She is not diaphoretic. HENT:      Head: Normocephalic and atraumatic. Nose: Nose normal.   Eyes:      General: No scleral icterus. Conjunctiva/sclera: Conjunctivae normal.   Neck:      Trachea: No tracheal deviation. Cardiovascular:      Rate and Rhythm: Normal rate and regular rhythm. Heart sounds: Normal heart sounds. Pulmonary:      Effort: Pulmonary effort is normal. No respiratory distress. Abdominal:      General: There is no distension. Musculoskeletal:         General: Normal range of motion. Right shoulder: Swelling, deformity, tenderness and bony tenderness present. No crepitus. Normal strength. Normal pulse. Arms:       Cervical back: Normal range of motion. Left knee: Swelling and bony tenderness present. Tenderness present. Legs:    Skin:     General: Skin is warm and dry. Neurological:      Mental Status: She is alert and oriented to person, place, and time. GCS: GCS eye subscore is 4. GCS verbal subscore is 5. GCS motor subscore is 6. Cranial Nerves: No cranial nerve deficit. Sensory: Sensation is intact. Psychiatric:         Behavior: Behavior normal.         Thought Content: Thought content normal.         CURRENT MEDICATIONS      Previous Medications    ALBUTEROL (PROVENTIL HFA, VENTOLIN HFA, PROAIR HFA) 90 MCG/ACTUATION INHALER    Take 2 Puffs by inhalation every six (6) hours as needed for Wheezing.     AMITRIPTYLINE (ELAVIL) 10 MG TABLET    TAKE 2 TABLETS BY MOUTH IN THE EVENING    ATORVASTATIN (LIPITOR) 20 MG TABLET    TAKE 1 TABLET BY MOUTH EVERY DAY    CETIRIZINE (ZYRTEC) 10 MG TABLET    Take 1 Tablet by mouth nightly. CHOLECALCIFEROL (VITAMIN D3) 25 MCG (1,000 UNIT) CAP    Take  by mouth daily. CYCLOBENZAPRINE (FLEXERIL) 5 MG TABLET    Take 1-2 Tabs by mouth nightly. As needed    DULOXETINE (CYMBALTA) 60 MG CAPSULE    TAKE 1 CAPSULE BY MOUTH EVERY DAY    FENOFIBRATE NANOCRYSTALLIZED (TRICOR) 145 MG TABLET    TAKE 1 TABLET BY MOUTH EVERY DAY    FUROSEMIDE (LASIX) 20 MG TABLET    TAKE 1 TABLET BY MOUTH EVERY DAY    GABAPENTIN (NEURONTIN) 300 MG CAPSULE    TAKE 1 CAP BY MOUTH TWO (2) TIMES DAILY AS NEEDED FOR PAIN. LEVOTHYROXINE (SYNTHROID) 75 MCG TABLET    TAKE 1 TABLET BY MOUTH EVERY DAY BEFORE BREAKFAST    LISINOPRIL PO    lisinopril    METOPROLOL SUCCINATE (TOPROL-XL) 25 MG XL TABLET    TAKE 1 TABLET BY MOUTH EVERY DAY    MULTIVITAMIN (ONE A DAY) TABLET    Take 1 Tab by mouth daily. NAPROXEN (NAPROSYN) 500 MG TABLET    Take 1 Tab by mouth two (2) times daily (with meals). PANTOPRAZOLE (PROTONIX) 20 MG TABLET    TAKE 1 TABLET BY MOUTH EVERY DAY    SUMATRIPTAN (IMITREX) 50 MG TABLET    TAKE 1 TABLET BY MOUTH ONCE AS NEEDED FOR MIGRAINE FOR UP TO 1 DOSE            Diagnostic Study Results     Labs:   No results found for this or any previous visit (from the past 24 hour(s)). EKG: If performed, independent interpretation documented below      RADIOLOGY:  Non-plain film images such as CT, Ultrasound and MRI are read by the radiologist. Plain radiographic images are visualized and preliminarily interpreted by the ED Provider with the findings documented in the MDM section. Interpretation per the Radiologist below, if available at the time of this note:  Radiologic Studies:  CT HEAD WO CONT   Final Result      No acute abnormality. CT UP EXT RT WO CONT   Final Result   1.  Comminuted fracture of the right humeral head and proximal shaft XR SHOULDER RT AP/LAT MIN 2 V   Final Result   Acute nondisplaced humeral neck and greater tuberosity fracture. XR KNEE LT 3 V   Final Result   No acute abnormality. CT Results  (Last 48 hours)                 03/10/23 0605  CT HEAD WO CONT Final result    Impression:      No acute abnormality. Narrative:  EXAM:  CT HEAD WO CONT       INDICATION: Fall with head injury       COMPARISON: CT 1/14/2017       TECHNIQUE: Noncontrast head CT. Coronal and sagittal reformats. CT dose   reduction was achieved through use of a standardized protocol tailored for this   examination and automatic exposure control for dose modulation. FINDINGS: The ventricles and sulci are age-appropriate without hydrocephalus. There is no mass effect or midline shift. There is no intracranial hemorrhage or   extra-axial fluid collection. There is no abnormal parenchymal attenuation. The   gray-white matter differentiation is maintained. The basal cisterns are patent. The osseous structures are intact. The visualized paranasal sinuses and mastoid   air cells are clear.           03/10/23 0605  CT UP EXT RT WO CONT Final result    Impression:  1. Comminuted fracture of the right humeral head and proximal shaft           Narrative:  *PRELIMINARY REPORT*       Acute right humeral neck and greater tuberosity fracture. No dislocation. Preliminary report was provided by Dr. Ermelinda Adams, the on-call radiologist, on   3/10/2023 at [phone] hours. Final report to follow. *END PRELIMINARY REPORT*       *FINAL REPORT BELOW*       INDICATION:  Right proximal humerus fracture        EXAM: CT right shoulder. Comparison earlier same day. Thin section axial images were obtained. From these sagittal and coronal   reformats were performed. CT dose reduction was achieved through use of a   standardized protocol tailored for this examination and automatic exposure   control for dose modulation.         FINDINGS: There is a comminuted fracture of the right humeral head and shaft. There is a transverse component through the neck with extension into the   inferior medial articular surface with approximately 2 mm of depression. Fracture extends cranially into the greater tuberosity with approximately 2 mm   of proximal displacement of the fracture fragment. No dislocation of the humeral   head. Marked swelling in the adjacent soft tissues visualized right lung is   normal no atrophy of the rotator cuff muscles. Mild degeneration of the   acromioclavicular joint                 CXR Results  (Last 48 hours)      None                SCREENINGS               No data recorded        ED Course     I am the first provider for this patient. Initial assessment performed. The patients presenting problems have been discussed, and they are in agreement with the care plan formulated and outlined with them. I have encouraged them to ask questions as they arise throughout their visit. TOBACCO COUNSELING:  During evaluation pt reported that they are a current tobacco user. I have spent 3 minutes discussing the medical risks of prolonged smoking habits and advised the patient of the benefits of the cessation of smoking, providing specific suggestions on how to quit. Pt has been counseled and encouraged to quit as soon as possible in order to decrease further risks to their health. Pt has conveyed their understanding of the risks involved should they continue to use tobacco products.         HYPERTENSION COUNSELING:Records Review:  I reviewed and interpreted the nursing notes and and vital signs from today's visit, as well as the electronic medical record system for any external medical records that were available that may contribute to the patients current condition, including independent interpretation of previous primary care visit in January    Nursing notes will be reviewed and interpreted by me as they become available in realtime while the pt has been in the ED. Vital Signs-Reviewed the patient's vital signs. Patient Vitals for the past 12 hrs:   Temp Pulse Resp BP SpO2   03/10/23 0150 98.4 °F (36.9 °C) 88 16 131/87 97 %       Pulse Oximetry Analysis:  97% on RA, normal    Heart Monitory Analysis:  Rate: 88 bpm  Rhythm: nsr    PROCEDURES   none      DDX:  Shoulder and knee fracture, dislocation, contusion, intracranial bleed    Plan:  CT head, x-ray shoulder and knee, CT shoulder, Ortho consult, analgesic           CONSULT NOTE:   6:55 AM  Filiberto Matthews MD spoke with Dr. Moni Chun,   Specialty: Orthopedics  Consulted Orthopedic team by [facility] due to proximal comminuted fracture of right humerus. Discussed pt's HPI and available diagnostic results thus far. Consultant recommends patient to be placed in sling and follow-up with Dr. Crow Zavala next week. Filiberto Matthews MD            MDM:  Patient is a well-appearing 57-year-old female in mild acute distress due to recent ground-level fall causing a comminuted fracture of her right proximal humerus. Consultation with Ortho delineates plan for sling placement, analgesic and outpatient follow-up. CT of head shows no evidence of intracranial bleed. Left knee x-ray does not show any evidence of fracture. CRITICAL CARE TIME   None         FINAL IMPRESSION     1. Closed fracture of proximal end of right humerus, unspecified fracture morphology, initial encounter    2. Contusion of left knee, initial encounter    3. Closed head injury, initial encounter          DISPOSITION/PLAN   Maite Kirby's  results have been reviewed with her. She has been counseled regarding her diagnosis, treatment, and plan. She verbally conveys understanding and agreement of the signs, symptoms, diagnosis, treatment and prognosis and additionally agrees to follow up as discussed. She also agrees with the care-plan and conveys that all of her questions have been answered.   I have also provided discharge instructions for her that include: educational information regarding their diagnosis and treatment, and list of reasons why they would want to return to the ED prior to their follow-up appointment, should her condition change. Discharge Note: The patient is stable for discharge home. The signs, symptoms, diagnosis, and discharge instructions have been discussed, understanding conveyed, and agreed upon. The patient is to follow up as recommended or return to ER should their symptoms worsen. PATIENT REFERRED TO:  Follow-up Information       Follow up With Specialties Details Why Contact Info    Calvin Romberg, MD Internal Medicine Physician Schedule an appointment as soon as possible for a visit   [address] [facility]  [phone]      [facility] EMERGENCY DEPT Emergency Medicine  As needed, If symptoms worsen [address] [facility] [address]    Kyree Orellana MD Orthopedic Surgery Schedule an appointment as soon as possible for a visit on 3/13/2023  [place] [phone]                DISCHARGE MEDICATIONS:  Current Discharge Medication List        START taking these medications    Details   oxyCODONE-acetaminophen (Percocet) 5-325 mg per tablet Take 1 Tablet by mouth every eight (8) hours as needed for Pain for up to 3 days. Max Daily Amount: 3 Tablets. Indications: pain  Qty: 10 Tablet, Refills: 0  Start date: 3/10/2023, End date: 3/13/2023    Associated Diagnoses: Closed fracture of proximal end of right humerus, unspecified fracture morphology, initial encounter               DISCONTINUED MEDICATIONS:  Current Discharge Medication List          I am the Primary Clinician of Record. Lisa Guerrero MD (electronically signed)    (Please note that parts of this dictation were completed with voice recognition software.  Quite often unanticipated grammatical, syntax, homophones, and other interpretive errors are inadvertently transcribed by the computer software. Please disregards these errors.  Please excuse any errors that have escaped final proofreading.)

## 2023-03-10 NOTE — DISCHARGE INSTRUCTIONS
It was a pleasure taking care of you in our Emergency Department today. We know that when you come to [facility], you are entrusting us with your health, comfort, and safety. Our physicians and nurses honor that trust, and truly appreciate the opportunity to care for you and your loved ones. We also value your feedback. If you receive a survey about your Emergency Department experience today, please fill it out. We care about our patients' feedback, and we listen to what you have to say. Thank you!       Dr. Jorge Harrison MD.

## 2023-03-10 NOTE — ED NOTES
SBAR provided to MARY ANN Duque RN. Care transitioned at this time. Discharge imminent after sling applied.

## 2023-03-10 NOTE — PROGRESS NOTES
HISTORY OF PRESENT ILLNESS  Maite Wild is a 59 y.o. female. HPI  Last here vv 1/6/23. Pt is here for routine care. Pt presented to ED 3/10/23 after a fall. She tripped over a metal bar, hit her head and shoulder. Was given percocet  Reviewed imaging:  CT HEAD WO CONT   Final Result       No acute abnormality. CT UP EXT RT WO CONT   Final Result   1. Comminuted fracture of the right humeral head and proximal shaft           XR SHOULDER RT AP/LAT MIN 2 V   Final Result   Acute nondisplaced humeral neck and greater tuberosity fracture. XR KNEE LT 3 V   Final Result   No acute abnormality. She saw Dr. Juancarlos Castro (ortho) earlier today 03/13/23 for closed fracture of proximal humerus   States she will be out of work for 3 weeks, she will f/U every 7-10 days w/ Dr. Juancarols Castro to see if she needs surgery.     Has a history of hypertension  BP today is 113/75  No home BP readings for review   She is on Toprol-XL 25 mg     Taking Lasix 20 mg daily for BL LE edema  Swelling is stable has mild chronic edema  We have previously discussed compression hose     Wt today is 168 lbs, up 7 lbs since lov  Discussed continued diet and w/l      Reviewed labs  Ordered labs   I previously have discussed climbing alkaline phosphotase level-- GGT elevated on recent labs so I ordered an ultrasound to evaluate liver   and referred to hepatology, however this was not completed--reminded her today reprinted orders     Pt previously followed with Dr. Yovany Waterman (hem/onc) for anemia in the past and for breast cancer  Last visit was 5/17, he had wanted pt to be on hormonal therapy in 2013, radiation was not completed, f/u in 1 year   Pt never followed up, reminded to do this on multiple occasions, discussed calling again  Recall past evaluation with Dr. Yovany Waterman in 2015, thrombocytosis thought d/t cigarette smoking   Overdue for mammogram reprinted order     Recall COLO and EGD 2013 - bleeding ulcer, issues with anemia TONSILLECTOMY  Procedure Note    Venessa Carroll David  9/4/2020    Surgeon:  Dr. Miley Quinones  Assistant:  None    Preoperative diagnosis:  recurrent tonsillitis    Postoperative diagnosis:  Same    Procedure:  TONSILLECTOMY    Findings:     3+ tonsils bilaterally     Anesthesia:  General endotracheal anesthesia    Blood loss:  5 mL    Specimen:  Tonsils    Medications administered in the OR:  Decadron 12 mg IV    Implants:  None    Indications for procedure:  Patient presented to clinic with complaints of sore throats, streptococcal pharyngitis.  Risks and benefits of the procedure were extensively discussed with the patient, and they elected to proceed with the procedure.    Procedure in Detail:  After appropriate consents were obtained, the patient was taken to the operating room and placed in a supine position.  Anesthesia then obtained intravenous access and placed the patient under general endotracheal anesthesia.  The head of the bed was rotated 90 degrees, and a small shoulder roll was placed.  A Ad-David mouth retractor was then placed in the patient's oral cavity and suspended from a licona stand.  The soft palate was examined, and it was found to be of adequate length and the uvula had a normal contour.  A red rubber catheter was passed through a nostril and held in place with a gauze and hemostat to elevate the soft palate.    The right tonsil was grasped using a straight allis, and the Plasma Blade was used on a setting of 7 to remove the tonsil in a superior to inferior fashion.  The suction bovie tip was then used to achieve adequate hemostasis.  The left tonsil was then removed in a similar fashion.    The patient's oral cavity was then irrigated with normal saline, and a flexible suction catheter was passed to the patient's stomach to evacuate gastric contents.  The mouth retractor was then removed, and the patient's teeth, gums, and lips were all examined and were found to be free of any trauma.   She takes Protonix for abdominal bloating and reflux which she uses as needed    History of hypothyroidism  Continues on synthroid 75 mcg daily -- last TSH at goal   Recall pt has a FMHX thyroid cancer (brother)     Continues on lipitor 20mg daily and fenofibrate     Pt is taking gabapentin 300 mg 1-2x per day for back pain, which helps  Taking cymbalta 60 mg as well  She gets pain from prev rib fracture      She is taking amitriptyline 20mg (2 tablets 10 mg) to help with sleep   Previously I ordered hyroxyzine, she did not take this  Pt takes imitrex 50mg prn (not daily) --stable     She is on cymbalta 60 mg for anxiety which helps  No longer continues on wellbutrin 150mg--for depression and smoking      She is drinking about 2-4 glasses of wine per night  per her report we have discussed this on multiple occasions  She has a long history of drinking significant amounts of wine, has history of LFT elevations, we have discussed cessation  Previously ordered an ultrasound of the abdomen she declines due to cost   Recall US abd in 2017 showed severe steotosis   I previously referred her to hepatology in 2014, she never went   Ordered repeat US abd, this was not completed, discussed again     Pt has a 30 pack year hx  Pt continues to smoke 0.5 PPD varies how much she smokes  Discussed cessation  Completed CT lung cancer screen 11/1/18 - due reminded printed again      ACP not on file. SDM is her brother Roel Orozco. Kofi III.    Provided information in the past  Recall EGD and COLO in 2013: bleeding ulcer     PREVENTIVE:   Colonoscopy: 3/13, repeat 10 years, due gave info  EGD: 2013, possible [facility]   Pap: ~3/19, [facility], due reminded  Mammogram: 6/29/21 negative, h/o R sided breast DCIS, due reminded once arm is healed   DEXA: [facility], ~3/19  Tdap: 12/10/17   Pneumovax: 8/27/2012   Prevnar 20: will get today 03/13/23   Shingrix: 1st 11/03/20 2nd dose 05/23/22  Flu shot: 10/25/22  Eye exam: 1/23 The patient's care was then returned to anesthesia, and the patient was awakened and extubated without difficulty, and brought to the recovery room in good condition.         [facility]   EKG: 3/17 per ED   Lipids: 5/22 LDL 52  A1c: 11/20 5.5 5/21 5.5 5/22 5.4 10/22 5.5  CT lung cancer screen: 11/1/18, , reminded   Covid: four doses [employer]), had covid 12/22, discussed getting bivalent dose in the summer     Patient Active Problem List    Diagnosis Date Noted    Pure hypercholesterolemia 05/01/2017    Acquired hypothyroidism 05/01/2017    Elevated WBC count 04/16/2014    Elevated liver function tests 04/16/2014    Breast cancer right DCIS 06/11/2013    Depression 02/11/2013    Headache 09/10/2012    HTN (hypertension) 09/10/2012     Current Outpatient Medications   Medication Sig Dispense Refill    oxyCODONE-acetaminophen (Percocet) 5-325 mg per tablet Take 1 Tablet by mouth every eight (8) hours as needed for Pain for up to 3 days. Max Daily Amount: 3 Tablets. Indications: pain 10 Tablet 0    gabapentin (NEURONTIN) 300 mg capsule TAKE 1 CAP BY MOUTH TWO (2) TIMES DAILY AS NEEDED FOR PAIN. 180 Capsule 0    DULoxetine (CYMBALTA) 60 mg capsule TAKE 1 CAPSULE BY MOUTH EVERY DAY 90 Capsule 0    amitriptyline (ELAVIL) 10 mg tablet TAKE 2 TABLETS BY MOUTH IN THE EVENING 60 Tablet 0    furosemide (LASIX) 20 mg tablet TAKE 1 TABLET BY MOUTH EVERY DAY 90 Tablet 0    LISINOPRIL PO lisinopril (Patient not taking: Reported on 1/6/2023)      atorvastatin (LIPITOR) 20 mg tablet TAKE 1 TABLET BY MOUTH EVERY DAY 90 Tablet 1    pantoprazole (PROTONIX) 20 mg tablet TAKE 1 TABLET BY MOUTH EVERY DAY 90 Tablet 1    levothyroxine (SYNTHROID) 75 mcg tablet TAKE 1 TABLET BY MOUTH EVERY DAY BEFORE BREAKFAST 90 Tablet 0    metoprolol succinate (TOPROL-XL) 25 mg XL tablet TAKE 1 TABLET BY MOUTH EVERY DAY 90 Tablet 1    cetirizine (ZYRTEC) 10 mg tablet Take 1 Tablet by mouth nightly. 30 Tablet 0    albuterol (PROVENTIL HFA, VENTOLIN HFA, PROAIR HFA) 90 mcg/actuation inhaler Take 2 Puffs by inhalation every six (6) hours as needed for Wheezing.  18 g 0    fenofibrate nanocrystallized (TRICOR) 145 mg tablet TAKE 1 TABLET BY MOUTH EVERY DAY 90 Tablet 1    cyclobenzaprine (FLEXERIL) 5 mg tablet Take 1-2 Tabs by mouth nightly. As needed 12 Tab 0    naproxen (NAPROSYN) 500 mg tablet Take 1 Tab by mouth two (2) times daily (with meals). 60 Tab 1    cholecalciferol (VITAMIN D3) 25 mcg (1,000 unit) cap Take  by mouth daily. (Patient not taking: No sig reported)      SUMAtriptan (IMITREX) 50 mg tablet TAKE 1 TABLET BY MOUTH ONCE AS NEEDED FOR MIGRAINE FOR UP TO 1 DOSE (Patient not taking: No sig reported) 6 Tab 1    multivitamin (ONE A DAY) tablet Take 1 Tab by mouth daily.          Past Surgical History:   Procedure Laterality Date    HX BREAST LUMPECTOMY  5/30/2013    BREAST LUMPECTOMY/PARTIAL performed by Alvin Chu MD at [facility] MAIN OR    HX GYN      fallopian tube    HX ORTHOPAEDIC      left ankle repair    HX OTHER SURGICAL      cyst removed rom right thigh    [place] UNLISTED PROCEDURE BREAST  5/30/13    RIGHT BREAST LUMPECTOMY WITH RIGHT NEDDLE LOCALIZATION       Lab Results   Component Value Date/Time    WBC 5.8 10/25/2022 12:00 AM    HGB 11.4 10/25/2022 12:00 AM    Hemoglobin (POC) 15.3 10/31/2014 02:26 PM    HCT 34.1 10/25/2022 12:00 AM    Hematocrit (POC) 45 10/31/2014 02:26 PM    PLATELET 031 (H) 17/06/5809 12:00 AM    MCV 92 10/25/2022 12:00 AM     Lab Results   Component Value Date/Time    Cholesterol, total 125 05/23/2022 12:00 AM    HDL Cholesterol 30 (L) 05/23/2022 12:00 AM    LDL, calculated 52 05/23/2022 12:00 AM    LDL, calculated 135.8 (H) 05/04/2021 10:39 AM    Triglyceride 275 (H) 05/23/2022 12:00 AM    CHOL/HDL Ratio 6.7 (H) 05/04/2021 10:39 AM     Lab Results   Component Value Date/Time    GFR est non-AA >60 05/04/2021 10:39 AM    GFRNA, POC >60 10/31/2014 02:26 PM    GFR est AA >60 05/04/2021 10:39 AM    GFRAA, POC >60 10/31/2014 02:26 PM    Creatinine 0.61 10/25/2022 12:00 AM    Creatinine (POC) 0.7 09/22/2022 10:45 AM    Creatinine (POC) 0.9 10/31/2014 02:26 PM    BUN 4 (L) 10/25/2022 12:00 AM    BUN 8 09/22/2022 10:45 AM    BUN (POC) 8 (L) 10/31/2014 02:26 PM    Sodium 140 10/25/2022 12:00 AM    Sodium (POC) 136 09/22/2022 10:45 AM    Sodium (POC) 140 10/31/2014 02:26 PM    Potassium 4.4 10/25/2022 12:00 AM    Potassium (POC) 3.8 09/22/2022 10:45 AM    Potassium (POC) 3.7 10/31/2014 02:26 PM    Chloride 99 10/25/2022 12:00 AM    CHLORIDE 96 09/22/2022 10:45 AM    Chloride (POC) 103 10/31/2014 02:26 PM    CO2 28 10/25/2022 12:00 AM    CO2 29 09/22/2022 10:45 AM    Magnesium 1.6 01/14/2017 07:28 AM    Phosphorus 1.0 (L) 11/28/2012 04:45 AM        Review of Systems   Respiratory:  Negative for shortness of breath. Cardiovascular:  Negative for chest pain. Physical Exam  Constitutional:       General: She is not in acute distress. Appearance: She is not ill-appearing, toxic-appearing or diaphoretic. HENT:      Head: Normocephalic and atraumatic. Right Ear: External ear normal.      Left Ear: External ear normal.   Eyes:      General:         Right eye: No discharge. Left eye: No discharge. Conjunctiva/sclera: Conjunctivae normal.      Pupils: Pupils are equal, round, and reactive to light. Cardiovascular:      Rate and Rhythm: Normal rate and regular rhythm. Heart sounds: No murmur heard. No friction rub. No gallop. Pulmonary:      Effort: No respiratory distress. Breath sounds: Normal breath sounds. No wheezing or rales. Chest:      Chest wall: No tenderness. Musculoskeletal:         General: Normal range of motion. Cervical back: Normal.      Right lower leg: Edema (trace) present. Left lower leg: Edema (trace) present. Comments: R arm in a sling   Skin:     General: Skin is warm and dry. Neurological:      Mental Status: She is oriented to person, place, and time. Mental status is at baseline.       Coordination: Coordination normal.      Gait: Gait normal.   Psychiatric:         Mood and Affect: Mood normal.         Behavior: Behavior normal. ASSESSMENT and PLAN    ICD-10-CM ICD-9-CM    1. Primary hypertension  I10 401.9 LIPID PANEL      METABOLIC PANEL, COMPREHENSIVE      HEMOGLOBIN A1C WITH EAG      TSH 3RD GENERATION      CBC W/O DIFF   Controlled on Toprol continue   GGT      LIPID PANEL      METABOLIC PANEL, COMPREHENSIVE      HEMOGLOBIN A1C WITH EAG      TSH 3RD GENERATION      CBC W/O DIFF      GGT      2. Acquired hypothyroidism  E03.9 244.9 LIPID PANEL      METABOLIC PANEL, COMPREHENSIVE      HEMOGLOBIN A1C WITH EAG   On Synthroid check TSH adjust dose as needed   TSH 3RD GENERATION      CBC W/O DIFF      GGT      LIPID PANEL      METABOLIC PANEL, COMPREHENSIVE      HEMOGLOBIN A1C WITH EAG      TSH 3RD GENERATION      CBC W/O DIFF      GGT      3. Pure hypercholesterolemia  E78.00 272.0 LIPID PANEL      METABOLIC PANEL, COMPREHENSIVE      HEMOGLOBIN A1C WITH EAG      TSH 3RD GENERATION      CBC W/O DIFF      GGT      LIPID PANEL      METABOLIC PANEL, COMPREHENSIVE   Controlled on Lipitor and fenofibrate continue   HEMOGLOBIN A1C WITH EAG      TSH 3RD GENERATION      CBC W/O DIFF      GGT      4. Localized edema  R60.0 782.3 LIPID PANEL      METABOLIC PANEL, COMPREHENSIVE   Stable on Lasix check metabolic panel for K creatinine sodium levels   HEMOGLOBIN A1C WITH EAG      TSH 3RD GENERATION      CBC W/O DIFF      GGT      LIPID PANEL      METABOLIC PANEL, COMPREHENSIVE      HEMOGLOBIN A1C WITH EAG      TSH 3RD GENERATION      CBC W/O DIFF      GGT      5. Colon cancer screening  Z12.11 V76.51 REFERRAL TO GASTROENTEROLOGY      LIPID PANEL      METABOLIC PANEL, COMPREHENSIVE   Overdue for colonoscopy discussed this she states she is open to getting it done placed referral   HEMOGLOBIN A1C WITH EAG      TSH 3RD GENERATION      CBC W/O DIFF      GGT      LIPID PANEL      METABOLIC PANEL, COMPREHENSIVE      HEMOGLOBIN A1C WITH EAG      TSH 3RD GENERATION      CBC W/O DIFF      GGT      6.  Alkaline phosphatase elevation  R74.8 790.5 LIPID PANEL METABOLIC PANEL, COMPREHENSIVE      HEMOGLOBIN A1C WITH EAG      TSH 3RD GENERATION      CBC W/O DIFF   Persistent elevated alk phos and GGT    Ordered liver ultrasound previously she never completed it    Reprinted order we will repeat labs today    Of note she drinks a fair amount of alcohol discussed again reducing this   GGT      LIPID PANEL      METABOLIC PANEL, COMPREHENSIVE      HEMOGLOBIN A1C WITH EAG      TSH 3RD GENERATION      CBC W/O DIFF      GGT      7. Reactive depression  F32.9 300.4 LIPID PANEL      METABOLIC PANEL, COMPREHENSIVE      HEMOGLOBIN A1C WITH EAG      TSH 3RD GENERATION      CBC W/O DIFF      GGT      LIPID PANEL   Controlled on Cymbalta    Amitriptyline nightly for sleep    Overall stable   METABOLIC PANEL, COMPREHENSIVE      HEMOGLOBIN A1C WITH EAG      TSH 3RD GENERATION      CBC W/O DIFF      GGT      8. Elevated liver function tests  R79.89 790.6 LIPID PANEL      METABOLIC PANEL, COMPREHENSIVE      HEMOGLOBIN A1C WITH EAG      TSH 3RD GENERATION      CBC W/O DIFF      GGT      LIPID PANEL   See above   METABOLIC PANEL, COMPREHENSIVE      HEMOGLOBIN A1C WITH EAG      TSH 3RD GENERATION      CBC W/O DIFF      GGT      9.  Malignant neoplasm of right female breast, unspecified estrogen receptor status, unspecified site of [facility]  C50.911 174.9 LIPID PANEL      METABOLIC PANEL, COMPREHENSIVE      HEMOGLOBIN A1C WITH EAG      TSH 3RD GENERATION      CBC W/O DIFF      GGT      LIPID PANEL   Overdue for mammogram discussed this again printed order again   METABOLIC PANEL, COMPREHENSIVE      HEMOGLOBIN A1C WITH EAG      TSH 3RD GENERATION      CBC W/O DIFF      GGT      10. Neuropathy  G62.9 355.9 LIPID PANEL      METABOLIC PANEL, COMPREHENSIVE      HEMOGLOBIN A1C WITH EAG      TSH 3RD GENERATION      CBC W/O DIFF      GGT      LIPID PANEL   Improved with gabapentin Cymbalta continue   METABOLIC PANEL, COMPREHENSIVE      HEMOGLOBIN A1C WITH EAG      TSH 3RD GENERATION      CBC W/O DIFF      GGT      11. Mixed hyperlipidemia  E78.2 272.2 LIPID PANEL      METABOLIC PANEL, COMPREHENSIVE      HEMOGLOBIN A1C WITH EAG      TSH 3RD GENERATION   Controlled Lipitor and fenofibrate in the past check lipids adjust dosing as needed   CBC W/O DIFF      GGT      LIPID PANEL      METABOLIC PANEL, COMPREHENSIVE      HEMOGLOBIN A1C WITH EAG      TSH 3RD GENERATION      CBC W/O DIFF      GGT      12. IFG (impaired fasting glucose)  R73.01 790.21 LIPID PANEL      METABOLIC PANEL, COMPREHENSIVE      HEMOGLOBIN A1C WITH EAG      TSH 3RD GENERATION      CBC W/O DIFF      GGT      LIPID PANEL   Check A1c weight has climbed work on weight loss diet controlled   METABOLIC PANEL, COMPREHENSIVE      HEMOGLOBIN A1C WITH EAG      TSH 3RD GENERATION      CBC W/O DIFF      GGT      Depression screen reviewed and negative. Scribed by Sathya Cadet, as dictated by Dr. Stanislav Longo. Current diagnosis and concerns discussed with pt at length. Pt understands risks and benefits or current treatment plan and medications, and accepts the treatment and medication with any possible risks. Pt asks appropriate questions, which were answered. Pt was instructed to call with any concerns or problems. I have reviewed the note documented by the scribe. The services provided are my own. The documentation is accurate.

## 2023-03-10 NOTE — Clinical Note
[address]  [facility] EMERGENCY DEPT  [address]  [place] [address]  [phone]    Work/School Note    Date: 3/10/2023    To Whom It May concern:    Jb Alberto was seen and treated today in the emergency room by the following provider(s):  Attending Provider: John Gonzales MD.      Jb Alberto is excused from work/school on 3/10/2023 through 3/12/2023. She is medically clear to return to work/school on 3/13/2023.          Sincerely,          Shelby Lantigua MD

## 2023-03-10 NOTE — ED TRIAGE NOTES
Pt arrives via EMS c/o right shoulder and left knee pain after mechanical GLF less than an hour ago. Pt believes she landed on right shoulder, on cement floor. Denies LOC but reports hitting head. Denies taking blood thinners.

## 2023-03-13 ENCOUNTER — OFFICE VISIT (OUTPATIENT)
Dept: INTERNAL MEDICINE CLINIC | Age: 58
End: 2023-03-13
Payer: COMMERCIAL

## 2023-03-13 VITALS
DIASTOLIC BLOOD PRESSURE: 75 MMHG | OXYGEN SATURATION: 98 % | WEIGHT: 168.2 LBS | TEMPERATURE: 98.6 F | HEART RATE: 76 BPM | BODY MASS INDEX: 28.71 KG/M2 | RESPIRATION RATE: 16 BRPM | SYSTOLIC BLOOD PRESSURE: 113 MMHG | HEIGHT: 64 IN

## 2023-03-13 DIAGNOSIS — C50.911 MALIGNANT NEOPLASM OF RIGHT FEMALE BREAST, UNSPECIFIED ESTROGEN RECEPTOR STATUS, UNSPECIFIED SITE OF BREAST (HCC): ICD-10-CM

## 2023-03-13 DIAGNOSIS — R79.89 ELEVATED LIVER FUNCTION TESTS: ICD-10-CM

## 2023-03-13 DIAGNOSIS — E03.9 ACQUIRED HYPOTHYROIDISM: ICD-10-CM

## 2023-03-13 DIAGNOSIS — Z23 ENCOUNTER FOR IMMUNIZATION: ICD-10-CM

## 2023-03-13 DIAGNOSIS — E78.2 MIXED HYPERLIPIDEMIA: ICD-10-CM

## 2023-03-13 DIAGNOSIS — E78.00 PURE HYPERCHOLESTEROLEMIA: ICD-10-CM

## 2023-03-13 DIAGNOSIS — R73.01 IFG (IMPAIRED FASTING GLUCOSE): ICD-10-CM

## 2023-03-13 DIAGNOSIS — G62.9 NEUROPATHY: ICD-10-CM

## 2023-03-13 DIAGNOSIS — F32.9 REACTIVE DEPRESSION: ICD-10-CM

## 2023-03-13 DIAGNOSIS — Z12.11 COLON CANCER SCREENING: ICD-10-CM

## 2023-03-13 DIAGNOSIS — I10 PRIMARY HYPERTENSION: Primary | ICD-10-CM

## 2023-03-13 DIAGNOSIS — R74.8 ALKALINE PHOSPHATASE ELEVATION: ICD-10-CM

## 2023-03-13 DIAGNOSIS — R60.0 LOCALIZED EDEMA: ICD-10-CM

## 2023-03-13 PROCEDURE — 90677 PCV20 VACCINE IM: CPT | Performed by: INTERNAL MEDICINE

## 2023-03-13 PROCEDURE — 99214 OFFICE O/P EST MOD 30 MIN: CPT | Performed by: INTERNAL MEDICINE

## 2023-03-13 PROCEDURE — 90471 IMMUNIZATION ADMIN: CPT | Performed by: INTERNAL MEDICINE

## 2023-03-13 NOTE — PROGRESS NOTES
1. \"Have you been to the ER, urgent care clinic since your last visit? Hospitalized since your last visit? \" Yes [id number] [place] 03/09/2023 Broken shoulder    2. \"Have you seen or consulted any other health care providers outside of the [address] [place] [address] since your last visit? \" No     3. For patients aged 39-70: Has the patient had a colonoscopy / FIT/ Cologuard? No      If the patient is female:    4. For patients aged 41-77: Has the patient had a mammogram within the past 2 years? No      5. For patients aged 21-65: Has the patient had a pap smear?  No

## 2023-03-14 LAB
ALBUMIN SERPL-MCNC: 3.4 G/DL (ref 3.5–5)
ALBUMIN/GLOB SERPL: 0.9 (ref 1.1–2.2)
ALP SERPL-CCNC: 134 U/L (ref 45–117)
ALT SERPL-CCNC: 15 U/L (ref 12–78)
ANION GAP SERPL CALC-SCNC: 4 MMOL/L (ref 5–15)
AST SERPL-CCNC: 14 U/L (ref 15–37)
BILIRUB SERPL-MCNC: 0.5 MG/DL (ref 0.2–1)
BUN SERPL-MCNC: 10 MG/DL (ref 6–20)
BUN/CREAT SERPL: 12 (ref 12–20)
CALCIUM SERPL-MCNC: 9.3 MG/DL (ref 8.5–10.1)
CHLORIDE SERPL-SCNC: 100 MMOL/L (ref 97–108)
CHOLEST SERPL-MCNC: 117 MG/DL
CO2 SERPL-SCNC: 33 MMOL/L (ref 21–32)
CREAT SERPL-MCNC: 0.86 MG/DL (ref 0.55–1.02)
ERYTHROCYTE [DISTWIDTH] IN BLOOD BY AUTOMATED COUNT: 12.8 % (ref 11.5–14.5)
EST. AVERAGE GLUCOSE BLD GHB EST-MCNC: 105 MG/DL
GGT SERPL-CCNC: 50 U/L (ref 5–55)
GLOBULIN SER CALC-MCNC: 3.7 G/DL (ref 2–4)
GLUCOSE SERPL-MCNC: 89 MG/DL (ref 65–100)
HBA1C MFR BLD: 5.3 % (ref 4–5.6)
HCT VFR BLD AUTO: 32.8 % (ref 35–47)
HDLC SERPL-MCNC: 41 MG/DL
HDLC SERPL: 2.9 (ref 0–5)
HGB BLD-MCNC: 9.9 G/DL (ref 11.5–16)
LDLC SERPL CALC-MCNC: 32 MG/DL (ref 0–100)
MCH RBC QN AUTO: 29.9 PG (ref 26–34)
MCHC RBC AUTO-ENTMCNC: 30.2 G/DL (ref 30–36.5)
MCV RBC AUTO: 99.1 FL (ref 80–99)
NRBC # BLD: 0 K/UL (ref 0–0.01)
NRBC BLD-RTO: 0 PER 100 WBC
PLATELET # BLD AUTO: 386 K/UL (ref 150–400)
PMV BLD AUTO: 10 FL (ref 8.9–12.9)
POTASSIUM SERPL-SCNC: 4.2 MMOL/L (ref 3.5–5.1)
PROT SERPL-MCNC: 7.1 G/DL (ref 6.4–8.2)
RBC # BLD AUTO: 3.31 M/UL (ref 3.8–5.2)
SODIUM SERPL-SCNC: 137 MMOL/L (ref 136–145)
TRIGL SERPL-MCNC: 220 MG/DL (ref ?–150)
TSH SERPL DL<=0.05 MIU/L-ACNC: 2.63 UIU/ML (ref 0.36–3.74)
VLDLC SERPL CALC-MCNC: 44 MG/DL
WBC # BLD AUTO: 13 K/UL (ref 3.6–11)

## 2023-03-16 DIAGNOSIS — D64.9 ANEMIA, UNSPECIFIED TYPE: ICD-10-CM

## 2023-03-16 DIAGNOSIS — D72.829 LEUKOCYTOSIS, UNSPECIFIED TYPE: Primary | ICD-10-CM

## 2023-03-16 LAB
FERRITIN SERPL-MCNC: 174 NG/ML (ref 26–388)
FOLATE SERPL-MCNC: 12.2 NG/ML (ref 5–21)
IRON SATN MFR SERPL: 11 % (ref 20–50)
IRON SERPL-MCNC: 39 UG/DL (ref 35–150)
TIBC SERPL-MCNC: 357 UG/DL (ref 250–450)
VIT B12 SERPL-MCNC: 344 PG/ML (ref 193–986)

## 2023-03-16 NOTE — PROGRESS NOTES
Called, spoke to pt  Received two pt identifiers    Pt informed per Dr. Misha Joseph anemia on labs and leukocytosis---- most likely due nto recent fracture  Pt informed per Dr. Anita Vela repeat cbc with ferritin/iron in 3-4 weeks---ordered  Add-on labs faxed w/ confirmation    Pt verbalizes understanding of the instructions and has no further questions at this time.

## 2023-03-16 NOTE — PROGRESS NOTES
Please call patient back about results  New anemia on labs and leukocytosis--she recently had a fracture  Add b12, folate, ferritin, iron profile if possible to blood at lab    Will plan on repeating cbc with ferritin/iron in 3-4 weeks

## 2023-03-21 NOTE — PROGRESS NOTES
HISTORY OF PRESENT ILLNESS  Maite Higgins is a 59 y.o. female. HPI  Last here 3/13/23. Pt is here for routine care. Pt reports increased swelling and tingling in BL ankles x 2 days. Also reports her stomach feels somewhat distended and that her feet have felt cold as well. States she feels like she is not urinating as much as usual. Of note, she recently fractured her arm, has been sitting more at home due to this. Denies SOB, CP, calf pain,. Of note this is happened in the past where she has had significant fluid retention and swelling in her ankles she has not been mobile much recently due to her injury edema is bilateral and equal  Of note, she has gained 8 lbs since lov last week. I have previously advised pt to complete US abd due to elevated alk phos. Will repeat labs. Will increase lasix to 40 mg (20 mg 2 tablets daily). Reminded pt to complete US abd. getting labs today     Has a history of hypertension  BP today is 120/73  No home BP readings for review   She is on Toprol-XL 25 mg     Taking Lasix 20 mg daily for mild chronic BL LE edema  We have previously discussed compression hose  Pt reports increased swelling and tingling in BL ankles x 2 days. Also reports her stomach feels somewhat distended and her feet have felt cold as well. States she feels like she is not urinating as much as usual. Of note, she recently fractured her arm, has been sitting more at home due to this. Denies SOB, CP, calf pain,. Also of note, she has gained 8 lbs since lov last week. I have previously advised pt to complete US abd due to elevated alk phos. Will repeat labs. Will increase lasix to 40 mg (20 mg 2 tablets daily). Reminded pt to complete US abd.      Wt today is 176 lbs, up 8 lbs since lov last week   Discussed continued diet and w/l      Reviewed labs  Ordered labs   I previously have discussed climbing alkaline phosphotase level -- GGT elevated on recent labs so I ordered an ultrasound to evaluate liver and referred to hepatology, however this was not completed--reminded her today reprinted orders, especially due to abdominal distension     Discussed anemia and low B12 on last labs. Will repeat to ensure no progressive anemia given progressive edema  Discussed taking a vit B12 supplement 2000U. Pt presented to ED 3/10/23 after a fall. She tripped over a metal bar, hit her head and shoulder. Was given percocet  She is seeing Dr. Sandeep Chavez (ortho) for closed fracture of proximal humerus was there yesterday  Lov 3/21/23, advised her to rest   Reviewed note:   ASSESSMENT  Impression: Rudy Orellana is a 59 y.o. female with three part proximal humerus fracture (greater and lesser tuberosities) after fall at work on 03/10/2023.    1. Closed 3-part fracture of proximal humerus, right, initial encounter     PLAN  I discussed the diagnosis and reviewed the imaging with Ms. Jerrell Rich and her significant other in clinic today. He has had minimal displacement of the fracture fragments since last being seen. Because this, I recommend continued non operative treatment. Continue nonweightbearing and keep the arm in a sling or hanging by the side to avoid activation of the rotator cuff muscles. I provided her with a refill on oxycodone for severe pain and asked that she continue taking over-the-counter Tylenol as well as anti-inflammatory medication for mild-to-moderate pain. I provided her with a note to remain out of work until she is seen in follow-up. Reviewed XR R shoulder 3/21/23:  Impression: Comminuted proximal humerus fracture is redemonstrated. There is been mild displacement of the lesser tuberosity fragment. No significant displacement of the greater tuberosity fragments. Mild varus of the head-neck junction. States she will be out of work for 3 weeks, she will f/U every 7-10 days w/ Dr. Sandeep Chavez to see if she needs surgery.      Pt previously followed with Dr. Lotus Alfonso (hem/onc) for anemia in the past and for breast cancer  Last visit was 5/17, he had wanted pt to be on hormonal therapy in 2013, radiation was not completed, f/u in 1 year   Pt never followed up, reminded to do this on multiple occasions, discussed calling again  Recall past evaluation with Dr. Raad Garcia in 2015, thrombocytosis thought d/t cigarette smoking   Overdue for mammogram      Recall COLO and EGD 2013 - bleeding ulcer, issues with anemia     She takes Protonix for abdominal bloating and reflux which she uses as needed     History of hypothyroidism  Continues on synthroid 75 mcg daily -- last TSH at goal   Recall pt has a FMHX thyroid cancer (brother)     Continues on lipitor 20mg daily and fenofibrate     Pt is taking gabapentin 300 mg 1-2x per day for back pain, which helps  Taking cymbalta 60 mg as well  She gets pain from prev rib fracture      She is taking amitriptyline 20mg (2 tablets 10 mg) to help with sleep   Previously I ordered hyroxyzine, she did not take this  Pt takes imitrex 50mg prn (not daily) --stable     She is on cymbalta 60 mg for anxiety which helps  No longer continues on wellbutrin 150mg--for depression and smoking      She is drinking about 2-4 glasses of wine per night  per her report we have discussed this on multiple occasions--she states for the last week or 2 she has not been drinking any wine    She has a long history of drinking significant amounts of wine, has history of LFT elevations, we have discussed cessation  Previously ordered an ultrasound of the abdomen she declines due to cost   Recall US abd in 2017 showed severe steotosis   I previously referred her to hepatology in 2014, she never went   Ordered repeat US abd, this was not completed, discussed again especially due to abdominal distension will evaluate for any ascites     Pt has a 30 pack year hx  Pt continues to smoke 0.5 PPD varies how much she smokes  Discussed cessation  Completed CT lung cancer screen 11/1/18 - due reminded printed again last office visit     ACP not on file. SDM is her brother John Youngblood. Thranow III.    Provided information in the past  Recall EGD and COLO in 2013: bleeding ulcer     PREVENTIVE:   Colonoscopy: 3/13, repeat 10 years, due gave info  EGD: 2013, possible [facility]   Pap: ~3/19, [facility], due reminded  Mammogram: 6/29/21 negative, h/o R sided breast DCIS, due reminded once arm is healed   DEXA: [facility], ~3/19  Tdap: 12/10/17   Pneumovax: 8/27/2012   Prevnar 20: 03/13/23   Shingrix: 1st 11/03/20 2nd dose 05/23/22  Flu shot: 10/25/22  Eye exam: 1/23 [facility]   EKG: 3/17 per ED   Lipids: 3/23 LDL 32  A1c: 11/20 5.5 5/21 5.5 5/22 5.4 10/22 5.5 3/23 5.3  CT lung cancer screen: 11/1/18, reminded she is due for this and was previously ordered  Covid: four doses [employer]), had covid 12/22, discussed getting bivalent dose in the summer     Patient Active Problem List    Diagnosis Date Noted    Localized edema 03/13/2023    Pure hypercholesterolemia 05/01/2017    Acquired hypothyroidism 05/01/2017    Elevated WBC count 04/16/2014    Elevated liver function tests 04/16/2014    Breast cancer right DCIS 06/11/2013    Depression 02/11/2013    Headache 09/10/2012    HTN (hypertension) 09/10/2012     Current Outpatient Medications   Medication Sig Dispense Refill    gabapentin (NEURONTIN) 300 mg capsule TAKE 1 CAP BY MOUTH TWO (2) TIMES DAILY AS NEEDED FOR PAIN. 180 Capsule 0    DULoxetine (CYMBALTA) 60 mg capsule TAKE 1 CAPSULE BY MOUTH EVERY DAY 90 Capsule 0    amitriptyline (ELAVIL) 10 mg tablet TAKE 2 TABLETS BY MOUTH IN THE EVENING 60 Tablet 0    furosemide (LASIX) 20 mg tablet TAKE 1 TABLET BY MOUTH EVERY DAY 90 Tablet 0    LISINOPRIL PO lisinopril (Patient not taking: No sig reported)      atorvastatin (LIPITOR) 20 mg tablet TAKE 1 TABLET BY MOUTH EVERY DAY 90 Tablet 1    pantoprazole (PROTONIX) 20 mg tablet TAKE 1 TABLET BY MOUTH EVERY DAY 90 Tablet 1    levothyroxine (SYNTHROID) 75 mcg tablet TAKE 1 TABLET BY MOUTH EVERY DAY BEFORE BREAKFAST 90 Tablet 0    metoprolol succinate (TOPROL-XL) 25 mg XL tablet TAKE 1 TABLET BY MOUTH EVERY DAY 90 Tablet 1    cetirizine (ZYRTEC) 10 mg tablet Take 1 Tablet by mouth nightly. 30 Tablet 0    albuterol (PROVENTIL HFA, VENTOLIN HFA, PROAIR HFA) 90 mcg/actuation inhaler Take 2 Puffs by inhalation every six (6) hours as needed for Wheezing. 18 g 0    fenofibrate nanocrystallized (TRICOR) 145 mg tablet TAKE 1 TABLET BY MOUTH EVERY DAY 90 Tablet 1    cyclobenzaprine (FLEXERIL) 5 mg tablet Take 1-2 Tabs by mouth nightly. As needed 12 Tab 0    naproxen (NAPROSYN) 500 mg tablet Take 1 Tab by mouth two (2) times daily (with meals). 60 Tab 1    cholecalciferol (VITAMIN D3) 25 mcg (1,000 unit) cap Take  by mouth daily. (Patient not taking: No sig reported)      SUMAtriptan (IMITREX) 50 mg tablet TAKE 1 TABLET BY MOUTH ONCE AS NEEDED FOR MIGRAINE FOR UP TO 1 DOSE (Patient not taking: No sig reported) 6 Tab 1    multivitamin (ONE A DAY) tablet Take 1 Tab by mouth daily.          Past Surgical History:   Procedure Laterality Date    HX BREAST LUMPECTOMY  5/30/2013    BREAST LUMPECTOMY/PARTIAL performed by Pablito Williamson MD at [facility] MAIN OR    HX GYN      fallopian tube    HX ORTHOPAEDIC      left ankle repair    HX OTHER SURGICAL      cyst removed rom right thigh    UT UNLISTED PROCEDURE BREAST  5/30/13    RIGHT BREAST LUMPECTOMY WITH RIGHT NEDDLE LOCALIZATION       Lab Results   Component Value Date/Time    WBC 13.0 (H) 03/13/2023 01:25 PM    HGB 9.9 (L) 03/13/2023 01:25 PM    Hemoglobin (POC) 15.3 10/31/2014 02:26 PM    HCT 32.8 (L) 03/13/2023 01:25 PM    Hematocrit (POC) 45 10/31/2014 02:26 PM    PLATELET 649 98/55/2855 01:25 PM    MCV 99.1 (H) 03/13/2023 01:25 PM     Lab Results   Component Value Date/Time    Cholesterol, total 117 03/13/2023 01:25 PM    HDL Cholesterol 41 03/13/2023 01:25 PM    LDL, calculated 32 03/13/2023 01:25 PM    Triglyceride 220 (H) 03/13/2023 01:25 PM    CHOL/HDL Ratio 2.9 03/13/2023 01:25 PM     Lab Results   Component Value Date/Time    GFR est non-AA >60 05/04/2021 10:39 AM    GFRNA, POC >60 10/31/2014 02:26 PM    GFR est AA >60 05/04/2021 10:39 AM    GFRAA, POC >60 10/31/2014 02:26 PM    Creatinine 0.86 03/13/2023 01:25 PM    Creatinine (POC) 0.7 09/22/2022 10:45 AM    Creatinine (POC) 0.9 10/31/2014 02:26 PM    BUN 10 03/13/2023 01:25 PM    BUN 8 09/22/2022 10:45 AM    BUN (POC) 8 (L) 10/31/2014 02:26 PM    Sodium 137 03/13/2023 01:25 PM    Sodium (POC) 136 09/22/2022 10:45 AM    Sodium (POC) 140 10/31/2014 02:26 PM    Potassium 4.2 03/13/2023 01:25 PM    Potassium (POC) 3.8 09/22/2022 10:45 AM    Potassium (POC) 3.7 10/31/2014 02:26 PM    Chloride 100 03/13/2023 01:25 PM    CHLORIDE 96 09/22/2022 10:45 AM    Chloride (POC) 103 10/31/2014 02:26 PM    CO2 33 (H) 03/13/2023 01:25 PM    CO2 29 09/22/2022 10:45 AM    Magnesium 1.6 01/14/2017 07:28 AM    Phosphorus 1.0 (L) 11/28/2012 04:45 AM        Review of Systems   Respiratory:  Negative for shortness of breath. Cardiovascular:  Positive for leg swelling. Negative for chest pain. Gastrointestinal:         Abdominal distension     Physical Exam  Constitutional:       General: She is not in acute distress. Appearance: She is not ill-appearing, toxic-appearing or diaphoretic. HENT:      Head: Normocephalic and atraumatic. Right Ear: External ear normal.      Left Ear: External ear normal.   Eyes:      General:         Right eye: No discharge. Left eye: No discharge. Conjunctiva/sclera: Conjunctivae normal.      Pupils: Pupils are equal, round, and reactive to light. Cardiovascular:      Rate and Rhythm: Normal rate and regular rhythm. Heart sounds: No murmur heard. No friction rub. No gallop. Pulmonary:      Effort: No respiratory distress. Breath sounds: Normal breath sounds. No wheezing or rales. Chest:      Chest wall: No tenderness.    Musculoskeletal:         General: Normal range of motion. Cervical back: Normal.      Right lower leg: Edema (pitting) present. Left lower leg: Edema (pitting) present. Comments: Dependent edema in R hand   Skin:     General: Skin is warm and dry. Neurological:      Mental Status: She is oriented to person, place, and time. Mental status is at baseline. Coordination: Coordination normal.      Gait: Gait normal.   Psychiatric:         Mood and Affect: Mood normal.         Behavior: Behavior normal.       ASSESSMENT and PLAN    ICD-10-CM ICD-9-CM    1. Localized edema  R60.0 782.3 US ABD COMP      CBC W/O DIFF      METABOLIC PANEL, COMPREHENSIVE      NT-PRO BNP      CBC W/O DIFF   Patient with progressive edema last few days    Pitting bilaterally on exam    Not concern for DVT at this time as pitting is equal and bilateral    She appears to be retaining fluid she has gained weight    We will check BNP to evaluate for evidence of heart failure    This she denies any shortness of breath or chest pain    We will increase Lasix to 40 mg daily    Potassium levels have been fine we will increase to 40 mg daily for 2 weeks then back to once daily we will check metabolic panel again today to ensure K and creatinine levels are stable   METABOLIC PANEL, COMPREHENSIVE      NT-PRO BNP      2. Primary hypertension  I10 401.9 CBC W/O DIFF      METABOLIC PANEL, COMPREHENSIVE      NT-PRO BNP      CBC W/O DIFF   Controlled on Toprol   METABOLIC PANEL, COMPREHENSIVE      NT-PRO BNP      3.  Elevated liver function tests  R79.89 790.6 US ABD COMP      CBC W/O DIFF      METABOLIC PANEL, COMPREHENSIVE   Patient with primarily elevated alk phos recently part of this is related to her recent fracture but she also has elevated GGT    Long history of alcohol use    ALT AST have been elevated the past as well    Given fluid retention and symptoms of abdominal bloating again asked her to get the abdominal ultrasound completed reordered this for her   NT-PRO BNP CBC W/O DIFF      METABOLIC PANEL, COMPREHENSIVE      NT-PRO BNP      4. Anemia, unspecified type  D64.9 285.9 CBC W/O DIFF      METABOLIC PANEL, COMPREHENSIVE      NT-PRO BNP      CBC W/O DIFF   Patient with new anemia on last labs likely related to her recent fracture unclear may be multifactorial liver disease could contribute to this as well repeat blood counts to ensure stable since last week given progressive edema await results   METABOLIC PANEL, COMPREHENSIVE      NT-PRO BNP        Depression screen reviewed and negative. Scribed by Christina Galvez, as dictated by Dr. Jessica Spring. Current diagnosis and concerns discussed with pt at length. Pt understands risks and benefits or current treatment plan and medications, and accepts the treatment and medication with any possible risks. Pt asks appropriate questions, which were answered. Pt was instructed to call with any concerns or problems. I have reviewed the note documented by the scribe. The services provided are my own. The documentation is accurate.

## 2023-03-22 ENCOUNTER — OFFICE VISIT (OUTPATIENT)
Dept: INTERNAL MEDICINE CLINIC | Age: 58
End: 2023-03-22
Payer: COMMERCIAL

## 2023-03-22 VITALS
WEIGHT: 176 LBS | TEMPERATURE: 97.9 F | OXYGEN SATURATION: 97 % | HEART RATE: 85 BPM | BODY MASS INDEX: 30.05 KG/M2 | HEIGHT: 64 IN | DIASTOLIC BLOOD PRESSURE: 73 MMHG | RESPIRATION RATE: 16 BRPM | SYSTOLIC BLOOD PRESSURE: 120 MMHG

## 2023-03-22 DIAGNOSIS — I10 PRIMARY HYPERTENSION: ICD-10-CM

## 2023-03-22 DIAGNOSIS — D64.9 ANEMIA, UNSPECIFIED TYPE: ICD-10-CM

## 2023-03-22 DIAGNOSIS — R79.89 ELEVATED LIVER FUNCTION TESTS: ICD-10-CM

## 2023-03-22 DIAGNOSIS — R60.0 LOCALIZED EDEMA: Primary | ICD-10-CM

## 2023-03-22 LAB
ALBUMIN SERPL-MCNC: 3.2 G/DL (ref 3.5–5)
ALBUMIN/GLOB SERPL: 0.8 (ref 1.1–2.2)
ALP SERPL-CCNC: 163 U/L (ref 45–117)
ALT SERPL-CCNC: 15 U/L (ref 12–78)
ANION GAP SERPL CALC-SCNC: 3 MMOL/L (ref 5–15)
AST SERPL-CCNC: 17 U/L (ref 15–37)
BILIRUB SERPL-MCNC: 0.4 MG/DL (ref 0.2–1)
BNP SERPL-MCNC: 550 PG/ML
BUN SERPL-MCNC: 9 MG/DL (ref 6–20)
BUN/CREAT SERPL: 9 (ref 12–20)
CALCIUM SERPL-MCNC: 9.4 MG/DL (ref 8.5–10.1)
CHLORIDE SERPL-SCNC: 101 MMOL/L (ref 97–108)
CO2 SERPL-SCNC: 31 MMOL/L (ref 21–32)
CREAT SERPL-MCNC: 0.97 MG/DL (ref 0.55–1.02)
ERYTHROCYTE [DISTWIDTH] IN BLOOD BY AUTOMATED COUNT: 13 % (ref 11.5–14.5)
GLOBULIN SER CALC-MCNC: 3.8 G/DL (ref 2–4)
GLUCOSE SERPL-MCNC: 96 MG/DL (ref 65–100)
HCT VFR BLD AUTO: 30.8 % (ref 35–47)
HGB BLD-MCNC: 9.5 G/DL (ref 11.5–16)
MCH RBC QN AUTO: 29.4 PG (ref 26–34)
MCHC RBC AUTO-ENTMCNC: 30.8 G/DL (ref 30–36.5)
MCV RBC AUTO: 95.4 FL (ref 80–99)
NRBC # BLD: 0 K/UL (ref 0–0.01)
NRBC BLD-RTO: 0 PER 100 WBC
PLATELET # BLD AUTO: 500 K/UL (ref 150–400)
PMV BLD AUTO: 9.1 FL (ref 8.9–12.9)
POTASSIUM SERPL-SCNC: 4.7 MMOL/L (ref 3.5–5.1)
PROT SERPL-MCNC: 7 G/DL (ref 6.4–8.2)
RBC # BLD AUTO: 3.23 M/UL (ref 3.8–5.2)
SODIUM SERPL-SCNC: 135 MMOL/L (ref 136–145)
WBC # BLD AUTO: 11.2 K/UL (ref 3.6–11)

## 2023-03-22 PROCEDURE — 99214 OFFICE O/P EST MOD 30 MIN: CPT | Performed by: INTERNAL MEDICINE

## 2023-03-22 RX ORDER — FUROSEMIDE 20 MG/1
40 TABLET ORAL DAILY
Qty: 60 TABLET | Refills: 0 | Status: SHIPPED | OUTPATIENT
Start: 2023-03-22

## 2023-03-22 RX ORDER — FUROSEMIDE 20 MG/1
20 TABLET ORAL DAILY
Qty: 90 TABLET | Refills: 0 | Status: SHIPPED | OUTPATIENT
Start: 2023-03-22 | End: 2023-03-22 | Stop reason: SDUPTHER

## 2023-03-22 NOTE — PROGRESS NOTES
1. \"Have you been to the ER, urgent care clinic since your last visit? Hospitalized since your last visit? \" No    2. \"Have you seen or consulted any other health care providers outside of the [address] [place] [address] since your last visit? \" No     3. For patients aged 39-70: Has the patient had a colonoscopy / FIT/ Cologuard? Yes - Care Gap present. Most recent result on file      If the patient is female:    4. For patients aged 41-77: Has the patient had a mammogram within the past 2 years? Yes - no Care Gap present      5. For patients aged 21-65: Has the patient had a pap smear?  Yes - no Care Gap present

## 2023-03-22 NOTE — PATIENT INSTRUCTIONS
2 lasix daily for 2 weeks than back to once daily     Labs today    Get liver ultrasound    B12 1,000mcg daily

## 2023-03-23 NOTE — PROGRESS NOTES
Please call patient back about results  Elevated bnp heart failure test, increased fluid retention --increase lasix as discussed  Lets get echo for further eval   Repeat bmp in2 weeks d/t increased lasix dose need to monitor k and cr levels

## 2023-03-27 DIAGNOSIS — R79.89 ELEVATED BRAIN NATRIURETIC PEPTIDE (BNP) LEVEL: ICD-10-CM

## 2023-03-27 DIAGNOSIS — I10 PRIMARY HYPERTENSION: Primary | ICD-10-CM

## 2023-04-23 DIAGNOSIS — D72.829 LEUKOCYTOSIS, UNSPECIFIED TYPE: Primary | ICD-10-CM

## 2023-04-23 DIAGNOSIS — D64.9 ANEMIA, UNSPECIFIED TYPE: ICD-10-CM

## 2023-04-24 DIAGNOSIS — D64.9 ANEMIA, UNSPECIFIED TYPE: ICD-10-CM

## 2023-04-24 DIAGNOSIS — D72.829 LEUKOCYTOSIS, UNSPECIFIED TYPE: Primary | ICD-10-CM

## 2023-04-24 NOTE — PROGRESS NOTES
HISTORY OF PRESENT ILLNESS  Maite Ugalde is a 59 y.o. female. HPI  Last here 3/22/23. Pt is here for routine care. Has a history of hypertension  BP today is 136/83   No home BP readings for review   She is on Toprol-XL 25 mg     Taking Lasix 2 tablets 20 mg (increased from 1 tablet) daily for chronic BL LE edema which had worsened a few weeks ago now much better  We have previously discussed compression hose, she has not been wearing these   Discussed she can decrease lasix to 1 tablet per day repeat labs today     Wt today is 170 lbs, down 6 lbs since lov   Discussed continued diet and w/l      Reviewed labs  Ordered labs      She saw Dr. Anabel Tsai (ortho) for closed fracture of proximal humerus  Lov 3/21/23  She then saw Dr. Rhianna Thakkar (ortho) at [facility] for this 3/27/23  Reviewed note:  Assessment/Plan   Diagnoses and all orders for this visit:  Closed fracture of proximal end of right humerus, unspecified fracture morphology, initial encounter  - XR shoulder 2+ views right  - CT shoulder right wo IV contrast; Future    This patient is being placed in a better sling for comfort. Appropriate paperwork was filled out for work. We are proceeding with a CT with 3D reconstructions of the right shoulder. The patient will be seen by Dr. Samira Reyes next week after the CT. The patient was seen for 30 minutes for her evaluation, physical examination, interpretation present x-rays, referral for further diagnostic imaging, and referral to Dr. Samira Reyes. She is now seeing Dr. Samira Reyes (ortho), since he accepts workmen's comp  Lov 4/5/23, will f/U next 5/3/23   Reviewed note:  Assessment/Plan   Right proximal humerus fracture    I believe that she is best suited for conservative management. I do not think surgical intervention will afford her any benefit over conservative management, even with her varus angulation. At this stage we are going to get her into physical therapy. She no longer requires the use of her sling.  I did give her a home exercise program. I will see her in 4 weeks with new x-rays. She is out of work at this point. Reviewed XR R shoulder 4/5/23:  IMPRESSION:  3 views demonstrate minimally displaced comminuted impacted fracture through the surgical neck of the humerus extending into the greater and lesser tuberosities with unchanged alignment and interval healing. Humeral heads articulates with the glenoid. Mild degenerative changes of the [facility] joint. Reviewed CT R shoulder 4/5/23:  IMPRESSION:  1. Subacute comminuted displaced impacted fracture of the humeral surgical neck extending to the humeral head, greater and lesser tuberosities. No dislocation. 2. Osteopenia.     She is completing PT, will go next tmrw 4/26/23   Now taking tramadol per orthopedics  No longer taking oxycodone        Pt previously followed with Dr. Elaine Aldridge (hem/onc) for anemia in the past and for breast cancer  Last visit was 5/17, he had wanted pt to be on hormonal therapy in 2013, radiation was not completed, f/u in 1 year   Pt never followed up, reminded to do this on multiple occasions, discussed calling again  Recall past evaluation with Dr. Elaine Aldridge in 2015, thrombocytosis thought d/t cigarette smoking   Overdue for mammogram, reminded      Recall COLO and EGD 2013 - bleeding ulcer, issues with anemia     She takes Protonix for abdominal bloating and reflux which she uses as needed     History of hypothyroidism  Continues on synthroid 75 mcg daily -- last TSH at goal   Recall pt has a FMHX thyroid cancer (brother)     Continues on lipitor 20mg daily and fenofibrate    She is now taking iron 65 mg supplement and vit B12 1000 mg supplement      Pt is taking gabapentin 300 mg 2x per day for back pain, which helps  Taking cymbalta 60 mg as well   She gets pain from prev rib fracture   No longer continues on wellbutrin 150mg -- for depression and smoking      She is taking amitriptyline 20mg (2 tablets 10 mg) to help with sleep Previously I ordered hyroxyzine, she did not take this  Pt takes imitrex 50mg prn (not daily) -- stable      She has a long history of drinking significant amounts of wine, has history of LFT elevations, we have discussed cessation  She is drinking about 2-4 glasses of wine per night, per her report we have discussed this on multiple occasions  Previously ordered an ultrasound of the abdomen she declines due to cost   Recall US abd in 2017 showed severe steotosis   I previously referred her to hepatology in 2014, she never went   Ordered repeat US abd, this was not completed, discussed again   She states she will call today she also needs to schedule hepatology follow-up    Also needs to schedule CT of the lungs mammogram echocardiogram colonoscopy reviewed everything we have ordered that she has not yet completed she expressed understanding and states that she will schedule the studies listed     Pt has a 30 pack year hx  Pt continues to smoke 0.5 PPD varies how much she smokes  Discussed cessation  Completed CT lung cancer screen 11/1/18 - due reminded printed again today      ACP not on file. SDM is her brother Rachel Combs. Kofi GOYAL.    Provided information in the past  Recall EGD and COLO in 2013: bleeding ulcer     PREVENTIVE:   Colonoscopy: 3/13, repeat 10 years, due gave info again today   EGD: 2013, possible [facility]   Pap: ~3/19, [facility], due reminded  Mammogram: 6/29/21 negative, h/o R sided breast DCIS, due reminded once arm is healed   DEXA: [facility], ~3/19  Tdap: 12/10/17   Pneumovax: 8/27/2012   Prevnar 20: 03/13/23   Shingrix: 1st 11/03/20 2nd dose 05/23/22  Flu shot: 10/25/22  Eye exam: 1/23 [facility]   EKG: 3/17 per ED   Lipids: 3/23 LDL 32  A1c: 11/20 5.5 5/21 5.5 5/22 5.4 10/22 5.5 3/23 5.3  CT lung cancer screen: 11/1/18, reminded she is due for this and was previously ordered  Covid: four doses [employer]), had covid 12/22, discussed getting bivalent dose in the summer Patient Active Problem List   Diagnosis Code    Headache R51.9    HTN (hypertension) I10    Depression F32. A    Breast cancer right DCIS C50.919    Elevated WBC count D72.829    Elevated liver function tests R79.89    Pure hypercholesterolemia E78.00    Acquired hypothyroidism E03.9    Localized edema R60.0     Current Outpatient Medications   Medication Sig Dispense Refill    furosemide (LASIX) 20 mg tablet Take 2 Tablets by mouth daily. 60 Tablet 0    gabapentin (NEURONTIN) 300 mg capsule TAKE 1 CAP BY MOUTH TWO (2) TIMES DAILY AS NEEDED FOR PAIN. 180 Capsule 0    DULoxetine (CYMBALTA) 60 mg capsule TAKE 1 CAPSULE BY MOUTH EVERY DAY 90 Capsule 0    amitriptyline (ELAVIL) 10 mg tablet TAKE 2 TABLETS BY MOUTH IN THE EVENING 60 Tablet 0    LISINOPRIL PO lisinopril (Patient not taking: No sig reported)      atorvastatin (LIPITOR) 20 mg tablet TAKE 1 TABLET BY MOUTH EVERY DAY 90 Tablet 1    pantoprazole (PROTONIX) 20 mg tablet TAKE 1 TABLET BY MOUTH EVERY DAY 90 Tablet 1    levothyroxine (SYNTHROID) 75 mcg tablet TAKE 1 TABLET BY MOUTH EVERY DAY BEFORE BREAKFAST 90 Tablet 0    metoprolol succinate (TOPROL-XL) 25 mg XL tablet TAKE 1 TABLET BY MOUTH EVERY DAY 90 Tablet 1    cetirizine (ZYRTEC) 10 mg tablet Take 1 Tablet by mouth nightly. 30 Tablet 0    albuterol (PROVENTIL HFA, VENTOLIN HFA, PROAIR HFA) 90 mcg/actuation inhaler Take 2 Puffs by inhalation every six (6) hours as needed for Wheezing. 18 g 0    fenofibrate nanocrystallized (TRICOR) 145 mg tablet TAKE 1 TABLET BY MOUTH EVERY DAY 90 Tablet 1    cyclobenzaprine (FLEXERIL) 5 mg tablet Take 1-2 Tabs by mouth nightly. As needed 12 Tab 0    naproxen (NAPROSYN) 500 mg tablet Take 1 Tab by mouth two (2) times daily (with meals). 60 Tab 1    cholecalciferol (VITAMIN D3) 25 mcg (1,000 unit) cap Take  by mouth daily.  (Patient not taking: Reported on 3/22/2023)      SUMAtriptan (IMITREX) 50 mg tablet TAKE 1 TABLET BY MOUTH ONCE AS NEEDED FOR MIGRAINE FOR UP TO 1 DOSE (Patient not taking: No sig reported) 6 Tab 1    multivitamin (ONE A DAY) tablet Take 1 Tab by mouth daily. Past Surgical History:   Procedure Laterality Date    HX BREAST LUMPECTOMY  5/30/2013    BREAST LUMPECTOMY/PARTIAL performed by Lia Rivera MD at [facility] MAIN OR    HX GYN      fallopian tube    HX ORTHOPAEDIC      left ankle repair    HX OTHER SURGICAL      cyst removed rom right thigh    OR UNLISTED PROCEDURE BREAST  5/30/13    RIGHT BREAST LUMPECTOMY WITH RIGHT NEDDLE LOCALIZATION          Lab Results   Component Value Date    WBC 11.2 (H) 03/22/2023    HGB 9.5 (L) 03/22/2023    HCT 30.8 (L) 03/22/2023    MCV 95.4 03/22/2023     (H) 03/22/2023     Lab Results   Component Value Date/Time    Cholesterol, total 117 03/13/2023 01:25 PM    HDL Cholesterol 41 03/13/2023 01:25 PM    LDL, calculated 32 03/13/2023 01:25 PM    VLDL, calculated 44 03/13/2023 01:25 PM    Triglyceride 220 (H) 03/13/2023 01:25 PM    CHOL/HDL Ratio 2.9 03/13/2023 01:25 PM     Lab Results   Component Value Date/Time    GFR est AA >60 05/04/2021 10:39 AM    GFR est non-AA >60 05/04/2021 10:39 AM    Creatinine (POC) 0.7 09/22/2022 10:45 AM    Creatinine (POC) 0.9 10/31/2014 02:26 PM    Creatinine 0.97 03/22/2023 01:01 PM    BUN 9 03/22/2023 01:01 PM    BUN 8 09/22/2022 10:45 AM    BUN (POC) 8 (L) 10/31/2014 02:26 PM    Sodium (POC) 136 09/22/2022 10:45 AM    Sodium (POC) 140 10/31/2014 02:26 PM    Sodium 135 (L) 03/22/2023 01:01 PM    Potassium 4.7 03/22/2023 01:01 PM    Potassium (POC) 3.8 09/22/2022 10:45 AM    Potassium (POC) 3.7 10/31/2014 02:26 PM    Chloride (POC) 103 10/31/2014 02:26 PM    CHLORIDE 96 09/22/2022 10:45 AM    Chloride 101 03/22/2023 01:01 PM    CO2 31 03/22/2023 01:01 PM         Review of Systems   Respiratory:  Negative for shortness of breath. Cardiovascular:  Negative for chest pain. Physical Exam  Constitutional:       General: She is not in acute distress.      Appearance: Normal appearance. She is not ill-appearing, toxic-appearing or diaphoretic. HENT:      Head: Normocephalic and atraumatic. Right Ear: External ear normal.      Left Ear: External ear normal.   Eyes:      General:         Right eye: No discharge. Left eye: No discharge. Conjunctiva/sclera: Conjunctivae normal.      Pupils: Pupils are equal, round, and reactive to light. Cardiovascular:      Rate and Rhythm: Normal rate and regular rhythm. Heart sounds: No murmur heard. No friction rub. No gallop. Pulmonary:      Effort: No respiratory distress. Breath sounds: Normal breath sounds. No wheezing or rales. Chest:      Chest wall: No tenderness. Musculoskeletal:         General: Normal range of motion. Cervical back: Normal range of motion. Right lower leg: No edema. Left lower leg: No edema. Skin:     General: Skin is warm and dry. Neurological:      General: No focal deficit present. Mental Status: She is oriented to person, place, and time. Gait: Gait normal.   Psychiatric:         Mood and Affect: Mood normal.         Behavior: Behavior normal.         ASSESSMENT and PLAN    ICD-10-CM ICD-9-CM    1. Primary hypertension  F70 898.0 METABOLIC PANEL, BASIC      METABOLIC PANEL, BASIC   Controlled on Toprol-XL continue       2. Pain of right humerus  M89.8X2 733.90    Patient fractured her humerus she is followed by [facility]. following with orthopedics gets tramadol from them doing physical therapy   3. Localized edema  R60.0 782.3        Improved from last office visit currently taking Lasix 40 mg daily    We will check metabolic panel to ensure K creatinine sodium levels are stable    Reduce Lasix to 20 mg daily as edema is improved discussed compression hose discussed how to wear compression hose   4.  Elevated liver function tests  R79.89 790.6    Long history of elevated LFTs needs to complete ultrasound of the liver still and follow-up with hepatology discussed this again reprinted orders again   5. Acquired hypothyroidism  E03.9 244.9    Control on Synthroid   6. Personal history of nicotine dependence  Z87.891 V15.82 CT LOW DOSE LUNG CANCER SCREENING   Overdue on CT lung cancer screen still discussed this again ordered again   7. Breast screening  Z12.39 V76.10 [facility] MAMMO BI SCREENING INCL CAD   Overdue for mammogram discussed this again ordered again   8. Colon cancer screening  Z12.11 V76.51 REFERRAL TO GASTROENTEROLOGY   Due for colonoscopy discussed again placed referral again   9. Iron deficiency anemia, unspecified iron deficiency anemia type  D50.9 280.9 CBC W/O DIFF      CBC W/O DIFF   Check CBC to ensure anemia stable since her last check if not improving will need to go back to hematology whom she previously saw       Depression screen reviewed and negative. Scribed by Krishna Deutsch, as dictated by Dr. Ben Jeffers. Current diagnosis and concerns discussed with pt at length. Pt understands risks and benefits or current treatment plan and medications, and accepts the treatment and medication with any possible risks. Pt asks appropriate questions, which were answered. Pt was instructed to call with any concerns or problems. I have reviewed the note documented by the scribe. The services provided are my own. The documentation is accurate.

## 2023-04-25 ENCOUNTER — OFFICE VISIT (OUTPATIENT)
Dept: INTERNAL MEDICINE CLINIC | Age: 58
End: 2023-04-25
Payer: COMMERCIAL

## 2023-04-25 VITALS
TEMPERATURE: 98.4 F | HEART RATE: 104 BPM | BODY MASS INDEX: 29.02 KG/M2 | DIASTOLIC BLOOD PRESSURE: 83 MMHG | RESPIRATION RATE: 16 BRPM | SYSTOLIC BLOOD PRESSURE: 136 MMHG | OXYGEN SATURATION: 96 % | WEIGHT: 170 LBS | HEIGHT: 64 IN

## 2023-04-25 DIAGNOSIS — I10 PRIMARY HYPERTENSION: Primary | ICD-10-CM

## 2023-04-25 DIAGNOSIS — Z12.11 COLON CANCER SCREENING: ICD-10-CM

## 2023-04-25 DIAGNOSIS — E03.9 ACQUIRED HYPOTHYROIDISM: ICD-10-CM

## 2023-04-25 DIAGNOSIS — R60.0 LOCALIZED EDEMA: ICD-10-CM

## 2023-04-25 DIAGNOSIS — Z12.39 BREAST SCREENING: ICD-10-CM

## 2023-04-25 DIAGNOSIS — M89.8X2 PAIN OF RIGHT HUMERUS: ICD-10-CM

## 2023-04-25 DIAGNOSIS — D50.9 IRON DEFICIENCY ANEMIA, UNSPECIFIED IRON DEFICIENCY ANEMIA TYPE: ICD-10-CM

## 2023-04-25 DIAGNOSIS — Z87.891 PERSONAL HISTORY OF NICOTINE DEPENDENCE: ICD-10-CM

## 2023-04-25 DIAGNOSIS — R79.89 ELEVATED LIVER FUNCTION TESTS: ICD-10-CM

## 2023-04-25 PROCEDURE — 99214 OFFICE O/P EST MOD 30 MIN: CPT | Performed by: INTERNAL MEDICINE

## 2023-04-25 RX ORDER — UREA 10 %
100 LOTION (ML) TOPICAL DAILY
COMMUNITY

## 2023-04-26 LAB
BUN SERPL-MCNC: 11 MG/DL (ref 6–24)
BUN/CREAT SERPL: 12 (ref 9–23)
CALCIUM SERPL-MCNC: 9.5 MG/DL (ref 8.7–10.2)
CHLORIDE SERPL-SCNC: 95 MMOL/L (ref 96–106)
CO2 SERPL-SCNC: 22 MMOL/L (ref 20–29)
CREAT SERPL-MCNC: 0.94 MG/DL (ref 0.57–1)
EGFRCR SERPLBLD CKD-EPI 2021: 70 ML/MIN/1.73
ERYTHROCYTE [DISTWIDTH] IN BLOOD BY AUTOMATED COUNT: 12.5 % (ref 11.7–15.4)
GLUCOSE SERPL-MCNC: 84 MG/DL (ref 70–99)
HCT VFR BLD AUTO: 37.1 % (ref 34–46.6)
HGB BLD-MCNC: 12.8 G/DL (ref 11.1–15.9)
MCH RBC QN AUTO: 30.5 PG (ref 26.6–33)
MCHC RBC AUTO-ENTMCNC: 34.5 G/DL (ref 31.5–35.7)
MCV RBC AUTO: 88 FL (ref 79–97)
PLATELET # BLD AUTO: 333 X10E3/UL (ref 150–450)
POTASSIUM SERPL-SCNC: 4.7 MMOL/L (ref 3.5–5.2)
RBC # BLD AUTO: 4.2 X10E6/UL (ref 3.77–5.28)
SODIUM SERPL-SCNC: 138 MMOL/L (ref 134–144)
WBC # BLD AUTO: 11.9 X10E3/UL (ref 3.4–10.8)

## 2023-04-28 ENCOUNTER — TRANSCRIBE ORDERS (OUTPATIENT)
Facility: HOSPITAL | Age: 58
End: 2023-04-28

## 2023-04-28 DIAGNOSIS — R79.89 ELEVATED LIVER FUNCTION TESTS: ICD-10-CM

## 2023-04-28 DIAGNOSIS — R60.0 LOCALIZED EDEMA: Primary | ICD-10-CM

## 2023-04-28 DIAGNOSIS — Z12.31 SCREENING MAMMOGRAM FOR HIGH-RISK PATIENT: Primary | ICD-10-CM

## 2023-04-28 DIAGNOSIS — Z87.891 PERSONAL HISTORY OF TOBACCO USE: Primary | ICD-10-CM

## 2023-04-28 DIAGNOSIS — I10 PRIMARY HYPERTENSION: ICD-10-CM

## 2023-05-08 RX ORDER — FUROSEMIDE 20 MG/1
TABLET ORAL
Qty: 60 TABLET | Refills: 0 | Status: SHIPPED | OUTPATIENT
Start: 2023-05-08

## 2023-05-08 RX ORDER — AMITRIPTYLINE HYDROCHLORIDE 10 MG/1
TABLET, FILM COATED ORAL
Qty: 60 TABLET | Refills: 0 | Status: SHIPPED | OUTPATIENT
Start: 2023-05-08

## 2023-05-08 NOTE — TELEPHONE ENCOUNTER
PCP: Christina Younger MD    Last appt: [unfilled]  Future Appointments   Date Time Provider Neymar Sim   6/19/2023  8:20 AM [facility] IRMA 3 [facility]   6/19/2023  9:00 AM [facility] CT 1 hospitalsRCT [facility]   6/19/2023 10:00 AM hospitals ECHO ROOM [facility]   6/20/2023  8:00 AM [facility] US 1 hospitalsRUS [facility]   9/18/2023 11:30 AM Ian Parham MD [facility] BS AMB       Requested Prescriptions     Pending Prescriptions Disp Refills    furosemide (LASIX) 20 MG tablet [Pharmacy Med Name: FUROSEMIDE 20 MG TABLET] 60 tablet      Sig: TAKE 2 TABLETS BY MOUTH DAILY

## 2023-05-08 NOTE — TELEPHONE ENCOUNTER
PCP: Claudell Borrow, MD    Last appt: [unfilled]  Future Appointments   Date Time Provider Neymar Sim   6/19/2023  8:20 AM [facility] IRMA 3 [facility]   6/19/2023  9:00 AM [facility]   6/19/2023 10:00 AM [facility]CHO [facility]OM [facility]   6/20/2023  8:00 AM [facility]   9/18/2023 11:30 AM Leena Guthrie MD [facility] BS AMB       Requested Prescriptions     Pending Prescriptions Disp Refills    amitriptyline (ELAVIL) 10 MG tablet [Pharmacy Med Name: AMITRIPTYLINE HCL 10 MG TAB] 60 tablet      Sig: TAKE 2 TABLETS BY MOUTH IN THE EVENING

## 2023-05-25 RX ORDER — LEVOTHYROXINE SODIUM 0.07 MG/1
1 TABLET ORAL
COMMUNITY
Start: 2022-11-01

## 2023-05-25 RX ORDER — METOPROLOL SUCCINATE 25 MG/1
1 TABLET, EXTENDED RELEASE ORAL DAILY
COMMUNITY
Start: 2022-11-01

## 2023-05-25 RX ORDER — FENOFIBRATE 145 MG/1
1 TABLET, COATED ORAL DAILY
COMMUNITY
Start: 2021-07-08

## 2023-05-25 RX ORDER — ATORVASTATIN CALCIUM 20 MG/1
1 TABLET, FILM COATED ORAL DAILY
COMMUNITY
Start: 2022-11-08

## 2023-05-25 RX ORDER — GABAPENTIN 300 MG/1
300 CAPSULE ORAL 2 TIMES DAILY PRN
COMMUNITY
Start: 2023-03-01

## 2023-05-25 RX ORDER — ALBUTEROL SULFATE 90 UG/1
2 AEROSOL, METERED RESPIRATORY (INHALATION) EVERY 6 HOURS PRN
COMMUNITY
Start: 2022-03-30

## 2023-05-25 RX ORDER — PANTOPRAZOLE SODIUM 20 MG/1
1 TABLET, DELAYED RELEASE ORAL DAILY
COMMUNITY
Start: 2022-11-01

## 2023-05-25 RX ORDER — DULOXETIN HYDROCHLORIDE 60 MG/1
1 CAPSULE, DELAYED RELEASE ORAL DAILY
COMMUNITY
Start: 2023-01-30 | End: 2023-06-26

## 2023-06-05 RX ORDER — FUROSEMIDE 20 MG/1
TABLET ORAL
Qty: 60 TABLET | Refills: 0 | Status: SHIPPED | OUTPATIENT
Start: 2023-06-05

## 2023-06-19 ENCOUNTER — HOSPITAL ENCOUNTER (OUTPATIENT)
Facility: HOSPITAL | Age: 58
Discharge: HOME | End: 2023-06-22
Payer: COMMERCIAL

## 2023-06-19 ENCOUNTER — HOSPITAL ENCOUNTER (OUTPATIENT)
Facility: HOSPITAL | Age: 58
Discharge: HOME | End: 2023-06-21
Payer: COMMERCIAL

## 2023-06-19 DIAGNOSIS — Z12.31 SCREENING MAMMOGRAM FOR HIGH-RISK PATIENT: ICD-10-CM

## 2023-06-19 DIAGNOSIS — Z87.891 PERSONAL HISTORY OF TOBACCO USE: ICD-10-CM

## 2023-06-19 DIAGNOSIS — I10 PRIMARY HYPERTENSION: ICD-10-CM

## 2023-06-19 PROCEDURE — 77067 SCR MAMMO BI INCL CAD: CPT

## 2023-06-19 PROCEDURE — 71271 CT THORAX LUNG CANCER SCR C-: CPT

## 2023-06-19 PROCEDURE — 93306 TTE W/DOPPLER COMPLETE: CPT

## 2023-06-20 ENCOUNTER — HOSPITAL ENCOUNTER (OUTPATIENT)
Facility: HOSPITAL | Age: 58
Discharge: HOME | End: 2023-06-23
Payer: COMMERCIAL

## 2023-06-20 DIAGNOSIS — R79.89 ELEVATED LIVER FUNCTION TESTS: ICD-10-CM

## 2023-06-20 DIAGNOSIS — R60.0 LOCALIZED EDEMA: ICD-10-CM

## 2023-06-20 LAB
ECHO AO ROOT DIAM: 3 CM
ECHO AV AREA PEAK VELOCITY: 2.6 CM2
ECHO AV PEAK GRADIENT: 7 MMHG
ECHO AV PEAK VELOCITY: 1.3 M/S
ECHO AV VELOCITY RATIO: 0.77
ECHO LA DIAMETER: 3.8 CM
ECHO LA TO AORTIC ROOT RATIO: 1.27
ECHO LV E' SEPTAL VELOCITY: 7 CM/S
ECHO LV FRACTIONAL SHORTENING: 29 % (ref 28–44)
ECHO LV INTERNAL DIMENSION DIASTOLIC: 4.8 CM (ref 3.9–5.3)
ECHO LV INTERNAL DIMENSION SYSTOLIC: 3.4 CM
ECHO LV IVSD: 1.2 CM (ref 0.6–0.9)
ECHO LV MASS 2D: 181.9 G (ref 67–162)
ECHO LV POSTERIOR WALL DIASTOLIC: 0.9 CM (ref 0.6–0.9)
ECHO LV RELATIVE WALL THICKNESS RATIO: 0.38
ECHO LVOT AREA: 3.5 CM2
ECHO LVOT DIAM: 2.1 CM
ECHO LVOT PEAK GRADIENT: 4 MMHG
ECHO LVOT PEAK VELOCITY: 1 M/S
ECHO MV A VELOCITY: 1 M/S
ECHO MV E DECELERATION TIME (DT): 142.3 MS
ECHO MV E VELOCITY: 0.76 M/S
ECHO MV E/A RATIO: 0.76
ECHO MV E/E' SEPTAL: 10.86
ECHO PV MAX VELOCITY: 0.9 M/S
ECHO PV PEAK GRADIENT: 3 MMHG
ECHO RV TAPSE: 2.2 CM (ref 1.7–?)
ECHO TV REGURGITANT MAX VELOCITY: 2.14 M/S
ECHO TV REGURGITANT PEAK GRADIENT: 18 MMHG

## 2023-06-20 PROCEDURE — 76700 US EXAM ABDOM COMPLETE: CPT

## 2023-06-26 RX ORDER — DULOXETIN HYDROCHLORIDE 60 MG/1
CAPSULE, DELAYED RELEASE ORAL
Qty: 90 CAPSULE | Refills: 0 | Status: SHIPPED | OUTPATIENT
Start: 2023-06-26

## 2023-07-11 RX ORDER — FUROSEMIDE 20 MG/1
TABLET ORAL
Qty: 60 TABLET | Refills: 0 | Status: SHIPPED | OUTPATIENT
Start: 2023-07-11

## 2023-07-14 RX ORDER — AMITRIPTYLINE HYDROCHLORIDE 10 MG/1
TABLET, FILM COATED ORAL
Qty: 60 TABLET | Refills: 0 | Status: SHIPPED | OUTPATIENT
Start: 2023-07-14

## 2023-08-11 RX ORDER — FUROSEMIDE 20 MG/1
TABLET ORAL
Qty: 60 TABLET | Refills: 0 | Status: SHIPPED | OUTPATIENT
Start: 2023-08-11

## 2023-08-16 RX ORDER — AMITRIPTYLINE HYDROCHLORIDE 10 MG/1
TABLET, FILM COATED ORAL
Qty: 60 TABLET | Refills: 0 | Status: SHIPPED | OUTPATIENT
Start: 2023-08-16

## 2023-08-16 NOTE — TELEPHONE ENCOUNTER
Future Appointments:  Future Appointments   Date Time Provider [address]   9/18/2023 11:30 AM Shirin Pemberton MD [facility] BS AMB        Last Appointment With Me:  4/25/2023     Requested Prescriptions     Pending Prescriptions Disp Refills    amitriptyline (ELAVIL) 10 MG tablet 60 tablet 0     Sig: TAKE 2 TABLETS BY MOUTH IN THE EVENING

## 2023-08-29 RX ORDER — METOPROLOL SUCCINATE 25 MG/1
TABLET, EXTENDED RELEASE ORAL
Qty: 90 TABLET | Refills: 1 | Status: SHIPPED | OUTPATIENT
Start: 2023-08-29

## 2023-08-29 RX ORDER — PANTOPRAZOLE SODIUM 20 MG/1
TABLET, DELAYED RELEASE ORAL
Qty: 90 TABLET | Refills: 1 | Status: SHIPPED | OUTPATIENT
Start: 2023-08-29

## 2023-08-29 RX ORDER — LEVOTHYROXINE SODIUM 0.07 MG/1
TABLET ORAL
Qty: 90 TABLET | Refills: 0 | Status: SHIPPED | OUTPATIENT
Start: 2023-08-29

## 2023-09-14 RX ORDER — FUROSEMIDE 20 MG/1
40 TABLET ORAL DAILY
Qty: 90 TABLET | Refills: 0 | Status: SHIPPED | OUTPATIENT
Start: 2023-09-14 | End: 2023-09-22

## 2023-09-17 DIAGNOSIS — E78.5 HYPERLIPIDEMIA, UNSPECIFIED: ICD-10-CM

## 2023-09-22 RX ORDER — DULOXETIN HYDROCHLORIDE 60 MG/1
CAPSULE, DELAYED RELEASE ORAL
Qty: 90 CAPSULE | Refills: 0 | Status: SHIPPED | OUTPATIENT
Start: 2023-09-22 | End: 2023-09-27

## 2023-09-22 RX ORDER — FUROSEMIDE 20 MG/1
20 TABLET ORAL DAILY
Qty: 90 TABLET | Refills: 0 | Status: SHIPPED | OUTPATIENT
Start: 2023-09-22 | End: 2023-09-27

## 2023-09-22 RX ORDER — LEVOTHYROXINE SODIUM 0.07 MG/1
TABLET ORAL
Qty: 90 TABLET | Refills: 0 | Status: SHIPPED | OUTPATIENT
Start: 2023-09-22

## 2023-09-27 RX ORDER — DULOXETIN HYDROCHLORIDE 60 MG/1
CAPSULE, DELAYED RELEASE ORAL
Qty: 90 CAPSULE | Refills: 0 | Status: SHIPPED | OUTPATIENT
Start: 2023-09-27

## 2023-09-27 RX ORDER — FUROSEMIDE 20 MG/1
40 TABLET ORAL DAILY
Qty: 60 TABLET | Refills: 1 | Status: SHIPPED | OUTPATIENT
Start: 2023-09-27

## 2023-09-28 DIAGNOSIS — G62.9 NEUROPATHY: Primary | ICD-10-CM

## 2023-09-28 NOTE — TELEPHONE ENCOUNTER
Future Appointments:  Future Appointments   Date Time Provider [address]   10/24/2023  2:45 PM Radha Bray MD [facility] BS AMB        Last Appointment With Me:  4/25/2023     Requested Prescriptions     Pending Prescriptions Disp Refills    gabapentin (NEURONTIN) 300 MG capsule 90 capsule      Sig: Take 1 capsule by mouth 2 times daily as needed.  Max Daily Amount: 600 mg

## 2023-10-02 RX ORDER — GABAPENTIN 300 MG/1
300 CAPSULE ORAL 2 TIMES DAILY PRN
Qty: 90 CAPSULE | Refills: 0 | Status: SHIPPED | OUTPATIENT
Start: 2023-10-02 | End: 2023-12-31

## 2023-10-08 RX ORDER — ATORVASTATIN CALCIUM 20 MG/1
20 TABLET, FILM COATED ORAL DAILY
Qty: 90 TABLET | Refills: 1 | OUTPATIENT
Start: 2023-10-08

## 2023-10-17 ASSESSMENT — ENCOUNTER SYMPTOMS: SHORTNESS OF BREATH: 0

## 2023-10-24 ENCOUNTER — OFFICE VISIT (OUTPATIENT)
Age: 58
End: 2023-10-24
Payer: COMMERCIAL

## 2023-10-24 VITALS
WEIGHT: 177 LBS | TEMPERATURE: 98 F | BODY MASS INDEX: 30.22 KG/M2 | DIASTOLIC BLOOD PRESSURE: 85 MMHG | RESPIRATION RATE: 18 BRPM | HEIGHT: 64 IN | SYSTOLIC BLOOD PRESSURE: 122 MMHG | OXYGEN SATURATION: 97 % | HEART RATE: 110 BPM

## 2023-10-24 DIAGNOSIS — R73.01 IFG (IMPAIRED FASTING GLUCOSE): ICD-10-CM

## 2023-10-24 DIAGNOSIS — C50.911 MALIGNANT NEOPLASM OF RIGHT FEMALE BREAST, UNSPECIFIED ESTROGEN RECEPTOR STATUS, UNSPECIFIED SITE OF BREAST (HCC): ICD-10-CM

## 2023-10-24 DIAGNOSIS — R79.89 ELEVATED LIVER FUNCTION TESTS: ICD-10-CM

## 2023-10-24 DIAGNOSIS — I10 PRIMARY HYPERTENSION: Primary | ICD-10-CM

## 2023-10-24 DIAGNOSIS — D72.829 LEUKOCYTOSIS, UNSPECIFIED TYPE: ICD-10-CM

## 2023-10-24 DIAGNOSIS — E03.9 ACQUIRED HYPOTHYROIDISM: ICD-10-CM

## 2023-10-24 DIAGNOSIS — F32.A DEPRESSION, UNSPECIFIED DEPRESSION TYPE: ICD-10-CM

## 2023-10-24 DIAGNOSIS — R60.0 LOCALIZED EDEMA: ICD-10-CM

## 2023-10-24 DIAGNOSIS — Z12.11 COLON CANCER SCREENING: ICD-10-CM

## 2023-10-24 DIAGNOSIS — E66.9 OBESITY (BMI 30.0-34.9): ICD-10-CM

## 2023-10-24 DIAGNOSIS — E78.00 PURE HYPERCHOLESTEROLEMIA: ICD-10-CM

## 2023-10-24 PROCEDURE — 99214 OFFICE O/P EST MOD 30 MIN: CPT | Performed by: INTERNAL MEDICINE

## 2023-10-24 PROCEDURE — 3079F DIAST BP 80-89 MM HG: CPT | Performed by: INTERNAL MEDICINE

## 2023-10-24 PROCEDURE — 3074F SYST BP LT 130 MM HG: CPT | Performed by: INTERNAL MEDICINE

## 2023-10-24 RX ORDER — FUROSEMIDE 20 MG/1
20 TABLET ORAL DAILY
Qty: 90 TABLET | Refills: 0 | Status: SHIPPED | OUTPATIENT
Start: 2023-10-24

## 2023-10-24 SDOH — ECONOMIC STABILITY: FOOD INSECURITY: WITHIN THE PAST 12 MONTHS, YOU WORRIED THAT YOUR FOOD WOULD RUN OUT BEFORE YOU GOT MONEY TO BUY MORE.: NEVER TRUE

## 2023-10-24 SDOH — ECONOMIC STABILITY: HOUSING INSECURITY
IN THE LAST 12 MONTHS, WAS THERE A TIME WHEN YOU DID NOT HAVE A STEADY PLACE TO SLEEP OR SLEPT IN A SHELTER (INCLUDING NOW)?: NO

## 2023-10-24 SDOH — ECONOMIC STABILITY: INCOME INSECURITY: HOW HARD IS IT FOR YOU TO PAY FOR THE VERY BASICS LIKE FOOD, HOUSING, MEDICAL CARE, AND HEATING?: NOT HARD AT ALL

## 2023-10-24 SDOH — ECONOMIC STABILITY: FOOD INSECURITY: WITHIN THE PAST 12 MONTHS, THE FOOD YOU BOUGHT JUST DIDN'T LAST AND YOU DIDN'T HAVE MONEY TO GET MORE.: NEVER TRUE

## 2023-10-24 ASSESSMENT — PATIENT HEALTH QUESTIONNAIRE - PHQ9
5. POOR APPETITE OR OVEREATING: 0
SUM OF ALL RESPONSES TO PHQ QUESTIONS 1-9: 2
10. IF YOU CHECKED OFF ANY PROBLEMS, HOW DIFFICULT HAVE THESE PROBLEMS MADE IT FOR YOU TO DO YOUR WORK, TAKE CARE OF THINGS AT HOME, OR GET ALONG WITH OTHER PEOPLE: 0
3. TROUBLE FALLING OR STAYING ASLEEP: 0
6. FEELING BAD ABOUT YOURSELF - OR THAT YOU ARE A FAILURE OR HAVE LET YOURSELF OR YOUR FAMILY DOWN: 0
1. LITTLE INTEREST OR PLEASURE IN DOING THINGS: 1
8. MOVING OR SPEAKING SO SLOWLY THAT OTHER PEOPLE COULD HAVE NOTICED. OR THE OPPOSITE, BEING SO FIGETY OR RESTLESS THAT YOU HAVE BEEN MOVING AROUND A LOT MORE THAN USUAL: 0
9. THOUGHTS THAT YOU WOULD BE BETTER OFF DEAD, OR OF HURTING YOURSELF: 0
SUM OF ALL RESPONSES TO PHQ QUESTIONS 1-9: 2
7. TROUBLE CONCENTRATING ON THINGS, SUCH AS READING THE NEWSPAPER OR WATCHING TELEVISION: 0
4. FEELING TIRED OR HAVING LITTLE ENERGY: 0
SUM OF ALL RESPONSES TO PHQ9 QUESTIONS 1 & 2: 2
2. FEELING DOWN, DEPRESSED OR HOPELESS: 1
SUM OF ALL RESPONSES TO PHQ QUESTIONS 1-9: 2
SUM OF ALL RESPONSES TO PHQ QUESTIONS 1-9: 2

## 2023-10-24 NOTE — PROGRESS NOTES
1. \"Have you been to the ER, urgent care clinic since your last visit? Hospitalized since your last visit? \" No    2. \"Have you seen or consulted any other health care providers outside of the [address] [place] [address] since your last visit? \" No     3. For patients aged 43-73: Has the patient had a colonoscopy / FIT/ Cologuard? Yes - Care Gap present. Most recent result on file      If the patient is female:    4. For patients aged 43-66: Has the patient had a mammogram within the past 2 years? Yes - Care Gap present. Most recent result on file      5. For patients aged 21-65: Has the patient had a pap smear? Yes - Care Gap present.  Most recent result on file
tablet TAKE 1 TABLET BY MOUTH EVERY DAY 90 tablet 1    amitriptyline (ELAVIL) 10 MG tablet TAKE 2 TABLETS BY MOUTH IN THE EVENING 60 tablet 0    albuterol sulfate HFA (PROVENTIL;VENTOLIN;PROAIR) 108 (90 Base) MCG/ACT inhaler Inhale 2 puffs into the lungs every 6 hours as needed      atorvastatin (LIPITOR) 20 MG tablet Take 1 tablet by mouth daily      cyanocobalamin 100 MCG tablet Take 1 tablet by mouth daily      fenofibrate (TRICOR) 145 MG tablet Take 1 tablet by mouth daily       No current facility-administered medications for this visit. Past Surgical History:   Procedure Laterality Date    BREAST LUMPECTOMY  5/30/2013    BREAST LUMPECTOMY/PARTIAL performed by Meenu Kidd MD at [facility] MAIN OR    BREAST SURGERY  5/30/13    RIGHT BREAST LUMPECTOMY WITH RIGHT NEDDLE LOCALIZATION     GYN      fallopian tube    ORTHOPEDIC SURGERY      left ankle repair    OTHER SURGICAL HISTORY      cyst removed rom right thigh         Lab Results   Component Value Date    WBC 11.9 (H) 04/25/2023    HGB 12.8 04/25/2023    HCT 37.1 04/25/2023    MCV 88 04/25/2023     04/25/2023     Lab Results   Component Value Date    CHOL 117 03/13/2023    TRIG 220 (H) 03/13/2023    HDL 41 03/13/2023    LDLCALC 32 03/13/2023     Lab Results   Component Value Date    CREATININE 0.94 04/25/2023    BUN 11 04/25/2023     04/25/2023    K 4.7 04/25/2023    CL 95 (L) 04/25/2023    CO2 22 04/25/2023       Review of Systems   Respiratory:  Negative for shortness of breath. Cardiovascular:  Negative for chest pain. Physical Exam  Constitutional:       General: She is not in acute distress. Appearance: She is not ill-appearing, toxic-appearing or diaphoretic. HENT:      Head: Normocephalic and atraumatic. Eyes:      General:         Right eye: No discharge. Left eye: No discharge. Extraocular Movements: Extraocular movements intact. Neck:      Vascular: No carotid bruit.    Cardiovascular:      Rate and

## 2023-10-25 LAB
ALBUMIN SERPL-MCNC: 3.4 G/DL (ref 3.5–5)
ALBUMIN/GLOB SERPL: 0.8 (ref 1.1–2.2)
ALP SERPL-CCNC: 144 U/L (ref 45–117)
ALT SERPL-CCNC: 18 U/L (ref 12–78)
ANION GAP SERPL CALC-SCNC: 5 MMOL/L (ref 5–15)
AST SERPL-CCNC: 19 U/L (ref 15–37)
BILIRUB SERPL-MCNC: 0.3 MG/DL (ref 0.2–1)
BUN SERPL-MCNC: 13 MG/DL (ref 6–20)
BUN/CREAT SERPL: 17 (ref 12–20)
CALCIUM SERPL-MCNC: 9.8 MG/DL (ref 8.5–10.1)
CHLORIDE SERPL-SCNC: 104 MMOL/L (ref 97–108)
CO2 SERPL-SCNC: 27 MMOL/L (ref 21–32)
COMMENT:: NORMAL
CREAT SERPL-MCNC: 0.78 MG/DL (ref 0.55–1.02)
ERYTHROCYTE [DISTWIDTH] IN BLOOD BY AUTOMATED COUNT: 12.4 % (ref 11.5–14.5)
EST. AVERAGE GLUCOSE BLD GHB EST-MCNC: 103 MG/DL
FERRITIN SERPL-MCNC: 182 NG/ML (ref 8–252)
GLOBULIN SER CALC-MCNC: 4.1 G/DL (ref 2–4)
GLUCOSE SERPL-MCNC: 90 MG/DL (ref 65–100)
HBA1C MFR BLD: 5.2 % (ref 4–5.6)
HCT VFR BLD AUTO: 41 % (ref 35–47)
HCV AB SER IA-ACNC: 0.04 INDEX
HCV AB SERPL QL IA: NONREACTIVE
HGB BLD-MCNC: 13.2 G/DL (ref 11.5–16)
HIV 1+2 AB+HIV1 P24 AG SERPL QL IA: NONREACTIVE
HIV 1/2 RESULT COMMENT: NORMAL
MCH RBC QN AUTO: 31.4 PG (ref 26–34)
MCHC RBC AUTO-ENTMCNC: 32.2 G/DL (ref 30–36.5)
MCV RBC AUTO: 97.6 FL (ref 80–99)
NRBC # BLD: 0 K/UL (ref 0–0.01)
NRBC BLD-RTO: 0 PER 100 WBC
PLATELET # BLD AUTO: 346 K/UL (ref 150–400)
PMV BLD AUTO: 10.6 FL (ref 8.9–12.9)
POTASSIUM SERPL-SCNC: 4.4 MMOL/L (ref 3.5–5.1)
PROT SERPL-MCNC: 7.5 G/DL (ref 6.4–8.2)
RBC # BLD AUTO: 4.2 M/UL (ref 3.8–5.2)
SODIUM SERPL-SCNC: 136 MMOL/L (ref 136–145)
SPECIMEN HOLD: NORMAL
TSH SERPL DL<=0.05 MIU/L-ACNC: 2.58 UIU/ML (ref 0.36–3.74)
WBC # BLD AUTO: 12.8 K/UL (ref 3.6–11)

## 2023-11-28 DIAGNOSIS — G62.9 NEUROPATHY: ICD-10-CM

## 2023-12-01 RX ORDER — GABAPENTIN 300 MG/1
300 CAPSULE ORAL 2 TIMES DAILY PRN
Qty: 60 CAPSULE | Refills: 1 | Status: SHIPPED | OUTPATIENT
Start: 2023-12-01 | End: 2024-02-29

## 2023-12-11 RX ORDER — LEVOTHYROXINE SODIUM 0.07 MG/1
TABLET ORAL
Qty: 90 TABLET | Refills: 0 | Status: SHIPPED | OUTPATIENT
Start: 2023-12-11

## 2023-12-11 RX ORDER — ATORVASTATIN CALCIUM 20 MG/1
20 TABLET, FILM COATED ORAL DAILY
Qty: 90 TABLET | Refills: 1 | OUTPATIENT
Start: 2023-12-11

## 2023-12-11 RX ORDER — METOPROLOL SUCCINATE 25 MG/1
TABLET, EXTENDED RELEASE ORAL
Qty: 90 TABLET | Refills: 1 | Status: SHIPPED | OUTPATIENT
Start: 2023-12-11

## 2023-12-11 NOTE — TELEPHONE ENCOUNTER
Patient states she requested refill thru [facility] on File for her Atorvastatin (LIPITOR) 20 MG tablet & is still waiting for refill to be done. Patient advised was not received from Pharmacy & would request to be done or a call back if any questions.  Thank you

## 2023-12-12 RX ORDER — ATORVASTATIN CALCIUM 20 MG/1
20 TABLET, FILM COATED ORAL DAILY
Qty: 90 TABLET | Refills: 1 | Status: SHIPPED | OUTPATIENT
Start: 2023-12-12

## 2023-12-12 NOTE — TELEPHONE ENCOUNTER
PCP: Frankie High MD    Last appt: 10/24/2023  Future Appointments   Date Time Provider [address]   4/22/2024  2:00 PM Frankie High MD [facility] BS AMB       Requested Prescriptions     Pending Prescriptions Disp Refills    atorvastatin (LIPITOR) 20 MG tablet 90 tablet 1     Sig: Take 1 tablet by mouth daily

## 2024-02-14 RX ORDER — DULOXETIN HYDROCHLORIDE 60 MG/1
CAPSULE, DELAYED RELEASE ORAL
Qty: 90 CAPSULE | Refills: 0 | Status: SHIPPED | OUTPATIENT
Start: 2024-02-14

## 2024-02-14 RX ORDER — FUROSEMIDE 20 MG/1
20 TABLET ORAL DAILY
Qty: 90 TABLET | Refills: 0 | Status: SHIPPED | OUTPATIENT
Start: 2024-02-14

## 2024-03-02 DIAGNOSIS — G62.9 NEUROPATHY: ICD-10-CM

## 2024-03-05 RX ORDER — GABAPENTIN 300 MG/1
300 CAPSULE ORAL 2 TIMES DAILY PRN
Qty: 60 CAPSULE | Refills: 1 | Status: SHIPPED | OUTPATIENT
Start: 2024-03-05 | End: 2024-06-03

## 2024-04-15 NOTE — PROGRESS NOTES
HISTORY OF PRESENT ILLNESS  Yoli Lynn is a 58 y.o. female.  HPI    Last here 10/24/23. Pt is here for routine care.     She reports chronic constipation, she states she does not remember her last bm discussed take MiraLAX she states then she gets diarrhea discussed she will need to titrate the dose of the medication  Reminded she is overdue for colonoscopy and EGD, emphasized the importance of these, she said she will call and schedule  Advised to take miralax for constipation she also complains of bloating has not been taking her Protonix lately discussed resuming Protonix and need for GI evaluation    Reviewed depression screen(+15).She states she is depressed d/t her ongoing health concerns and she states she has lost job and could not find one since then.she is not interested in trying any medications for this.  Of note she is already on Cymbalta and Elavil  Discussed seeing a psychiatrist will help her, provided list of psychiatrist in the area      She reports R knee pain x 1 week no injury this hurts a little bit on the top clicks at times  Advised RICE, tylenol   Ordered XR  R knee         Has a history of hypertension  BP today is  109/71  No home BP readings for review   She is on Toprol-XL 25 mg     Taking Lasix 2 tablets 20 mg (increased from 1 tablet) daily for chronic BL LE  We have previously discussed compression hose  Lov discussed decreasing to 1 tablet daily, check labs and adjust medication further as needed     Wt today is 190  lbs, up 13 lbs x lov  Discussed diet and w/l  She states she does not have a good appetite but continues to gain weight we will check labs discussed weight watchers portion control     Reviewed labs  Ordered labs         Recall echo 6/23:    Left Ventricle: Normal left ventricular systolic function with a visually estimated EF of 55 - 60%. Not well visualized. Left ventricle size is normal. Unable to assess wall thickness. Unable to assess wall motion. Diastolic

## 2024-04-22 ENCOUNTER — OFFICE VISIT (OUTPATIENT)
Age: 59
End: 2024-04-22
Payer: COMMERCIAL

## 2024-04-22 VITALS
BODY MASS INDEX: 32.44 KG/M2 | DIASTOLIC BLOOD PRESSURE: 71 MMHG | TEMPERATURE: 98.3 F | HEART RATE: 95 BPM | SYSTOLIC BLOOD PRESSURE: 109 MMHG | WEIGHT: 190 LBS | HEIGHT: 64 IN | RESPIRATION RATE: 20 BRPM | OXYGEN SATURATION: 95 %

## 2024-04-22 DIAGNOSIS — F32.A DEPRESSION, UNSPECIFIED DEPRESSION TYPE: ICD-10-CM

## 2024-04-22 DIAGNOSIS — R79.89 ELEVATED LIVER FUNCTION TESTS: ICD-10-CM

## 2024-04-22 DIAGNOSIS — I10 PRIMARY HYPERTENSION: Primary | ICD-10-CM

## 2024-04-22 DIAGNOSIS — C50.911 MALIGNANT NEOPLASM OF RIGHT FEMALE BREAST, UNSPECIFIED ESTROGEN RECEPTOR STATUS, UNSPECIFIED SITE OF BREAST (HCC): ICD-10-CM

## 2024-04-22 DIAGNOSIS — M25.561 ACUTE PAIN OF RIGHT KNEE: ICD-10-CM

## 2024-04-22 DIAGNOSIS — R73.01 IFG (IMPAIRED FASTING GLUCOSE): ICD-10-CM

## 2024-04-22 DIAGNOSIS — Z12.39 BREAST SCREENING: ICD-10-CM

## 2024-04-22 DIAGNOSIS — E78.00 PURE HYPERCHOLESTEROLEMIA: ICD-10-CM

## 2024-04-22 DIAGNOSIS — R60.0 LOCALIZED EDEMA: ICD-10-CM

## 2024-04-22 DIAGNOSIS — E03.9 ACQUIRED HYPOTHYROIDISM: ICD-10-CM

## 2024-04-22 DIAGNOSIS — G62.9 NEUROPATHY: ICD-10-CM

## 2024-04-22 DIAGNOSIS — R14.0 BLOATING: ICD-10-CM

## 2024-04-22 DIAGNOSIS — Z87.891 PERSONAL HISTORY OF NICOTINE DEPENDENCE: ICD-10-CM

## 2024-04-22 DIAGNOSIS — K59.09 OTHER CONSTIPATION: ICD-10-CM

## 2024-04-22 PROCEDURE — 3078F DIAST BP <80 MM HG: CPT | Performed by: INTERNAL MEDICINE

## 2024-04-22 PROCEDURE — 3074F SYST BP LT 130 MM HG: CPT | Performed by: INTERNAL MEDICINE

## 2024-04-22 PROCEDURE — 99214 OFFICE O/P EST MOD 30 MIN: CPT | Performed by: INTERNAL MEDICINE

## 2024-04-22 RX ORDER — GABAPENTIN 300 MG/1
300 CAPSULE ORAL 2 TIMES DAILY PRN
Qty: 180 CAPSULE | Refills: 1 | Status: SHIPPED | OUTPATIENT
Start: 2024-04-22 | End: 2024-04-26 | Stop reason: SDUPTHER

## 2024-04-22 ASSESSMENT — PATIENT HEALTH QUESTIONNAIRE - PHQ9
SUM OF ALL RESPONSES TO PHQ9 QUESTIONS 1 & 2: 6
SUM OF ALL RESPONSES TO PHQ QUESTIONS 1-9: 15
SUM OF ALL RESPONSES TO PHQ QUESTIONS 1-9: 15
9. THOUGHTS THAT YOU WOULD BE BETTER OFF DEAD, OR OF HURTING YOURSELF: NOT AT ALL
4. FEELING TIRED OR HAVING LITTLE ENERGY: NEARLY EVERY DAY
3. TROUBLE FALLING OR STAYING ASLEEP: NEARLY EVERY DAY
7. TROUBLE CONCENTRATING ON THINGS, SUCH AS READING THE NEWSPAPER OR WATCHING TELEVISION: NOT AT ALL
10. IF YOU CHECKED OFF ANY PROBLEMS, HOW DIFFICULT HAVE THESE PROBLEMS MADE IT FOR YOU TO DO YOUR WORK, TAKE CARE OF THINGS AT HOME, OR GET ALONG WITH OTHER PEOPLE: NOT DIFFICULT AT ALL
6. FEELING BAD ABOUT YOURSELF - OR THAT YOU ARE A FAILURE OR HAVE LET YOURSELF OR YOUR FAMILY DOWN: NOT AT ALL
5. POOR APPETITE OR OVEREATING: NEARLY EVERY DAY
SUM OF ALL RESPONSES TO PHQ QUESTIONS 1-9: 15
8. MOVING OR SPEAKING SO SLOWLY THAT OTHER PEOPLE COULD HAVE NOTICED. OR THE OPPOSITE, BEING SO FIGETY OR RESTLESS THAT YOU HAVE BEEN MOVING AROUND A LOT MORE THAN USUAL: NOT AT ALL
1. LITTLE INTEREST OR PLEASURE IN DOING THINGS: NEARLY EVERY DAY
2. FEELING DOWN, DEPRESSED OR HOPELESS: NEARLY EVERY DAY
SUM OF ALL RESPONSES TO PHQ QUESTIONS 1-9: 15

## 2024-04-22 NOTE — PROGRESS NOTES
\"Have you been to the ER, urgent care clinic since your last visit?  Hospitalized since your last visit?\"    NO    “Have you seen or consulted any other health care providers outside of [facility] since your last visit?”    NO     “Have you had a pap smear?”    NO    No cervical cancer screening on file         “Have you had a colorectal cancer screening such as a colonoscopy/FIT/Cologuard?    NO    Date of last Colonoscopy: 3/11/2013  No cologuard on file  No FIT/FOBT on file   No flexible sigmoidoscopy on file         Click Here for Release of Records Request

## 2024-04-23 LAB
ALBUMIN SERPL-MCNC: 3.4 G/DL (ref 3.5–5)
ALBUMIN/GLOB SERPL: 0.8 (ref 1.1–2.2)
ALP SERPL-CCNC: 152 U/L (ref 45–117)
ALT SERPL-CCNC: 20 U/L (ref 12–78)
ANION GAP SERPL CALC-SCNC: 7 MMOL/L (ref 5–15)
AST SERPL-CCNC: 26 U/L (ref 15–37)
BILIRUB SERPL-MCNC: 0.4 MG/DL (ref 0.2–1)
BUN SERPL-MCNC: 10 MG/DL (ref 6–20)
BUN/CREAT SERPL: 12 (ref 12–20)
CALCIUM SERPL-MCNC: 9.4 MG/DL (ref 8.5–10.1)
CHLORIDE SERPL-SCNC: 103 MMOL/L (ref 97–108)
CHOLEST SERPL-MCNC: 153 MG/DL
CO2 SERPL-SCNC: 25 MMOL/L (ref 21–32)
CREAT SERPL-MCNC: 0.83 MG/DL (ref 0.55–1.02)
ERYTHROCYTE [DISTWIDTH] IN BLOOD BY AUTOMATED COUNT: 12.3 % (ref 11.5–14.5)
EST. AVERAGE GLUCOSE BLD GHB EST-MCNC: 108 MG/DL
GLOBULIN SER CALC-MCNC: 4.1 G/DL (ref 2–4)
GLUCOSE SERPL-MCNC: 112 MG/DL (ref 65–100)
HBA1C MFR BLD: 5.4 % (ref 4–5.6)
HCT VFR BLD AUTO: 39.6 % (ref 35–47)
HDLC SERPL-MCNC: 33 MG/DL
HDLC SERPL: 4.6 (ref 0–5)
HGB BLD-MCNC: 13.6 G/DL (ref 11.5–16)
LDLC SERPL CALC-MCNC: ABNORMAL MG/DL (ref 0–100)
LDLC SERPL DIRECT ASSAY-MCNC: 51 MG/DL (ref 0–100)
MCH RBC QN AUTO: 31.6 PG (ref 26–34)
MCHC RBC AUTO-ENTMCNC: 34.3 G/DL (ref 30–36.5)
MCV RBC AUTO: 91.9 FL (ref 80–99)
NRBC # BLD: 0 K/UL (ref 0–0.01)
NRBC BLD-RTO: 0 PER 100 WBC
PLATELET # BLD AUTO: 375 K/UL (ref 150–400)
PMV BLD AUTO: 10.1 FL (ref 8.9–12.9)
POTASSIUM SERPL-SCNC: 3.8 MMOL/L (ref 3.5–5.1)
PROT SERPL-MCNC: 7.5 G/DL (ref 6.4–8.2)
RBC # BLD AUTO: 4.31 M/UL (ref 3.8–5.2)
SODIUM SERPL-SCNC: 135 MMOL/L (ref 136–145)
TRIGL SERPL-MCNC: 752 MG/DL
TSH SERPL DL<=0.05 MIU/L-ACNC: 2.43 UIU/ML (ref 0.36–3.74)
VLDLC SERPL CALC-MCNC: ABNORMAL MG/DL
WBC # BLD AUTO: 8.5 K/UL (ref 3.6–11)

## 2024-04-23 RX ORDER — ATORVASTATIN CALCIUM 20 MG/1
20 TABLET, FILM COATED ORAL DAILY
Qty: 90 TABLET | Refills: 1 | Status: SHIPPED | OUTPATIENT
Start: 2024-04-23

## 2024-04-26 DIAGNOSIS — G62.9 NEUROPATHY: ICD-10-CM

## 2024-04-26 RX ORDER — FENOFIBRATE 145 MG/1
145 TABLET, COATED ORAL DAILY
Qty: 90 TABLET | Refills: 1 | Status: SHIPPED | OUTPATIENT
Start: 2024-04-26

## 2024-04-26 RX ORDER — GABAPENTIN 300 MG/1
300 CAPSULE ORAL 2 TIMES DAILY PRN
Qty: 180 CAPSULE | Refills: 1 | Status: SHIPPED | OUTPATIENT
Start: 2024-04-26 | End: 2024-10-23

## 2024-04-26 NOTE — TELEPHONE ENCOUNTER
Future Appointments:  Future Appointments   Date Time Provider Department Center   8/5/2024  2:45 PM Mara Graves MD [facility] BS AMB        Last Appointment With Me:  4/22/2024     Requested Prescriptions     Pending Prescriptions Disp Refills    fenofibrate (TRICOR) 145 MG tablet 90 tablet 1     Sig: Take 1 tablet by mouth daily    gabapentin (NEURONTIN) 300 MG capsule 180 capsule 1     Sig: Take 1 capsule by mouth 2 times daily as needed (pain) for up to 180 days. Max Daily Amount: 600 mg

## 2024-04-26 NOTE — RESULT ENCOUNTER NOTE
Called, Spoke with Pt  Received two pt identifiers  Pt informed per Dr. Graves Cholesterol triglycerides specifically are quite elevated will need to start fenofibrate 145 mg daily  Pt informed per Dr. Graves Continue current dose Lipitor  Rest labs are okay   Pt asks if ronnie can be refilled as well---pended in refill encounter  Pt verbalizes understanding of the instructions and has no further questions at this time.

## 2024-05-16 RX ORDER — LEVOTHYROXINE SODIUM 0.07 MG/1
TABLET ORAL
Qty: 90 TABLET | Refills: 0 | Status: SHIPPED | OUTPATIENT
Start: 2024-05-16

## 2024-05-30 ENCOUNTER — HOSPITAL ENCOUNTER (OUTPATIENT)
Facility: HOSPITAL | Age: 59
Discharge: HOME | End: 2024-05-30
Payer: COMMERCIAL

## 2024-05-30 DIAGNOSIS — M25.561 ACUTE PAIN OF RIGHT KNEE: ICD-10-CM

## 2024-05-30 PROCEDURE — 73562 X-RAY EXAM OF KNEE 3: CPT

## 2024-05-31 ENCOUNTER — TELEPHONE (OUTPATIENT)
Age: 59
End: 2024-05-31

## 2024-05-31 DIAGNOSIS — M25.561 ACUTE PAIN OF RIGHT KNEE: Primary | ICD-10-CM

## 2024-05-31 DIAGNOSIS — R60.0 LEG EDEMA, RIGHT: ICD-10-CM

## 2024-05-31 NOTE — TELEPHONE ENCOUNTER
Called, Spoke with Pt  Received two pt identifiers  Pt informed per Dr. Graves xray shows arthritic changes  Pt informed per Dr. Graves with progressive sx can see Dr. Bryson or Dr. Rad MACIAS per Dr. Graves referral placed to Dr. Jerome  Advised pt can to otc tylenol and to work on Ice and elevation to help with sx until able to be seen  Pt verbalizes understanding of the instructions and has no further questions at this time.

## 2024-05-31 NOTE — TELEPHONE ENCOUNTER
Patient states she needs a call back to discuss X-ray results for Right Knee as patient reports knee, Leg & ankle is now swelling. Please call to discuss. Patient states a detailed message can be left on voice mail if no answer. Thank you

## 2024-06-10 SDOH — HEALTH STABILITY: PHYSICAL HEALTH: ON AVERAGE, HOW MANY DAYS PER WEEK DO YOU ENGAGE IN MODERATE TO STRENUOUS EXERCISE (LIKE A BRISK WALK)?: 0 DAYS

## 2024-06-12 ENCOUNTER — OFFICE VISIT (OUTPATIENT)
Age: 59
End: 2024-06-12
Payer: COMMERCIAL

## 2024-06-12 VITALS — WEIGHT: 193 LBS | HEIGHT: 64 IN | BODY MASS INDEX: 32.95 KG/M2

## 2024-06-12 DIAGNOSIS — M17.0 BILATERAL PRIMARY OSTEOARTHRITIS OF KNEE: Primary | ICD-10-CM

## 2024-06-12 PROCEDURE — 99204 OFFICE O/P NEW MOD 45 MIN: CPT | Performed by: ORTHOPAEDIC SURGERY

## 2024-06-12 PROCEDURE — 20610 DRAIN/INJ JOINT/BURSA W/O US: CPT | Performed by: ORTHOPAEDIC SURGERY

## 2024-06-12 RX ORDER — BETAMETHASONE SODIUM PHOSPHATE AND BETAMETHASONE ACETATE 3; 3 MG/ML; MG/ML
6 INJECTION, SUSPENSION INTRA-ARTICULAR; INTRALESIONAL; INTRAMUSCULAR; SOFT TISSUE ONCE
Status: COMPLETED | OUTPATIENT
Start: 2024-06-12 | End: 2024-06-12

## 2024-06-12 RX ORDER — DICLOFENAC SODIUM 75 MG/1
75 TABLET, DELAYED RELEASE ORAL 2 TIMES DAILY
Qty: 60 TABLET | Refills: 0 | Status: SHIPPED | OUTPATIENT
Start: 2024-06-12 | End: 2024-07-12

## 2024-06-12 RX ADMIN — BETAMETHASONE SODIUM PHOSPHATE AND BETAMETHASONE ACETATE 6 MG: 3; 3 INJECTION, SUSPENSION INTRA-ARTICULAR; INTRALESIONAL; INTRAMUSCULAR; SOFT TISSUE at 14:38

## 2024-06-12 ASSESSMENT — PATIENT HEALTH QUESTIONNAIRE - PHQ9
SUM OF ALL RESPONSES TO PHQ9 QUESTIONS 1 & 2: 0
2. FEELING DOWN, DEPRESSED OR HOPELESS: NOT AT ALL
1. LITTLE INTEREST OR PLEASURE IN DOING THINGS: NOT AT ALL
SUM OF ALL RESPONSES TO PHQ QUESTIONS 1-9: 0

## 2024-06-12 NOTE — PROGRESS NOTES
Identified pt with two pt identifiers (name and ). Reviewed chart in preparation for visit and have obtained necessary documentation.    Yoli Lynn is a 59 y.o. female Knee Pain (Rt knee pain and swelling )  .    Vitals:    24 1326   Weight: 87.5 kg (193 lb)   Height: 1.626 m (5' 4\")          1. Have you been to the ER, urgent care clinic since your last visit?  Hospitalized since your last visit?  no     2. Have you seen or consulted any other health care providers outside of the [facility] since your last visit?  Include any pap smears or colon screening.  no

## 2024-06-12 NOTE — PROGRESS NOTES
6/12/2024    Chief Complaint: Right knee pain    Assessment: Osteoarthritis right knee vs. Meniscus tear    Plan:  This patient and I did discuss the many options in treating knee osteoarthritis.  We did discuss that we could continue to seek out nonoperative modalities, such as: NSAIDs, oral and topical analgesics, knee injections, knee braces, physical therapy, stretching, strengthening, and weight loss strategies, activity modification, ambulatory assistive devices. The patient stated their understanding with this, but and would like to proceed with nonsurgical management in the form of aspiration and injection.      HPI: This is a 59 y.o. female who complains of right knee pain. Onset was gradual.  The patient has had activity dependent pain for several weeks.    The patient has tried activity modification, physical therapy exercises, injections have not been done.  The pain is in the medial knee, it is severe in intensity.  The patient feels unstable with the knee, fears falling, and has significant limitation with activities of daily living, recreation, and walks with a limp.    Past Medical History:   Diagnosis Date    Anxiety     Breast cancer (HCC) 2013    right lumpectomy with radiation    Cancer (HCC)     Breast Cancer-Ductal Carcinoma in situ grade 1    Depression     Headache(784.0)     History of therapeutic radiation     Hypertension     Nose fracture 10/2019    Poor appetite     Psychiatric disorder     depression    PUD (peptic ulcer disease) 11/12    Stress     Tired     Trouble in sleeping     Weakness generalized        Past Surgical History:   Procedure Laterality Date    BREAST LUMPECTOMY  5/30/2013    BREAST LUMPECTOMY/PARTIAL performed by Supriya Daley MD at [facility] MAIN OR    BREAST SURGERY  5/30/13    RIGHT BREAST LUMPECTOMY WITH RIGHT NEDDLE LOCALIZATION     GYN      fallopian tube    ORTHOPEDIC SURGERY      left ankle repair    OTHER SURGICAL HISTORY      cyst removed rom right thigh

## 2024-06-21 ENCOUNTER — HOSPITAL ENCOUNTER (OUTPATIENT)
Facility: HOSPITAL | Age: 59
End: 2024-06-21
Attending: INTERNAL MEDICINE
Payer: COMMERCIAL

## 2024-06-21 ENCOUNTER — HOSPITAL ENCOUNTER (OUTPATIENT)
Facility: HOSPITAL | Age: 59
Discharge: HOME | End: 2024-06-21
Attending: INTERNAL MEDICINE
Payer: COMMERCIAL

## 2024-06-21 DIAGNOSIS — Z12.39 BREAST SCREENING: ICD-10-CM

## 2024-06-21 DIAGNOSIS — Z87.891 PERSONAL HISTORY OF NICOTINE DEPENDENCE: ICD-10-CM

## 2024-06-21 PROCEDURE — 77063 BREAST TOMOSYNTHESIS BI: CPT

## 2024-06-21 PROCEDURE — 71271 CT THORAX LUNG CANCER SCR C-: CPT

## 2024-06-23 RX ORDER — FUROSEMIDE 20 MG/1
20 TABLET ORAL DAILY
Qty: 90 TABLET | Refills: 0 | Status: SHIPPED | OUTPATIENT
Start: 2024-06-23

## 2024-06-23 RX ORDER — DULOXETIN HYDROCHLORIDE 60 MG/1
CAPSULE, DELAYED RELEASE ORAL
Qty: 90 CAPSULE | Refills: 0 | Status: SHIPPED | OUTPATIENT
Start: 2024-06-23

## 2024-06-24 ENCOUNTER — SCHEDULED TELEPHONE ENCOUNTER (OUTPATIENT)
Age: 59
End: 2024-06-24
Payer: COMMERCIAL

## 2024-06-24 DIAGNOSIS — M17.0 BILATERAL PRIMARY OSTEOARTHRITIS OF KNEE: Primary | ICD-10-CM

## 2024-06-24 PROCEDURE — 99441 PR PHYS/QHP TELEPHONE EVALUATION 5-10 MIN: CPT | Performed by: ORTHOPAEDIC SURGERY

## 2024-06-25 RX ORDER — METOPROLOL SUCCINATE 25 MG/1
TABLET, EXTENDED RELEASE ORAL
Qty: 90 TABLET | Refills: 1 | Status: SHIPPED | OUTPATIENT
Start: 2024-06-25

## 2024-06-25 RX ORDER — DICLOFENAC SODIUM 75 MG/1
75 TABLET, DELAYED RELEASE ORAL 2 TIMES DAILY
Qty: 60 TABLET | Refills: 0 | Status: SHIPPED | OUTPATIENT
Start: 2024-06-25

## 2024-06-25 RX ORDER — LEVOTHYROXINE SODIUM 0.07 MG/1
TABLET ORAL
Qty: 90 TABLET | Refills: 0 | Status: SHIPPED | OUTPATIENT
Start: 2024-06-25

## 2024-06-25 NOTE — PROGRESS NOTES
Disp: 90 tablet, Rfl: 1    metoprolol succinate (TOPROL XL) 25 MG extended release tablet, TAKE 1 TABLET BY MOUTH EVERY DAY, Disp: 90 tablet, Rfl: 1    pantoprazole (PROTONIX) 20 MG tablet, TAKE 1 TABLET BY MOUTH EVERY DAY, Disp: 90 tablet, Rfl: 1    amitriptyline (ELAVIL) 10 MG tablet, TAKE 2 TABLETS BY MOUTH IN THE EVENING, Disp: 60 tablet, Rfl: 0    albuterol sulfate HFA (PROVENTIL;VENTOLIN;PROAIR) 108 (90 Base) MCG/ACT inhaler, Inhale 2 puffs into the lungs every 6 hours as needed, Disp: , Rfl:     cyanocobalamin 100 MCG tablet, Take 1 tablet by mouth daily, Disp: , Rfl:     fenofibrate (TRICOR) 145 MG tablet, Take 1 tablet by mouth daily, Disp: , Rfl:     All:  Allergies   Allergen Reactions    Aspirin Itching    Tramadol Itching     Pt requesting deletion       Social Hx:  Social History     Socioeconomic History    Marital status: Single   Tobacco Use    Smoking status: Every Day     Current packs/day: 0.25     Average packs/day: 0.3 packs/day for 20.0 years (5.0 ttl pk-yrs)     Types: Cigarettes    Smokeless tobacco: Never   Substance and Sexual Activity    Alcohol use: Yes     Alcohol/week: 5.8 standard drinks of alcohol    Drug use: No     Social Determinants of Health     Financial Resource Strain: Low Risk  (10/24/2023)    Overall Financial Resource Strain (CARDIA)     Difficulty of Paying Living Expenses: Not hard at all   Transportation Needs: Unknown (10/24/2023)    PRAPARE - Transportation     Lack of Transportation (Non-Medical): No   Physical Activity: Unknown (6/10/2024)    Exercise Vital Sign     Days of Exercise per Week: 0 days   Housing Stability: Unknown (10/24/2023)    Housing Stability Vital Sign     Unstable Housing in the Last Year: No       Family Hx:  Family History   Problem Relation Age of Onset    Cancer Brother         thyroid cancer    Cancer Father         pancreatic cancer    Hypertension Mother          Review of Systems:       General: Denies headache, lethargy, fever, weight

## 2024-07-01 ENCOUNTER — TELEPHONE (OUTPATIENT)
Age: 59
End: 2024-07-01

## 2024-07-03 ENCOUNTER — TELEPHONE (OUTPATIENT)
Age: 59
End: 2024-07-03

## 2024-07-03 LAB
25(OH)D3 SERPL-MCNC: 31.8 NG/ML (ref 30–100)
BASOPHILS # BLD: 0 K/UL (ref 0–0.1)
BASOPHILS NFR BLD: 0 % (ref 0–1)
DIFFERENTIAL METHOD BLD: ABNORMAL
EOSINOPHIL # BLD: 0.4 K/UL (ref 0–0.4)
EOSINOPHIL NFR BLD: 4 % (ref 0–7)
ERYTHROCYTE [DISTWIDTH] IN BLOOD BY AUTOMATED COUNT: 12.2 % (ref 11.5–14.5)
EST. AVERAGE GLUCOSE BLD GHB EST-MCNC: 108 MG/DL
HBA1C MFR BLD: 5.4 % (ref 4–5.6)
HCT VFR BLD AUTO: 38 % (ref 35–47)
HGB BLD-MCNC: 12.2 G/DL (ref 11.5–16)
IMM GRANULOCYTES # BLD AUTO: 0 K/UL
IMM GRANULOCYTES NFR BLD AUTO: 0 %
LYMPHOCYTES # BLD: 2.4 K/UL (ref 0.8–3.5)
LYMPHOCYTES NFR BLD: 24 % (ref 12–49)
MCH RBC QN AUTO: 30.7 PG (ref 26–34)
MCHC RBC AUTO-ENTMCNC: 32.1 G/DL (ref 30–36.5)
MCV RBC AUTO: 95.5 FL (ref 80–99)
MONOCYTES # BLD: 1.1 K/UL (ref 0–1)
MONOCYTES NFR BLD: 11 % (ref 5–13)
NEUTS SEG # BLD: 6 K/UL (ref 1.8–8)
NEUTS SEG NFR BLD: 61 % (ref 32–75)
NRBC # BLD: 0 K/UL (ref 0–0.01)
NRBC BLD-RTO: 0 PER 100 WBC
PLATELET # BLD AUTO: 370 K/UL (ref 150–400)
PMV BLD AUTO: 9.5 FL (ref 8.9–12.9)
RBC # BLD AUTO: 3.98 M/UL (ref 3.8–5.2)
RBC MORPH BLD: ABNORMAL
WBC # BLD AUTO: 9.9 K/UL (ref 3.6–11)

## 2024-07-03 NOTE — TELEPHONE ENCOUNTER
Ms. Lynn called stating that her discomfort in her right knee has increased over the past 2 days with no injury.  She is taking the presribed medication and it has had some effect, but minimal.  She is asking that some call to instruct what she can do next, or if she needs to come in.  Her number is [phone]. Thank you!

## 2024-07-05 DIAGNOSIS — M25.561 RIGHT KNEE PAIN, UNSPECIFIED CHRONICITY: Primary | ICD-10-CM

## 2024-07-16 ENCOUNTER — OFFICE VISIT (OUTPATIENT)
Age: 59
End: 2024-07-16
Payer: COMMERCIAL

## 2024-07-16 DIAGNOSIS — M17.0 BILATERAL PRIMARY OSTEOARTHRITIS OF KNEE: Primary | ICD-10-CM

## 2024-07-16 PROCEDURE — 99213 OFFICE O/P EST LOW 20 MIN: CPT | Performed by: ORTHOPAEDIC SURGERY

## 2024-07-16 RX ORDER — CELECOXIB 200 MG/1
200 CAPSULE ORAL 2 TIMES DAILY PRN
Qty: 60 CAPSULE | Refills: 0 | Status: SHIPPED | OUTPATIENT
Start: 2024-07-16

## 2024-07-17 NOTE — PROGRESS NOTES
7/17/2024      CC: right knee pain    HPI:      This is a 59 y.o. year old female who presents for a follow up visit.  The patient was last seen and diagnosed with right knee osteoarthritis.   The patient's treatments since the most recent visit have comprised of injection.   The patient has had some relief of the chief complaint.        PMH:  Past Medical History:   Diagnosis Date    Anxiety     Breast cancer (HCC) 2013    right lumpectomy with radiation    Cancer (HCC)     Breast Cancer-Ductal Carcinoma in situ grade 1    Depression     Headache(784.0)     History of therapeutic radiation     Hypertension     Nose fracture 10/2019    Poor appetite     Psychiatric disorder     depression    PUD (peptic ulcer disease) 11/12    Stress     Tired     Trouble in sleeping     Weakness generalized        PSxHx:  Past Surgical History:   Procedure Laterality Date    BREAST LUMPECTOMY  5/30/2013    BREAST LUMPECTOMY/PARTIAL performed by Supriya Daley MD at [facility] MAIN OR    BREAST SURGERY  5/30/13    RIGHT BREAST LUMPECTOMY WITH RIGHT NEDDLE LOCALIZATION     GYN      fallopian tube    ORTHOPEDIC SURGERY      left ankle repair    OTHER SURGICAL HISTORY      cyst removed rom right thigh       Meds:    Current Outpatient Medications:     celecoxib (CELEBREX) 200 MG capsule, Take 1 capsule by mouth 2 times daily as needed for Pain, Disp: 60 capsule, Rfl: 0    metoprolol succinate (TOPROL XL) 25 MG extended release tablet, TAKE 1 TABLET BY MOUTH EVERY DAY, Disp: 90 tablet, Rfl: 1    levothyroxine (SYNTHROID) 75 MCG tablet, TAKE 1 TABLET BY MOUTH EVERY DAY BEFORE BREAKFAST, Disp: 90 tablet, Rfl: 0    furosemide (LASIX) 20 MG tablet, TAKE 1 TABLET BY MOUTH EVERY DAY, Disp: 90 tablet, Rfl: 0    DULoxetine (CYMBALTA) 60 MG extended release capsule, TAKE 1 CAPSULE BY MOUTH EVERY DAY, Disp: 90 capsule, Rfl: 0    fenofibrate (TRICOR) 145 MG tablet, Take 1 tablet by mouth daily, Disp: 90 tablet, Rfl: 1    gabapentin (NEURONTIN) 300 MG

## 2024-08-02 RX ORDER — DICLOFENAC SODIUM 75 MG/1
75 TABLET, DELAYED RELEASE ORAL 2 TIMES DAILY
Qty: 60 TABLET | Refills: 0 | OUTPATIENT
Start: 2024-08-02

## 2024-08-05 ENCOUNTER — OFFICE VISIT (OUTPATIENT)
Age: 59
End: 2024-08-05
Payer: COMMERCIAL

## 2024-08-05 VITALS
HEART RATE: 94 BPM | WEIGHT: 198 LBS | TEMPERATURE: 98.4 F | HEIGHT: 64 IN | RESPIRATION RATE: 16 BRPM | SYSTOLIC BLOOD PRESSURE: 128 MMHG | OXYGEN SATURATION: 96 % | DIASTOLIC BLOOD PRESSURE: 87 MMHG | BODY MASS INDEX: 33.8 KG/M2

## 2024-08-05 DIAGNOSIS — E03.9 ACQUIRED HYPOTHYROIDISM: ICD-10-CM

## 2024-08-05 DIAGNOSIS — E78.00 PURE HYPERCHOLESTEROLEMIA: ICD-10-CM

## 2024-08-05 DIAGNOSIS — M79.672 FOOT PAIN, LEFT: ICD-10-CM

## 2024-08-05 DIAGNOSIS — E66.9 OBESITY (BMI 30.0-34.9): ICD-10-CM

## 2024-08-05 DIAGNOSIS — R73.01 IFG (IMPAIRED FASTING GLUCOSE): ICD-10-CM

## 2024-08-05 DIAGNOSIS — F17.200 SMOKER: ICD-10-CM

## 2024-08-05 DIAGNOSIS — R60.0 LOCALIZED EDEMA: ICD-10-CM

## 2024-08-05 DIAGNOSIS — I10 PRIMARY HYPERTENSION: Primary | ICD-10-CM

## 2024-08-05 DIAGNOSIS — I10 PRIMARY HYPERTENSION: ICD-10-CM

## 2024-08-05 DIAGNOSIS — Z85.3 H/O MALIGNANT NEOPLASM OF BREAST: ICD-10-CM

## 2024-08-05 LAB
ALBUMIN SERPL-MCNC: 3.6 G/DL (ref 3.5–5)
ALBUMIN/GLOB SERPL: 0.9 (ref 1.1–2.2)
ALP SERPL-CCNC: 172 U/L (ref 45–117)
ALT SERPL-CCNC: 22 U/L (ref 12–78)
ANION GAP SERPL CALC-SCNC: 6 MMOL/L (ref 5–15)
AST SERPL-CCNC: 26 U/L (ref 15–37)
BILIRUB SERPL-MCNC: 0.4 MG/DL (ref 0.2–1)
BUN SERPL-MCNC: 11 MG/DL (ref 6–20)
BUN/CREAT SERPL: 14 (ref 12–20)
CALCIUM SERPL-MCNC: 9.3 MG/DL (ref 8.5–10.1)
CHLORIDE SERPL-SCNC: 99 MMOL/L (ref 97–108)
CHOLEST SERPL-MCNC: 113 MG/DL
CO2 SERPL-SCNC: 31 MMOL/L (ref 21–32)
CREAT SERPL-MCNC: 0.81 MG/DL (ref 0.55–1.02)
GLOBULIN SER CALC-MCNC: 3.8 G/DL (ref 2–4)
GLUCOSE SERPL-MCNC: 101 MG/DL (ref 65–100)
HDLC SERPL-MCNC: 35 MG/DL
HDLC SERPL: 3.2 (ref 0–5)
LDLC SERPL CALC-MCNC: 1 MG/DL (ref 0–100)
POTASSIUM SERPL-SCNC: 4.2 MMOL/L (ref 3.5–5.1)
PROT SERPL-MCNC: 7.4 G/DL (ref 6.4–8.2)
SODIUM SERPL-SCNC: 136 MMOL/L (ref 136–145)
TRIGL SERPL-MCNC: 385 MG/DL
TSH SERPL DL<=0.05 MIU/L-ACNC: 1.14 UIU/ML (ref 0.36–3.74)
VLDLC SERPL CALC-MCNC: 77 MG/DL

## 2024-08-05 PROCEDURE — 3074F SYST BP LT 130 MM HG: CPT | Performed by: INTERNAL MEDICINE

## 2024-08-05 PROCEDURE — 99214 OFFICE O/P EST MOD 30 MIN: CPT | Performed by: INTERNAL MEDICINE

## 2024-08-05 PROCEDURE — 3079F DIAST BP 80-89 MM HG: CPT | Performed by: INTERNAL MEDICINE

## 2024-08-05 ASSESSMENT — PATIENT HEALTH QUESTIONNAIRE - PHQ9
SUM OF ALL RESPONSES TO PHQ QUESTIONS 1-9: 0
1. LITTLE INTEREST OR PLEASURE IN DOING THINGS: NOT AT ALL
SUM OF ALL RESPONSES TO PHQ QUESTIONS 1-9: 0
SUM OF ALL RESPONSES TO PHQ QUESTIONS 1-9: 0
SUM OF ALL RESPONSES TO PHQ9 QUESTIONS 1 & 2: 0
2. FEELING DOWN, DEPRESSED OR HOPELESS: NOT AT ALL
SUM OF ALL RESPONSES TO PHQ QUESTIONS 1-9: 0

## 2024-08-05 NOTE — PROGRESS NOTES
\"Have you been to the ER, urgent care clinic since your last visit?  Hospitalized since your last visit?\"    NO    “Have you seen or consulted any other health care providers outside of [facility] since your last visit?”    [facility]     “Have you had a pap smear?”    unknown    No cervical cancer screening on file         “Have you had a colorectal cancer screening such as a colonoscopy/FIT/Cologuard?    Colonoscopy ten years ago  Normal    Date of last Colonoscopy: 3/11/2013  No cologuard on file  No FIT/FOBT on file   No flexible sigmoidoscopy on file         Click Here for Release of Records Request  
Movements: Extraocular movements intact.   Neck:      Vascular: No carotid bruit.   Cardiovascular:      Rate and Rhythm: Normal rate and regular rhythm.      Heart sounds: Normal heart sounds. No murmur heard.  Pulmonary:      Effort: Pulmonary effort is normal. No respiratory distress.      Breath sounds: Normal breath sounds. No wheezing.   Abdominal:      General: Abdomen is flat. There is no distension.      Palpations: Abdomen is soft. There is no mass.      Tenderness: There is no abdominal tenderness.      Hernia: No hernia is present.   Musculoskeletal:         General: Swelling (L foot and ankle) and tenderness (L ankle outer tenderness) present. No deformity.      Cervical back: Normal range of motion and neck supple. No tenderness.   Lymphadenopathy:      Cervical: No cervical adenopathy.   Skin:     General: Skin is warm and dry.      Findings: No erythema.   Neurological:      General: No focal deficit present.      Mental Status: She is oriented to person, place, and time. Mental status is at baseline.      Gait: Gait normal.   Psychiatric:         Mood and Affect: Mood normal.           ASSESSMENT and PLAN   Diagnosis Orders   1. Primary hypertension  Lipid Panel    Comprehensive Metabolic Panel   Controlled with Toprol   2. H/O malignant neoplasm of breast     Mammogram up-to-date negative   3. Acquired hypothyroidism  TSH   On Synthroid check TSH adjust dose as needed   4. Pure hypercholesterolemia  Lipid Panel    Comprehensive Metabolic Panel   Controlled Lipitor continue check lipids adjust dose as needed    Has coronary calcification as well on scan discussed statin will help with this and smoking cessation   5. Localized edema     Improved since last office visit continue Lasix once daily monitor metabolic panel for K creatinine and sodium level   6. Foot pain, left  XR ANKLE LEFT (2 VIEWS)    XR FOOT LEFT (MIN 3 VIEWS)   She wonders if this could be gout this was a mechanical injury not

## 2024-08-06 LAB — GGT SERPL-CCNC: 37 U/L (ref 5–55)

## 2024-08-08 DIAGNOSIS — M17.0 BILATERAL PRIMARY OSTEOARTHRITIS OF KNEE: ICD-10-CM

## 2024-08-08 RX ORDER — CELECOXIB 200 MG/1
200 CAPSULE ORAL 2 TIMES DAILY PRN
Qty: 60 CAPSULE | Refills: 0 | Status: SHIPPED | OUTPATIENT
Start: 2024-08-08

## 2024-09-06 DIAGNOSIS — M17.0 BILATERAL PRIMARY OSTEOARTHRITIS OF KNEE: ICD-10-CM

## 2024-09-06 RX ORDER — CELECOXIB 200 MG/1
200 CAPSULE ORAL 2 TIMES DAILY PRN
Qty: 60 CAPSULE | Refills: 0 | Status: SHIPPED | OUTPATIENT
Start: 2024-09-06

## 2024-09-18 RX ORDER — DULOXETIN HYDROCHLORIDE 60 MG/1
CAPSULE, DELAYED RELEASE ORAL
Qty: 90 CAPSULE | Refills: 0 | Status: SHIPPED | OUTPATIENT
Start: 2024-09-18

## 2024-09-23 RX ORDER — FUROSEMIDE 20 MG
20 TABLET ORAL DAILY
Qty: 90 TABLET | Refills: 0 | Status: SHIPPED | OUTPATIENT
Start: 2024-09-23

## 2024-10-23 NOTE — PROGRESS NOTES
Left voice mail for patient to call for results Operative Report    Preop Diagnosis: Peripheral arterial vascular disease.  Right iliac artery stenosis or occlusion.  Claudication and rest pain in the right leg    Results of STS risk score discussed with patient and family    Postoperative Diagnosis: Same      Procedure: Catheter in aorta.  Aortogram.  Repositioning of catheter below the renal arteries and complete arteriographic runoff of both lower extremities to the level of the foot.  Angioplasty and stent of the right common iliac artery with a 7 x 39 VBX covered stent graft subsequently enlarged to 9 mm with angioplasty balloon        Surgeons: Toñito Dee MD      Assistant: Constantino Gant PA-C    The Assistant provided services of suctioning, irrigation and retraction.  Also, assisted in suture closure of parts of the skin incision.      Indication: Patient referred to my office with a right leg claudication and rest pain in the right foot.  CT angiogram demonstrated significant disease in the right common iliac artery with possible actual occlusion.  He understood the nature procedure risk of bleeding infection contrast reaction nephrotoxicity agreed to proceed        Description: Supine position sterile prep and drape.  Right femoral to percutaneously cannulated and J-wire advanced and followed by a 6 Bruneian sheath.  I then performed a hand-held injection of the right common femoral artery and demonstrated a 90% stenosis in the right common iliac artery I was able to advance an advantage 035 guidewire through the stenosis and into the mid descending thoracic aorta.  Pigtail catheter was placed and arteriogram performed.  Demonstrated the renal arteries were single and patent bilaterally the left sided system the common iliac and external iliacs are widely patent without stenosis the left common femoral and profundus femoral are patent without stenosis similarly the left superficial femoral artery popliteal artery were widely patent.  The trifurcation  vessels of the left leg were normal in appearance and runoff to the level of the ankle and foot.  On the right leg 90 to 95% right common iliac stenosis was noted however the right external iliac artery and common femoral and profundus femoral were widely patent without any narrowing.  Similarly the right superficial femoral popliteal and tibial vessels were widely patent.  I positioned a 7 x 39 mm VBX covered stent graft in the area of the right common iliac stenosis this was inflated to 12 robert.  Subsequently with a 9 mm balloon this was angioplastied and enlarged to 9 mm.  Contrast run following this demonstrated complete resolution of the stenosis with no extravasation of contrast.  The 6 Divehi sheath will be removed in the recovery room and manual pressure will be applied.  Total contrast given 60 mL total fluoroscopy time 2 minutes and 36 seconds      Please note that portions of this note were completed with a voice recognition program. Efforts were made to edit the dictations, but occasionally words are mistranscribed.

## 2024-10-28 RX ORDER — FENOFIBRATE 145 MG/1
145 TABLET, COATED ORAL DAILY
Qty: 90 TABLET | Refills: 1 | Status: SHIPPED | OUTPATIENT
Start: 2024-10-28

## 2024-11-22 RX ORDER — LEVOTHYROXINE SODIUM 75 UG/1
TABLET ORAL
Qty: 90 TABLET | Refills: 0 | Status: SHIPPED | OUTPATIENT
Start: 2024-11-22

## 2024-11-27 RX ORDER — FUROSEMIDE 20 MG/1
20 TABLET ORAL DAILY
Qty: 90 TABLET | Refills: 0 | Status: SHIPPED | OUTPATIENT
Start: 2024-11-27

## 2024-12-02 RX ORDER — DICLOFENAC SODIUM 75 MG/1
75 TABLET, DELAYED RELEASE ORAL 2 TIMES DAILY
Qty: 60 TABLET | Refills: 0 | OUTPATIENT
Start: 2024-12-02

## 2024-12-02 RX ORDER — ATORVASTATIN CALCIUM 20 MG/1
20 TABLET, FILM COATED ORAL DAILY
Qty: 90 TABLET | Refills: 1 | Status: SHIPPED | OUTPATIENT
Start: 2024-12-02

## 2024-12-16 RX ORDER — DULOXETIN HYDROCHLORIDE 60 MG/1
CAPSULE, DELAYED RELEASE ORAL
Qty: 90 CAPSULE | Refills: 0 | Status: SHIPPED | OUTPATIENT
Start: 2024-12-16

## 2025-01-02 DIAGNOSIS — G62.9 NEUROPATHY: ICD-10-CM

## 2025-01-02 DIAGNOSIS — M17.0 BILATERAL PRIMARY OSTEOARTHRITIS OF KNEE: ICD-10-CM

## 2025-01-03 RX ORDER — DULOXETIN HYDROCHLORIDE 60 MG/1
CAPSULE, DELAYED RELEASE ORAL
Qty: 90 CAPSULE | Refills: 0 | Status: SHIPPED | OUTPATIENT
Start: 2025-01-03

## 2025-01-03 RX ORDER — LEVOTHYROXINE SODIUM 75 UG/1
TABLET ORAL
Qty: 90 TABLET | Refills: 0 | Status: SHIPPED | OUTPATIENT
Start: 2025-01-03

## 2025-01-03 RX ORDER — GABAPENTIN 300 MG/1
300 CAPSULE ORAL 2 TIMES DAILY PRN
Qty: 180 CAPSULE | Refills: 0 | Status: SHIPPED | OUTPATIENT
Start: 2025-01-03 | End: 2025-07-02

## 2025-01-03 RX ORDER — METOPROLOL SUCCINATE 25 MG/1
TABLET, EXTENDED RELEASE ORAL
Qty: 90 TABLET | Refills: 1 | Status: SHIPPED | OUTPATIENT
Start: 2025-01-03

## 2025-01-05 RX ORDER — DICLOFENAC SODIUM 75 MG/1
75 TABLET, DELAYED RELEASE ORAL 2 TIMES DAILY
Qty: 60 TABLET | Refills: 0 | OUTPATIENT
Start: 2025-01-05

## 2025-01-05 RX ORDER — CELECOXIB 200 MG/1
200 CAPSULE ORAL 2 TIMES DAILY PRN
Qty: 60 CAPSULE | Refills: 0 | Status: SHIPPED | OUTPATIENT
Start: 2025-01-05

## 2025-01-21 ENCOUNTER — OFFICE VISIT (OUTPATIENT)
Age: 60
End: 2025-01-21

## 2025-01-21 VITALS
OXYGEN SATURATION: 98 % | HEART RATE: 85 BPM | BODY MASS INDEX: 35.34 KG/M2 | RESPIRATION RATE: 16 BRPM | DIASTOLIC BLOOD PRESSURE: 73 MMHG | SYSTOLIC BLOOD PRESSURE: 106 MMHG | TEMPERATURE: 98.2 F | WEIGHT: 206 LBS

## 2025-01-21 DIAGNOSIS — M79.672 LEFT FOOT PAIN: ICD-10-CM

## 2025-01-21 DIAGNOSIS — S92.352A CLOSED DISPLACED FRACTURE OF FIFTH METATARSAL BONE OF LEFT FOOT, INITIAL ENCOUNTER: Primary | ICD-10-CM

## 2025-01-21 RX ORDER — MULTIVITAMIN
1 TABLET ORAL DAILY
COMMUNITY

## 2025-01-21 RX ORDER — OMEPRAZOLE 40 MG/1
CAPSULE, DELAYED RELEASE ORAL
COMMUNITY
Start: 2025-01-10

## 2025-01-21 RX ORDER — NYSTATIN AND TRIAMCINOLONE ACETONIDE 100000; 1 [USP'U]/G; MG/G
CREAM TOPICAL
COMMUNITY
Start: 2025-01-02

## 2025-01-21 RX ORDER — TRAMADOL HYDROCHLORIDE 50 MG/1
50 TABLET ORAL EVERY 6 HOURS PRN
COMMUNITY
Start: 2025-01-20

## 2025-01-21 NOTE — PROGRESS NOTES
significant swelling to the lateral aspect of the ankle as well as the dorsal aspect of the foot.  She does report some diminished sensation to her toes.  Denies any coolness or paleness.  She is ambulating but does report some difficulty with this.  She denies pain elsewhere.         Vitals:    01/21/25 1233   BP: 106/73   Site: Left Upper Arm   Position: Sitting   Cuff Size: Large Adult   Pulse: 85   Resp: 16   Temp: 98.2 °F (36.8 °C)   SpO2: 98%   Weight: 93.4 kg (206 lb)       Xray Result (most recent):  XR FOOT LEFT (MIN 3 VIEWS) 01/21/2025    Narrative  Exam  X-Ray   Views of the Left  Foot    Comparison  None provided.    Findings    Acute mildly displaced fifth metatarsal base fracture with intra-articular  extension. Overlying soft tissue swelling. Additional minimally displaced fifth  metatarsal head fracture with mild angulation.    Questionable avulsion injury from the lateral aspect of the calcaneus about the  calcaneocuboid articulation.    Maintained hindfoot articulation. Maintained midfoot and forefoot alignment.  Mild midfoot DJD. Postsurgical changes in the lateral malleolus.    Impression  Fifth metatarsal head and base fractures as above.    Questionable avulsion injury from the lateral aspect of the calcaneus about the  calcaneocuboid articulation.    Electronically signed by: Scott Rodas MD on 01/21/2025 01:32:38 PM  /Eastern         Objective   Physical Exam  Vitals and nursing note reviewed.   Constitutional:       General: She is not in acute distress.     Appearance: Normal appearance. She is not ill-appearing.   HENT:      Head: Normocephalic and atraumatic.   Cardiovascular:      Rate and Rhythm: Normal rate and regular rhythm.      Pulses: Normal pulses.   Pulmonary:      Effort: Pulmonary effort is normal. No respiratory distress.   Musculoskeletal:      Left ankle: Swelling present. No deformity. Tenderness present over the lateral malleolus. Normal range of motion. Normal

## 2025-01-21 NOTE — PATIENT INSTRUCTIONS
Patient was seen today for evaluation of a left foot injury  X-ray of the foot shows a fracture to the base of the fifth metatarsal, also with questionable calcaneal avulsion fracture  Will place in a walking boot and provide the patient with crutches  Tylenol and ibuprofen over-the-counter as needed for pain  RICE: Rest, ice, compression and elevation  Elevate the affected extremity for 15 to 20 minutes at a time, several times throughout the day  Please follow-up with orthopedic specialist, referral has been provided, please call for an appointment

## 2025-01-28 NOTE — PROGRESS NOTES
HISTORY OF PRESENT ILLNESS  Yoli Lynn is a 59 y.o. female.  HPI  Last here 8/5/24. Pt is here for routine care.        Has a history of hypertension  BP today is 106/70  No home BP readings for review  She is on Toprol-XL 25 mg     Taking Lasix 1 tablet 20 mgdaily for chronic BL LE  discussed compression hose  Overall improved stable         Wt today is 204 lbs, lov 198 lbs, up 6more lbs since lov but she does have a walking boot on, she has been gaining weight the past few office visits  Discussed diet and w/l, counting calories, weight watchers portion control  Discussed GLP1 agonist and potential side effects. Emphasized the importance of listening to hunger cues. No fmhx of thyroid cancer. It was previously noted her brother had thyroid cancer but she states this was actually just a thyroid issue, not cancer  Rx'd zepbound 2.5mg weekly, will titrate up as tolerated        Reviewed labs  Ordered labs         Has been dealing with a foot fracture has a boot on  She was seen at  1/21/25 for fracture of fifth metatarsal bone L foot  Reviewed XR L foot 1/21/25  IMPRESSION:     Fifth metatarsal head and base fractures as above.     Questionable avulsion injury from the lateral aspect of the calcaneus about the   calcaneocuboid articulation.    Reviewed XR L ankle 1/21/25  IMPRESSION:     Fifth metatarsal base fracture as above.     She then saw Dr Montaño (ortho) for fracture of 5th metatarsal bone R foot  Lov 1/29/25  Reviewed note:  1. Closed avulsion fracture of lateral malleolus of left fibula, initial encounter  -     ibuprofen (ADVIL;MOTRIN) 600 MG tablet; Take 1 tablet by mouth 3 times daily as needed for Pain, Disp-30 tablet, R-0Normal  2. Closed fracture of base of fifth metatarsal bone of left foot, initial encounter  3. Pain and swelling of left ankle  -     ibuprofen (ADVIL;MOTRIN) 600 MG tablet; Take 1 tablet by mouth 3 times daily as needed for Pain, Disp-30 tablet, R-0Normal     Patient verbalized

## 2025-02-04 ENCOUNTER — OFFICE VISIT (OUTPATIENT)
Age: 60
End: 2025-02-04
Payer: COMMERCIAL

## 2025-02-04 VITALS
HEIGHT: 64 IN | RESPIRATION RATE: 15 BRPM | SYSTOLIC BLOOD PRESSURE: 106 MMHG | HEART RATE: 95 BPM | OXYGEN SATURATION: 96 % | BODY MASS INDEX: 34.83 KG/M2 | TEMPERATURE: 97.9 F | DIASTOLIC BLOOD PRESSURE: 70 MMHG | WEIGHT: 204 LBS

## 2025-02-04 DIAGNOSIS — R73.01 IFG (IMPAIRED FASTING GLUCOSE): ICD-10-CM

## 2025-02-04 DIAGNOSIS — E78.00 PURE HYPERCHOLESTEROLEMIA: ICD-10-CM

## 2025-02-04 DIAGNOSIS — E03.9 ACQUIRED HYPOTHYROIDISM: ICD-10-CM

## 2025-02-04 DIAGNOSIS — R60.0 LOCALIZED EDEMA: ICD-10-CM

## 2025-02-04 DIAGNOSIS — K21.9 GASTROESOPHAGEAL REFLUX DISEASE WITHOUT ESOPHAGITIS: ICD-10-CM

## 2025-02-04 DIAGNOSIS — E66.9 OBESITY (BMI 35.0-39.9 WITHOUT COMORBIDITY): ICD-10-CM

## 2025-02-04 DIAGNOSIS — F32.A DEPRESSION, UNSPECIFIED DEPRESSION TYPE: ICD-10-CM

## 2025-02-04 DIAGNOSIS — R10.13 DYSPEPSIA: ICD-10-CM

## 2025-02-04 DIAGNOSIS — R79.89 ELEVATED LIVER FUNCTION TESTS: ICD-10-CM

## 2025-02-04 DIAGNOSIS — I10 PRIMARY HYPERTENSION: Primary | ICD-10-CM

## 2025-02-04 DIAGNOSIS — I10 PRIMARY HYPERTENSION: ICD-10-CM

## 2025-02-04 DIAGNOSIS — Z85.3 H/O MALIGNANT NEOPLASM OF BREAST: ICD-10-CM

## 2025-02-04 PROCEDURE — 3078F DIAST BP <80 MM HG: CPT | Performed by: INTERNAL MEDICINE

## 2025-02-04 PROCEDURE — 3074F SYST BP LT 130 MM HG: CPT | Performed by: INTERNAL MEDICINE

## 2025-02-04 PROCEDURE — 99214 OFFICE O/P EST MOD 30 MIN: CPT | Performed by: INTERNAL MEDICINE

## 2025-02-04 SDOH — ECONOMIC STABILITY: FOOD INSECURITY: WITHIN THE PAST 12 MONTHS, THE FOOD YOU BOUGHT JUST DIDN'T LAST AND YOU DIDN'T HAVE MONEY TO GET MORE.: NEVER TRUE

## 2025-02-04 SDOH — ECONOMIC STABILITY: FOOD INSECURITY: WITHIN THE PAST 12 MONTHS, YOU WORRIED THAT YOUR FOOD WOULD RUN OUT BEFORE YOU GOT MONEY TO BUY MORE.: NEVER TRUE

## 2025-02-04 ASSESSMENT — PATIENT HEALTH QUESTIONNAIRE - PHQ9
SUM OF ALL RESPONSES TO PHQ9 QUESTIONS 1 & 2: 0
SUM OF ALL RESPONSES TO PHQ QUESTIONS 1-9: 0
2. FEELING DOWN, DEPRESSED OR HOPELESS: NOT AT ALL
1. LITTLE INTEREST OR PLEASURE IN DOING THINGS: NOT AT ALL
SUM OF ALL RESPONSES TO PHQ QUESTIONS 1-9: 0

## 2025-02-04 NOTE — PROGRESS NOTES
\"Have you been to the ER, urgent care clinic since your last visit?  Hospitalized since your last visit?\"    YES - When: approximately 2  weeks ago.  Where and Why: Fractured left foot.    “Have you seen or consulted any other health care providers outside our system since your last visit?”    NO     “Have you had a pap smear?”    NO    No cervical cancer screening on file

## 2025-02-06 ENCOUNTER — TELEPHONE (OUTPATIENT)
Age: 60
End: 2025-02-06

## 2025-02-06 LAB
ALBUMIN SERPL-MCNC: 3.4 G/DL (ref 3.5–5)
ALBUMIN/GLOB SERPL: 0.9 (ref 1.1–2.2)
ALP SERPL-CCNC: 112 U/L (ref 45–117)
ALT SERPL-CCNC: 24 U/L (ref 12–78)
ANION GAP SERPL CALC-SCNC: 6 MMOL/L (ref 2–12)
AST SERPL-CCNC: 27 U/L (ref 15–37)
BILIRUB SERPL-MCNC: 0.2 MG/DL (ref 0.2–1)
BUN SERPL-MCNC: 7 MG/DL (ref 6–20)
BUN/CREAT SERPL: 7 (ref 12–20)
CALCIUM SERPL-MCNC: 9.9 MG/DL (ref 8.5–10.1)
CHLORIDE SERPL-SCNC: 100 MMOL/L (ref 97–108)
CHOLEST SERPL-MCNC: 142 MG/DL
CO2 SERPL-SCNC: 30 MMOL/L (ref 21–32)
CREAT SERPL-MCNC: 0.95 MG/DL (ref 0.55–1.02)
EST. AVERAGE GLUCOSE BLD GHB EST-MCNC: 111 MG/DL
GLOBULIN SER CALC-MCNC: 3.9 G/DL (ref 2–4)
GLUCOSE SERPL-MCNC: 94 MG/DL (ref 65–100)
HBA1C MFR BLD: 5.5 % (ref 4–5.6)
HDLC SERPL-MCNC: 30 MG/DL
HDLC SERPL: 4.7 (ref 0–5)
LDLC SERPL CALC-MCNC: 40.4 MG/DL (ref 0–100)
POTASSIUM SERPL-SCNC: 4.8 MMOL/L (ref 3.5–5.1)
PROT SERPL-MCNC: 7.3 G/DL (ref 6.4–8.2)
SODIUM SERPL-SCNC: 136 MMOL/L (ref 136–145)
TRIGL SERPL-MCNC: 358 MG/DL
TSH SERPL DL<=0.05 MIU/L-ACNC: 2.34 UIU/ML (ref 0.36–3.74)
VLDLC SERPL CALC-MCNC: 71.6 MG/DL

## 2025-02-10 NOTE — TELEPHONE ENCOUNTER
Patient received results from Anabell Grant 02/10/2025 [id number].    See result note information below.    Called, Spoke with Pt  Received two pt identifiers  Pt informed per Dr. Graves, Be sure to continue fenofibrate and lipitor for lipids, triglycerides remain elevated though better than in the past.  Work on weight loss as discussed in clinic--started mounjaro--- pt reports she has not yet started taking Mounjaro, however she will  today.     Pt verbalizes understanding of the instructions and has no further questions at this time.

## 2025-02-17 ENCOUNTER — TELEPHONE (OUTPATIENT)
Age: 60
End: 2025-02-17

## 2025-02-17 NOTE — TELEPHONE ENCOUNTER
Pt states would like a call from clinical staff to discuss medication.      Please call to discuss.

## 2025-02-19 RX ORDER — FENOFIBRATE 145 MG/1
145 TABLET, COATED ORAL DAILY
Qty: 90 TABLET | Refills: 1 | Status: SHIPPED | OUTPATIENT
Start: 2025-02-19

## 2025-03-07 ENCOUNTER — TELEPHONE (OUTPATIENT)
Age: 60
End: 2025-03-07

## 2025-03-07 NOTE — TELEPHONE ENCOUNTER
Caller requests Refill of:  tirzepatide-weight management (ZEPBOUND) 2.5 MG/0.5ML SOAJ subCUTAneous auto-injector pen     Please send to:    [facility]/pharmacy #[id number] - [place], [place] - [address] - P [phone] - F [phone]  [address]  [facility] [address]  Phone: [phone] Fax: [phone]         Visit / Appointment History:  Future Appointment at [facility]:  5/5/2025   Last Appointment With PCP:  2/4/2025       Caller confirmed instructions and dosages as correct.    Caller was advised that Meds will be refilled as soon as possible, however there can be a 48-72 business hour turn around on refill requests.

## 2025-03-07 NOTE — TELEPHONE ENCOUNTER
[place] of state [place] - [place]    Questions:     When is best to fill:  Can she get the refills early or   is it better to call when needed, and have them sent to [facility] (where she will be staying)?  Travel:  Is it safe to care in a suitcase for duration of a couple hours?  Ok to put in purse for plane travel?      Call to advise  [phone]

## 2025-03-11 DIAGNOSIS — R60.0 LOCALIZED EDEMA: ICD-10-CM

## 2025-03-11 DIAGNOSIS — F32.A DEPRESSION, UNSPECIFIED DEPRESSION TYPE: ICD-10-CM

## 2025-03-11 DIAGNOSIS — R79.89 ELEVATED LIVER FUNCTION TESTS: ICD-10-CM

## 2025-03-11 DIAGNOSIS — R10.13 DYSPEPSIA: ICD-10-CM

## 2025-03-11 DIAGNOSIS — K21.9 GASTROESOPHAGEAL REFLUX DISEASE WITHOUT ESOPHAGITIS: ICD-10-CM

## 2025-03-11 DIAGNOSIS — E66.9 OBESITY (BMI 35.0-39.9 WITHOUT COMORBIDITY): ICD-10-CM

## 2025-03-11 DIAGNOSIS — I10 PRIMARY HYPERTENSION: ICD-10-CM

## 2025-03-11 DIAGNOSIS — E03.9 ACQUIRED HYPOTHYROIDISM: ICD-10-CM

## 2025-03-11 DIAGNOSIS — Z85.3 H/O MALIGNANT NEOPLASM OF BREAST: ICD-10-CM

## 2025-03-11 DIAGNOSIS — E78.00 PURE HYPERCHOLESTEROLEMIA: ICD-10-CM

## 2025-03-11 DIAGNOSIS — R73.01 IFG (IMPAIRED FASTING GLUCOSE): ICD-10-CM

## 2025-03-11 NOTE — TELEPHONE ENCOUNTER
Called pt, left vm to return call to office.   Of note, pt is supposed to go to next higher does as long as previous dose was tolerated

## 2025-03-12 ENCOUNTER — TELEPHONE (OUTPATIENT)
Age: 60
End: 2025-03-12

## 2025-03-12 RX ORDER — TIRZEPATIDE 2.5 MG/.5ML
INJECTION, SOLUTION SUBCUTANEOUS
Qty: 2 ML | Refills: 0 | Status: SHIPPED | OUTPATIENT
Start: 2025-03-12 | End: 2025-03-13

## 2025-03-12 NOTE — TELEPHONE ENCOUNTER
Pt states would like a call from clinical staff in regards to tirzepatide-weight management (ZEPBOUND) 2.5 MG/0.5ML SOAJ subCUTAneous auto-injector pen states would like to know if pcp is adjusting dosage.    Please call to discuss.

## 2025-03-30 RX ORDER — ATORVASTATIN CALCIUM 20 MG/1
20 TABLET, FILM COATED ORAL DAILY
Qty: 90 TABLET | Refills: 1 | Status: SHIPPED | OUTPATIENT
Start: 2025-03-30

## 2025-04-05 DIAGNOSIS — G62.9 NEUROPATHY: ICD-10-CM

## 2025-04-06 RX ORDER — DULOXETIN HYDROCHLORIDE 60 MG/1
CAPSULE, DELAYED RELEASE ORAL DAILY
Qty: 90 CAPSULE | Refills: 0 | Status: SHIPPED | OUTPATIENT
Start: 2025-04-06

## 2025-04-06 RX ORDER — FUROSEMIDE 20 MG/1
20 TABLET ORAL DAILY
Qty: 90 TABLET | Refills: 0 | Status: SHIPPED | OUTPATIENT
Start: 2025-04-06

## 2025-04-06 RX ORDER — GABAPENTIN 300 MG/1
300 CAPSULE ORAL 2 TIMES DAILY PRN
Qty: 180 CAPSULE | Refills: 0 | Status: SHIPPED | OUTPATIENT
Start: 2025-04-06 | End: 2025-10-03

## 2025-04-06 RX ORDER — LEVOTHYROXINE SODIUM 75 UG/1
75 TABLET ORAL
Qty: 90 TABLET | Refills: 0 | Status: SHIPPED | OUTPATIENT
Start: 2025-04-06

## 2025-04-07 DIAGNOSIS — M17.0 BILATERAL PRIMARY OSTEOARTHRITIS OF KNEE: ICD-10-CM

## 2025-04-08 RX ORDER — CELECOXIB 200 MG/1
200 CAPSULE ORAL 2 TIMES DAILY PRN
Qty: 60 CAPSULE | Refills: 0 | OUTPATIENT
Start: 2025-04-08

## 2025-04-09 RX ORDER — TIRZEPATIDE 5 MG/.5ML
INJECTION, SOLUTION SUBCUTANEOUS
Qty: 2 ML | Refills: 0 | Status: SHIPPED | OUTPATIENT
Start: 2025-04-09

## 2025-04-10 ENCOUNTER — TELEPHONE (OUTPATIENT)
Age: 60
End: 2025-04-10

## 2025-04-10 RX ORDER — DICLOFENAC SODIUM 75 MG/1
75 TABLET, DELAYED RELEASE ORAL 2 TIMES DAILY PRN
Qty: 60 TABLET | Refills: 0 | Status: SHIPPED | OUTPATIENT
Start: 2025-04-10

## 2025-04-10 NOTE — TELEPHONE ENCOUNTER
Requesting refill of RX   diclofenac (VOLTAREN) 75 MG EC tablet (by Félix on 7/10/24) to her preferred pharmacy of [facility]/PHARMACY #[id number] - [place], [place] - [address] - [phone] - F [phone] [[id number]].     Refill of RX to pharmacy on file.

## 2025-04-28 NOTE — PROGRESS NOTES
HISTORY OF PRESENT ILLNESS  Yoli Lynn is a 59 y.o. female.  HPI  Last here 2/4/25. Pt is here for routine care.        Has a history of hypertension  BP today is 107/72  No home BP readings for review  She is on Toprol-XL 25 mg     Taking Lasix 1 tablet 20 mgdaily for chronic BL LE edema   discussed compression hose  Overall improved stable        Struggles with obesity   Wt today is 200 lbs, down 4 lbs since lov she does still have a walking boot on   Discussed diet and w/l, counting calories, weight watchers portion control  She is on zepbound 5mg weekly, titrated up since lov, tolerating well no nausea no problems with medication, will increase to 7.5mg dose discussed calling for next dose whenever she is due rather than just a refill until he hit her target goal       Reviewed labs  Ordered labs            Has been dealing with a foot fracture has a boot on this will be removed in approximately 1 month     She then saw Dr Montaño (ortho) for fracture of 5th metatarsal bone L foot  Lov 4/22/25, note not signed, f/u 6/25      Reviewed XR L foot 3/25/25  Left foot AP, lateral and oblique standing x-rays show the base of fifth metatarsal fracture continues to heal, there is healing ossification at the site but still visible fracture.  There is 2 fracture lines 1 more at the metaphyseal region and the other more proximal tuberosity.  The proximal tuberosity could also be calcification at the insertional part of the peroneal tendons.  There is no significant soft tissue swelling.  Overall alignment satisfactory no other fractures are seen.    Reviewed XR L ankle 3/25/25  Left ankle AP, lateral and oblique x-rays show a hole in the fibula from a previous procedure which was a tendon transfer.  That remains about the same.  There are no acute fractures or dislocations at the proximal part of the fibula but the very distal avulsion fracture of the fibula remains about the same position, there is signs of healing,

## 2025-05-03 DIAGNOSIS — E66.811 OBESITY (BMI 30.0-34.9): ICD-10-CM

## 2025-05-03 DIAGNOSIS — F32.A DEPRESSION, UNSPECIFIED DEPRESSION TYPE: ICD-10-CM

## 2025-05-03 DIAGNOSIS — E03.9 ACQUIRED HYPOTHYROIDISM: ICD-10-CM

## 2025-05-03 DIAGNOSIS — E78.00 PURE HYPERCHOLESTEROLEMIA: ICD-10-CM

## 2025-05-03 DIAGNOSIS — I10 PRIMARY HYPERTENSION: Primary | ICD-10-CM

## 2025-05-03 DIAGNOSIS — Z85.3 H/O MALIGNANT NEOPLASM OF BREAST: ICD-10-CM

## 2025-05-03 DIAGNOSIS — R60.0 LOCALIZED EDEMA: ICD-10-CM

## 2025-05-04 RX ORDER — DICLOFENAC SODIUM 75 MG/1
75 TABLET, DELAYED RELEASE ORAL 2 TIMES DAILY
Qty: 60 TABLET | Refills: 0 | Status: SHIPPED | OUTPATIENT
Start: 2025-05-04

## 2025-05-05 ENCOUNTER — OFFICE VISIT (OUTPATIENT)
Age: 60
End: 2025-05-05
Payer: COMMERCIAL

## 2025-05-05 VITALS
HEIGHT: 64 IN | TEMPERATURE: 98.1 F | WEIGHT: 200 LBS | DIASTOLIC BLOOD PRESSURE: 72 MMHG | BODY MASS INDEX: 34.15 KG/M2 | OXYGEN SATURATION: 96 % | RESPIRATION RATE: 15 BRPM | HEART RATE: 99 BPM | SYSTOLIC BLOOD PRESSURE: 107 MMHG

## 2025-05-05 DIAGNOSIS — I10 PRIMARY HYPERTENSION: Primary | ICD-10-CM

## 2025-05-05 DIAGNOSIS — G44.209 TENSION-TYPE HEADACHE, NOT INTRACTABLE, UNSPECIFIED CHRONICITY PATTERN: ICD-10-CM

## 2025-05-05 DIAGNOSIS — R73.01 IFG (IMPAIRED FASTING GLUCOSE): ICD-10-CM

## 2025-05-05 DIAGNOSIS — R60.0 LOCALIZED EDEMA: ICD-10-CM

## 2025-05-05 DIAGNOSIS — G62.9 NEUROPATHY: ICD-10-CM

## 2025-05-05 DIAGNOSIS — Z85.3 H/O MALIGNANT NEOPLASM OF BREAST: ICD-10-CM

## 2025-05-05 DIAGNOSIS — F32.A DEPRESSION, UNSPECIFIED DEPRESSION TYPE: ICD-10-CM

## 2025-05-05 DIAGNOSIS — E66.811 OBESITY (BMI 30.0-34.9): ICD-10-CM

## 2025-05-05 DIAGNOSIS — Z87.891 PERSONAL HISTORY OF NICOTINE DEPENDENCE: ICD-10-CM

## 2025-05-05 DIAGNOSIS — E03.9 ACQUIRED HYPOTHYROIDISM: ICD-10-CM

## 2025-05-05 DIAGNOSIS — E78.00 PURE HYPERCHOLESTEROLEMIA: ICD-10-CM

## 2025-05-05 PROCEDURE — 3078F DIAST BP <80 MM HG: CPT | Performed by: INTERNAL MEDICINE

## 2025-05-05 PROCEDURE — 3074F SYST BP LT 130 MM HG: CPT | Performed by: INTERNAL MEDICINE

## 2025-05-05 PROCEDURE — 99214 OFFICE O/P EST MOD 30 MIN: CPT | Performed by: INTERNAL MEDICINE

## 2025-05-05 RX ORDER — FUROSEMIDE 20 MG/1
20 TABLET ORAL DAILY
Qty: 90 TABLET | Refills: 0 | Status: SHIPPED | OUTPATIENT
Start: 2025-05-05

## 2025-05-05 ASSESSMENT — PATIENT HEALTH QUESTIONNAIRE - PHQ9
SUM OF ALL RESPONSES TO PHQ QUESTIONS 1-9: 0
2. FEELING DOWN, DEPRESSED OR HOPELESS: NOT AT ALL
SUM OF ALL RESPONSES TO PHQ QUESTIONS 1-9: 0
SUM OF ALL RESPONSES TO PHQ QUESTIONS 1-9: 0
1. LITTLE INTEREST OR PLEASURE IN DOING THINGS: NOT AT ALL
SUM OF ALL RESPONSES TO PHQ QUESTIONS 1-9: 0

## 2025-06-03 ENCOUNTER — TELEPHONE (OUTPATIENT)
Age: 60
End: 2025-06-03

## 2025-06-03 NOTE — TELEPHONE ENCOUNTER
Future Appointments:  Future Appointments   Date Time Provider Department Center   9/8/2025  1:30 PM Mara Graves MD [facility]   9/16/2025 11:00 AM Amy Montaño MD [facility]        Last Appointment With Me:  5/5/2025     Requested Prescriptions     Pending Prescriptions Disp Refills    tirzepatide-weight management (ZEPBOUND) 10 MG/0.5ML SOAJ subCUTAneous auto-injector pen 2 mL 0     Sig: Inject 10 mg into the skin every 7 days

## 2025-06-03 NOTE — TELEPHONE ENCOUNTER
Caller:  patient       Regarding medication:  zepbound    Reported Issue:  Pt states due for dosage increase        Pharmacy:  [facility]/pharmacy #[id number] - [place] - [address] [phone] - F [phone]                  Caller confirms readback of documented phone/fax number(s) as correct.      Appointments:  Last seen at [facility]:   5/5/2025   Future appointment [facility]:  9/8/2025

## 2025-06-04 NOTE — TELEPHONE ENCOUNTER
Pt states that she will be in [place] from mid July to end of August.  That is why she scheduled in Sept.     She also states a nurse told her we could have the medication mailed to her?  Please call pt to work this out.

## 2025-06-05 NOTE — TELEPHONE ENCOUNTER
Called, Spoke with Pt  Received two pt identifiers  Pt informed per Dr. Graves appt needs to be scheduled in July before [place].     Pt scheduled for 07/07/25 @01:45 pm.

## 2025-06-20 DIAGNOSIS — G62.9 NEUROPATHY: ICD-10-CM

## 2025-06-21 RX ORDER — GABAPENTIN 300 MG/1
300 CAPSULE ORAL 2 TIMES DAILY PRN
Qty: 180 CAPSULE | Refills: 0 | Status: SHIPPED | OUTPATIENT
Start: 2025-06-21 | End: 2025-12-18

## 2025-06-30 NOTE — PROGRESS NOTES
HISTORY OF PRESENT ILLNESS  Yoli Lynn is a 60 y.o. female.  HPI    Last here 5/5/25. Pt is here for routine care.  Patient will be leaving for [place] for months in about a week     Has a history of hypertension  BP today is 110/75  No home BP readings for review  She is on Toprol-XL 25 mg     Taking Lasix 1 tablet 20 mg daily for chronic BL LE edema   discussed compression hose  Overall improved stable really no edema here today discussed taking as needed only        Struggles with obesity   Wt today is 191 lbs, 200 lbs lov  Discussed diet and w/l, counting calories, weight watchers portion control  She is on zepbound 10mg weekly has taken 1 shot of this dose previously on the 7.5 mg weekly dose, titrated up since lov, tolerating well no nausea no problems with medication, makes her feel ful        Reviewed labs  Ordered labs         She saw Dr Montaño (ortho) for fracture of 5th metatarsal bone L foot  She is still wearing a boot  Lov 6/3/25  Reviewed note:   1. Closed fracture of base of fifth metatarsal bone with delayed healing, left  -     XR FOOT LEFT STANDING (MIN 3 VIEWS); Future  2. Cigarette smoker     Patient verbalized understanding of exam findings and treatment plan.  We engaged in the shared decision-making process and treatment options were discussed at length with the patient.  Surgical and conservative management discussed today along with risk and benefits.     Patient returns today for reevaluation, she had this injury sometime in March 2025 she was diagnosed with base of fifth metatarsal fracture which is proximal to the metaphyseal-diaphyseal junction mostly involving the metaphysis and the proximal tuberosity.  Despite time since the injury which is now going on over 3 months, she continues to have some pain and swelling to the area.  X-rays that were obtained today were independently reviewed on PACS and my results are documented in the physical examination portion of this note.  The

## 2025-07-03 NOTE — TELEPHONE ENCOUNTER
Patient is calling regarding refill for   tirzepatide-weight management (ZEPBOUND) 10 MG/0.5ML SOAJ subCUTAneous auto-injector pen [[id number]] . Confirmed [facility] pharmacy.

## 2025-07-03 NOTE — TELEPHONE ENCOUNTER
Future Appointments:  Future Appointments   Date Time Provider Department Center   7/7/2025  1:45 PM Mara Graves MD [facility]3 [facility] DEP   9/8/2025  8:00 AM [facility] IRMA 1 MRMRMAM [facility]   9/8/2025  8:30 AM [facility] CT 2 MRMRCT [facility]   9/8/2025  1:30 PM Mara Graves MD [facility]3 [facility] DEP   9/16/2025 11:00 AM Amy Montaño MD [facility]        Last Appointment With Me:  5/5/2025     Requested Prescriptions     Pending Prescriptions Disp Refills    tirzepatide-weight management (ZEPBOUND) 10 MG/0.5ML SOAJ subCUTAneous auto-injector pen 2 mL 0     Sig: Inject 10 mg into the skin every 7 days

## 2025-07-07 ENCOUNTER — OFFICE VISIT (OUTPATIENT)
Age: 60
End: 2025-07-07
Payer: COMMERCIAL

## 2025-07-07 VITALS
HEIGHT: 64 IN | SYSTOLIC BLOOD PRESSURE: 110 MMHG | OXYGEN SATURATION: 94 % | WEIGHT: 191.4 LBS | HEART RATE: 98 BPM | RESPIRATION RATE: 15 BRPM | BODY MASS INDEX: 32.68 KG/M2 | TEMPERATURE: 97.9 F | DIASTOLIC BLOOD PRESSURE: 75 MMHG

## 2025-07-07 DIAGNOSIS — I10 PRIMARY HYPERTENSION: ICD-10-CM

## 2025-07-07 DIAGNOSIS — E03.9 ACQUIRED HYPOTHYROIDISM: ICD-10-CM

## 2025-07-07 DIAGNOSIS — E78.00 PURE HYPERCHOLESTEROLEMIA: ICD-10-CM

## 2025-07-07 DIAGNOSIS — E66.811 OBESITY (BMI 30.0-34.9): Primary | ICD-10-CM

## 2025-07-07 DIAGNOSIS — G62.9 NEUROPATHY: ICD-10-CM

## 2025-07-07 DIAGNOSIS — F32.A DEPRESSION, UNSPECIFIED DEPRESSION TYPE: ICD-10-CM

## 2025-07-07 DIAGNOSIS — R79.89 ELEVATED LIVER FUNCTION TESTS: ICD-10-CM

## 2025-07-07 DIAGNOSIS — Z85.3 H/O MALIGNANT NEOPLASM OF BREAST: ICD-10-CM

## 2025-07-07 DIAGNOSIS — R60.0 LOCALIZED EDEMA: ICD-10-CM

## 2025-07-07 DIAGNOSIS — Z12.11 COLON CANCER SCREENING: ICD-10-CM

## 2025-07-07 DIAGNOSIS — Z23 NEED FOR MEASLES-MUMPS-RUBELLA (MMR) VACCINE: ICD-10-CM

## 2025-07-07 PROCEDURE — 99214 OFFICE O/P EST MOD 30 MIN: CPT | Performed by: INTERNAL MEDICINE

## 2025-07-07 PROCEDURE — 3074F SYST BP LT 130 MM HG: CPT | Performed by: INTERNAL MEDICINE

## 2025-07-07 PROCEDURE — 3078F DIAST BP <80 MM HG: CPT | Performed by: INTERNAL MEDICINE

## 2025-07-07 ASSESSMENT — PATIENT HEALTH QUESTIONNAIRE - PHQ9
2. FEELING DOWN, DEPRESSED OR HOPELESS: NOT AT ALL
1. LITTLE INTEREST OR PLEASURE IN DOING THINGS: NOT AT ALL
SUM OF ALL RESPONSES TO PHQ QUESTIONS 1-9: 0

## 2025-07-29 NOTE — TELEPHONE ENCOUNTER
tirzepatide-weight management (ZEPBOUND) 10 MG/0.5ML SOAJ subCUTAneous auto-injector pen     Pt states she is in [place] on vacation an needs this called into [facility] in [place] [facility][phone]    Pt needs as soon as possible

## 2025-07-29 NOTE — TELEPHONE ENCOUNTER
Future Appointments:  Future Appointments   Date Time Provider Department Center   9/8/2025  8:00 AM [facility] IRMA 1 MRMRMAM [facility]   9/8/2025  8:30 AM [facility] CT 2 MRMRCT [facility]   9/8/2025  1:30 PM Mara Graves MD [facility]   9/16/2025 11:00 AM Amy Montaño MD [facility]        Last Appointment With Me:  7/7/2025     Requested Prescriptions     Pending Prescriptions Disp Refills    tirzepatide-weight management (ZEPBOUND) 10 MG/0.5ML SOAJ subCUTAneous auto-injector pen 2 mL 0     Sig: Inject 10 mg into the skin every 7 days

## 2025-08-01 NOTE — TELEPHONE ENCOUNTER
Pt states that the script was very expensive due to insurance.  [facility] and her [facility].    For some reason it is very expensive in [place] and the way the script was written.    Anyway pt walked away with no medication/Zepbound and is not taking until she comes back end of month.     This is an fyi for doctor.

## 2025-08-17 RX ORDER — METOPROLOL SUCCINATE 25 MG/1
25 TABLET, EXTENDED RELEASE ORAL DAILY
Qty: 90 TABLET | Refills: 1 | Status: SHIPPED | OUTPATIENT
Start: 2025-08-17

## 2025-08-17 RX ORDER — LEVOTHYROXINE SODIUM 75 UG/1
75 TABLET ORAL
Qty: 90 TABLET | Refills: 0 | Status: SHIPPED | OUTPATIENT
Start: 2025-08-17